# Patient Record
Sex: MALE | Race: WHITE | NOT HISPANIC OR LATINO | Employment: FULL TIME | URBAN - METROPOLITAN AREA
[De-identification: names, ages, dates, MRNs, and addresses within clinical notes are randomized per-mention and may not be internally consistent; named-entity substitution may affect disease eponyms.]

---

## 2017-12-10 ENCOUNTER — OFFICE VISIT (OUTPATIENT)
Dept: URGENT CARE | Facility: MEDICAL CENTER | Age: 47
End: 2017-12-10
Payer: COMMERCIAL

## 2017-12-10 LAB — S PYO AG THROAT QL: NEGATIVE

## 2017-12-10 PROCEDURE — 87430 STREP A AG IA: CPT

## 2017-12-10 PROCEDURE — 99213 OFFICE O/P EST LOW 20 MIN: CPT

## 2017-12-12 NOTE — PROGRESS NOTES
Assessment    1  Pharyngitis (462) (J02 9)   2  Acute upper respiratory infection (465 9) (J06 9)    Plan  Acute upper respiratory infection    · Benzonatate 200 MG Oral Capsule; TAKE 1 CAPSULE 3 TIMES DAILY AS NEEDED  Pharyngitis    · Rapid StrepA- POC; Source:Throat; Status:Resulted - Requires Verification;   Done:[id number] 10:52AM    Discussion/Summary  Discussion Summary:   1  Motrin as needed for throat painPush fluidsFollow-up with PCP if symptoms persist 4  Take Tessalon 1 every 8 hours as needed for cough  Medication Side Effects Reviewed: Possible side effects of new medications were reviewed with the patient/guardian today  Understands and agrees with treatment plan: The treatment plan was reviewed with the patient/guardian  The patient/guardian understands and agrees with the treatment plan      Chief Complaint    1  Sore Throat  Chief Complaint Free Text Note Form: Pt presents with 3 day history of sore throat worse at night , today slight cough  No OTC for sore throat, Delsym for cough with relief      History of Present Illness  HPI: The patient is a 47-year-old male presents with a 3 day history of sore throat, slight cough and nasal congestion  He denies fever since the onset of his symptoms, he has had no chills or body aches  Hospital Based Practices Required Assessment:  Pain Assessment  the patient states they have pain  The pain is located in the throat  (on a scale of 0 to 10, the patient rates the pain at 6 )  Abuse And Domestic Violence Screen   Yes, the patient is safe at home  -- The patient states no one is hurting them  Depression And Suicide Screen  No, the patient has not had thoughts of hurting themself  No, the patient has not felt depressed in the past 7 days  Prefered Language is  english  Primary Language is  english  Readiness To Learn: Receptive  Barriers To Learning: none    Preferred Learning: verbal      Review of Systems  Focused-Male:  Constitutional: no fever or chills, feels well, no tiredness, no recent weight loss or weight gain  ENT: sore throat, but-- no nasal discharge  Respiratory: cough-- and-- PND  Active Problems  1  Depression (311) (F32 9)    Past Medical History  1  Denied: Patient denies significant medical history  Active Problems And Past Medical History Reviewed: The active problems and past medical history were reviewed and updated today  Family History  Family History Reviewed: The family history was reviewed and updated today  Social History   · Former smoker (E68 25) (L54 075)  Social History Reviewed: The social history was reviewed and updated today  Surgical History    1  Denied: History Of Prior Surgery  Surgical History Reviewed: The surgical history was reviewed and updated today  Current Meds   1  Lexapro 10 MG Oral Tablet; Therapy: (Recorded:[id number]) to Recorded  Medication List Reviewed: The medication list was reviewed and updated today  Allergies    1  No Known Drug Allergies    Vitals  Signs   Recorded: [id number] 10:29AM   Temperature: 98 8 F  Heart Rate: 94  Respiration: 18  Systolic: 978  Diastolic: 74  Height: 5 ft 10 in  Weight: 160 lb   BMI Calculated: 22 96  BSA Calculated: 1 9  O2 Saturation: 97  Pain Scale: 6    Physical Exam   Constitutional  General appearance: No acute distress, well appearing and well nourished  Ears, Nose, Mouth, and Throat  External inspection of ears and nose: Normal    Otoscopic examination: Tympanic membrance translucent with normal light reflex  Canals patent without erythema  Nasal mucosa, septum, and turbinates: Abnormal  -- slight clear drainage bilateral   Oropharynx: Abnormal  -- injected, no exudate  Pulmonary  Respiratory effort: No increased work of breathing or signs of respiratory distress  Auscultation of lungs: Clear to auscultation  Cardiovascular  Auscultation of heart: Normal rate and rhythm, normal S1 and S2, without murmurs  Signatures   Electronically signed by : Trina Roman, [facility]; Dec 10 2017 10:53AM EST                       (Author)    Electronically signed by : KAILEE Blackmon ; Dec 11 2017  5:21PM EST                       (Co-author)

## 2018-01-23 VITALS
WEIGHT: 160 LBS | OXYGEN SATURATION: 97 % | DIASTOLIC BLOOD PRESSURE: 74 MMHG | RESPIRATION RATE: 18 BRPM | SYSTOLIC BLOOD PRESSURE: 124 MMHG | BODY MASS INDEX: 22.9 KG/M2 | HEART RATE: 94 BPM | TEMPERATURE: 98.8 F | HEIGHT: 70 IN

## 2018-05-07 ENCOUNTER — HOSPITAL ENCOUNTER (EMERGENCY)
Facility: HOSPITAL | Age: 48
Discharge: HOME | End: 2018-05-07
Attending: EMERGENCY MEDICINE | Admitting: EMERGENCY MEDICINE
Payer: COMMERCIAL

## 2018-05-07 ENCOUNTER — TELEPHONE (OUTPATIENT)
Dept: UROLOGY | Facility: MEDICAL CENTER | Age: 48
End: 2018-05-07

## 2018-05-07 ENCOUNTER — APPOINTMENT (EMERGENCY)
Dept: CT IMAGING | Facility: HOSPITAL | Age: 48
End: 2018-05-07
Payer: COMMERCIAL

## 2018-05-07 VITALS
TEMPERATURE: 97.7 F | WEIGHT: 160.72 LBS | DIASTOLIC BLOOD PRESSURE: 67 MMHG | OXYGEN SATURATION: 100 % | BODY MASS INDEX: 23.06 KG/M2 | RESPIRATION RATE: 18 BRPM | SYSTOLIC BLOOD PRESSURE: 102 MMHG | HEART RATE: 56 BPM

## 2018-05-07 DIAGNOSIS — N20.1 URETEROLITHIASIS: Primary | ICD-10-CM

## 2018-05-07 LAB
ALBUMIN SERPL BCP-MCNC: 3.6 G/DL (ref 3.5–5)
ALP SERPL-CCNC: 55 U/L (ref 46–116)
ALT SERPL W P-5'-P-CCNC: 29 U/L (ref 12–78)
ANION GAP SERPL CALCULATED.3IONS-SCNC: 8 MMOL/L (ref 4–13)
AST SERPL W P-5'-P-CCNC: 15 U/L (ref 5–45)
BACTERIA UR QL AUTO: ABNORMAL /HPF
BASOPHILS # BLD AUTO: 0.01 THOUSANDS/ΜL (ref 0–0.1)
BASOPHILS NFR BLD AUTO: 0 % (ref 0–1)
BILIRUB SERPL-MCNC: 0.7 MG/DL (ref 0.2–1)
BILIRUB UR QL STRIP: ABNORMAL
BILIRUB UR QL STRIP: NEGATIVE
BUN SERPL-MCNC: 13 MG/DL (ref 5–25)
CALCIUM SERPL-MCNC: 8.6 MG/DL (ref 8.3–10.1)
CHLORIDE SERPL-SCNC: 108 MMOL/L (ref 100–108)
CLARITY UR: ABNORMAL
CLARITY UR: CLEAR
CO2 SERPL-SCNC: 28 MMOL/L (ref 21–32)
COLOR UR: YELLOW
COLOR UR: YELLOW
CREAT SERPL-MCNC: 0.95 MG/DL (ref 0.6–1.3)
EOSINOPHIL # BLD AUTO: 0.03 THOUSAND/ΜL (ref 0–0.61)
EOSINOPHIL NFR BLD AUTO: 1 % (ref 0–6)
ERYTHROCYTE [DISTWIDTH] IN BLOOD BY AUTOMATED COUNT: 12.2 % (ref 11.6–15.1)
GFR SERPL CREATININE-BSD FRML MDRD: 96 ML/MIN/1.73SQ M
GLUCOSE SERPL-MCNC: 131 MG/DL (ref 65–140)
GLUCOSE UR STRIP-MCNC: NEGATIVE MG/DL
GLUCOSE UR STRIP-MCNC: NEGATIVE MG/DL
HCT VFR BLD AUTO: 39.9 % (ref 36.5–49.3)
HGB BLD-MCNC: 14.4 G/DL (ref 12–17)
HGB UR QL STRIP.AUTO: ABNORMAL
HGB UR QL STRIP.AUTO: ABNORMAL
KETONES UR STRIP-MCNC: NEGATIVE MG/DL
KETONES UR STRIP-MCNC: NEGATIVE MG/DL
LEUKOCYTE ESTERASE UR QL STRIP: ABNORMAL
LEUKOCYTE ESTERASE UR QL STRIP: ABNORMAL
LIPASE SERPL-CCNC: 146 U/L (ref 73–393)
LYMPHOCYTES # BLD AUTO: 1.69 THOUSANDS/ΜL (ref 0.6–4.47)
LYMPHOCYTES NFR BLD AUTO: 34 % (ref 14–44)
MCH RBC QN AUTO: 31.5 PG (ref 26.8–34.3)
MCHC RBC AUTO-ENTMCNC: 36.1 G/DL (ref 31.4–37.4)
MCV RBC AUTO: 87 FL (ref 82–98)
MONOCYTES # BLD AUTO: 0.33 THOUSAND/ΜL (ref 0.17–1.22)
MONOCYTES NFR BLD AUTO: 7 % (ref 4–12)
NEUTROPHILS # BLD AUTO: 2.85 THOUSANDS/ΜL (ref 1.85–7.62)
NEUTS SEG NFR BLD AUTO: 58 % (ref 43–75)
NITRITE UR QL STRIP: NEGATIVE
NITRITE UR QL STRIP: NEGATIVE
NON-SQ EPI CELLS URNS QL MICRO: ABNORMAL /HPF
PH UR STRIP.AUTO: 6 [PH] (ref 4.5–8)
PH UR STRIP.AUTO: 6 [PH] (ref 4.5–8)
PLATELET # BLD AUTO: 156 THOUSANDS/UL (ref 149–390)
PMV BLD AUTO: 9.7 FL (ref 8.9–12.7)
POTASSIUM SERPL-SCNC: 3.6 MMOL/L (ref 3.5–5.3)
PROT SERPL-MCNC: 6.6 G/DL (ref 6.4–8.2)
PROT UR STRIP-MCNC: ABNORMAL MG/DL
PROT UR STRIP-MCNC: ABNORMAL MG/DL
RBC # BLD AUTO: 4.57 MILLION/UL (ref 3.88–5.62)
RBC #/AREA URNS AUTO: ABNORMAL /HPF
SODIUM SERPL-SCNC: 144 MMOL/L (ref 136–145)
SP GR UR STRIP.AUTO: 1.02 (ref 1–1.03)
SP GR UR STRIP.AUTO: >=1.03 (ref 1–1.03)
UROBILINOGEN UR QL STRIP.AUTO: 2 E.U./DL
UROBILINOGEN UR QL STRIP.AUTO: 2 E.U./DL
WBC # BLD AUTO: 4.91 THOUSAND/UL (ref 4.31–10.16)
WBC #/AREA URNS AUTO: ABNORMAL /HPF

## 2018-05-07 PROCEDURE — 81001 URINALYSIS AUTO W/SCOPE: CPT | Performed by: EMERGENCY MEDICINE

## 2018-05-07 PROCEDURE — 74177 CT ABD & PELVIS W/CONTRAST: CPT

## 2018-05-07 PROCEDURE — 85025 COMPLETE CBC W/AUTO DIFF WBC: CPT | Performed by: EMERGENCY MEDICINE

## 2018-05-07 PROCEDURE — 96374 THER/PROPH/DIAG INJ IV PUSH: CPT

## 2018-05-07 PROCEDURE — 83690 ASSAY OF LIPASE: CPT | Performed by: EMERGENCY MEDICINE

## 2018-05-07 PROCEDURE — 99284 EMERGENCY DEPT VISIT MOD MDM: CPT

## 2018-05-07 PROCEDURE — 36415 COLL VENOUS BLD VENIPUNCTURE: CPT | Performed by: EMERGENCY MEDICINE

## 2018-05-07 PROCEDURE — 96375 TX/PRO/DX INJ NEW DRUG ADDON: CPT

## 2018-05-07 PROCEDURE — 96361 HYDRATE IV INFUSION ADD-ON: CPT

## 2018-05-07 PROCEDURE — 80053 COMPREHEN METABOLIC PANEL: CPT | Performed by: EMERGENCY MEDICINE

## 2018-05-07 RX ORDER — TAMSULOSIN HYDROCHLORIDE 0.4 MG/1
0.4 CAPSULE ORAL ONCE
Status: DISCONTINUED | OUTPATIENT
Start: 2018-05-07 | End: 2018-05-07 | Stop reason: HOSPADM

## 2018-05-07 RX ORDER — ESCITALOPRAM OXALATE 10 MG/1
20 TABLET ORAL DAILY
COMMUNITY
End: 2020-05-18 | Stop reason: SDUPTHER

## 2018-05-07 RX ORDER — ACETAMINOPHEN 325 MG/1
975 TABLET ORAL ONCE
Status: COMPLETED | OUTPATIENT
Start: 2018-05-07 | End: 2018-05-07

## 2018-05-07 RX ORDER — KETOROLAC TROMETHAMINE 30 MG/ML
15 INJECTION, SOLUTION INTRAMUSCULAR; INTRAVENOUS ONCE
Status: COMPLETED | OUTPATIENT
Start: 2018-05-07 | End: 2018-05-07

## 2018-05-07 RX ORDER — MORPHINE SULFATE 4 MG/ML
4 INJECTION, SOLUTION INTRAMUSCULAR; INTRAVENOUS ONCE
Status: COMPLETED | OUTPATIENT
Start: 2018-05-07 | End: 2018-05-07

## 2018-05-07 RX ORDER — OXYCODONE HYDROCHLORIDE AND ACETAMINOPHEN 5; 325 MG/1; MG/1
1 TABLET ORAL EVERY 6 HOURS PRN
Qty: 20 TABLET | Refills: 0 | Status: SHIPPED | OUTPATIENT
Start: 2018-05-07 | End: 2018-05-17

## 2018-05-07 RX ORDER — LORATADINE AND PSEUDOEPHEDRINE 10; 240 MG/1; MG/1
1 TABLET, EXTENDED RELEASE ORAL DAILY
COMMUNITY
End: 2021-01-21 | Stop reason: HOSPADM

## 2018-05-07 RX ORDER — TAMSULOSIN HYDROCHLORIDE 0.4 MG/1
0.4 CAPSULE ORAL
Qty: 10 CAPSULE | Refills: 0 | Status: ON HOLD | OUTPATIENT
Start: 2018-05-07 | End: 2018-05-10

## 2018-05-07 RX ADMIN — SODIUM CHLORIDE 1000 ML: 0.9 INJECTION, SOLUTION INTRAVENOUS at 07:30

## 2018-05-07 RX ADMIN — KETOROLAC TROMETHAMINE 15 MG: 30 INJECTION, SOLUTION INTRAMUSCULAR at 06:56

## 2018-05-07 RX ADMIN — MORPHINE SULFATE 4 MG: 4 INJECTION, SOLUTION INTRAMUSCULAR; INTRAVENOUS at 06:56

## 2018-05-07 RX ADMIN — IOHEXOL 100 ML: 350 INJECTION, SOLUTION INTRAVENOUS at 08:11

## 2018-05-07 RX ADMIN — ACETAMINOPHEN 975 MG: 325 TABLET, FILM COATED ORAL at 06:55

## 2018-05-07 NOTE — ED CARE HANDOFF
Emergency Department Sign Out Note        Sign out and transfer of care from Dr Brad Palomares  See Separate Emergency Department note  The patient, Teodora Lujan, was evaluated by the previous provider for left flank pain  Workup Completed:  Labs and CT scan    ED Course / Workup Pending (followup): Results of CMP, UA and CT scan                          ED Course as of May 07 1521   Mon May 07, 2018   9524 Patient and family updated on CT results  Pain improved  Systolic pressure 88 on most recent check  IV fluids infusing  Will reassess following their completion  [address] reviewed  Patient not identified in database  Procedures  MDM  CritCare Time      Disposition  Final diagnoses:   Ureterolithiasis     Time reflects when diagnosis was documented in both MDM as applicable and the Disposition within this note     Time User Action Codes Description Comment    5/7/2018  9:21 AM Scott LO Add [N20 1] Ureterolithiasis       ED Disposition     ED Disposition Condition Comment    Discharge  Teodora Lujan discharge to home/self care  Condition at discharge:         Follow-up Information     Follow up With Specialties Details Why [address] [place] [address] For Urology [facility] Schedule an appointment as soon as possible for a visit  [facility] [address] [phone]  [phone]        Discharge Medication List as of 5/7/2018  9:23 AM      START taking these medications    Details   oxyCODONE-acetaminophen (PERCOCET) 5-325 mg per tablet Take 1 tablet by mouth every 6 (six) hours as needed for moderate pain or severe pain Max Daily Amount: 4 tablets, Starting Mon 5/7/2018, Print      tamsulosin (FLOMAX) 0 4 mg Take 1 capsule (0 4 mg total) by mouth daily with dinner, Starting Mon 5/7/2018, Print         CONTINUE these medications which have NOT CHANGED    Details   escitalopram (LEXAPRO) 10 mg tablet Take 10 mg by mouth daily, Historical Med loratadine-pseudoephedrine (CLARITIN-D 24-HOUR)  mg per 24 hr tablet Take 1 tablet by mouth daily, Historical Med           No discharge procedures on file         ED Provider  Electronically Signed by     Denis Archibald MD  05/07/18 8482

## 2018-05-07 NOTE — ED PROVIDER NOTES
History  Chief Complaint   Patient presents with    Flank Pain     Pt reports to ED with c/o L flank pain that started about 1 hour ago     Patient is a pleasant 65-year-old male, denies any other significant past medical history who reports to the emergency department with left leg pain that started about 1 hour ago  Pain is severe, achy, no radiation to the testicle  No nausea, vomiting  No sweats  No abdominal pain  On exam he is clearly having pain and he is writhing around on the bed  No abdominal pain  He has no external signs of trauma  No lightheadedness or dizziness  Medical decision makin-year-old male, suspect kidney stone given the flank location and that he is writhing around on the bed, will CT scan the abdomen, treat his symptoms  The patient (and any family present) verbalized understanding of the discharge instructions and warnings that would necessitate return to the Emergency Department  Gave verbal in addition to written discharge instructions  Specifically highlighted areas of special concern regarding the written and verbal discharge instructions and return precautions  All questions were answered prior to discharge  Prior to Admission Medications   Prescriptions Last Dose Informant Patient Reported?  Taking?   escitalopram (LEXAPRO) 10 mg tablet 2018 at Unknown time  Yes Yes   Sig: Take 10 mg by mouth daily   loratadine-pseudoephedrine (CLARITIN-D 24-HOUR)  mg per 24 hr tablet 2018 at Unknown time  Yes Yes   Sig: Take 1 tablet by mouth daily      Facility-Administered Medications: None       Past Medical History:   Diagnosis Date    Renal disorder     kidney stones       Past Surgical History:   Procedure Laterality Date    APPENDECTOMY      [place] CYSTO/URETERO W/LITHOTRIPSY &INDWELL STENT INSRT Left 2018    Procedure: CYSTOSCOPY URETEROSCOPY,LEFT  RETROGRADE PYELOGRAM AND INSERTION STENT URETERAL;  Surgeon: Francis Hollis MD;  [facility];  Service: Urology       No family history on file  I have reviewed and agree with the history as documented  Social History   Substance Use Topics    Smoking status: Never Smoker    Smokeless tobacco: Never Used    Alcohol use Yes      Comment: ocassional        Review of Systems   Constitutional: Negative for chills and fever  Genitourinary: Positive for flank pain  Negative for hematuria  All other systems reviewed and are negative  Physical Exam  ED Triage Vitals [05/07/18 0629]   Temperature Pulse Respirations Blood Pressure SpO2   97 7 °F (36 5 °C) 57 18 104/60 97 %      Temp Source Heart Rate Source Patient Position - Orthostatic VS BP Location FiO2 (%)   Oral Monitor Lying Left arm --      Pain Score       7           Orthostatic Vital Signs  Vitals:    05/07/18 0629 05/07/18 0744 05/07/18 0900   BP: 104/60 96/64 102/67   Pulse: 57 60 56   Patient Position - Orthostatic VS: Lying Lying        Physical Exam   Constitutional: He is oriented to person, place, and time  He appears well-developed and well-nourished  HENT:   Head: Normocephalic and atraumatic  Eyes: EOM are normal  Pupils are equal, round, and reactive to light  Neck: Normal range of motion  Neck supple  Cardiovascular: Normal rate, regular rhythm and normal heart sounds  No murmur heard  Pulmonary/Chest: Effort normal and breath sounds normal  No respiratory distress  He has no wheezes  Abdominal: Soft  Bowel sounds are normal  He exhibits no distension  There is no tenderness  Musculoskeletal: Normal range of motion  He exhibits no edema or tenderness  Neurological: He is alert and oriented to person, place, and time  No cranial nerve deficit  Coordination normal    Skin: Skin is warm and dry  He is not diaphoretic  No erythema  Psychiatric: He has a normal mood and affect  His behavior is normal    Nursing note and vitals reviewed        ED Medications  Medications   ketorolac (TORADOL) injection 15 mg (15 mg Intravenous Given 5/7/18 0656)   acetaminophen (TYLENOL) tablet 975 mg (975 mg Oral Given 5/7/18 0655)   morphine (PF) 4 mg/mL injection 4 mg (4 mg Intravenous Given 5/7/18 0656)   sodium chloride 0 9 % bolus 1,000 mL (0 mL Intravenous Stopped 5/7/18 1015)   iohexol (OMNIPAQUE) 350 MG/ML injection (MULTI-DOSE) 100 mL (100 mL Intravenous Given 5/7/18 0811)       Diagnostic Studies  Results Reviewed     Procedure Component Value Units Date/Time    Urine Microscopic [[id number]]  (Abnormal) Collected:  05/07/18 0811    Lab Status:  Final result Specimen:  Urine from Urine, Clean Catch Updated:  05/07/18 0832     RBC, UA Innumerable (A) /hpf      WBC, UA 1-2 (A) /hpf      Epithelial Cells Occasional /hpf      Bacteria, UA Occasional /hpf     UA w Reflex to Microscopic w Reflex to Culture [[id number]]  (Abnormal) Collected:  05/07/18 0811    Lab Status:  Final result Specimen:  Urine from Urine, Clean Catch Updated:  05/07/18 0821     Color, UA Yellow     Clarity, UA Slightly Cloudy     Specific Gravity, UA 1 025     pH, UA 6 0     Leukocytes, UA Trace (A)     Nitrite, UA Negative     Protein, UA 30 (1+) (A) mg/dl      Glucose, UA Negative mg/dl      Ketones, UA Negative mg/dl      Urobilinogen, UA 2 0 (A) E U /dl      Bilirubin, UA Small (A)     Blood, UA Large (A)    ED Urine Macroscopic [[id number]]  (Abnormal) Collected:  05/07/18 0812    Lab Status:  Final result Specimen:  Urine Updated:  05/07/18 0810     Color, UA Yellow     Clarity, UA Clear     pH, UA 6 0     Leukocytes, UA Trace (A)     Nitrite, UA Negative     Protein,  (2+) (A) mg/dl      Glucose, UA Negative mg/dl      Ketones, UA Negative mg/dl      Urobilinogen, UA 2 0 (A) E U /dl      Bilirubin, UA Negative     Blood, UA Large (A)     Specific Gravity, UA >=1 030    Narrative:       CLINITEK RESULT    Comprehensive metabolic panel [[id number]] Collected:  05/07/18 0652    Lab Status:  Final result Specimen:  Blood from Arm, Right Updated: 05/07/18 0716     Sodium 144 mmol/L      Potassium 3 6 mmol/L      Chloride 108 mmol/L      CO2 28 mmol/L      Anion Gap 8 mmol/L      BUN 13 mg/dL      Creatinine 0 95 mg/dL      Glucose 131 mg/dL      Calcium 8 6 mg/dL      AST 15 U/L      ALT 29 U/L      Alkaline Phosphatase 55 U/L      Total Protein 6 6 g/dL      Albumin 3 6 g/dL      Total Bilirubin 0 70 mg/dL      eGFR 96 ml/min/1 73sq m     Narrative:         National Kidney Disease Education Program recommendations are as follows:  GFR calculation is accurate only with a steady state creatinine  Chronic Kidney disease less than 60 ml/min/1 73 sq  meters  Kidney failure less than 15 ml/min/1 73 sq  meters  Lipase [[id number]]  (Normal) Collected:  05/07/18 0652    Lab Status:  Final result Specimen:  Blood from Arm, Right Updated:  05/07/18 0716     Lipase 146 u/L     CBC and differential [[id number]]  (Normal) Collected:  05/07/18 0652    Lab Status:  Final result Specimen:  Blood from Arm, Right Updated:  05/07/18 0658     WBC 4 91 Thousand/uL      RBC 4 57 Million/uL      Hemoglobin 14 4 g/dL      Hematocrit 39 9 %      MCV 87 fL      MCH 31 5 pg      MCHC 36 1 g/dL      RDW 12 2 %      MPV 9 7 fL      Platelets 345 Thousands/uL      Neutrophils Relative 58 %      Lymphocytes Relative 34 %      Monocytes Relative 7 %      Eosinophils Relative 1 %      Basophils Relative 0 %      Neutrophils Absolute 2 85 Thousands/µL      Lymphocytes Absolute 1 69 Thousands/µL      Monocytes Absolute 0 33 Thousand/µL      Eosinophils Absolute 0 03 Thousand/µL      Basophils Absolute 0 01 Thousands/µL                  CT abdomen pelvis with contrast   Final Result by Lenwood Crigler, MD (05/07 0882)      1  Proximal left ureteral 5 mm calculus resulting in moderate left hydroureteronephrosis and slightly delayed left nephrogram without further urinary calculi identified  2   Cholelithiasis without cholecystitis  3   Fatty liver                 Workstation performed: [id number]                    Procedures  Procedures       Phone Contacts  ED Phone Contact    ED Course                               MDM  CritCare Time    Disposition  Final diagnoses:   Ureterolithiasis     Time reflects when diagnosis was documented in both MDM as applicable and the Disposition within this note     Time User Action Codes Description Comment    5/7/2018  9:21 AM Colin [place] A Add [N20 1] Ureterolithiasis       ED Disposition     ED Disposition Condition Comment    Discharge  Shaunna Morgan discharge to home/self care  Condition at discharge: Follow-up Information     Follow up With Specialties Details Why T[facility]  [address]  For Urology [facility] Schedule an appointment as soon as possible for a visit  [address] [phone]  [phone]        Discharge Medication List as of 5/7/2018  9:23 AM      START taking these medications    Details   oxyCODONE-acetaminophen (PERCOCET) 5-325 mg per tablet Take 1 tablet by mouth every 6 (six) hours as needed for moderate pain or severe pain Max Daily Amount: 4 tablets, Starting Mon 5/7/2018, Print      tamsulosin (FLOMAX) 0 4 mg Take 1 capsule (0 4 mg total) by mouth daily with dinner, Starting Mon 5/7/2018, Print         CONTINUE these medications which have NOT CHANGED    Details   escitalopram (LEXAPRO) 10 mg tablet Take 10 mg by mouth daily, Historical Med      loratadine-pseudoephedrine (CLARITIN-D 24-HOUR)  mg per 24 hr tablet Take 1 tablet by mouth daily, Historical Med           No discharge procedures on file      ED Provider  Electronically Signed by           David Brooks MD  05/10/18 2219       David Brooks MD  05/10/18 2214

## 2018-05-07 NOTE — DISCHARGE INSTRUCTIONS
Ureteral Stones   WHAT YOU NEED TO KNOW:   A ureteral stone is a stone that forms in the kidney and moves down the ureter and gets stuck there  The ureter is the tube that takes urine from the kidney to the bladder  Stones can form in the urinary system when your urine has high levels of minerals and salts  Urinary stones can be made of uric acid, calcium, phosphate, or oxalate crystals  DISCHARGE INSTRUCTIONS:   Return to the emergency department if:   · You have severe pain that does not improve, even after you take medicine  · You have vomiting that is not relieved by medicine  · You develop a fever  Contact your healthcare provider if:   · You develop a fever  · You have any questions or concerns about your condition or care  Follow up with your healthcare provider as directed: You may need to return for more tests  Write down your questions so you remember to ask them during your visits  Medicines: You may need any of the following:  · NSAIDs , such as ibuprofen, help decrease swelling, pain, and fever  This medicine is available with or without a doctor's order  NSAIDs can cause stomach bleeding or kidney problems in certain people  If you take blood thinner medicine, always ask your healthcare provider if NSAIDs are safe for you  Always read the medicine label and follow directions  · Prescription pain medicine  may help decrease pain or help your ureteral stone pass  Do not wait until the pain is severe before you take pain medicine  · Nausea medicine  may help calm your stomach and prevent vomiting  · Take your medicine as directed  Contact your healthcare provider if you think your medicine is not helping or if you have side effects  Tell him or her if you are allergic to any medicine  Keep a list of the medicines, vitamins, and herbs you take  Include the amounts, and when and why you take them  Bring the list or the pill bottles to follow-up visits   Carry your medicine list with you in case of an emergency  Self-care:   · Drink plenty of liquids  Your healthcare provider may tell you to drink at least 8 to 12 (eight-ounce) cups of liquids each day  This helps flush out the ureteral stones when you urinate  Water is the best liquid to drink  · Strain your urine every time you go to the bathroom  Urinate through a strainer or a piece of thin cloth to catch the stones  Take the stones to your healthcare provider so they can be sent to the lab for tests  This will help your healthcare providers plan the best treatment for you  · Ask your healthcare provider about any nutrition changes you need to make  You may need to limit certain foods such as foods high in sodium (salt), certain protein foods, or foods high in oxalate  After you pass your ureteral stone: Once you have passed your ureteral stone, you may need to do a 24-hour urine test  You may need to save all of your urine for 24 hours  Each time you go to the bathroom, you will urinate into a container  Then you will pour your urine into a larger container that is kept cold  You may be told to write down the time and amount of urine you passed  At the end of 24 hours, the urine is sent to a lab for tests  Results from the test will help your healthcare provider plan ways to prevent more stones from forming  © 2017 [employer] Information is for End User's use only and may not be sold, redistributed or otherwise used for commercial purposes  All illustrations and images included in CareNotes® are the copyrighted property of A D A M [employer]  or [employer]  The above information is an  only  It is not intended as medical advice for individual conditions or treatments  Talk to your doctor, nurse or pharmacist before following any medical regimen to see if it is safe and effective for you

## 2018-05-07 NOTE — TELEPHONE ENCOUNTER
Recd message that patient called for ER follow up for stone,  New patient to our practice  Reviewed EPIC  Evaluated in ER 5/7/18 proximal left ureteral 5 mm calculus with hydronephrosis, initiated on Tamsulosin and provided with pain script  Please schedule new patient appointment with MD or PA within one week

## 2018-05-07 NOTE — ED NOTES
Patient aware of needed urine sample, tried but was unable to void at this time        Deneise Lauryn Garcia  05/07/18 0636

## 2018-05-08 ENCOUNTER — HOSPITAL ENCOUNTER (INPATIENT)
Facility: HOSPITAL | Age: 48
LOS: 1 days | Discharge: HOME | DRG: 690 | End: 2018-05-10
Attending: EMERGENCY MEDICINE | Admitting: HOSPITALIST
Payer: COMMERCIAL

## 2018-05-08 ENCOUNTER — APPOINTMENT (EMERGENCY)
Dept: RADIOLOGY | Facility: HOSPITAL | Age: 48
DRG: 690 | End: 2018-05-08
Payer: COMMERCIAL

## 2018-05-08 DIAGNOSIS — N20.0 LEFT NEPHROLITHIASIS: Primary | ICD-10-CM

## 2018-05-08 DIAGNOSIS — N20.1 URETEROLITHIASIS: ICD-10-CM

## 2018-05-08 DIAGNOSIS — R52 INTRACTABLE PAIN: ICD-10-CM

## 2018-05-08 PROBLEM — F41.1 GENERALIZED ANXIETY DISORDER: Chronic | Status: ACTIVE | Noted: 2018-05-08

## 2018-05-08 LAB
ANION GAP SERPL CALCULATED.3IONS-SCNC: 9 MMOL/L (ref 4–13)
BASOPHILS # BLD AUTO: 0 THOUSANDS/ΜL (ref 0–0.1)
BASOPHILS NFR BLD AUTO: 0 % (ref 0–1)
BUN SERPL-MCNC: 10 MG/DL (ref 5–25)
CALCIUM SERPL-MCNC: 8.6 MG/DL (ref 8.3–10.1)
CHLORIDE SERPL-SCNC: 106 MMOL/L (ref 100–108)
CO2 SERPL-SCNC: 26 MMOL/L (ref 21–32)
CREAT SERPL-MCNC: 0.91 MG/DL (ref 0.6–1.3)
EOSINOPHIL # BLD AUTO: 0 THOUSAND/ΜL (ref 0–0.61)
EOSINOPHIL NFR BLD AUTO: 0 % (ref 0–6)
ERYTHROCYTE [DISTWIDTH] IN BLOOD BY AUTOMATED COUNT: 12.6 % (ref 11.6–15.1)
GFR SERPL CREATININE-BSD FRML MDRD: 101 ML/MIN/1.73SQ M
GLUCOSE SERPL-MCNC: 127 MG/DL (ref 65–140)
HCT VFR BLD AUTO: 37 % (ref 36.5–49.3)
HGB BLD-MCNC: 13 G/DL (ref 12–17)
LYMPHOCYTES # BLD AUTO: 1.01 THOUSANDS/ΜL (ref 0.6–4.47)
LYMPHOCYTES NFR BLD AUTO: 13 % (ref 14–44)
MCH RBC QN AUTO: 31.2 PG (ref 26.8–34.3)
MCHC RBC AUTO-ENTMCNC: 35.1 G/DL (ref 31.4–37.4)
MCV RBC AUTO: 89 FL (ref 82–98)
MONOCYTES # BLD AUTO: 0.31 THOUSAND/ΜL (ref 0.17–1.22)
MONOCYTES NFR BLD AUTO: 4 % (ref 4–12)
NEUTROPHILS # BLD AUTO: 6.59 THOUSANDS/ΜL (ref 1.85–7.62)
NEUTS SEG NFR BLD AUTO: 83 % (ref 43–75)
PLATELET # BLD AUTO: 137 THOUSANDS/UL (ref 149–390)
PMV BLD AUTO: 9.6 FL (ref 8.9–12.7)
POTASSIUM SERPL-SCNC: 4.1 MMOL/L (ref 3.5–5.3)
RBC # BLD AUTO: 4.17 MILLION/UL (ref 3.88–5.62)
SODIUM SERPL-SCNC: 141 MMOL/L (ref 136–145)
WBC # BLD AUTO: 7.91 THOUSAND/UL (ref 4.31–10.16)

## 2018-05-08 PROCEDURE — 99244 OFF/OP CNSLTJ NEW/EST MOD 40: CPT | Performed by: NURSE PRACTITIONER

## 2018-05-08 PROCEDURE — 36415 COLL VENOUS BLD VENIPUNCTURE: CPT | Performed by: EMERGENCY MEDICINE

## 2018-05-08 PROCEDURE — 80048 BASIC METABOLIC PNL TOTAL CA: CPT | Performed by: EMERGENCY MEDICINE

## 2018-05-08 PROCEDURE — 74018 RADEX ABDOMEN 1 VIEW: CPT

## 2018-05-08 PROCEDURE — 96374 THER/PROPH/DIAG INJ IV PUSH: CPT

## 2018-05-08 PROCEDURE — 99220 PR INITIAL OBSERVATION CARE/DAY 70 MINUTES: CPT | Performed by: HOSPITALIST

## 2018-05-08 PROCEDURE — 85025 COMPLETE CBC W/AUTO DIFF WBC: CPT | Performed by: EMERGENCY MEDICINE

## 2018-05-08 PROCEDURE — 99285 EMERGENCY DEPT VISIT HI MDM: CPT

## 2018-05-08 PROCEDURE — 96361 HYDRATE IV INFUSION ADD-ON: CPT

## 2018-05-08 RX ORDER — ESCITALOPRAM OXALATE 10 MG/1
10 TABLET ORAL DAILY
Status: DISCONTINUED | OUTPATIENT
Start: 2018-05-08 | End: 2018-05-10 | Stop reason: HOSPADM

## 2018-05-08 RX ORDER — TAMSULOSIN HYDROCHLORIDE 0.4 MG/1
0.4 CAPSULE ORAL
Status: DISCONTINUED | OUTPATIENT
Start: 2018-05-08 | End: 2018-05-10

## 2018-05-08 RX ORDER — SODIUM CHLORIDE 9 MG/ML
75 INJECTION, SOLUTION INTRAVENOUS CONTINUOUS
Status: DISCONTINUED | OUTPATIENT
Start: 2018-05-08 | End: 2018-05-10 | Stop reason: HOSPADM

## 2018-05-08 RX ORDER — OXYCODONE HYDROCHLORIDE AND ACETAMINOPHEN 5; 325 MG/1; MG/1
1 TABLET ORAL EVERY 6 HOURS PRN
Status: DISCONTINUED | OUTPATIENT
Start: 2018-05-08 | End: 2018-05-08

## 2018-05-08 RX ORDER — OXYCODONE HYDROCHLORIDE AND ACETAMINOPHEN 5; 325 MG/1; MG/1
1 TABLET ORAL EVERY 6 HOURS PRN
Status: DISCONTINUED | OUTPATIENT
Start: 2018-05-08 | End: 2018-05-10 | Stop reason: HOSPADM

## 2018-05-08 RX ORDER — OXYCODONE HYDROCHLORIDE 10 MG/1
10 TABLET ORAL EVERY 4 HOURS PRN
Status: DISCONTINUED | OUTPATIENT
Start: 2018-05-08 | End: 2018-05-08

## 2018-05-08 RX ORDER — KETOROLAC TROMETHAMINE 30 MG/ML
15 INJECTION, SOLUTION INTRAMUSCULAR; INTRAVENOUS ONCE
Status: COMPLETED | OUTPATIENT
Start: 2018-05-08 | End: 2018-05-08

## 2018-05-08 RX ORDER — OXYCODONE HYDROCHLORIDE 10 MG/1
10 TABLET ORAL EVERY 4 HOURS PRN
Status: DISCONTINUED | OUTPATIENT
Start: 2018-05-08 | End: 2018-05-10 | Stop reason: HOSPADM

## 2018-05-08 RX ORDER — KETOROLAC TROMETHAMINE 30 MG/ML
15 INJECTION, SOLUTION INTRAMUSCULAR; INTRAVENOUS EVERY 6 HOURS PRN
Status: DISCONTINUED | OUTPATIENT
Start: 2018-05-08 | End: 2018-05-10 | Stop reason: HOSPADM

## 2018-05-08 RX ADMIN — SODIUM CHLORIDE 1000 ML: 0.9 INJECTION, SOLUTION INTRAVENOUS at 09:16

## 2018-05-08 RX ADMIN — TAMSULOSIN HYDROCHLORIDE 0.4 MG: 0.4 CAPSULE ORAL at 16:33

## 2018-05-08 RX ADMIN — SODIUM CHLORIDE 75 ML/HR: 0.9 INJECTION, SOLUTION INTRAVENOUS at 14:53

## 2018-05-08 RX ADMIN — KETOROLAC TROMETHAMINE 15 MG: 30 INJECTION, SOLUTION INTRAMUSCULAR at 09:16

## 2018-05-08 RX ADMIN — ENOXAPARIN SODIUM 40 MG: 40 INJECTION SUBCUTANEOUS at 16:34

## 2018-05-08 NOTE — H&P
H&P- Kathrin Moody 1970, 57 y o  male MRN: [id number]    Unit/Bed#: ED 28 Encounter: [id number]    Primary Care Provider: Xander Burnette MD   Date and time admitted to hospital: 5/8/2018  8:44 AM        * Left nephrolithiasis   Assessment & Plan     Pain, left groin 10/10, controlled on Percocet   KUB 5/ 8:5 mm left mid ureteric calculus at the L3-4 level with minimal if any caudal migration compared to recent CT  CT scan abdomen pelvis 5/ 7:  1   Proximal left ureteral 5 mm calculus resulting in moderate left hydroureteronephrosis and slightly delayed left nephrogram without further urinary calculi identified  2   Cholelithiasis without cholecystitis  3   Fatty liver  plan   IV hydration   analgesia for pain   urology consult   follow-up a m  labs        Generalized anxiety disorder   Assessment & Plan     Continue Lexapro              VTE Prophylaxis: Enoxaparin (Lovenox)  / sequential compression device   Code Status: full code  POLST: POLST form is not discussed and not completed at this time  Discussion with family:  Yes with wife    Anticipated Length of Stay:  Patient will be admitted on an Observation basis with an anticipated length of stay of  <2 midnights  Justification for Hospital Stay:  Medical management of the nephrolithiasis    Total Time for Visit, including Counseling / Coordination of Care: 45 minutes  Greater than 50% of this total time spent on direct patient counseling and coordination of care  Chief Complaint:    Left groin pain    History of Present Illness:    Kathrin Moody is a 57 y o  male who presents with left groin pain which started  Two days ago  Patient visited the ER 05/07/2018 with complaints of left groin pain worsening  Patient was found to have left-sided nephrolithiasis 5 mm left mid ureter calculus at L3-L4  Patient was discharged on Percocet for pain control  Earlier this morning at 1:30 a m   Patient reported severe left groin pain, took Percocet, went to sleep, woke up back with worsening severe left point pain  Reported cold sweats, shaking chills  Denied any nausea, vomiting, fever  Pain is localized left groin, 10/10  Intensity, intermittent,  No aggravating factors  Relieved with Percocet  Past medical history of generalized anxiety disorder, appendicectomy 15 years ago  Review of Systems:    Review of Systems   Constitutional: Positive for activity change and chills  Negative for fever  HENT: Negative  Eyes: Negative  Respiratory: Negative  Cardiovascular: Negative  Gastrointestinal: Negative  Endocrine: Negative  Genitourinary: Negative for decreased urine volume, difficulty urinating, dysuria and frequency  Musculoskeletal: Negative  Allergic/Immunologic: Negative  Neurological: Negative  Hematological: Negative  Psychiatric/Behavioral: Negative  Past Medical and Surgical History:     Past Medical History:   Diagnosis Date    Renal disorder     kidney stones       Past Surgical History:   Procedure Laterality Date    APPENDECTOMY         Meds/Allergies:    Prior to Admission medications    Medication Sig Start Date End Date Taking? Authorizing Provider   escitalopram (LEXAPRO) 10 mg tablet Take 10 mg by mouth daily    Historical Provider, MD   loratadine-pseudoephedrine (CLARITIN-D 24-HOUR)  mg per 24 hr tablet Take 1 tablet by mouth daily    Historical Provider, MD   oxyCODONE-acetaminophen (PERCOCET) 5-325 mg per tablet Take 1 tablet by mouth every 6 (six) hours as needed for moderate pain or severe pain Max Daily Amount: 4 tablets 5/7/18   Berny Pete MD   [facility] 0 4 mg Take 1 capsule (0 4 mg total) by mouth daily with dinner 5/7/18   Berny Pete MD     I have reviewed home medications with patient personally  Allergies:    Allergies   Allergen Reactions    Betadine Antibiotic-Moisturize [Bacitracin-Polymyxin B] Rash       Social History: Marital Status: Unknown   Occupation:   Employed  Patient Pre-hospital Living Situation:  With wife  Patient Pre-hospital Level of Mobility: ambulates without assistance  Patient Pre-hospital Diet Restrictions: none  Substance Use History:   History   Alcohol Use    Yes     Comment: ocassional     History   Smoking Status    Never Smoker   Smokeless Tobacco    Never Used     History   Drug Use No       Family History:   family history positive for grandfather paternal -kidney disease, mother with lung cancer, father with high cholesterol    Physical Exam:     Vitals:   Blood Pressure: 102/55 (05/08/18 1213)  Pulse: 81 (05/08/18 1213)  Temperature: 98 9 °F (37 2 °C) (05/08/18 0851)  Temp Source: Oral (05/08/18 0851)  Respirations: 18 (05/08/18 1213)  Weight - Scale: 74 4 kg (164 lb 0 4 oz) (05/08/18 0851)  SpO2: 97 % (05/08/18 1213)    Physical Exam   Constitutional: He is oriented to person, place, and time  He appears distressed  HENT:   Head: Normocephalic and atraumatic  Mouth/Throat: Oropharynx is clear and moist    Eyes: EOM are normal  Pupils are equal, round, and reactive to light  No scleral icterus  Neck: Normal range of motion  No JVD present  Cardiovascular: Normal rate  Exam reveals no gallop and no friction rub  No murmur heard  Pulmonary/Chest: Effort normal and breath sounds normal  No respiratory distress  He has no wheezes  He has no rales  Abdominal: Soft  Bowel sounds are normal  He exhibits no distension  There is no tenderness  There is no rebound and no guarding  Musculoskeletal: Normal range of motion  He exhibits no edema, tenderness or deformity  Neurological: He is alert and oriented to person, place, and time  No cranial nerve deficit  Coordination normal    Skin: Skin is warm  No rash noted  No erythema  Psychiatric: He has a normal mood and affect  His behavior is normal            Additional Data:     Lab Results: I have personally reviewed pertinent reports  Results from last 7 days  Lab Units 05/08/18  0916   WBC Thousand/uL 7 91   HEMOGLOBIN g/dL 13 0   HEMATOCRIT % 37 0   PLATELETS Thousands/uL 137*   NEUTROS PCT % 83*   LYMPHS PCT % 13*   MONOS PCT % 4   EOS PCT % 0       Results from last 7 days  Lab Units 05/08/18  0916 05/07/18  0652   SODIUM mmol/L 141 144   POTASSIUM mmol/L 4 1 3 6   CHLORIDE mmol/L 106 108   CO2 mmol/L 26 28   BUN mg/dL 10 13   CREATININE mg/dL 0 91 0 95   CALCIUM mg/dL 8 6 8 6   TOTAL PROTEIN g/dL  --  6 6   BILIRUBIN TOTAL mg/dL  --  0 70   ALK PHOS U/L  --  55   ALT U/L  --  29   AST U/L  --  15   GLUCOSE RANDOM mg/dL 127 131                   Imaging: I have personally reviewed pertinent reports  XR abdomen 1 view kub   ED Interpretation by Sarthak Matute DO (05/08 0390)   Unchanged appearance of proximal left-sided ureteral stone      Final Result by Felix Sánchez DO (05/08 7898)      5 mm left mid ureteric calculus at the L3-4 level with minimal if any caudal migration compared to recent CT  Workstation performed: [id number]             EKG, Pathology, and Other Studies Reviewed on Admission:   · EKG: n/a    Allscripts / Epic Records Reviewed: Yes     ** Please Note: This note has been constructed using a voice recognition system   **

## 2018-05-08 NOTE — PLAN OF CARE
Problem: DISCHARGE PLANNING - CARE MANAGEMENT  Goal: Discharge to post-acute care or home with appropriate resources  INTERVENTIONS:  - Conduct assessment to determine patient/family and health care team treatment goals, and need for post-acute services based on payer coverage, community resources, and patient preferences, and barriers to discharge  - Address psychosocial, clinical, and financial barriers to discharge as identified in assessment in conjunction with the patient/family and health care team  - Arrange appropriate level of post-acute services according to patients   needs and preference and payer coverage in collaboration with the physician and health care team  - Communicate with and update the patient/family, physician, and health care team regarding progress on the discharge plan  - Arrange appropriate transportation to post-acute venues  Outcome: Progressing  LOS- 0   PATIENT IS NOT A BUNDLE  PATIENT IS NOT  A READMIT  CM met with patient at bedside along with his wife  Patient lives with his family in [facility] in a 2 story house  Patient uses no DME and can complete ADLS by himself  Patient has no Hx of VNA, rehab, MH , or s&a  Patient uses the [facility] on [address]  Patient has no POA and want no info  Patient works at [employer] and can drive himself  CM dept will continue to follow until d/c

## 2018-05-08 NOTE — ASSESSMENT & PLAN NOTE
Pain, left groin 10/10, controlled on Percocet   KUB 5/ 8:5 mm left mid ureteric calculus at the L3-4 level with minimal if any caudal migration compared to recent CT  CT scan abdomen pelvis 5/ 7:  1   Proximal left ureteral 5 mm calculus resulting in moderate left hydroureteronephrosis and slightly delayed left nephrogram without further urinary calculi identified  2   Cholelithiasis without cholecystitis  3   Fatty liver     plan   IV hydration   analgesia for pain   urology consult   follow-up a m  labs

## 2018-05-08 NOTE — PLAN OF CARE
GENITOURINARY - ADULT     Maintains or returns to baseline urinary function Progressing     Absence of urinary retention Progressing        PAIN - ADULT     Verbalizes/displays adequate comfort level or baseline comfort level Progressing        SAFETY ADULT     Patient will remain free of falls Progressing

## 2018-05-08 NOTE — CONSULTS
CONSULT    Patient Name: Lenin Varma  Patient MRN: [id number]  Date of Service: 5/8/2018   Date / Time Note Created: 5/8/2018 4:12 PM   Referring Provider: Kesha Guidry MD  Provider Creating Note: EDWAR Summers    PCP: Gerardo Espinoza  Attending Provider:  Kesha Guidry MD    Reason for Consult: Flank Pain    History of present illness:  Mr Terra Rowland is a very pleasant 61-year-old male who was seen in the emergency room yesterday for chief complaint of left flank pain; not accompanied by fever, chills, testicular or groin pain or gross hematuria  CT scan of the abdomen and pelvis were demonstrative for a left 5 mm obstructing ureteral calculus with resultant hydronephrosis  Patient denies any prior urologic evaluation, consultation or  surgical manipulation in the past   Current temperature is 98 7° with normal creatinine and normal WBCs  In the emergency room, provider's 1 unable to stop blush adequate pain control  Urologic consultation was requested for further surgical management  Active Problems:   Patient Active Problem List   Diagnosis    Left nephrolithiasis    Generalized anxiety disorder       Impressions  Left Ureteral Calculus  Hydronephrosis  Renal Colic    Recommendations  1  Initiate aggressive IVFs   Flomax  3  Analgesia/Narcotics 5  Anti-emetics 5  ATBs empirically while awaiting culture 6  Strain urine 7  NPO after Midnight for OR if no spontaneous expulsion achieved  Explained risk, benefits and potential complications of ureteroscopic stone extraction  Informed consent by surgeon  Past Medical History:   Diagnosis Date    Renal disorder     kidney stones       Past Surgical History:   Procedure Laterality Date    APPENDECTOMY         History reviewed  No pertinent family history  Social History     Social History    Marital status: Unknown     Spouse name: N/A    Number of children: N/A    Years of education: N/A     Occupational History    Not on file  Social History Main Topics    Smoking status: Never Smoker    Smokeless tobacco: Never Used    Alcohol use Yes      Comment: ocassional    Drug use: No    Sexual activity: Not on file     Other Topics Concern    Not on file     Social History Narrative    No narrative on file       Allergies   Allergen Reactions    Betadine Antibiotic-Moisturize [Bacitracin-Polymyxin B] Rash       Review of Systems  10 point review of systems negative except as noted in HPI  Constitutional:   negative for - chills or fever  Cardiovascular:   no chest pain or dyspnea on exertion  Gastrointestinal:   positive for - abdominal pain  Genito-Urinary:   no dysuria, trouble voiding, or hematuria  Neurological:   no TIA or stroke symptoms     Chart Review   Allergies, current medications, history, problem list    Vital Signs  /63 (BP Location: Right arm)   Pulse 57   Temp 98 7 °F (37 1 °C) (Oral)   Resp 18   Ht 5' 10" (1 778 m)   Wt 74 4 kg (164 lb 0 4 oz)   SpO2 99%   BMI 23 53 kg/m²     Physical Exam  General appearance: alert and oriented, in no acute distress  Head: Normocephalic, without obvious abnormality, atraumatic  Neck: no adenopathy, no carotid bruit, no JVD, supple, symmetrical, trachea midline and thyroid not enlarged, symmetric, no tenderness/mass/nodules  Lungs: clear to auscultation bilaterally  Heart: regular rate and rhythm, S1, S2 normal, no murmur, click, rub or gallop  Abdomen: abnormal findings:  mild tenderness in the lower abdomen  Extremities: extremities normal, warm and well-perfused; no cyanosis, clubbing, or edema  Pulses: 2+ and symmetric  Neurologic: Grossly normal  No urinary drains     Laboratory Studies  Lab Results   Component Value Date     05/08/2018    K 4 1 05/08/2018     05/08/2018    CO2 26 05/08/2018    GLUCOSE 127 05/08/2018    CREATININE 0 91 05/08/2018    BUN 10 05/08/2018     Lab Results   Component Value Date    WBC 7 91 05/08/2018    RBC 4 17 05/08/2018 HGB 13 0 05/08/2018    HCT 37 0 05/08/2018    MCV 89 05/08/2018    MCH 31 2 05/08/2018    RDW 12 6 05/08/2018     (L) 05/08/2018         Imaging and Other Studies  )  Xr Abdomen 1 View Kub    Result Date: 5/8/2018  Narrative: ABDOMEN INDICATION:   Evaluate position of known left ureteral 5 mm calculus  COMPARISON:  CT abdomen pelvis from yesterday VIEWS:  AP supine Images: 2 FINDINGS: 5 mm left ureteric calculus is seen at the L3-4 level  Minimal if any caudal migration noted compared to recent CT  There is a nonobstructive bowel gas pattern  Modest amount of scattered colonic stool  No discernible free air on this supine study  Upright or left lateral decubitus imaging is more sensitive to detect subtle free air in the appropriate setting  Subtle right upper quadrant calcification likely corresponding to known gallstone  Visualized lung bases are clear  Visualized osseous structures are unremarkable for the patient's age  Impression: 5 mm left mid ureteric calculus at the L3-4 level with minimal if any caudal migration compared to recent CT  Workstation performed: [id number]     Ct Abdomen Pelvis With Contrast    Result Date: 5/7/2018  Narrative: CT ABDOMEN AND PELVIS WITH IV CONTRAST INDICATION:   left flank pain, likely kidney stone  COMPARISON: None  TECHNIQUE:  CT examination of the abdomen and pelvis was performed  Axial, sagittal, and coronal 2D reformatted images were created from the source data and submitted for interpretation  Radiation dose length product (DLP) for this visit:  445 mGy-cm   This examination, like all CT scans performed in the [facility], was performed utilizing techniques to minimize radiation dose exposure, including the use of iterative reconstruction and automated exposure control  IV Contrast:  100 mL of iohexol (OMNIPAQUE) Enteric Contrast:  Enteric contrast was not administered   FINDINGS: ABDOMEN LOWER CHEST:  No clinically significant abnormality identified in the visualized lower chest  LIVER/BILIARY TREE:  Mild fatty liver noted  GALLBLADDER:  Cholelithiasis without cholecystitis  SPLEEN:  Unremarkable  PANCREAS:  Unremarkable  ADRENAL GLANDS:  Unremarkable  KIDNEYS/URETERS:  5 mm proximal left ureteral calculus identified at the L3 level resulting in moderate left hydroureteronephrosis  Slightly delayed left nephrogram   The distal ureter is decompressed  No further urinary calculi  STOMACH AND BOWEL:  Mild fecal stasis noted  APPENDIX:  No findings to suggest appendicitis  ABDOMINOPELVIC CAVITY:  No ascites or free intraperitoneal air  No lymphadenopathy  VESSELS:  Unremarkable for patient's age  PELVIS REPRODUCTIVE ORGANS:  Unremarkable for patient's age  URINARY BLADDER:  Unremarkable  ABDOMINAL WALL/INGUINAL REGIONS:  Unremarkable  OSSEOUS STRUCTURES:  No acute fracture or destructive osseous lesion  Impression: 1  Proximal left ureteral 5 mm calculus resulting in moderate left hydroureteronephrosis and slightly delayed left nephrogram without further urinary calculi identified  2   Cholelithiasis without cholecystitis  3   Fatty liver  Workstation performed: [id number]       Medications   Scheduled Meds:  Current Facility-Administered Medications:  enoxaparin 40 mg Subcutaneous Daily Scott Munoz MD    escitalopram 10 mg Oral Daily Scott Munoz MD    oxyCODONE 10 mg Oral Q4H PRN Scott Munoz MD    oxyCODONE-acetaminophen 1 tablet Oral Q6H PRN Scott Munoz MD    sodium chloride 75 mL/hr Intravenous Continuous Scott Munoz MD Last Rate: 75 mL/hr (05/08/18 1453)   tamsulosin 0 4 mg Oral Daily With Alexandria Pedro MD      Continuous Infusions:  sodium chloride 75 mL/hr Last Rate: 75 mL/hr (05/08/18 1453)     PRN Meds: oxyCODONE    oxyCODONE-acetaminophen      Total time spent with patient 25 minutes, >50% spent counseling and/or coordination of care           EDWAR Todd

## 2018-05-08 NOTE — Clinical Note
Case was discussed with Dr Queenie Chavez and the patient's admission status was agreed to be Admission Status: observation status to the service of Dr Queenie Chavez

## 2018-05-08 NOTE — ED PROVIDER NOTES
History  Chief Complaint   Patient presents with    Flank Pain     Pt  reports known 5 mm kidney stone from yesterday  Pt  reports pain is worse  Pain on left side  Took Percocet this am with no relief       History provided by:  Patient  Flank Pain   Pain location:  L flank  Pain quality: sharp    Pain radiates to:  Does not radiate  Pain severity:  Severe  Onset quality:  Gradual  Duration:  1 day  Timing:  Intermittent  Progression:  Worsening  Chronicity:  New  Context comment:  Dx wtih Proximal left ureteral 5 mm calculus, yesterday, discharged home with Percocets has been taking them but pain worsening, unable to sleep, prompting visit  Relieved by:  Nothing  Worsened by:  Nothing  Ineffective treatments: Percocet  Associated symptoms: no chest pain, no chills, no constipation, no cough, no diarrhea, no dysuria, no fever, no nausea, no shortness of breath, no sore throat and no vomiting    Associated symptoms comment:  The patient currently significantly drowsy, eyes mainly closed throughout examination, answering questions appropriately but appears sedated      Prior to Admission Medications   Prescriptions Last Dose Informant Patient Reported? Taking?   escitalopram (LEXAPRO) 10 mg tablet   Yes No   Sig: Take 10 mg by mouth daily   loratadine-pseudoephedrine (CLARITIN-D 24-HOUR)  mg per 24 hr tablet   Yes No   Sig: Take 1 tablet by mouth daily   oxyCODONE-acetaminophen (PERCOCET) 5-325 mg per tablet   No No   Sig: Take 1 tablet by mouth every 6 (six) hours as needed for moderate pain or severe pain Max Daily Amount: 4 tablets   tamsulosin (FLOMAX) 0 4 mg   No No   Sig: Take 1 capsule (0 4 mg total) by mouth daily with dinner      Facility-Administered Medications: None       Past Medical History:   Diagnosis Date    Renal disorder     kidney stones       Past Surgical History:   Procedure Laterality Date    APPENDECTOMY         History reviewed  No pertinent family history    I have reviewed and agree with the history as documented  Social History   Substance Use Topics    Smoking status: Never Smoker    Smokeless tobacco: Never Used    Alcohol use Yes      Comment: ocassional        Review of Systems   Constitutional: Negative for activity change, chills, diaphoresis and fever  HENT: Negative for congestion, sinus pressure and sore throat  Eyes: Negative for pain and visual disturbance  Respiratory: Negative for cough, chest tightness, shortness of breath, wheezing and stridor  Cardiovascular: Negative for chest pain and palpitations  Gastrointestinal: Negative for abdominal distention, abdominal pain, constipation, diarrhea, nausea and vomiting  Genitourinary: Positive for flank pain  Negative for decreased urine volume, difficulty urinating, discharge, dysuria, frequency, penile pain, penile swelling, scrotal swelling, testicular pain and urgency  Musculoskeletal: Negative for neck pain and neck stiffness  Skin: Negative for rash  Neurological: Negative for dizziness, speech difficulty, light-headedness, numbness and headaches  Physical Exam  ED Triage Vitals [05/08/18 0851]   Temperature Pulse Respirations Blood Pressure SpO2   98 9 °F (37 2 °C) (!) 51 18 111/61 100 %      Temp Source Heart Rate Source Patient Position - Orthostatic VS BP Location FiO2 (%)   Oral Monitor Lying Right arm --      Pain Score       Worst Possible Pain           Orthostatic Vital Signs  Vitals:    05/08/18 1100 05/08/18 1213 05/08/18 1424 05/08/18 1432   BP: 96/57 102/55 110/63 115/68   Pulse: (!) 52 81 81 67   Patient Position - Orthostatic VS:  Lying Lying Lying       Physical Exam   Constitutional: He is oriented to person, place, and time  He appears well-developed  No distress  The patient currently significantly drowsy, eyes mainly closed throughout examination, answering questions appropriately but appears sedated   HENT:   Head: Normocephalic and atraumatic     Eyes: Pupils are equal, round, and reactive to light  Neck: Normal range of motion  Neck supple  No tracheal deviation present  Cardiovascular: Normal rate, regular rhythm, normal heart sounds and intact distal pulses  No murmur heard  Pulmonary/Chest: Effort normal and breath sounds normal  No stridor  No respiratory distress  Abdominal: Soft  He exhibits no distension  There is no tenderness  There is no rebound and no guarding  Musculoskeletal: Normal range of motion  Neurological: He is alert and oriented to person, place, and time  Skin: Skin is warm and dry  He is not diaphoretic  No erythema  No pallor  Psychiatric: He has a normal mood and affect  Vitals reviewed        ED Medications  Medications   escitalopram (LEXAPRO) tablet 10 mg (not administered)   oxyCODONE-acetaminophen (PERCOCET) 5-325 mg per tablet 1 tablet (not administered)   tamsulosin (FLOMAX) capsule 0 4 mg (not administered)   enoxaparin (LOVENOX) subcutaneous injection 40 mg (not administered)   sodium chloride 0 9 % infusion (75 mL/hr Intravenous New Bag 5/8/18 1453)   oxyCODONE (ROXICODONE) immediate release tablet 10 mg (not administered)   sodium chloride 0 9 % bolus 1,000 mL (0 mL Intravenous Stopped 5/8/18 1104)   ketorolac (TORADOL) injection 15 mg (15 mg Intravenous Given 5/8/18 0916)       Diagnostic Studies  Results Reviewed     Procedure Component Value Units Date/Time    Basic metabolic panel [[id number]] Collected:  05/08/18 0916    Lab Status:  Final result Specimen:  Blood from Arm, Right Updated:  05/08/18 0941     Sodium 141 mmol/L      Potassium 4 1 mmol/L      Chloride 106 mmol/L      CO2 26 mmol/L      Anion Gap 9 mmol/L      BUN 10 mg/dL      Creatinine 0 91 mg/dL      Glucose 127 mg/dL      Calcium 8 6 mg/dL      eGFR 101 ml/min/1 73sq m     Narrative:         National Kidney Disease Education Program recommendations are as follows:  GFR calculation is accurate only with a steady state creatinine  Chronic Kidney disease less than 60 ml/min/1 73 sq  meters  Kidney failure less than 15 ml/min/1 73 sq  meters  CBC and differential [[id number]]  (Abnormal) Collected:  05/08/18 0916    Lab Status:  Final result Specimen:  Blood from Arm, Right Updated:  05/08/18 0927     WBC 7 91 Thousand/uL      RBC 4 17 Million/uL      Hemoglobin 13 0 g/dL      Hematocrit 37 0 %      MCV 89 fL      MCH 31 2 pg      MCHC 35 1 g/dL      RDW 12 6 %      MPV 9 6 fL      Platelets 865 (L) Thousands/uL      Neutrophils Relative 83 (H) %      Lymphocytes Relative 13 (L) %      Monocytes Relative 4 %      Eosinophils Relative 0 %      Basophils Relative 0 %      Neutrophils Absolute 6 59 Thousands/µL      Lymphocytes Absolute 1 01 Thousands/µL      Monocytes Absolute 0 31 Thousand/µL      Eosinophils Absolute 0 00 Thousand/µL      Basophils Absolute 0 00 Thousands/µL                  XR abdomen 1 view kub   ED Interpretation by Marie Bowser DO (05/08 0946)   Unchanged appearance of proximal left-sided ureteral stone      Final Result by Jorge Cope DO (05/08 1036)      5 mm left mid ureteric calculus at the L3-4 level with minimal if any caudal migration compared to recent CT  Workstation performed: [id number]                    Procedures  Procedures       Phone Contacts  ED Phone Contact    ED Course  ED Course as of May 08 1518   Tue May 08, 2018   1000 Page placed Urology as patient report some improvement but feels very uncomfortable with discharge plan      1026 No call back, Re page Urology                                MDM  Number of Diagnoses or Management Options  Intractable pain: new and requires workup  Left nephrolithiasis: new and requires workup  Diagnosis management comments:       Initial ED assessment:  57-year-old male presents with known left-sided proximal nephrolithiasis, Re presents due to uncontrolled pain despite taking Percocet as an outpatient    Initial DDx includes but is not limited to:   Pain from kidney stone doubt new pathology    Initial ED plan:   KUB to evaluate for stone placement, IV pain control medications, will check creatinine, discussed with patient wife about pain controlling medication choice  I explained at this time I do not feel comfortable giving IV opiates due to patient's current sleepy state  I understand patient was not sleeping most of last night due to discomfort, but currently patient very sleepy eyes mainly close throughout examination, he has taken 5 Percocet over the past 12 hr, I am concerned in this opiate naive person that any more opiates especially IV opiate medications can sent patient into respiratory depression  Although I do want to control patient's pain, I do not want to over sedate him that would require reversal agents or airway interventions  Final ED summary/disposition:   After evaluation and workup in the emergency department, pain controlled in ER, patient very uncomfortable with discharge plan due to severe pain despite oral pain medications at home    Will admit the hospital for Urology consultation and possible stent placement due to severe intractable pain        Amount and/or Complexity of Data Reviewed  Clinical lab tests: ordered and reviewed  Tests in the radiology section of CPT®: ordered and reviewed  Decide to obtain previous medical records or to obtain history from someone other than the patient: yes  Obtain history from someone other than the patient: yes  Review and summarize past medical records: yes  Independent visualization of images, tracings, or specimens: yes      CritCare Time    Disposition  Final diagnoses:   Left nephrolithiasis   Intractable pain - Left flank secondary to nephrolithiasis     Time reflects when diagnosis was documented in both MDM as applicable and the Disposition within this note     Time User Action Codes Description Comment    5/8/2018 11:19 AM Alex CALHOUN Add [N20 0] Left nephrolithiasis     5/8/2018 11:19 AM Hilda العلي Add [R52] Intractable pain     5/8/2018 11:19 AM Yuliaanrosita Preciadogonzalez Modify [R52] Intractable pain Left flank secondary to nephrolithiasis      ED Disposition     ED Disposition Condition Comment    Admit  Case was discussed with Dr Jose Payne and the patient's admission status was agreed to be Admission Status: observation status to the service of [address] [place] [address]      Follow-up Information    None       Current Discharge Medication List      CONTINUE these medications which have NOT CHANGED    Details   escitalopram (LEXAPRO) 10 mg tablet Take 10 mg by mouth daily      loratadine-pseudoephedrine (CLARITIN-D 24-HOUR)  mg per 24 hr tablet Take 1 tablet by mouth daily      oxyCODONE-acetaminophen (PERCOCET) 5-325 mg per tablet Take 1 tablet by mouth every 6 (six) hours as needed for moderate pain or severe pain Max Daily Amount: 4 tablets  Qty: 20 tablet, Refills: 0    Associated Diagnoses: Ureterolithiasis      tamsulosin (FLOMAX) 0 4 mg Take 1 capsule (0 4 mg total) by mouth daily with dinner  Qty: 10 capsule, Refills: 0    Associated Diagnoses: Ureterolithiasis           No discharge procedures on file      ED Provider  Electronically Signed by           Kaitlin Hitchcock DO  05/08/18 8931

## 2018-05-08 NOTE — SOCIAL WORK
LOS- 0   PATIENT IS NOT A BUNDLE  PATIENT IS NOT  A READMIT  CM met with patient at bedside along with his wife  Patient lives with his family in [facility] in a 2 story house  Patient uses no DME and can complete ADLS by himself  Patient has no Hx of VNA, rehab, MH , or s&a  Patient uses the [facility] on [address]  Patient has no POA and want no info  Patient works at [employer] and can drive himself  CM dept will continue to follow until d/c

## 2018-05-09 ENCOUNTER — ANESTHESIA (INPATIENT)
Dept: PERIOP | Facility: HOSPITAL | Age: 48
DRG: 690 | End: 2018-05-09
Payer: COMMERCIAL

## 2018-05-09 ENCOUNTER — ANESTHESIA EVENT (INPATIENT)
Dept: PERIOP | Facility: HOSPITAL | Age: 48
DRG: 690 | End: 2018-05-09
Payer: COMMERCIAL

## 2018-05-09 ENCOUNTER — APPOINTMENT (INPATIENT)
Dept: RADIOLOGY | Facility: HOSPITAL | Age: 48
DRG: 690 | End: 2018-05-09
Payer: COMMERCIAL

## 2018-05-09 LAB
ALBUMIN SERPL BCP-MCNC: 2.9 G/DL (ref 3.5–5)
ALP SERPL-CCNC: 48 U/L (ref 46–116)
ALT SERPL W P-5'-P-CCNC: 20 U/L (ref 12–78)
ANION GAP SERPL CALCULATED.3IONS-SCNC: 7 MMOL/L (ref 4–13)
AST SERPL W P-5'-P-CCNC: 12 U/L (ref 5–45)
BASOPHILS # BLD AUTO: 0.01 THOUSANDS/ΜL (ref 0–0.1)
BASOPHILS NFR BLD AUTO: 0 % (ref 0–1)
BILIRUB SERPL-MCNC: 0.8 MG/DL (ref 0.2–1)
BUN SERPL-MCNC: 10 MG/DL (ref 5–25)
CALCIUM SERPL-MCNC: 7.8 MG/DL (ref 8.3–10.1)
CHLORIDE SERPL-SCNC: 111 MMOL/L (ref 100–108)
CO2 SERPL-SCNC: 26 MMOL/L (ref 21–32)
CREAT SERPL-MCNC: 0.83 MG/DL (ref 0.6–1.3)
EOSINOPHIL # BLD AUTO: 0.02 THOUSAND/ΜL (ref 0–0.61)
EOSINOPHIL NFR BLD AUTO: 0 % (ref 0–6)
ERYTHROCYTE [DISTWIDTH] IN BLOOD BY AUTOMATED COUNT: 12.8 % (ref 11.6–15.1)
GFR SERPL CREATININE-BSD FRML MDRD: 106 ML/MIN/1.73SQ M
GLUCOSE P FAST SERPL-MCNC: 101 MG/DL (ref 65–99)
GLUCOSE SERPL-MCNC: 101 MG/DL (ref 65–140)
HCT VFR BLD AUTO: 33.3 % (ref 36.5–49.3)
HGB BLD-MCNC: 11.6 G/DL (ref 12–17)
LYMPHOCYTES # BLD AUTO: 1.36 THOUSANDS/ΜL (ref 0.6–4.47)
LYMPHOCYTES NFR BLD AUTO: 19 % (ref 14–44)
MAGNESIUM SERPL-MCNC: 1.8 MG/DL (ref 1.6–2.6)
MCH RBC QN AUTO: 31.7 PG (ref 26.8–34.3)
MCHC RBC AUTO-ENTMCNC: 34.8 G/DL (ref 31.4–37.4)
MCV RBC AUTO: 91 FL (ref 82–98)
MONOCYTES # BLD AUTO: 0.62 THOUSAND/ΜL (ref 0.17–1.22)
MONOCYTES NFR BLD AUTO: 9 % (ref 4–12)
NEUTROPHILS # BLD AUTO: 5.14 THOUSANDS/ΜL (ref 1.85–7.62)
NEUTS SEG NFR BLD AUTO: 72 % (ref 43–75)
PHOSPHATE SERPL-MCNC: 3.3 MG/DL (ref 2.7–4.5)
PLATELET # BLD AUTO: 117 THOUSANDS/UL (ref 149–390)
PMV BLD AUTO: 10 FL (ref 8.9–12.7)
POTASSIUM SERPL-SCNC: 3.6 MMOL/L (ref 3.5–5.3)
PROT SERPL-MCNC: 5.5 G/DL (ref 6.4–8.2)
RBC # BLD AUTO: 3.66 MILLION/UL (ref 3.88–5.62)
SODIUM SERPL-SCNC: 144 MMOL/L (ref 136–145)
WBC # BLD AUTO: 7.15 THOUSAND/UL (ref 4.31–10.16)

## 2018-05-09 PROCEDURE — 88112 CYTOPATH CELL ENHANCE TECH: CPT | Performed by: PATHOLOGY

## 2018-05-09 PROCEDURE — 99232 SBSQ HOSP IP/OBS MODERATE 35: CPT | Performed by: HOSPITALIST

## 2018-05-09 PROCEDURE — 83735 ASSAY OF MAGNESIUM: CPT | Performed by: HOSPITALIST

## 2018-05-09 PROCEDURE — BT1FYZZ FLUOROSCOPY OF LEFT KIDNEY, URETER AND BLADDER USING OTHER CONTRAST: ICD-10-PCS | Performed by: UROLOGY

## 2018-05-09 PROCEDURE — 87086 URINE CULTURE/COLONY COUNT: CPT | Performed by: UROLOGY

## 2018-05-09 PROCEDURE — 80053 COMPREHEN METABOLIC PANEL: CPT | Performed by: HOSPITALIST

## 2018-05-09 PROCEDURE — 74420 UROGRAPHY RTRGR +-KUB: CPT

## 2018-05-09 PROCEDURE — 52332 CYSTOSCOPY AND TREATMENT: CPT | Performed by: UROLOGY

## 2018-05-09 PROCEDURE — 0T778DZ DILATION OF LEFT URETER WITH INTRALUMINAL DEVICE, VIA NATURAL OR ARTIFICIAL OPENING ENDOSCOPIC: ICD-10-PCS | Performed by: UROLOGY

## 2018-05-09 PROCEDURE — C2617 STENT, NON-COR, TEM W/O DEL: HCPCS | Performed by: UROLOGY

## 2018-05-09 PROCEDURE — C1769 GUIDE WIRE: HCPCS | Performed by: UROLOGY

## 2018-05-09 PROCEDURE — 99233 SBSQ HOSP IP/OBS HIGH 50: CPT | Performed by: UROLOGY

## 2018-05-09 PROCEDURE — 85025 COMPLETE CBC W/AUTO DIFF WBC: CPT | Performed by: HOSPITALIST

## 2018-05-09 PROCEDURE — 84100 ASSAY OF PHOSPHORUS: CPT | Performed by: HOSPITALIST

## 2018-05-09 DEVICE — STENT URETERAL 6FR 24CML INLAY OPTIMA: Type: IMPLANTABLE DEVICE | Site: URETER | Status: FUNCTIONAL

## 2018-05-09 RX ORDER — MIDAZOLAM HYDROCHLORIDE 1 MG/ML
INJECTION INTRAMUSCULAR; INTRAVENOUS AS NEEDED
Status: DISCONTINUED | OUTPATIENT
Start: 2018-05-09 | End: 2018-05-09 | Stop reason: SURG

## 2018-05-09 RX ORDER — FENTANYL CITRATE/PF 50 MCG/ML
25 SYRINGE (ML) INJECTION
Status: DISCONTINUED | OUTPATIENT
Start: 2018-05-09 | End: 2018-05-09 | Stop reason: HOSPADM

## 2018-05-09 RX ORDER — ONDANSETRON 2 MG/ML
4 INJECTION INTRAMUSCULAR; INTRAVENOUS ONCE AS NEEDED
Status: DISCONTINUED | OUTPATIENT
Start: 2018-05-09 | End: 2018-05-09 | Stop reason: HOSPADM

## 2018-05-09 RX ORDER — LIDOCAINE HYDROCHLORIDE 10 MG/ML
INJECTION, SOLUTION INFILTRATION; PERINEURAL AS NEEDED
Status: DISCONTINUED | OUTPATIENT
Start: 2018-05-09 | End: 2018-05-09 | Stop reason: SURG

## 2018-05-09 RX ORDER — CIPROFLOXACIN 2 MG/ML
400 INJECTION, SOLUTION INTRAVENOUS EVERY 12 HOURS
Status: DISCONTINUED | OUTPATIENT
Start: 2018-05-09 | End: 2018-05-09

## 2018-05-09 RX ORDER — SODIUM CHLORIDE, SODIUM LACTATE, POTASSIUM CHLORIDE, CALCIUM CHLORIDE 600; 310; 30; 20 MG/100ML; MG/100ML; MG/100ML; MG/100ML
INJECTION, SOLUTION INTRAVENOUS CONTINUOUS PRN
Status: DISCONTINUED | OUTPATIENT
Start: 2018-05-09 | End: 2018-05-09 | Stop reason: SURG

## 2018-05-09 RX ORDER — ONDANSETRON 2 MG/ML
INJECTION INTRAMUSCULAR; INTRAVENOUS AS NEEDED
Status: DISCONTINUED | OUTPATIENT
Start: 2018-05-09 | End: 2018-05-09 | Stop reason: SURG

## 2018-05-09 RX ORDER — FENTANYL CITRATE 50 UG/ML
INJECTION, SOLUTION INTRAMUSCULAR; INTRAVENOUS AS NEEDED
Status: DISCONTINUED | OUTPATIENT
Start: 2018-05-09 | End: 2018-05-09 | Stop reason: SURG

## 2018-05-09 RX ORDER — CEFAZOLIN SODIUM 1 G/3ML
INJECTION, POWDER, FOR SOLUTION INTRAMUSCULAR; INTRAVENOUS AS NEEDED
Status: DISCONTINUED | OUTPATIENT
Start: 2018-05-09 | End: 2018-05-09 | Stop reason: SURG

## 2018-05-09 RX ORDER — PROPOFOL 10 MG/ML
INJECTION, EMULSION INTRAVENOUS AS NEEDED
Status: DISCONTINUED | OUTPATIENT
Start: 2018-05-09 | End: 2018-05-09 | Stop reason: SURG

## 2018-05-09 RX ADMIN — OXYCODONE HYDROCHLORIDE 10 MG: 10 TABLET ORAL at 12:45

## 2018-05-09 RX ADMIN — ONDANSETRON 4 MG: 2 INJECTION INTRAMUSCULAR; INTRAVENOUS at 15:30

## 2018-05-09 RX ADMIN — SODIUM CHLORIDE 75 ML/HR: 0.9 INJECTION, SOLUTION INTRAVENOUS at 10:05

## 2018-05-09 RX ADMIN — OXYCODONE HYDROCHLORIDE AND ACETAMINOPHEN 1 TABLET: 5; 325 TABLET ORAL at 09:57

## 2018-05-09 RX ADMIN — SODIUM CHLORIDE, SODIUM LACTATE, POTASSIUM CHLORIDE, AND CALCIUM CHLORIDE: .6; .31; .03; .02 INJECTION, SOLUTION INTRAVENOUS at 15:24

## 2018-05-09 RX ADMIN — OXYCODONE HYDROCHLORIDE 10 MG: 10 TABLET ORAL at 08:38

## 2018-05-09 RX ADMIN — PROPOFOL 200 MG: 10 INJECTION, EMULSION INTRAVENOUS at 15:26

## 2018-05-09 RX ADMIN — CEFAZOLIN SODIUM 1000 MG: 1 INJECTION, POWDER, FOR SOLUTION INTRAMUSCULAR; INTRAVENOUS at 15:24

## 2018-05-09 RX ADMIN — PIPERACILLIN SODIUM AND TAZOBACTAM SODIUM 3.38 G: 36; 4.5 INJECTION, POWDER, FOR SOLUTION INTRAVENOUS at 20:04

## 2018-05-09 RX ADMIN — TAMSULOSIN HYDROCHLORIDE 0.4 MG: 0.4 CAPSULE ORAL at 17:33

## 2018-05-09 RX ADMIN — DEXAMETHASONE SODIUM PHOSPHATE 4 MG: 10 INJECTION INTRAMUSCULAR; INTRAVENOUS at 15:30

## 2018-05-09 RX ADMIN — OXYCODONE HYDROCHLORIDE AND ACETAMINOPHEN 1 TABLET: 5; 325 TABLET ORAL at 02:02

## 2018-05-09 RX ADMIN — SODIUM CHLORIDE 75 ML/HR: 0.9 INJECTION, SOLUTION INTRAVENOUS at 01:50

## 2018-05-09 RX ADMIN — CIPROFLOXACIN 400 MG: 2 INJECTION, SOLUTION INTRAVENOUS at 10:04

## 2018-05-09 RX ADMIN — FENTANYL CITRATE 100 MCG: 50 INJECTION INTRAMUSCULAR; INTRAVENOUS at 15:24

## 2018-05-09 RX ADMIN — LIDOCAINE HYDROCHLORIDE 100 MG: 10 INJECTION, SOLUTION INFILTRATION; PERINEURAL at 15:26

## 2018-05-09 RX ADMIN — OXYCODONE HYDROCHLORIDE AND ACETAMINOPHEN 1 TABLET: 5; 325 TABLET ORAL at 18:18

## 2018-05-09 RX ADMIN — ESCITALOPRAM OXALATE 10 MG: 10 TABLET ORAL at 08:38

## 2018-05-09 RX ADMIN — SODIUM CHLORIDE 75 ML/HR: 0.9 INJECTION, SOLUTION INTRAVENOUS at 17:44

## 2018-05-09 RX ADMIN — MIDAZOLAM HYDROCHLORIDE 3 MG: 1 INJECTION, SOLUTION INTRAMUSCULAR; INTRAVENOUS at 15:24

## 2018-05-09 NOTE — OP NOTE
Operative Note     PATIENT:  Teodora Lujan (MRN [id number])    DATE OF PROCEDURE:   5/9/2018    PRE-OP DIAGNOSIS:   1) left ureteral calculus    Urinary-OP DIAGNOSIS:   1) left ureteral calculus  2) urinary tract infection    PROCEDURES PERFORMED:  1) Cystoscopy  2) left retrograde pyelography with fluoroscopic interpretation  3) left ureteral stent placement     SURGEON:  Janie Green MD    ASSISTANTS:    NOTE:  There were no qualified teaching residents to assist with this case    ANESTHESIA: General     COMPLICATIONS:   None    ANTIBIOTICS:  Cephazolin, broadened to Zosyn based on intraoperative findings    INTRAOPERATIVE THROMBOEMBOLISM PROPHYLAXIS:  Pneumatic compression stockings     FINDINGS:    - the stone was non radiopaque  - retrograde pyelogram revealed moderate hydronephrosis  - upon placement of wire there was brisk drainage of turbid urine consistent with underlying urinary tract infection  - a ureteral stent was successfully placed      INDICATIONS FOR PROCEDURE:  Teodora Lujan is an 57 y o  old male with a left hydronephrosis secondary to an obstructing ureteral stone  After discussing the options, the patient elected to undergo ureteral stent placement  We discussed the procedure in detail, the alternatives, and the risks, and they signed informed consent to proceed  PROCEDURE IN DETAIL:   The patient was identified and brought to the OR  Antibiotic prophylaxis and DVT prophylaxis were administered  They were placed in the comfortable dorsal lithotomy position with care to pad all pressure points  They were prepped and draped in the usual sterile fashion using hibiclens  A surgical time out was performed with all in the room in agreement with the correct patient, procedure, indications, and laterality  A 21-[place] rigid cystoscope was used to enter the bladder  The bladder was inspected in its entirety and there were no lesions noted    The ureteral orifices were identified in their normal orthotopic positions  The left ureteral orifice was identified and a 5 Fr open ended catheter was placed into the ureteral orifice  The stone was not visible on  fluoroscopic guidance  A retrograde pyelogram was performed with injection of 50/50 Isovue which demonstrated moderate hydroureteronephrosis  A Sensor wire was up to the kidney under fluoroscopic guidance  At this point there was brisk drainage of very turbid thick urine consistent with underlying urinary tract infection  For this reason I felt that ureteroscopy was contraindicated elected to place a stent  A  JJ stent was then passed up the wire  under fluoroscopic guidance into the   kidney with a good curl noted in the kidney and in the bladder  The bladder was drained  The patient was placed back supine, awakened from general anesthesia and brought to recovery room in stable condition  SPECIMENS:     Order Name Source Comment Collection Info Order Time   URINE CULTURE Urine, Renal, Left  Collected By: Alex Sebastian MD 5/9/2018  3:41 PM   CYTOLOGY, URINE Urine, Renal, Left  Collected By: Alex Sebastian MD 5/9/2018  3:41 PM        IMPLANTS:     Implant Name Type Inv  Item Serial No   Lot No  LRB No  Used   URETERAL STENT 6 FR X 24 CM OPTIMA INLAY - [id number]   URETERAL STENT 6 FR X 24 CM OPTIMA INLAY   [employer] [id number] Left 1       COMPLICATIONS: none    DISPOSITION: PACU     PLAN:   - patient will return to the [facility]  - his antibiotics were broadened  - please follow up on urine culture submitted from the operating room    Patient will require 2 week course of culture directed antibiotics  - he will require secondary procedure to extract the stone via ureteroscopy once he has recovered from this acute infectious event  - I will schedule  follow-up in my office in 1 week to coordinate

## 2018-05-09 NOTE — ASSESSMENT & PLAN NOTE
Pain, left groin 10/10, controlled on Percocet- still present now in left flank   KUB 5/ 8:5 mm left mid ureteric calculus at the L3-4 level with minimal if any caudal migration compared to recent CT  CT scan abdomen pelvis 5/ 7:  1   Proximal left ureteral 5 mm calculus resulting in moderate left hydroureteronephrosis and slightly delayed left nephrogram without further urinary calculi identified  2   Cholelithiasis without cholecystitis  3   Fatty liver  plan  Stone not pass- use sieve for collection   IV hydration to be continued   analgesia for pain   urology consult recs appreciated   follow-up a m   Labs  cipro 400mg iv bid

## 2018-05-09 NOTE — PROGRESS NOTES
Progress Note - Brennen Higuera 57 y o  male MRN: [id number]    Unit/Bed#: -01 Encounter: [id number]      Assessment:  Mr Mannie Mantilla is a very pleasant 62-year-old male admitted for chief complaint of left flank pain with positive CT scan findings of left obstructing ureteral calculus and resultant hydronephrosis  Patient remains afebrile, however very colicky  with severe urinary frequency  Patient reports adequate pain control and denies nausea and vomiting  Plan:  Maintain NPO  Continue IV fluids and symptom management  Patient is agreeable to cystoscopy and lithotripsy later this afternoon  Subjective:   Positive urinary urgency and frequency  Positive bowel nausea without vomiting  No hematuria  Objective:     Vitals: Blood pressure 111/72, pulse 92, temperature 98 1 °F (36 7 °C), temperature source Oral, resp  rate 16, height 5' 10" (1 778 m), weight 74 4 kg (164 lb 0 4 oz), SpO2 97 %  ,Body mass index is 23 53 kg/m²      No intake or output data in the 24 hours ending 05/09/18 1127    Physical Exam: General appearance: alert and oriented, in no acute distress  Head: Normocephalic, without obvious abnormality, atraumatic  Neck: no adenopathy, no carotid bruit, no JVD, supple, symmetrical, trachea midline and thyroid not enlarged, symmetric, no tenderness/mass/nodules  Lungs: clear to auscultation bilaterally  Heart: regular rate and rhythm, S1, S2 normal, no murmur, click, rub or gallop  Abdomen: abnormal findings:  mild tenderness in the lower abdomen  Extremities: extremities normal, warm and well-perfused; no cyanosis, clubbing, or edema  Pulses: 2+ and symmetric  Neurologic: Grossly normal  No urinary drains     Invasive Devices     Peripheral Intravenous Line            Peripheral IV 05/08/18 Right Antecubital 1 day              Lab Results   Component Value Date    WBC 7 15 05/09/2018    HGB 11 6 (L) 05/09/2018    HCT 33 3 (L) 05/09/2018    MCV 91 05/09/2018     (L) 05/09/2018 Lab Results   Component Value Date    GLUCOSE 101 05/09/2018    CALCIUM 7 8 (L) 05/09/2018     05/09/2018    K 3 6 05/09/2018    CO2 26 05/09/2018     (H) 05/09/2018    BUN 10 05/09/2018    CREATININE 0 83 05/09/2018       Lab, Imaging and other studies: I have personally reviewed pertinent reports

## 2018-05-09 NOTE — PLAN OF CARE
Problem: PAIN - ADULT  Goal: Verbalizes/displays adequate comfort level or baseline comfort level  Interventions:  - Encourage patient to monitor pain and request assistance  - Assess pain using appropriate pain scale  - Administer analgesics based on type and severity of pain and evaluate response  - Implement non-pharmacological measures as appropriate and evaluate response  - Consider cultural and social influences on pain and pain management  - Notify physician/advanced practitioner if interventions unsuccessful or patient reports new pain   Outcome: Progressing      Problem: SAFETY ADULT  Goal: Patient will remain free of falls  INTERVENTIONS:  - Assess patient frequently for physical needs  -  Identify cognitive and physical deficits and behaviors that affect risk of falls    -  [place] fall precautions as indicated by assessment   - Educate patient/family on patient safety including physical limitations  - Instruct patient to call for assistance with activity based on assessment  - Modify environment to reduce risk of injury  - Consider OT/PT consult to assist with strengthening/mobility   Outcome: Progressing      Problem: GENITOURINARY - ADULT  Goal: Maintains or returns to baseline urinary function  INTERVENTIONS:  - Assess urinary function  - Encourage oral fluids to ensure adequate hydration  - Administer IV fluids as ordered to ensure adequate hydration  - Administer ordered medications as needed  - Offer frequent toileting  - Follow urinary retention protocol if ordered   Outcome: Progressing    Goal: Absence of urinary retention  INTERVENTIONS:  - Assess patients ability to void and empty bladder  - Monitor I/O  - Bladder scan as needed  - Discuss with physician/AP medications to alleviate retention as needed  - Discuss catheterization for long term situations as appropriate   Outcome: Progressing

## 2018-05-09 NOTE — ANESTHESIA PREPROCEDURE EVALUATION
Review of Systems/Medical History  Patient summary reviewed  Chart reviewed  No history of anesthetic complications     Cardiovascular  EKG reviewed, Exercise tolerance: good,     Pulmonary  Negative pulmonary ROS        GI/Hepatic  Negative GI/hepatic ROS          Kidney stones,        Endo/Other  Negative endo/other ROS      GYN       Hematology  Negative hematology ROS      Musculoskeletal  Negative musculoskeletal ROS        Neurology  Negative neurology ROS      Psychology   Negative psychology ROS              Physical Exam    Airway    Mallampati score: II  TM Distance: >3 FB  Neck ROM: full     Dental   No notable dental hx     Cardiovascular  Cardiovascular exam normal    Pulmonary  Pulmonary exam normal     Other Findings      Lab Results   Component Value Date    WBC 7 15 05/09/2018    HGB 11 6 (L) 05/09/2018    HCT 33 3 (L) 05/09/2018    MCV 91 05/09/2018     (L) 05/09/2018     Lab Results   Component Value Date     05/09/2018    K 3 6 05/09/2018    CO2 26 05/09/2018     (H) 05/09/2018    BUN 10 05/09/2018    CREATININE 0 83 05/09/2018     No results found for: INR, PROTIME  No results found for: PTT    Lab Results   Component Value Date    GLUCOSE 101 05/09/2018    GLUCOSE 127 05/08/2018    GLUCOSE 131 05/07/2018       No results found for: HGBA1C    Type and Screen:      Anesthesia Plan  ASA Score- 1     Anesthesia Type- general with ASA Monitors  Additional Monitors:   Airway Plan: LMA  Plan Factors-    Induction- intravenous  Postoperative Plan- Plan for postoperative opioid use  Informed Consent- Anesthetic plan and risks discussed with patient and spouse  I personally reviewed this patient with the CRNA  Discussed and agreed on the Anesthesia Plan with the CRNA  Hari Reynolds

## 2018-05-09 NOTE — PROGRESS NOTES
Progress Note - Alla Tolbert 1970, 57 y o  male MRN: [id number]    Unit/Bed#: -01 Encounter: [id number]    Primary Care Provider: Peg John MD   Date and time admitted to hospital: 5/8/2018  8:44 AM        * Left nephrolithiasis   Assessment & Plan     Pain, left groin 10/10, controlled on Percocet- still present now in left flank   KUB 5/ 8:5 mm left mid ureteric calculus at the L3-4 level with minimal if any caudal migration compared to recent CT  Gross hematuria reported  H/H: 11 6/33 3     CT scan abdomen pelvis 5/ 7:  1   Proximal left ureteral 5 mm calculus resulting in moderate left hydroureteronephrosis and slightly delayed left nephrogram without further urinary calculi identified  2   Cholelithiasis without cholecystitis  3   Fatty liver  plan  Stone not pass- use sieve for collection   IV hydration to be continued   analgesia for pain   urology consult recs appreciated   follow-up a m  Labs  cipro 400mg iv bid  Monitor Hb, if <7g/dL, consider BT        Generalized anxiety disorder   Assessment & Plan     Continue Lexapro            VTE Pharmacologic Prophylaxis:   Pharmacologic: gross hematuria, lovenox held  Mechanical VTE Prophylaxis in Place: Yes    Patient Centered Rounds: I have performed bedside rounds with nursing staff today  Discussions with Specialists or Other Care Team Provider: yes    Education and Discussions with Family / Patient: yes    Time Spent for Care: 45 minutes  More than 50% of total time spent on counseling and coordination of care as described above  Current Length of Stay: 0 day(s)    Current Patient Status: Observation   Certification Statement: The patient will continue to require additional inpatient hospital stay due to med mgt of nephrolithiasis    Discharge Plan: home when medically stable    Code Status: Level 1 - Full Code      Subjective:   Severe left flank pain, radiating down to groin  Pain controlled     ROS negative otherwise  Objective:     Vitals:   Temp (24hrs), Av 7 °F (37 1 °C), Min:98 °F (36 7 °C), Max:99 9 °F (37 7 °C)    HR:  [52-92] 92  Resp:  [16-18] 16  BP: ()/(51-72) 111/72  SpO2:  [97 %-99 %] 97 %  Body mass index is 23 53 kg/m²  Input and Output Summary (last 24 hours):     No intake or output data in the 24 hours ending 18 0858    Physical Exam:     Physical Exam   Constitutional: He is oriented to person, place, and time  No distress  HENT:   Head: Normocephalic and atraumatic  Mouth/Throat: Oropharynx is clear and moist    Eyes: EOM are normal  Pupils are equal, round, and reactive to light  Neck: Normal range of motion  No JVD present  Cardiovascular: Normal rate  Exam reveals no gallop and no friction rub  No murmur heard  Pulmonary/Chest: Effort normal and breath sounds normal  No respiratory distress  He has no wheezes  He has no rales  Abdominal: Soft  Bowel sounds are normal  He exhibits no distension  There is tenderness  There is no rebound and no guarding  Left CVA tenderness   Musculoskeletal: Normal range of motion  He exhibits no edema, tenderness or deformity  Neurological: He is alert and oriented to person, place, and time  No cranial nerve deficit  Coordination normal    Skin: Skin is warm  No erythema  Psychiatric: He has a normal mood and affect   His behavior is normal          Additional Data:     Labs:      Results from last 7 days  Lab Units 18  0427   WBC Thousand/uL 7 15   HEMOGLOBIN g/dL 11 6*   HEMATOCRIT % 33 3*   PLATELETS Thousands/uL 117*   NEUTROS PCT % 72   LYMPHS PCT % 19   MONOS PCT % 9   EOS PCT % 0       Results from last 7 days  Lab Units 18  0427   SODIUM mmol/L 144   POTASSIUM mmol/L 3 6   CHLORIDE mmol/L 111*   CO2 mmol/L 26   BUN mg/dL 10   CREATININE mg/dL 0 83   CALCIUM mg/dL 7 8*   TOTAL PROTEIN g/dL 5 5*   BILIRUBIN TOTAL mg/dL 0 80   ALK PHOS U/L 48   ALT U/L 20   AST U/L 12   GLUCOSE RANDOM mg/dL 101 * I Have Reviewed All Lab Data Listed Above  * Additional Pertinent Lab Tests Reviewed: [place] 66 Admission Reviewed    Imaging:    Imaging Reports Reviewed Today   Recent Cultures (last 7 days):           Last 24 Hours Medication List:     Current Facility-Administered Medications:  ciprofloxacin 400 mg Intravenous Q12H Pamela Peng MD    enoxaparin 40 mg Subcutaneous Daily Pamela Peng MD    escitalopram 10 mg Oral Daily Pamela Peng MD    ketorolac 15 mg Intravenous Q6H PRN EDWAR Barba    oxyCODONE 10 mg Oral Q4H PRN Pamela Peng MD    oxyCODONE-acetaminophen 1 tablet Oral Q6H PRN Pamela Peng MD    sodium chloride 75 mL/hr Intravenous Continuous Pamela Peng MD Last Rate: 75 mL/hr (05/09/18 0150)   tamsulosin 0 4 mg Oral Daily With Levi Higginbotham MD         Today, Patient Was Seen By: Pamela Peng MD    ** Please Note: Dictation voice to text software may have been used in the creation of this document   **

## 2018-05-09 NOTE — CASE MANAGEMENT
Initial Clinical Review    Admission: Date/Time/Statement: 5/8/18 @ 1123 OBSERVATION, CHANGED TO INPATIENT 5/9/18 @ 0902 for IV fluids and symptom management  No movement of kidney stone overnight; patient remains NPO for surgery today  Orders Placed This Encounter   Procedures    Inpatient Admission     Standing Status:   Standing     Number of Occurrences:   1     Order Specific Question:   Admitting Physician     Answer:   Angel Hairston     Order Specific Question:   Level of Care     Answer:   Med Surg [16]     Order Specific Question:   Estimated length of stay     Answer:   More than 2 Midnights     Order Specific Question:   Certification     Answer:   I certify that inpatient services are medically necessary for this patient for a duration of greater than two midnights  See H&P and MD Progress Notes for additional information about the patient's course of treatment  ED: Date/Time/Mode of Arrival:   ED Arrival Information     Expected Arrival Acuity Means of Arrival Escorted By Service Admission Type    - 5/8/2018 08:36 Urgent Wheelchair Family Member General Medicine Urgent    Arrival Complaint    flank pain        Chief Complaint:   Chief Complaint   Patient presents with    Flank Pain     Pt  reports known 5 mm kidney stone from yesterday  Pt  reports pain is worse  Pain on left side  Took Percocet this am with no relief       History of Illness:     Yanique Mckeon is a 57 y o  male who presents with left groin pain which started two days ago  Patient visited the ER 05/07/2018 with complaints of left groin pain worsening  Patient was found to have left-sided nephrolithiasis 5 mm left mid ureter calculus at L3-L4  Patient was discharged on Percocet for pain control  Earlier this morning at 1:30 a m  Patient reported severe left groin pain, took Percocet, went to sleep, woke up back with worsening severe left point pain  Reported cold sweats, shaking chills      Pain is localized left groin, 10/10  Intensity, intermittent       ED Vital Signs:   ED Triage Vitals [05/08/18 0851]   Temperature Pulse Respirations Blood Pressure SpO2   98 9 °F (37 2 °C) (!) 51 18 111/61 100 %      Temp Source Heart Rate Source Patient Position - Orthostatic VS BP Location FiO2 (%)   Oral Monitor Lying Right arm --      Pain Score       Worst Possible Pain        Wt Readings from Last 1 Encounters:   05/08/18 74 4 kg (164 lb 0 4 oz)       Vital Signs: WNL    Abnormal Labs/Diagnostic Test Results:     5/7 CT abdomen and pelvis: Proximal left ureteral 5 mm calculus resulting in moderate left hydroureteronephrosis and slightly delayed left nephrogram without further urinary calculi identified  5/8 Abdomen x-ray: 5 mm left mid ureteric calculus at the L3-4 level with minimal if any caudal migration compared to recent CT        05/07/18 6625     Color, UA  Yellow    Clarity, UA  Clear    pH, UA 4 5 - 8 0 6 0    Leukocytes, UA Negative Trace     Nitrite, UA Negative Negative    Protein, UA Negative mg/dl 100 (2+)     Glucose, UA Negative mg/dl Negative    Ketones, UA Negative mg/dl Negative    Urobilinogen, UA 0 2, 1 0 E U /dl E U /dl 2 0     Bilirubin, UA Negative Negative    Blood, UA Negative Large     Specific [place], UA 1 003 - 1 030 >=1 030        ED Treatment:   Medication Administration from 05/08/2018 0836 to 05/08/2018 1429       Date/Time Order Dose Route Action Comments     05/08/2018 0916 sodium chloride 0 9 % bolus 1,000 mL 1,000 mL Intravenous New Bag      05/08/2018 0916 ketorolac (TORADOL) injection 15 mg 15 mg Intravenous Given         Past Medical/Surgical History:    Active Ambulatory Problems     Diagnosis Date Noted    Kidney stones  Appendectomy       Admitting Diagnosis: Flank pain [R10 9]  Intractable pain [R52]  Left nephrolithiasis [N20 0]    Age/Sex: 57 y o  male    Assessment/Plan:         * Left nephrolithiasis   Assessment & Plan      Pain, left groin 10/10   KUB 5/ 8:5 mm left mid ureteric calculus at the L3-4 level with minimal if any caudal migration compared to recent CT  CT scan abdomen pelvis 5/ 7:  1   Proximal left ureteral 5 mm calculus resulting in moderate left hydroureteronephrosis and slightly delayed left nephrogram without further urinary calculi identified  2   Cholelithiasis without cholecystitis  3   Fatty liver  plan   IV hydration   analgesia for pain   urology consult   follow-up a m  labs          Generalized anxiety disorder   Assessment & Plan      Continue Lexapro                VTE Prophylaxis: Enoxaparin (Lovenox)  / sequential compression device        Admission Orders:    Urology consult  NPO  OOB as tolerated  Sequential compression device  OR: CYSTOSCOPY URETEROSCOPY WITH LITHOTRIPSY HOLMIUM LASER, RETROGRADE PYELOGRAM AND INSERTION STENT URETERAL             Scheduled Meds:   Current Facility-Administered Medications:  ciprofloxacin 400 mg Intravenous Q12H   escitalopram 10 mg Oral Daily   ketorolac 15 mg Intravenous Q6H PRN   oxyCODONE 10 mg Oral Q4H PRN   oxyCODONE-acetaminophen 1 tablet Oral Q6H PRN   tamsulosin 0 4 mg Oral Daily With Dinner     Continuous Infusions:   sodium chloride 75 mL/hr     ====================================================================     5/8/18 Urology Consult:    Impressions  Left Ureteral Calculus  Hydronephrosis  Renal Colic     Recommendations  1  Initiate aggressive IVFs   Flomax  3  Analgesia/Narcotics 5  Anti-emetics 5  ATBs empirically while awaiting culture 6  Strain urine 7  NPO after Midnight for OR if no spontaneous expulsion achieved  5/9/18 Urology Progress Note:    Assessment:  Mr Xander Mahoney is a  74-year-old male admitted for chief complaint of left flank pain with positive CT scan findings of left obstructing ureteral calculus and resultant hydronephrosis  Patient remains very colicky with severe urinary frequency        Plan:  Maintain NPO  Continue IV fluids and symptom management    Patient is agreeable to cystoscopy and lithotripsy later this afternoon            Thank you,  [address]  [facility] in the [employer] [facility] [employer] for 2017  Network Utilization Review Department  Phone: [phone]; Fax [phone]  ATTENTION: The Network Utilization Review Department is now centralized for our 7 Facilities  Make a note that we have a new phone and fax numbers for our Department  Please call with any questions or concerns to [phone] and carefully follow the prompts so that you are directed to the right person  All voicemails are confidential  Fax any determinations, approvals, denials, and requests for initial or continue stay review clinical to [phone]  Due to HIGH CALL volume, it would be easier if you could please send faxed requests to expedite your requests and in part, help us provide discharge notifications faster

## 2018-05-09 NOTE — ANESTHESIA POSTPROCEDURE EVALUATION
Post-Op Assessment Note      CV Status:  Stable    Mental Status:  Alert and awake    Hydration Status:  Stable and euvolemic    PONV Controlled:  Controlled    Airway Patency:  Patent and adequate    Post Op Vitals Reviewed: Yes          Staff: CRNA           /61 (05/09/18 1558)    Temp 98 3 °F (36 8 °C) (05/09/18 1557)    Pulse 93 (05/09/18 1558)   Resp 20 (05/09/18 1558)    SpO2 95 % (05/09/18 1558)

## 2018-05-10 ENCOUNTER — TELEPHONE (OUTPATIENT)
Dept: UROLOGY | Facility: CLINIC | Age: 48
End: 2018-05-10

## 2018-05-10 VITALS
TEMPERATURE: 98.2 F | BODY MASS INDEX: 23.48 KG/M2 | HEIGHT: 70 IN | OXYGEN SATURATION: 98 % | HEART RATE: 57 BPM | WEIGHT: 164 LBS | DIASTOLIC BLOOD PRESSURE: 55 MMHG | SYSTOLIC BLOOD PRESSURE: 103 MMHG | RESPIRATION RATE: 16 BRPM

## 2018-05-10 LAB
ALBUMIN SERPL BCP-MCNC: 3.1 G/DL (ref 3.5–5)
ALP SERPL-CCNC: 51 U/L (ref 46–116)
ALT SERPL W P-5'-P-CCNC: 23 U/L (ref 12–78)
ANION GAP SERPL CALCULATED.3IONS-SCNC: 8 MMOL/L (ref 4–13)
AST SERPL W P-5'-P-CCNC: 14 U/L (ref 5–45)
BACTERIA UR CULT: NORMAL
BILIRUB SERPL-MCNC: 0.6 MG/DL (ref 0.2–1)
BUN SERPL-MCNC: 10 MG/DL (ref 5–25)
CALCIUM SERPL-MCNC: 8.4 MG/DL (ref 8.3–10.1)
CHLORIDE SERPL-SCNC: 107 MMOL/L (ref 100–108)
CO2 SERPL-SCNC: 27 MMOL/L (ref 21–32)
CREAT SERPL-MCNC: 0.89 MG/DL (ref 0.6–1.3)
ERYTHROCYTE [DISTWIDTH] IN BLOOD BY AUTOMATED COUNT: 12.4 % (ref 11.6–15.1)
GFR SERPL CREATININE-BSD FRML MDRD: 103 ML/MIN/1.73SQ M
GLUCOSE SERPL-MCNC: 124 MG/DL (ref 65–140)
HCT VFR BLD AUTO: 36.7 % (ref 36.5–49.3)
HGB BLD-MCNC: 12.9 G/DL (ref 12–17)
MCH RBC QN AUTO: 31.4 PG (ref 26.8–34.3)
MCHC RBC AUTO-ENTMCNC: 35.1 G/DL (ref 31.4–37.4)
MCV RBC AUTO: 89 FL (ref 82–98)
PLATELET # BLD AUTO: 130 THOUSANDS/UL (ref 149–390)
PMV BLD AUTO: 10.1 FL (ref 8.9–12.7)
POTASSIUM SERPL-SCNC: 4.2 MMOL/L (ref 3.5–5.3)
PROT SERPL-MCNC: 6.1 G/DL (ref 6.4–8.2)
RBC # BLD AUTO: 4.11 MILLION/UL (ref 3.88–5.62)
SODIUM SERPL-SCNC: 142 MMOL/L (ref 136–145)
WBC # BLD AUTO: 8.88 THOUSAND/UL (ref 4.31–10.16)

## 2018-05-10 PROCEDURE — 99233 SBSQ HOSP IP/OBS HIGH 50: CPT | Performed by: NURSE PRACTITIONER

## 2018-05-10 PROCEDURE — 80053 COMPREHEN METABOLIC PANEL: CPT | Performed by: HOSPITALIST

## 2018-05-10 PROCEDURE — 85027 COMPLETE CBC AUTOMATED: CPT | Performed by: HOSPITALIST

## 2018-05-10 PROCEDURE — 99238 HOSP IP/OBS DSCHRG MGMT 30/<: CPT | Performed by: HOSPITALIST

## 2018-05-10 PROCEDURE — 99232 SBSQ HOSP IP/OBS MODERATE 35: CPT | Performed by: NURSE PRACTITIONER

## 2018-05-10 RX ORDER — CIPROFLOXACIN 500 MG/1
500 TABLET, FILM COATED ORAL EVERY 12 HOURS SCHEDULED
Qty: 28 TABLET | Refills: 0 | Status: SHIPPED | OUTPATIENT
Start: 2018-05-10 | End: 2018-05-24

## 2018-05-10 RX ORDER — CIPROFLOXACIN 500 MG/1
500 TABLET, FILM COATED ORAL EVERY 12 HOURS SCHEDULED
Qty: 28 TABLET | Refills: 0 | Status: SHIPPED | OUTPATIENT
Start: 2018-05-10 | End: 2018-05-10

## 2018-05-10 RX ORDER — TAMSULOSIN HYDROCHLORIDE 0.4 MG/1
0.4 CAPSULE ORAL
Qty: 30 CAPSULE | Refills: 0 | Status: SHIPPED | OUTPATIENT
Start: 2018-05-10 | End: 2018-08-24 | Stop reason: ALTCHOICE

## 2018-05-10 RX ORDER — CIPROFLOXACIN 500 MG/1
500 TABLET, FILM COATED ORAL EVERY 12 HOURS SCHEDULED
Qty: 24 TABLET | Refills: 0 | Status: SHIPPED | OUTPATIENT
Start: 2018-05-10 | End: 2018-05-10

## 2018-05-10 RX ORDER — OXYBUTYNIN CHLORIDE 5 MG/1
5 TABLET ORAL 3 TIMES DAILY
Qty: 30 TABLET | Refills: 0 | Status: SHIPPED | OUTPATIENT
Start: 2018-05-10 | End: 2018-05-10

## 2018-05-10 RX ORDER — OXYBUTYNIN CHLORIDE 5 MG/1
5 TABLET ORAL 3 TIMES DAILY
Status: DISCONTINUED | OUTPATIENT
Start: 2018-05-10 | End: 2018-05-10 | Stop reason: HOSPADM

## 2018-05-10 RX ORDER — OXYBUTYNIN CHLORIDE 5 MG/1
5 TABLET ORAL 3 TIMES DAILY
Qty: 30 TABLET | Refills: 0 | Status: SHIPPED | OUTPATIENT
Start: 2018-05-10 | End: 2018-08-24 | Stop reason: ALTCHOICE

## 2018-05-10 RX ADMIN — SODIUM CHLORIDE 75 ML/HR: 0.9 INJECTION, SOLUTION INTRAVENOUS at 06:34

## 2018-05-10 RX ADMIN — PIPERACILLIN SODIUM AND TAZOBACTAM SODIUM 3.38 G: 36; 4.5 INJECTION, POWDER, FOR SOLUTION INTRAVENOUS at 09:20

## 2018-05-10 RX ADMIN — ESCITALOPRAM OXALATE 10 MG: 10 TABLET ORAL at 09:18

## 2018-05-10 RX ADMIN — OXYBUTYNIN CHLORIDE 5 MG: 5 TABLET ORAL at 09:18

## 2018-05-10 RX ADMIN — PIPERACILLIN SODIUM AND TAZOBACTAM SODIUM 3.38 G: 36; 4.5 INJECTION, POWDER, FOR SOLUTION INTRAVENOUS at 01:04

## 2018-05-10 NOTE — TELEPHONE ENCOUNTER
Patient needs an appointment for H&P scheduled at [facility]   Please schedule with NP/PA within 1-2 weeks

## 2018-05-10 NOTE — DISCHARGE SUMMARY
Discharge- Musa Jeffrey 1970, 57 y o  male MRN: [id number]    Unit/Bed#: -01 Encounter: [id number]    Primary Care Provider: Hailey Moya MD   Date and time admitted to hospital: 5/8/2018  8:44 AM        * Left nephrolithiasis   Assessment & Plan     Pain, left groin 10/10, controlled on Percocet- still present now in left flank   KUB 5/ 8:5 mm left mid ureteric calculus at the L3-4 level with minimal if any caudal migration compared to recent CT  CT scan abdomen pelvis 5/ 7:  1   Proximal left ureteral 5 mm calculus resulting in moderate left hydroureteronephrosis and slightly delayed left nephrogram without further urinary calculi identified  2   Cholelithiasis without cholecystitis  3   Fatty liver  plan  Stent in place  Oral hydration is encourage  analgesia for pain  follow-up a m  Labs  cipro 400mg bid 14 days        Generalized anxiety disorder   Assessment & Plan     Continue Lexapro            Discharging Physician / Practitioner: Cristina Rocha MD  PCP: Hailey Moya MD  Admission Date:   Admission Orders     Ordered        05/09/18 0902  Inpatient Admission  Once         05/08/18 1122  Place in Observation (expected length of stay for this patient is less than two midnights)  Once             Discharge Date: 05/10/18    Resolved Problems  Date Reviewed: 5/10/2018    None          Consultations During Hospital Stay:  Urology  Procedures Performed:     · Ureteral stent placement    Significant Findings / Test Results:     Have mm left ureteral calculus at L3-L4  Incidental Findings:   · None    Test Results Pending at Discharge (will require follow up):    Urine culture   Outpatient Tests Requested:  · None    Complications:  None    Reason for Admission:  Left groin pain    Hospital Course:     Musa Jeffrey is a 57 y o  male patient who originally presented to the hospital on 5/8/2018 due to left groin pain, which started 2 days prior to admission, visited ER 05/07/2018 with worsening left groin pain  Patient was noted to have a left-sided kidney stone 5 mm in the left mid ureter calculus at L3-L4  Patient was given Percocet for pain control, IV hydration, antimicrobial therapy  Urology was consulted with  Please see above list of diagnoses and related plan for additional information  Ureteral stent placement, to continue antibiotics outpatient, follow-up with Urology for ureteroscopy and shockwave lithotripsy  Urology team aware, risk and benefits were discussed by patient, all procedures will be done outpatient  Condition at Discharge: good     Discharge Day Visit / Exam:     Subjective:  Urinary frequency  ROS negative otherwise  Vitals: Blood Pressure: 103/55 (05/10/18 0733)  Pulse: 57 (05/10/18 0733)  Temperature: 98 2 °F (36 8 °C) (05/10/18 0733)  Temp Source: Oral (05/10/18 0733)  Respirations: 16 (05/10/18 0733)  Height: 5' 10" (177 8 cm) (05/09/18 1401)  Weight - Scale: 74 4 kg (164 lb) (05/09/18 1401)  SpO2: 98 % (05/10/18 0733)  Exam:   Physical Exam   Constitutional: He is oriented to person, place, and time  No distress  HENT:   Head: Normocephalic and atraumatic  Mouth/Throat: Oropharynx is clear and moist    Eyes: EOM are normal  Pupils are equal, round, and reactive to light  Neck: Normal range of motion  No JVD present  Cardiovascular: Normal rate  Exam reveals no gallop and no friction rub  No murmur heard  Pulmonary/Chest: Effort normal and breath sounds normal  No respiratory distress  He has no wheezes  Abdominal: Soft  Bowel sounds are normal  He exhibits no distension  There is no tenderness  Musculoskeletal: Normal range of motion  He exhibits no edema  Neurological: He is alert and oriented to person, place, and time  No cranial nerve deficit  Skin: Skin is warm  No erythema  Psychiatric: He has a normal mood and affect   His behavior is normal      Discussion with Family:yes with wife  Discharge instructions/Information to patient and family:   See after visit summary for information provided to patient and family  Provisions for Follow-Up Care:  See after visit summary for information related to follow-up care and any pertinent home health orders  Disposition:     Home    For Discharges to [facility]:   · Not Applicable to this Patient - Not Applicable to this Patient    Planned Readmission: none     Discharge Statement:  I spent 35 minutes discharging the patient  This time was spent on the day of discharge  I had direct contact with the patient on the day of discharge  Greater than 50% of the total time was spent examining patient, answering all patient questions, arranging and discussing plan of care with patient as well as directly providing post-discharge instructions  Additional time then spent on discharge activities  Discharge Medications:  See after visit summary for reconciled discharge medications provided to patient and family        ** Please Note: This note has been constructed using a voice recognition system **

## 2018-05-10 NOTE — DISCHARGE INSTRUCTIONS
Kidney Stones   WHAT YOU NEED TO KNOW:   Kidney stones form in the urinary system when the water and waste in your urine are out of balance  When this happens, certain types of waste crystals separate from the urine  The crystals build up and form kidney stones  You may have 1 or more kidney stones  DISCHARGE INSTRUCTIONS:   Seek care immediately:   · You have vomiting that is not relieved by medicine  Contact your healthcare provider if:   · You have a fever  · You have trouble passing urine  · You see blood in your urine  · You have severe pain  · You have any questions or concerns about your condition or care  Medicines:   · NSAIDs , such as ibuprofen, help decrease swelling, pain, and fever  This medicine is available with or without a doctor's order  NSAIDs can cause stomach bleeding or kidney problems in certain people  If you take blood thinner medicine, always ask your healthcare provider if NSAIDs are safe for you  Always read the medicine label and follow directions  · Prescription medicine  may be given  Ask how to take this medicine safely  · Medicines  to balance your electrolytes may be needed  · Take your medicine as directed  Contact your healthcare provider if you think your medicine is not helping or if you have side effects  Tell him or her if you are allergic to any medicine  Keep a list of the medicines, vitamins, and herbs you take  Include the amounts, and when and why you take them  Bring the list or the pill bottles to follow-up visits  Carry your medicine list with you in case of an emergency  Follow up with your healthcare provider as directed: You may need to return for more tests  Write down your questions so you remember to ask them during your visits  Self-care:   · Drink plenty of liquids  Your healthcare provider may tell you to drink at least 8 to 12 (eight-ounce) cups of liquids each day   This helps flush out the kidney stones when you urinate  Water is the best liquid to drink  · Strain your urine every time you go to the bathroom  Urinate through a strainer or a piece of thin cloth to catch the stones  Take the stones to your healthcare provider so they can be sent to the lab for tests  This will help your healthcare providers plan the best treatment for you  · Eat a variety of healthy foods  Healthy foods include fruits, vegetables, whole-grain breads, low-fat dairy products, beans, and fish  You may need to limit how much sodium (salt) or protein you eat  Ask for information about the best foods for you  · Stay active  Your stones may pass more easily by if you stay active  Ask about the best activities for you  After you pass your kidney stones:  Once you have passed your kidney stones, your healthcare provider may  order a 24-hour urine test  Results from a 24-hour urine test will help your healthcare provider plan ways to prevent more stones from forming  If you are told to do a 24-hour test, your healthcare provider will give you more instructions  © 2017 [facility] Information is for End User's use only and may not be sold, redistributed or otherwise used for commercial purposes  All illustrations and images included in CareNotes® are the copyrighted property of A D A M [employer] Inc  or [employer]  The above information is an  only  It is not intended as medical advice for individual conditions or treatments  Talk to your doctor, nurse or pharmacist before following any medical regimen to see if it is safe and effective for you  Kidney Stones   WHAT YOU NEED TO KNOW:   What is a kidney stone? Kidney stones form in the urinary system when the water and waste in your urine are out of balance  When this happens, certain types of waste crystals separate from the urine  The crystals build up and form kidney stones   Kidney stones can be made of uric acid, calcium, phosphate, or oxalate crystals  You may have 1 or more kidney stones  What increases my risk for kidney stones? · You do not drink enough liquids (especially water) each day  · You have urinary tract infections often  · You follow a certain type of diet  For example, people who eat a diet high in meat or salt may be at higher risk for kidney stones  People who eat foods high in oxalate may also be at higher risk  Foods that are high in oxalate include nuts, chocolate, coffee, and green leafy vegetables  · You take certain medicines such as diuretics, steroids, and antacids  · A family member has had kidney stones  · You were born with a kidney or bowel disorder, or you have other medical problems such as gout  What are the signs and symptoms of kidney stones? · Pain in the middle of your back that moves across to your side or that may spread to your groin    · Nausea and vomiting    · Urge to urinate often, burning feeling when you urinate, or pink or red urine    · Tenderness in your lower back, side, or stomach  How are kidney stones diagnosed? Your healthcare provider will ask about your health, diet, and lifestyle  He may refer you to a urologist  Brenda Thompson may need tests to find out what type of kidney stones you have  Tests can show the size of your kidney stones and where they are in your urinary system  You may have one or more of the following:  · Urine tests  may show if you have blood in your urine  They may also show high amounts of the substances that form kidney stones, such as uric acid  · Blood tests  show how well your kidneys are working  They may also be used to check the levels of calcium or uric acid in your blood  · A noncontrast helical CT scan  is a type of x-ray that uses a computer to take pictures of your kidneys  Healthcare providers use the pictures to check for kidney stones or other problems  · X-rays  of your kidneys, bladder, and ureters may be done   You may be given a dye before the pictures are taken to help healthcare providers see the pictures better  You may need to have more than one x-ray  Tell the healthcare provider if you have ever had an allergic reaction to contrast dye  · An abdominal ultrasound  uses sound waves to show pictures of your abdomen on a monitor  How are kidney stones treated? · NSAIDs , such as ibuprofen, help decrease swelling, pain, and fever  This medicine is available with or without a doctor's order  NSAIDs can cause stomach bleeding or kidney problems in certain people  If you take blood thinner medicine, always ask your healthcare provider if NSAIDs are safe for you  Always read the medicine label and follow directions  · Prescription medicine  may be given  Ask how to take this medicine safely  · Medicines  to balance your electrolytes may be needed  · A procedure or surgery  to remove the kidney stones may be needed if they do not pass on their own  Your treatment will depend on the size and location of your kidney stones  How can I manage my symptoms? · Drink plenty of liquids  Your healthcare provider may tell you to drink at least 8 to 12 (eight-ounce) cups of liquids each day  This helps flush out the kidney stones when you urinate  Water is the best liquid to drink  · Strain your urine every time you go to the bathroom  Urinate through a strainer or a piece of thin cloth to catch the stones  Take the stones to your healthcare provider so they can be sent to the lab for tests  This will help your healthcare providers plan the best treatment for you  · Eat a variety of healthy foods  Healthy foods include fruits, vegetables, whole-grain breads, low-fat dairy products, beans, and fish  You may need to limit how much sodium (salt) or protein you eat  Ask for information about the best foods for you  · Exercise regularly  Your stones may pass more easily if you stay active   Ask about the best activities for you  When should I seek immediate care? · You have vomiting that is not relieved by medicine  When should I contact my healthcare provider? · You have a fever  · You have trouble passing urine  · You see blood in your urine  · You have severe pain  · You have any questions or concerns about your condition or care  CARE AGREEMENT:   You have the right to help plan your care  Learn about your health condition and how it may be treated  Discuss treatment options with your caregivers to decide what care you want to receive  You always have the right to refuse treatment  The above information is an  only  It is not intended as medical advice for individual conditions or treatments  Talk to your doctor, nurse or pharmacist before following any medical regimen to see if it is safe and effective for you  © 2017 [address] Information is for End User's use only and may not be sold, redistributed or otherwise used for commercial purposes  All illustrations and images included in CareNotes® are the copyrighted property of A D A M , Inc  or [employer]

## 2018-05-10 NOTE — TELEPHONE ENCOUNTER
Spoke with Pt and scheduled for next Wednesday 5/16 with Isiah at 11:30 at [place]  Confirmed PT has [facility]

## 2018-05-10 NOTE — ASSESSMENT & PLAN NOTE
Pain, left groin 10/10, controlled on Percocet- still present now in left flank   KUB 5/ 8:5 mm left mid ureteric calculus at the L3-4 level with minimal if any caudal migration compared to recent CT  CT scan abdomen pelvis 5/ 7:  1   Proximal left ureteral 5 mm calculus resulting in moderate left hydroureteronephrosis and slightly delayed left nephrogram without further urinary calculi identified  2   Cholelithiasis without cholecystitis  3   Fatty liver  plan  Stent in place  Oral hydration is encourage  analgesia for pain  follow-up a paige   Labs  cipro 400mg bid 14 days

## 2018-05-10 NOTE — PROGRESS NOTES
Progress Note - Justa Chau 57 y o  male MRN: [id number]    Unit/Bed#: -01 Encounter: [id number]      Assessment:  Mr Laurie Sen is a very pleasant 63-year-old male who was admitted with a chief complaint of left flank pain not accompanied by fever, chills, testicular, groin pain or gross hematuria  CT of the abdomen and pelvis was positive for 5 mm left obstructing ureteral calculus and resultant hydronephrosis  Patient was admitted by the internal medicine team for medical expulsive therapy  Patient did not pass stone overnight and was for planned ureteroscopy and laser lithotripsy  Now postoperative day 1 ureteral stent placement secondary to  presence of pus upon ureteral cannulation  Patient remains afebrile with normal labs  Patient has offered some complaints of mild to moderate stent colic  Otherwise, patient has done well postoperatively  Plan:  Continue fluids, narcotics, anti spasmodic, stool softener to prevent constipation which often at exacerbate stent colic and antibiotics for sterilization  Patient understands he will require staged ureteroscopic stone treatment to be performed in the ambulatory surgery setting once convalesced  Our office is in the process of making arrangements and will contact patient once discharged  Otherwise, patient is eating, ambulating and voiding with only minor complaints of dysuria to be expected secondary to stent  No further  intervention is indicated during this hospital stay  We will sign off  Please do not hesitate to contact our office with any questions or new  concerns  Subjective:   "I feel better, just a little stinging on my side "    Objective:     Vitals: Blood pressure 103/55, pulse 57, temperature 98 2 °F (36 8 °C), temperature source Oral, resp  rate 16, height 5' 10" (1 778 m), weight 74 4 kg (164 lb), SpO2 98 %  ,Body mass index is 23 53 kg/m²        Intake/Output Summary (Last 24 hours) at 05/10/18 0816  Last data filed at 05/09/18 2237   Gross per 24 hour   Intake              560 ml   Output                0 ml   Net              560 ml       Physical Exam: General appearance: alert and oriented, in no acute distress and cooperative  Head: Normocephalic, without obvious abnormality, atraumatic  Neck: no adenopathy, no carotid bruit, no JVD, supple, symmetrical, trachea midline and thyroid not enlarged, symmetric, no tenderness/mass/nodules  Lungs: clear to auscultation bilaterally  Heart: regular rate and rhythm, S1, S2 normal, no murmur, click, rub or gallop  Abdomen: soft, non-tender; bowel sounds normal; no masses,  no organomegaly  Extremities: extremities normal, warm and well-perfused; no cyanosis, clubbing, or edema  Pulses: 2+ and symmetric  Neurologic: Grossly normal     Lab Results   Component Value Date    WBC 8 88 05/10/2018    HGB 12 9 05/10/2018    HCT 36 7 05/10/2018    MCV 89 05/10/2018     (L) 05/10/2018       Lab Results   Component Value Date    GLUCOSE 124 05/10/2018    CALCIUM 8 4 05/10/2018     05/10/2018    K 4 2 05/10/2018    CO2 27 05/10/2018     05/10/2018    BUN 10 05/10/2018    CREATININE 0 89 05/10/2018         Lab, Imaging and other studies: I have personally reviewed pertinent reports

## 2018-05-11 NOTE — CASE MANAGEMENT
Notification of Discharge  This is a Notification of Discharge from our facility [address]  Please be advised that this patient has been discharge from our facility  Below you will find the admission and discharge date and time including the patients disposition  PRESENTATION DATE: 5/8/2018  8:44 AM  IP ADMISSION DATE: 5/9/18 0902  DISCHARGE DATE: 5/10/2018 12:52 PM  DISPOSITION: Home/Self Care    [address] [facility] in the [facility] by [employer] for 2017  Network Utilization Review Department  Phone: [phone]; Fax [phone]  ATTENTION: The Network Utilization Review Department is now centralized for our 7 Facilities  Make a note that we have a new phone and fax numbers for our Department  Please call with any questions or concerns to [phone] and carefully follow the prompts so that you are directed to the right person  All voicemails are confidential  Fax any determinations, approvals, denials, and requests for initial or continue stay review clinical to [phone]  Due to HIGH CALL volume, it would be easier if you could please send faxed requests to expedite your requests and in part, help us provide discharge notifications faster        Reference #[id number]

## 2018-05-15 NOTE — PROGRESS NOTES
5/16/2018    Alla Tolbert  1970  [id number]      Assessment  -Ureteral calculus    Discussion/Plan  Nuria Perez is a 57 y o  male being managed by Dr Zofia Porras        -Patient unable to provide repeat urine specimen at office visit today  Will provide script for UA and culture, to be performed prior to upcoming surgery  Patient will complete current course of Ciprofloxacin 500mg  -Will arrange for secondary procedure of ureteroscopy and extraction of stone  Discussed procedure in detail, including all risks and benefits  Patient will sign consent with performing surgeon day of procedure  -All questions answered, patient agrees with plan      History of Present Illness  57 y o  male who presents as new consult after recent finding of ureteral calculus  Patient initially presented to emergency department on 5/7/18 for acute onset of left flank pain  CT scan identified 5mm left ureteral calculus as well as moderate left hydroureteronephrosis and slight delayed left nephrogram   Left ureteral stent placed on 5/9/18 by Dr Zofia Porras  However, turbid, thick urine identified, and ureteroscopy was contraindicated  Patient discharged with 14 day course of Cipro, he has 7 days left of antibiotics  Results of urine culture from 5/9/18 showed no bacterial growth  Patient denies any dysuria  He notes having urinary frequency, urgency, and fever of 100 7 one week ago, but has resolved  Denies any gross hematuria or flank pain  Review of Systems  Review of Systems   Constitutional: Negative  HENT: Negative  Respiratory: Negative  Cardiovascular: Negative  Gastrointestinal: Negative  Genitourinary: Negative for decreased urine volume, difficulty urinating, dysuria, flank pain, frequency, hematuria and urgency  Musculoskeletal: Negative  Skin: Negative  Neurological: Negative  Psychiatric/Behavioral: Negative          Past Medical History  Past Medical History:   Diagnosis Date    Renal disorder     kidney stones       Past Social History  Past Surgical History:   Procedure Laterality Date    APPENDECTOMY      MD CYSTO/URETERO W/LITHOTRIPSY &INDWELL STENT INSRT Left 5/9/2018    Procedure: CYSTOSCOPY URETEROSCOPY,LEFT  RETROGRADE PYELOGRAM AND INSERTION STENT URETERAL;  Surgeon: Tim Rocha MD;  Location: [facility];  Service: Urology       Past Family History  No family history on file  Past Social history  Social History     Social History    Marital status: Unknown     Spouse name: N/A    Number of children: N/A    Years of education: N/A     Occupational History    Not on file  Social History Main Topics    Smoking status: Never Smoker    Smokeless tobacco: Never Used    Alcohol use Yes      Comment: ocassional    Drug use: No    Sexual activity: Not on file     Other Topics Concern    Not on file     Social History Narrative    No narrative on file       Current Medications  Current Outpatient Prescriptions   Medication Sig Dispense Refill    ciprofloxacin (CIPRO) 500 mg tablet Take 1 tablet (500 mg total) by mouth every 12 (twelve) hours for 14 days 28 tablet 0    escitalopram (LEXAPRO) 10 mg tablet Take 10 mg by mouth daily      loratadine-pseudoephedrine (CLARITIN-D 24-HOUR)  mg per 24 hr tablet Take 1 tablet by mouth daily      oxybutynin (DITROPAN) 5 mg tablet Take 1 tablet (5 mg total) by mouth 3 (three) times a day for 10 days 30 tablet 0    oxyCODONE-acetaminophen (PERCOCET) 5-325 mg per tablet Take 1 tablet by mouth every 6 (six) hours as needed for moderate pain or severe pain Max Daily Amount: 4 tablets 20 tablet 0    tamsulosin (FLOMAX) 0 4 mg Take 1 capsule (0 4 mg total) by mouth daily with dinner 30 capsule 0     No current facility-administered medications for this visit          Allergies  Allergies   Allergen Reactions    Betadine Antibiotic-Moisturize [Bacitracin-Polymyxin B] Rash       Past Medical History, Social History, Family History, medications and allergies were reviewed  Vitals  There were no vitals filed for this visit  Physical Exam  Physical Exam   Constitutional: He is oriented to person, place, and time  He appears well-developed and well-nourished  HENT:   Head: Normocephalic  Eyes: Pupils are equal, round, and reactive to light  Neck: Normal range of motion  Cardiovascular: Normal rate, regular rhythm and intact distal pulses  Pulmonary/Chest: Effort normal and breath sounds normal    Abdominal: Soft  Normal appearance and bowel sounds are normal  There is no CVA tenderness  Musculoskeletal: Normal range of motion  Neurological: He is alert and oriented to person, place, and time  He has normal reflexes  Skin: Skin is warm and dry  Psychiatric: He has a normal mood and affect  His behavior is normal  Judgment and thought content normal        Results    I have personally reviewed all pertinent lab results and reviewed with patient  No results found for: PSA  Lab Results   Component Value Date    GLUCOSE 124 05/10/2018    CALCIUM 8 4 05/10/2018     05/10/2018    K 4 2 05/10/2018    CO2 27 05/10/2018     05/10/2018    BUN 10 05/10/2018    CREATININE 0 89 05/10/2018     Lab Results   Component Value Date    WBC 8 88 05/10/2018    HGB 12 9 05/10/2018    HCT 36 7 05/10/2018    MCV 89 05/10/2018     (L) 05/10/2018     No results found for this or any previous visit (from the past 1 hour(s))

## 2018-05-16 ENCOUNTER — OFFICE VISIT (OUTPATIENT)
Dept: UROLOGY | Facility: CLINIC | Age: 48
End: 2018-05-16
Payer: COMMERCIAL

## 2018-05-16 ENCOUNTER — TELEPHONE (OUTPATIENT)
Dept: UROLOGY | Facility: AMBULATORY SURGERY CENTER | Age: 48
End: 2018-05-16

## 2018-05-16 VITALS
BODY MASS INDEX: 23.42 KG/M2 | HEART RATE: 62 BPM | DIASTOLIC BLOOD PRESSURE: 78 MMHG | HEIGHT: 70 IN | SYSTOLIC BLOOD PRESSURE: 118 MMHG | WEIGHT: 163.6 LBS

## 2018-05-16 DIAGNOSIS — N20.1 URETERAL CALCULUS, LEFT: ICD-10-CM

## 2018-05-16 DIAGNOSIS — N39.0 URINARY TRACT INFECTION WITHOUT HEMATURIA, SITE UNSPECIFIED: Primary | ICD-10-CM

## 2018-05-16 PROCEDURE — 99213 OFFICE O/P EST LOW 20 MIN: CPT | Performed by: NURSE PRACTITIONER

## 2018-05-16 RX ORDER — ESCITALOPRAM OXALATE 10 MG/1
10 TABLET ORAL DAILY
COMMUNITY
End: 2018-05-17

## 2018-05-16 NOTE — PATIENT INSTRUCTIONS
Ureteroscopy   WHAT YOU NEED TO KNOW:   A ureteroscopy is a procedure to examine in the inside of your urinary tract, which includes your urethra, bladder, ureters, and kidneys  A ureteroscope is a small, thin tube with a light and camera on the end  Ureteroscopy can help your healthcare provider diagnose and treat problems in your urinary tract, such as kidney stones  DISCHARGE INSTRUCTIONS:   Medicine:   · Antibiotics  may be given to treat or prevent an infection  · Take your medicine as directed  Contact your healthcare provider if you think your medicine is not helping or if you have side effects  Tell him or her if you are allergic to any medicine  Keep a list of the medicines, vitamins, and herbs you take  Include the amounts, and when and why you take them  Bring the list or the pill bottles to follow-up visits  Carry your medicine list with you in case of an emergency  Follow up with your healthcare provider as directed:  Write down your questions so you remember to ask them during your visits  Drink liquids as directed  Liquids can help prevent kidney stones and urinary tract infections  Drink water and limit the amount of caffeine you drink  Caffeine may be found in coffee, tea, soda, sports drinks, and foods  Ask your healthcare provider how much liquid to drink each day  Contact your healthcare provider if:   · You have a fever  · You cannot urinate  · You have blood in your urine  · You are vomiting  · You have pain in your abdomen or side  · You have questions or concerns about your condition or care  © 2017 [address] Information is for End User's use only and may not be sold, redistributed or otherwise used for commercial purposes  All illustrations and images included in CareNotes® are the copyrighted property of A D [employer] M [employer] Inc  or [employer]  The above information is an  only   It is not intended as medical advice for individual conditions or treatments  Talk to your doctor, nurse or pharmacist before following any medical regimen to see if it is safe and effective for you

## 2018-05-16 NOTE — TELEPHONE ENCOUNTER
Confirmed surgery date of 6/8/18 for cysto/left URS/Holmium laser/left RGP/left stent  Packet emailed to patient

## 2018-05-17 ENCOUNTER — TRANSCRIBE ORDERS (OUTPATIENT)
Dept: LAB | Facility: CLINIC | Age: 48
End: 2018-05-17

## 2018-05-17 ENCOUNTER — APPOINTMENT (OUTPATIENT)
Dept: LAB | Facility: CLINIC | Age: 48
End: 2018-05-17
Payer: COMMERCIAL

## 2018-05-17 DIAGNOSIS — N39.0 URINARY TRACT INFECTION WITHOUT HEMATURIA, SITE UNSPECIFIED: ICD-10-CM

## 2018-05-17 LAB
BACTERIA UR QL AUTO: ABNORMAL /HPF
BILIRUB UR QL STRIP: NEGATIVE
CLARITY UR: CLEAR
COLOR UR: ABNORMAL
GLUCOSE UR STRIP-MCNC: NEGATIVE MG/DL
HGB UR QL STRIP.AUTO: ABNORMAL
KETONES UR STRIP-MCNC: NEGATIVE MG/DL
LEUKOCYTE ESTERASE UR QL STRIP: ABNORMAL
NITRITE UR QL STRIP: NEGATIVE
NON-SQ EPI CELLS URNS QL MICRO: ABNORMAL /HPF
PH UR STRIP.AUTO: 6 [PH] (ref 4.5–8)
PROT UR STRIP-MCNC: NEGATIVE MG/DL
RBC #/AREA URNS AUTO: ABNORMAL /HPF
SP GR UR STRIP.AUTO: <=1.005 (ref 1–1.03)
UROBILINOGEN UR QL STRIP.AUTO: 0.2 E.U./DL
WBC #/AREA URNS AUTO: ABNORMAL /HPF

## 2018-05-17 PROCEDURE — 81001 URINALYSIS AUTO W/SCOPE: CPT | Performed by: NURSE PRACTITIONER

## 2018-05-17 PROCEDURE — 87086 URINE CULTURE/COLONY COUNT: CPT

## 2018-05-17 NOTE — PRE-PROCEDURE INSTRUCTIONS
Pre-Surgery Instructions:   Medication Instructions    ciprofloxacin (CIPRO) 500 mg tablet Instructed patient per Anesthesia Guidelines   escitalopram (LEXAPRO) 10 mg tablet Instructed patient per Anesthesia Guidelines   loratadine-pseudoephedrine (CLARITIN-D 24-HOUR)  mg per 24 hr tablet Instructed patient per Anesthesia Guidelines   oxybutynin (DITROPAN) 5 mg tablet Instructed patient per Anesthesia Guidelines   tamsulosin (FLOMAX) 0 4 mg Instructed patient per Anesthesia Guidelines  Pre op and bathing instructions reviewed

## 2018-05-18 ENCOUNTER — TELEPHONE (OUTPATIENT)
Dept: UROLOGY | Facility: AMBULATORY SURGERY CENTER | Age: 48
End: 2018-05-18

## 2018-05-18 LAB — BACTERIA UR CULT: NORMAL

## 2018-05-18 NOTE — TELEPHONE ENCOUNTER
Please advise patient that results of recent urine culture were negative for any infection, he is pending upcoming ureteroscopy on 6/8/18

## 2018-05-22 ENCOUNTER — TELEPHONE (OUTPATIENT)
Dept: UROLOGY | Facility: CLINIC | Age: 48
End: 2018-05-22

## 2018-05-22 NOTE — TELEPHONE ENCOUNTER
Crow Pacheco patient, managed by DR Deep Perez scheduled for surgery on 6/8/18 for cysto/URS/left stent,  He has a indwelling stent original surgery 5/9/18  Seen on 5/18/18 with Sita Mota for [facility] P and consents  Returned call from patient,  He states he is feeling well, he completed his prescription for anti inflammatory two days ago and has no pain,  Advised he can use over the counter Ibuprofen as needed for any stent discomfort,  He has one day left of Cipro  He would like to discontinue the Flomax due to increased urinary frequency  Advised increased frequency could also be related to his indwelling stent  I advised patient I will send message to advanced practitioner to confirm ok to stop Flomax and advised clinical will follow up with him

## 2018-05-23 NOTE — TELEPHONE ENCOUNTER
After discussing with Dr Ignacia De Leon, no further testing required at this time  Please reassure patient that he can have occasional flank pain and/or gross hematuria due to ureteral stent  Plans for upcoming L URS on 6/8/18  If patient continues to have persistent discomfort before surgery, can consider KUB

## 2018-05-23 NOTE — TELEPHONE ENCOUNTER
Called and informed patient, no further imaging needed at this time  Reviewed with patient occasional flank pain and blood in urine can occur with ureteral stent in place  Advised to increase fluids,  Use Ibuprofen as needed for pain,  Call if develops worsening pain, fever, nausea or vomiting  Plan to proceed with surgery as scheduled on 6/8/18

## 2018-05-29 ENCOUNTER — TELEPHONE (OUTPATIENT)
Dept: UROLOGY | Facility: CLINIC | Age: 48
End: 2018-05-29

## 2018-05-29 ENCOUNTER — ANESTHESIA EVENT (OUTPATIENT)
Dept: PERIOP | Facility: AMBULARY SURGERY CENTER | Age: 48
End: 2018-05-29
Payer: COMMERCIAL

## 2018-05-29 NOTE — TELEPHONE ENCOUNTER
[facility] patient, managed by DR Adkins, indwelling ureteral stent s/p stent insertion on 5/9/18  Patient scheduled for cysto/URS/left stent on 6/8/18 with Dr Naveen Jennings,  Patient left message that he had resumed exercise running and he has experienced increased hematuria and discomfort with exercise  Patient calling to get recommendation  LM for patient to call office

## 2018-05-29 NOTE — TELEPHONE ENCOUNTER
Patient returned call,  Reviewed with patient that if he is experiencing increased stent discomfort and hematuria with exercise that he should refrain from running, treadmill  And avoid heavy lifting at this time  Follow up with surgery as scheduled

## 2018-06-08 ENCOUNTER — APPOINTMENT (OUTPATIENT)
Dept: RADIOLOGY | Facility: AMBULARY SURGERY CENTER | Age: 48
End: 2018-06-08
Payer: COMMERCIAL

## 2018-06-08 ENCOUNTER — TELEPHONE (OUTPATIENT)
Dept: UROLOGY | Facility: CLINIC | Age: 48
End: 2018-06-08

## 2018-06-08 ENCOUNTER — HOSPITAL ENCOUNTER (OUTPATIENT)
Facility: AMBULARY SURGERY CENTER | Age: 48
Setting detail: OUTPATIENT SURGERY
Discharge: HOME | End: 2018-06-08
Attending: UROLOGY | Admitting: UROLOGY
Payer: COMMERCIAL

## 2018-06-08 ENCOUNTER — ANESTHESIA (OUTPATIENT)
Dept: PERIOP | Facility: AMBULARY SURGERY CENTER | Age: 48
End: 2018-06-08
Payer: COMMERCIAL

## 2018-06-08 VITALS
HEIGHT: 70 IN | OXYGEN SATURATION: 100 % | HEART RATE: 85 BPM | DIASTOLIC BLOOD PRESSURE: 82 MMHG | RESPIRATION RATE: 16 BRPM | BODY MASS INDEX: 21.62 KG/M2 | SYSTOLIC BLOOD PRESSURE: 122 MMHG | WEIGHT: 151 LBS | TEMPERATURE: 97 F

## 2018-06-08 DIAGNOSIS — N20.0 LEFT NEPHROLITHIASIS: Primary | ICD-10-CM

## 2018-06-08 PROCEDURE — C2617 STENT, NON-COR, TEM W/O DEL: HCPCS | Performed by: UROLOGY

## 2018-06-08 PROCEDURE — C1758 CATHETER, URETERAL: HCPCS | Performed by: UROLOGY

## 2018-06-08 PROCEDURE — C1769 GUIDE WIRE: HCPCS | Performed by: UROLOGY

## 2018-06-08 PROCEDURE — 74420 UROGRAPHY RTRGR +-KUB: CPT

## 2018-06-08 PROCEDURE — 52356 CYSTO/URETERO W/LITHOTRIPSY: CPT | Performed by: UROLOGY

## 2018-06-08 DEVICE — STENT URETERAL 6FR 24CML INLAY OPTIMA: Type: IMPLANTABLE DEVICE | Status: FUNCTIONAL

## 2018-06-08 RX ORDER — ONDANSETRON 2 MG/ML
INJECTION INTRAMUSCULAR; INTRAVENOUS AS NEEDED
Status: DISCONTINUED | OUTPATIENT
Start: 2018-06-08 | End: 2018-06-08 | Stop reason: SURG

## 2018-06-08 RX ORDER — PROPOFOL 10 MG/ML
INJECTION, EMULSION INTRAVENOUS AS NEEDED
Status: DISCONTINUED | OUTPATIENT
Start: 2018-06-08 | End: 2018-06-08 | Stop reason: SURG

## 2018-06-08 RX ORDER — SODIUM CHLORIDE, SODIUM LACTATE, POTASSIUM CHLORIDE, CALCIUM CHLORIDE 600; 310; 30; 20 MG/100ML; MG/100ML; MG/100ML; MG/100ML
75 INJECTION, SOLUTION INTRAVENOUS CONTINUOUS
Status: DISCONTINUED | OUTPATIENT
Start: 2018-06-08 | End: 2018-06-08 | Stop reason: HOSPADM

## 2018-06-08 RX ORDER — SODIUM CHLORIDE 9 MG/ML
INJECTION, SOLUTION INTRAVENOUS AS NEEDED
Status: DISCONTINUED | OUTPATIENT
Start: 2018-06-08 | End: 2018-06-08 | Stop reason: HOSPADM

## 2018-06-08 RX ORDER — ONDANSETRON 2 MG/ML
4 INJECTION INTRAMUSCULAR; INTRAVENOUS ONCE AS NEEDED
Status: DISCONTINUED | OUTPATIENT
Start: 2018-06-08 | End: 2018-06-08 | Stop reason: HOSPADM

## 2018-06-08 RX ORDER — SODIUM CHLORIDE 9 MG/ML
INJECTION, SOLUTION INTRAVENOUS CONTINUOUS PRN
Status: DISCONTINUED | OUTPATIENT
Start: 2018-06-08 | End: 2018-06-08 | Stop reason: SURG

## 2018-06-08 RX ORDER — FENTANYL CITRATE 50 UG/ML
INJECTION, SOLUTION INTRAMUSCULAR; INTRAVENOUS AS NEEDED
Status: DISCONTINUED | OUTPATIENT
Start: 2018-06-08 | End: 2018-06-08 | Stop reason: SURG

## 2018-06-08 RX ORDER — HYDROCODONE BITARTRATE AND ACETAMINOPHEN 5; 325 MG/1; MG/1
1 TABLET ORAL EVERY 6 HOURS PRN
Qty: 5 TABLET | Refills: 0 | Status: SHIPPED | OUTPATIENT
Start: 2018-06-08 | End: 2018-06-18

## 2018-06-08 RX ORDER — MAGNESIUM HYDROXIDE 1200 MG/15ML
LIQUID ORAL AS NEEDED
Status: DISCONTINUED | OUTPATIENT
Start: 2018-06-08 | End: 2018-06-08 | Stop reason: HOSPADM

## 2018-06-08 RX ORDER — DOCUSATE SODIUM 100 MG/1
100 CAPSULE, LIQUID FILLED ORAL 2 TIMES DAILY
Qty: 30 CAPSULE | Refills: 0 | Status: SHIPPED | OUTPATIENT
Start: 2018-06-08 | End: 2020-04-17

## 2018-06-08 RX ORDER — TAMSULOSIN HYDROCHLORIDE 0.4 MG/1
0.4 CAPSULE ORAL
Qty: 15 CAPSULE | Refills: 0 | Status: SHIPPED | OUTPATIENT
Start: 2018-06-08 | End: 2018-08-24 | Stop reason: ALTCHOICE

## 2018-06-08 RX ORDER — METOCLOPRAMIDE HYDROCHLORIDE 5 MG/ML
10 INJECTION INTRAMUSCULAR; INTRAVENOUS ONCE AS NEEDED
Status: DISCONTINUED | OUTPATIENT
Start: 2018-06-08 | End: 2018-06-08 | Stop reason: HOSPADM

## 2018-06-08 RX ORDER — LIDOCAINE HYDROCHLORIDE 10 MG/ML
INJECTION, SOLUTION INFILTRATION; PERINEURAL AS NEEDED
Status: DISCONTINUED | OUTPATIENT
Start: 2018-06-08 | End: 2018-06-08 | Stop reason: SURG

## 2018-06-08 RX ORDER — IBUPROFEN 200 MG
600 TABLET ORAL EVERY 6 HOURS PRN
Refills: 0
Start: 2018-06-08 | End: 2018-08-24 | Stop reason: ALTCHOICE

## 2018-06-08 RX ORDER — PHENAZOPYRIDINE HYDROCHLORIDE 200 MG/1
200 TABLET, FILM COATED ORAL 3 TIMES DAILY PRN
Qty: 10 TABLET | Refills: 0 | Status: SHIPPED | OUTPATIENT
Start: 2018-06-08 | End: 2018-06-11

## 2018-06-08 RX ORDER — FENTANYL CITRATE/PF 50 MCG/ML
50 SYRINGE (ML) INJECTION
Status: DISCONTINUED | OUTPATIENT
Start: 2018-06-08 | End: 2018-06-08 | Stop reason: HOSPADM

## 2018-06-08 RX ORDER — MIDAZOLAM HYDROCHLORIDE 1 MG/ML
INJECTION INTRAMUSCULAR; INTRAVENOUS AS NEEDED
Status: DISCONTINUED | OUTPATIENT
Start: 2018-06-08 | End: 2018-06-08 | Stop reason: SURG

## 2018-06-08 RX ORDER — DIPHENHYDRAMINE HYDROCHLORIDE 50 MG/ML
12.5 INJECTION INTRAMUSCULAR; INTRAVENOUS ONCE AS NEEDED
Status: DISCONTINUED | OUTPATIENT
Start: 2018-06-08 | End: 2018-06-08 | Stop reason: HOSPADM

## 2018-06-08 RX ADMIN — FENTANYL CITRATE 50 MCG: 50 INJECTION, SOLUTION INTRAMUSCULAR; INTRAVENOUS at 12:13

## 2018-06-08 RX ADMIN — ONDANSETRON 4 MG: 2 INJECTION INTRAMUSCULAR; INTRAVENOUS at 12:30

## 2018-06-08 RX ADMIN — LIDOCAINE HYDROCHLORIDE 50 MG: 10 INJECTION, SOLUTION INFILTRATION; PERINEURAL at 12:00

## 2018-06-08 RX ADMIN — FENTANYL CITRATE 25 MCG: 50 INJECTION, SOLUTION INTRAMUSCULAR; INTRAVENOUS at 12:29

## 2018-06-08 RX ADMIN — MIDAZOLAM HYDROCHLORIDE 2 MG: 1 INJECTION, SOLUTION INTRAMUSCULAR; INTRAVENOUS at 11:57

## 2018-06-08 RX ADMIN — FENTANYL CITRATE 25 MCG: 50 INJECTION, SOLUTION INTRAMUSCULAR; INTRAVENOUS at 12:35

## 2018-06-08 RX ADMIN — PROPOFOL 200 MG: 10 INJECTION, EMULSION INTRAVENOUS at 12:00

## 2018-06-08 RX ADMIN — SODIUM CHLORIDE: 0.9 INJECTION, SOLUTION INTRAVENOUS at 11:55

## 2018-06-08 RX ADMIN — DEXAMETHASONE SODIUM PHOSPHATE 10 MG: 10 INJECTION INTRAMUSCULAR; INTRAVENOUS at 12:18

## 2018-06-08 RX ADMIN — FENTANYL CITRATE 50 MCG: 50 INJECTION INTRAMUSCULAR; INTRAVENOUS at 13:06

## 2018-06-08 NOTE — DISCHARGE INSTRUCTIONS
WHAT IS A STENT? At the end of the procedure, your doctor may place a stent into your ureter  A stent is a thin, flexible piece of plastic that will hold open your ureter while the remaining small pieces of stone pass  This allows your kidney to drain easily and prevents you from having to pass these small stone pieces on your own, which could be painful  The stent is about 12 inches long and looks and feels like a thin piece of spaghetti  AFTER THE PROCEDURE  After the procedure you may experience the following symptoms  All of these are normal and should resolve within 1 or 2 days after your stent is removed  1) Urinary frequency (urinating more often than usual)  2) Urinary urgency (the sensation that you need to urinate right away)  3) Painful urination (this can be pain in your bladder or in your back when  you urinate)  4) Blood in your urine ( a stent can irritate the lining of your bladder causing it to bleed)  5) Back/Flank pain, especially with urination  You will receive a prescription for narcotic pain medication after the procedure  You will also receive a prescription for tamsulosin which you will take once a day for 2 weeks to help relax your ureter and decrease stent discomfort  You will also need to purchase a stool softener (i e  Colace) or mild laxative (i e  Miralax) as the narcotic pain medication can make you constipated  This is important as constipation can exacerbate stent related symptoms

## 2018-06-08 NOTE — OP NOTE
Operative Note     PATIENT:  Efren Bower (MRN [id number])    DATE OF PROCEDURE:   6/8/2018    PRE-OP DIAGNOSIS:   1) Left ureteral calculus  2) indwelling left ureteral stent  3) history of urinary tract infection    POST-OP DIAGNOSIS:   1) Left ureteral calculus  2) indwelling left ureteral stent  3) history of urinary tract infection    PROCEDURES PERFORMED:  1) Cystoscopy  2) Left retrograde pyelography with fluoroscopic interpretation  3) Left ureteroscopy with laser lithotripsy of stone  4) Left ureteral stent placement     SURGEON:  Carie Levy MD    NOTE:  There were no qualified teaching residents to assist with this case    ANESTHESIA: General     COMPLICATIONS:   None    ANTIBIOTICS:  Cefazolin    INTRAOPERATIVE THROMBOEMBOLISM PROPHYLAXIS:  Pneumatic compression stockings     FINDINGS:  There was a solitary left stone that had retropulsed into a lower pole calyx  It had the endoscopic appearance of a calcium oxalate dihydrate stone  It was dusted in situ and a postoperative ureteral stent was of the place on a string    INDICATIONS FOR PROCEDURE:  Efren Bower is an 57 y o  old male with Left ureteral calculus  He was recently treated with a ureteral stent  After discussing the options, the patient elected to undergo ureteroscopy and ureteral stent placement  We discussed the procedure in detail, the alternatives, and the risks, and they signed informed consent to proceed  PROCEDURE IN DETAIL:   The patient was identified and brought to the OR  Antibiotic prophylaxis and DVT prophylaxis were administered  They were placed in the comfortable dorsal lithotomy position with care to pad all pressure points  They were prepped and draped in the usual sterile fashion using hibiclens  A surgical time out was performed with all in the room in agreement with the correct patient, procedure, indications, and laterality  A 21-Congolese rigid cystoscope was used to enter the bladder    The bladder was inspected in its entirety and there were no lesions noted  The ureteral orifices were identified in their normal orthotopic positions  The Left ureteral orifice was identified and a 5 Fr open ended catheter was placed into the ureteral orifice  A retrograde pyelogram was performed with the findings as described above  A Sensor wire was advanced up to the kidney under fluoroscopic guidance  The pre-placed stent was removed  Leaving this safety wire in place, the bladder was drained  A 7 5 Cambodian semi-rigid ureteral scope was advanced alongside the pre-placed guidewire  A number of clots were present within the ureter  These were evacuated using a 2 4 Cambodian Nitinol tip stone basket to provide better visualization  The ureter itself was intact  I did evaluate the status of the ureter, based on the patient's previously noted impacted stone prior to stent placement  This was evaluated with retrograde pyelogram through the ureteral scope that was negative for extravasation  Next a 2nd wire was placed and over this a 12/14 [employer] ureteral access sheath was advanced under fluoroscopic guidance  Through this an 8 5 Matchbinra digital flexible ureteral scope was placed and pan pyeloscopy was performed  The stone was encountered in the lower pole calyx  A 200 micron holmium laser fiber was passed when the stone was non deflected  The stone was then Re in gauge and the stone was completely dusted using the settings of 0 7 joules and 7 hertz  The ureteroscope was backed down the ureter under vision and there were no residual fragments and the ureter was noted to be intact with no injury and moderate edema where the stone was located  A JJ stent was then passed up the wire  under fluoroscopic guidance into the kidney with a good curl noted in the kidney and in the bladder  An externalized tethering string was left in place and secured to the skin  The bladder was drained        The patient was placed back supine, awakened from general anesthesia and brought to recovery room in stable condition  ESTIMATED BLOOD LOSS:  Minimal      SPECIMENS:   * No orders in the log *     IMPLANTS:     Implant Name Type Inv  Item Serial No   Lot No  LRB No  Used   URETERAL STENT 6 FR X 24 CM OPTIMA INLAY - [id number]   URETERAL STENT 6 FR X 24 CM OPTIMA INLAY   [place] [employer] [facility] [id number] Left 1        COMPLICATIONS: None    DISPOSITION: PACU    PLAN:  Patient will be scheduled for ureteral stent removal in the office early next week    He will then follow up with a KUB and renal ultrasound in 2 months time

## 2018-06-08 NOTE — INTERVAL H&P NOTE
H&P reviewed  After examining the patient I find no changes in the patients condition since the H&P had been written  Discussed options for management of the patient's ureteral stone  We discussed surgical options including ureteroscopy and shock wave lithotripsy  In addition we discussed conservative management with medical expulsive therapy  The patient has elected to undergo ureteroscopy  I discussed with the patients risks and benefits and alternatives to ureteroscopy with laser lithotripsy  The risks include bleeding, infection, injury to the urethra, bladder, ureter or kidney, risk of a staged procedure, risk of stricture, risk of residual fragments, risk of loss of kidney, risks of anesthesia including DVT, PE, MI and death  The patient understands that a ureteral stent will likely be left in place at the time of the procedure  We reviewed the expected postoperative care  The patient understands these risks and has provided informed consent for // LEFT ureteroscopy

## 2018-06-08 NOTE — H&P (VIEW-ONLY)
5/16/2018    Jody Villanueva  1970  [id number]      Assessment  -Ureteral calculus    Discussion/Plan  Scott Sams is a 57 y o  male being managed by Dr Gerardo Hickey        -Patient unable to provide repeat urine specimen at office visit today  Will provide script for UA and culture, to be performed prior to upcoming surgery  Patient will complete current course of Ciprofloxacin 500mg  -Will arrange for secondary procedure of ureteroscopy and extraction of stone  Discussed procedure in detail, including all risks and benefits  Patient will sign consent with performing surgeon day of procedure  -All questions answered, patient agrees with plan      History of Present Illness  57 y o  male who presents as new consult after recent finding of ureteral calculus  Patient initially presented to emergency department on 5/7/18 for acute onset of left flank pain  CT scan identified 5mm left ureteral calculus as well as moderate left hydroureteronephrosis and slight delayed left nephrogram   Left ureteral stent placed on 5/9/18 by Dr Gerardo Hickey  However, turbid, thick urine identified, and ureteroscopy was contraindicated  Patient discharged with 14 day course of Cipro, he has 7 days left of antibiotics  Results of urine culture from 5/9/18 showed no bacterial growth  Patient denies any dysuria  He notes having urinary frequency, urgency, and fever of 100 7 one week ago, but has resolved  Denies any gross hematuria or flank pain  Review of Systems  Review of Systems   Constitutional: Negative  HENT: Negative  Respiratory: Negative  Cardiovascular: Negative  Gastrointestinal: Negative  Genitourinary: Negative for decreased urine volume, difficulty urinating, dysuria, flank pain, frequency, hematuria and urgency  Musculoskeletal: Negative  Skin: Negative  Neurological: Negative  Psychiatric/Behavioral: Negative          Past Medical History  Past Medical History:   Diagnosis Date    Renal disorder     kidney stones       Past Social History  Past Surgical History:   Procedure Laterality Date    APPENDECTOMY      MS CYSTO/URETERO W/LITHOTRIPSY &INDWELL STENT INSRT Left 5/9/2018    Procedure: CYSTOSCOPY URETEROSCOPY,LEFT  RETROGRADE PYELOGRAM AND INSERTION STENT URETERAL;  Surgeon: Jack Chen MD;  [facility];  Service: Urology       Past Family History  No family history on file  Past Social history  Social History     Social History    Marital status: Unknown     Spouse name: N/A    Number of children: N/A    Years of education: N/A     Occupational History    Not on file  Social History Main Topics    Smoking status: Never Smoker    Smokeless tobacco: Never Used    Alcohol use Yes      Comment: ocassional    Drug use: No    Sexual activity: Not on file     Other Topics Concern    Not on file     Social History Narrative    No narrative on file       Current Medications  Current Outpatient Prescriptions   Medication Sig Dispense Refill    ciprofloxacin (CIPRO) 500 mg tablet Take 1 tablet (500 mg total) by mouth every 12 (twelve) hours for 14 days 28 tablet 0    escitalopram (LEXAPRO) 10 mg tablet Take 10 mg by mouth daily      loratadine-pseudoephedrine (CLARITIN-D 24-HOUR)  mg per 24 hr tablet Take 1 tablet by mouth daily      oxybutynin (DITROPAN) 5 mg tablet Take 1 tablet (5 mg total) by mouth 3 (three) times a day for 10 days 30 tablet 0    oxyCODONE-acetaminophen (PERCOCET) 5-325 mg per tablet Take 1 tablet by mouth every 6 (six) hours as needed for moderate pain or severe pain Max Daily Amount: 4 tablets 20 tablet 0    tamsulosin (FLOMAX) 0 4 mg Take 1 capsule (0 4 mg total) by mouth daily with dinner 30 capsule 0     No current facility-administered medications for this visit          Allergies  Allergies   Allergen Reactions    Betadine Antibiotic-Moisturize [Bacitracin-Polymyxin B] Rash       Past Medical History, Social History, Family History, medications and allergies were reviewed  Vitals  There were no vitals filed for this visit  Physical Exam  Physical Exam   Constitutional: He is oriented to person, place, and time  He appears well-developed and well-nourished  HENT:   Head: Normocephalic  Eyes: Pupils are equal, round, and reactive to light  Neck: Normal range of motion  Cardiovascular: Normal rate, regular rhythm and intact distal pulses  Pulmonary/Chest: Effort normal and breath sounds normal    Abdominal: Soft  Normal appearance and bowel sounds are normal  There is no CVA tenderness  Musculoskeletal: Normal range of motion  Neurological: He is alert and oriented to person, place, and time  He has normal reflexes  Skin: Skin is warm and dry  Psychiatric: He has a normal mood and affect  His behavior is normal  Judgment and thought content normal        Results    I have personally reviewed all pertinent lab results and reviewed with patient  No results found for: PSA  Lab Results   Component Value Date    GLUCOSE 124 05/10/2018    CALCIUM 8 4 05/10/2018     05/10/2018    K 4 2 05/10/2018    CO2 27 05/10/2018     05/10/2018    BUN 10 05/10/2018    CREATININE 0 89 05/10/2018     Lab Results   Component Value Date    WBC 8 88 05/10/2018    HGB 12 9 05/10/2018    HCT 36 7 05/10/2018    MCV 89 05/10/2018     (L) 05/10/2018     No results found for this or any previous visit (from the past 1 hour(s))

## 2018-06-08 NOTE — ANESTHESIA PREPROCEDURE EVALUATION
Review of Systems/Medical History  Patient summary reviewed  Chart reviewed  No history of anesthetic complications     Cardiovascular  Hyperlipidemia,    Pulmonary  Negative pulmonary ROS        GI/Hepatic  Negative GI/hepatic ROS          Kidney stones,        Endo/Other  Negative endo/other ROS      GYN       Hematology  Negative hematology ROS      Musculoskeletal  Negative musculoskeletal ROS        Neurology  Negative neurology ROS      Psychology   Anxiety,              Physical Exam    Airway    Mallampati score: II  TM Distance: >3 FB  Neck ROM: full     Dental   No notable dental hx     Cardiovascular  Rhythm: regular, Rate: normal, Cardiovascular exam normal    Pulmonary  Pulmonary exam normal Breath sounds clear to auscultation,     Other Findings        Anesthesia Plan  ASA Score- 2     Anesthesia Type- general with ASA Monitors  Additional Monitors:   Airway Plan: LMA  Plan Factors-    Induction- intravenous  Postoperative Plan- Plan for postoperative opioid use  Informed Consent- Anesthetic plan and risks discussed with patient  I personally reviewed this patient with the CRNA  Discussed and agreed on the Anesthesia Plan with the CRNA  Lisa Cook Recent labs personally reviewed:  Lab Results   Component Value Date    WBC 8 88 05/10/2018    HGB 12 9 05/10/2018     (L) 05/10/2018     Lab Results   Component Value Date     05/10/2018    K 4 2 05/10/2018    BUN 10 05/10/2018    CREATININE 0 89 05/10/2018    GLUCOSE 124 05/10/2018     I, Jenn Palafox MD, have personally seen and evaluated the patient prior to anesthetic care  I have reviewed the pre-anesthetic record, and other medical records if appropriate to the anesthetic care  If a CRNA is involved in the case, I have reviewed the CRNA assessment, if present, and agree   Risks/benefits and alternatives discussed with patient including possible PONV, sore throat, and possibility of rare anesthetic and surgical emergencies

## 2018-06-08 NOTE — ANESTHESIA POSTPROCEDURE EVALUATION
Post-Op Assessment Note      CV Status:  Stable    Mental Status:  Alert and awake    Hydration Status:  Euvolemic    PONV Controlled:  Controlled    Airway Patency:  Patent    Post Op Vitals Reviewed: Yes          Staff: CRNA           BP      Temp (!) 97 1 °F (36 2 °C) (06/08/18 1250)    Pulse 95 (06/08/18 1250)   Resp 16 (06/08/18 1250)    SpO2

## 2018-06-11 NOTE — TELEPHONE ENCOUNTER
Called and spoke to patient, reviewed post op stent instructions,  Scheduled for nurse visit tomorrow 6/12/18 Marta Barnett with Stu Trinh RN for stent with string removal   Instructed to take Ibuprofen 600 mg one hour before  Patient is to be scheduled for a 2 month follow up with PA, with US/KUB prior to visit  Orders entered  Thank you 
Patient is s/p Left URS/laser lithotripsy of stone with left ureteral stent with string placement with Dr Genevieve Santiago on 6/8/18,  Per DR Genevieve Santiago patient to be scheduled for stent with string removal nurse visit Tuesday 6/12/18 , then follow up in 2 months with KUB/US prior to visit 
none

## 2018-06-12 ENCOUNTER — CLINICAL SUPPORT (OUTPATIENT)
Dept: UROLOGY | Facility: CLINIC | Age: 48
End: 2018-06-12
Payer: COMMERCIAL

## 2018-06-12 VITALS
HEIGHT: 70 IN | HEART RATE: 88 BPM | SYSTOLIC BLOOD PRESSURE: 112 MMHG | DIASTOLIC BLOOD PRESSURE: 74 MMHG | WEIGHT: 155.2 LBS | BODY MASS INDEX: 22.22 KG/M2

## 2018-06-12 DIAGNOSIS — N20.0 KIDNEY STONE: Primary | ICD-10-CM

## 2018-06-12 PROCEDURE — 99211 OFF/OP EST MAY X REQ PHY/QHP: CPT | Performed by: UROLOGY

## 2018-06-12 NOTE — PROGRESS NOTES
Sunitha Malcolm  [id number]  1970      6/12/2018      Diagnosis  Chief Complaint     Nephrolithiasis          Patient is s/p left ureteroscopy with stone extraction on 6/8/18 with [facility]  Follow up in 2 months with AP with KUB and US prior to visit      Procedure Stent with String Removal    Vitals:    06/12/18 0822   BP: 112/74   BP Location: Left arm   Patient Position: Sitting   Cuff Size: Standard   Pulse: 88   Weight: 70 4 kg (155 lb 3 2 oz)   Height: 5' 10" (1 778 m)       Stent with string removed without difficulty  Reviewed post stent removal symptoms including flank pain, nausea, dysuria, and hematuria  Instructed to increase PO fluids  Take NSAIDS and other prescribed pain medication PRN  Gave patietn a copy of the Dietary Kidney stone prevention handout  Encouraged to call with for uncontrolled pain and fever        Debi Magana RN

## 2018-08-06 ENCOUNTER — HOSPITAL ENCOUNTER (OUTPATIENT)
Dept: ULTRASOUND IMAGING | Facility: HOSPITAL | Age: 48
Discharge: HOME | End: 2018-08-06
Attending: UROLOGY
Payer: COMMERCIAL

## 2018-08-06 DIAGNOSIS — N20.0 KIDNEY STONE: ICD-10-CM

## 2018-08-06 PROCEDURE — 76770 US EXAM ABDO BACK WALL COMP: CPT

## 2018-08-22 ENCOUNTER — HOSPITAL ENCOUNTER (OUTPATIENT)
Dept: RADIOLOGY | Facility: HOSPITAL | Age: 48
Discharge: HOME | End: 2018-08-22
Attending: UROLOGY
Payer: COMMERCIAL

## 2018-08-22 DIAGNOSIS — N20.0 KIDNEY STONE: ICD-10-CM

## 2018-08-22 PROCEDURE — 74018 RADEX ABDOMEN 1 VIEW: CPT

## 2018-08-24 ENCOUNTER — OFFICE VISIT (OUTPATIENT)
Dept: UROLOGY | Facility: CLINIC | Age: 48
End: 2018-08-24
Payer: COMMERCIAL

## 2018-08-24 VITALS
WEIGHT: 162 LBS | HEIGHT: 70 IN | DIASTOLIC BLOOD PRESSURE: 70 MMHG | SYSTOLIC BLOOD PRESSURE: 112 MMHG | HEART RATE: 84 BPM | BODY MASS INDEX: 23.19 KG/M2

## 2018-08-24 DIAGNOSIS — N20.0 NEPHROLITHIASIS: Primary | ICD-10-CM

## 2018-08-24 PROCEDURE — 99213 OFFICE O/P EST LOW 20 MIN: CPT | Performed by: PHYSICIAN ASSISTANT

## 2018-08-24 NOTE — PROGRESS NOTES
1  Nephrolithiasis  XR abdomen 1 view kub       Assessment and plan:       1  Nephrolithiasis status post left ureteroscopy 06/08/2018 - managed by Dr Adama Hammer  - I was happy to review with the patient his most recent KUB and ultrasound is negative for any obstructive uropathy or intrarenal calculi  We discussed the importance of proper hydration and dietary modifications order to minimize future stone formation  Patient will follow up with with a KUB in 1 year for further surveillance  Should this be negative for any stone burden at that time continued surveillance can be discontinued  -patient is aware to contact us sooner with any signs of a passing stone   -All questions answered  Mavis Hicks PA-C      Chief Complaint     F/u nephrolithiasis    History of Present Illness     Jaya Varela is a 57 y o  male patient of Dr Adama Hammer with a history of nephrolithiasis status post ureteroscopy 06/08/2018 presenting for follow-up  Patient had a 5 mm left ureteral calculus  He underwent a left ureteral stent on 05/09/2018  He underwent definitive stone therapy with cystoscopy, left ureteroscopy, holmium laser, basket extraction, retrograde pyelogram, ureteral stent insertion 06/08/2018  Stent removed in the office 06/12/2018  Patient had a recent ultrasound of the kidney and bladder (08/06/2018) which was negative for any hydronephrosis, intrarenal calculi, or evidence of obstructive uropathy  KUB still pendingt this time  Patient denies any dysuria, gross hematuria, flank pain, nausea, vomiting, fevers, or chills  Denies any interval passage of stone  Laboratory     Lab Results   Component Value Date    CREATININE 0 89 05/10/2018         Review of Systems     Review of Systems   Constitutional: Negative for activity change, appetite change, chills, diaphoresis, fatigue, fever and unexpected weight change     Respiratory: Negative for chest tightness and shortness of breath  Cardiovascular: Negative for chest pain, palpitations and leg swelling  Gastrointestinal: Negative for abdominal distention, abdominal pain, constipation, diarrhea, nausea and vomiting  Genitourinary: Negative for decreased urine volume, difficulty urinating, dysuria, enuresis, flank pain, frequency, genital sores, hematuria and urgency  Musculoskeletal: Negative for back pain, gait problem and myalgias  Skin: Negative for color change, pallor, rash and wound  Psychiatric/Behavioral: Negative for behavioral problems  The patient is not nervous/anxious  Allergies     Allergies   Allergen Reactions    Betadine Antibiotic-Moisturize [Bacitracin-Polymyxin B] Rash       Physical Exam     Physical Exam   Constitutional: He is oriented to person, place, and time  He appears well-developed and well-nourished  No distress  HENT:   Head: Normocephalic and atraumatic  Eyes: Conjunctivae are normal    Neck: Normal range of motion  No tracheal deviation present  Pulmonary/Chest: Effort normal    Musculoskeletal: Normal range of motion  He exhibits no edema or deformity  Neurological: He is alert and oriented to person, place, and time  Skin: Skin is warm and dry  No rash noted  He is not diaphoretic  No erythema  Psychiatric: He has a normal mood and affect   His behavior is normal          Vital Signs     Vitals:    08/24/18 0853   BP: 112/70   BP Location: Left arm   Patient Position: Sitting   Cuff Size: Adult   Pulse: 84   Weight: 73 5 kg (162 lb)   Height: 5' 10" (1 778 m)         Current Medications       Current Outpatient Prescriptions:     escitalopram (LEXAPRO) 10 mg tablet, Take 10 mg by mouth daily, Disp: , Rfl:     docusate sodium (COLACE) 100 mg capsule, Take 1 capsule (100 mg total) by mouth 2 (two) times a day for 15 days, Disp: 30 capsule, Rfl: 0    loratadine-pseudoephedrine (CLARITIN-D 24-HOUR)  mg per 24 hr tablet, Take 1 tablet by mouth daily, Disp: , Rfl:    Polyethylene Glycol 3350 (MIRALAX PO), Take by mouth, Disp: , Rfl:       Active Problems     Patient Active Problem List   Diagnosis    Left nephrolithiasis    Generalized anxiety disorder    Urinary tract infection without hematuria         Past Medical History     Past Medical History:   Diagnosis Date    Anxiety     Hyperlipidemia     controlled with diet and exercise    Kidney stone     Renal disorder     kidney stones         Surgical History     Past Surgical History:   Procedure Laterality Date    APPENDECTOMY      DE CYSTO/URETERO W/LITHOTRIPSY &INDWELL STENT INSRT Left 5/9/2018    Procedure: CYSTOSCOPY URETEROSCOPY,LEFT  RETROGRADE PYELOGRAM AND INSERTION STENT URETERAL;  Surgeon: William Pollack MD;  [facility];  Service: Urology    DE CYSTO/URETERO W/LITHOTRIPSY &INDWELL STENT INSRT Left 6/8/2018    Procedure: CYSTOSCOPY URETEROSCOPY WITH LITHOTRIPSY HOLMIUM LASER, RETROGRADE PYELOGRAM AND INSERTION STENT URETERAL;  Surgeon: William Pollack MD;  [facility];  Service: Urology    TONSILLECTOMY      WISDOM TOOTH EXTRACTION           Family History     History reviewed  No pertinent family history  Social History     Social History       Radiology     RENAL ULTRASOUND     INDICATION:   N20 0: Calculus of kidney      COMPARISON: None     TECHNIQUE:   Ultrasound of the retroperitoneum was performed with a curvilinear transducer utilizing volumetric sweeps and still imaging techniques       FINDINGS:     KIDNEYS:     Right kidney:  10 6 cm  Left kidney:  12 cm      Right kidney  Normal echogenicity and contour  No suspicious masses detected  No hydronephrosis  No shadowing calculi  No perinephric fluid collections      Left kidney  Normal echogenicity and contour  No suspicious masses detected  No hydronephrosis  No shadowing calculi  No perinephric fluid collections      URETERS:  Nonvisualized      BLADDER:   Normally distended    No focal thickening or mass lesions    Bilateral ureteral jets detected         IMPRESSION:     Normal

## 2018-11-24 ENCOUNTER — OFFICE VISIT (OUTPATIENT)
Dept: URGENT CARE | Facility: MEDICAL CENTER | Age: 48
End: 2018-11-24
Payer: COMMERCIAL

## 2018-11-24 VITALS
HEIGHT: 70 IN | RESPIRATION RATE: 18 BRPM | WEIGHT: 171.2 LBS | HEART RATE: 82 BPM | DIASTOLIC BLOOD PRESSURE: 78 MMHG | TEMPERATURE: 98.4 F | BODY MASS INDEX: 24.51 KG/M2 | OXYGEN SATURATION: 98 % | SYSTOLIC BLOOD PRESSURE: 118 MMHG

## 2018-11-24 DIAGNOSIS — J02.9 SORE THROAT: Primary | ICD-10-CM

## 2018-11-24 LAB — S PYO AG THROAT QL: NEGATIVE

## 2018-11-24 PROCEDURE — S9083 URGENT CARE CENTER GLOBAL: HCPCS | Performed by: PHYSICIAN ASSISTANT

## 2018-11-24 PROCEDURE — 87430 STREP A AG IA: CPT | Performed by: PHYSICIAN ASSISTANT

## 2018-11-24 PROCEDURE — G0382 LEV 3 HOSP TYPE B ED VISIT: HCPCS | Performed by: PHYSICIAN ASSISTANT

## 2018-11-24 NOTE — PROGRESS NOTES
[facility]        NAME: Boo Valle is a 57 y o  male  : 1970    MRN: [id number]  DATE: 2018  TIME: 1:13 PM    Assessment and Plan   Sore throat [J02 9]  1  Sore throat  POCT rapid strepA         Patient Instructions     pharyngitis  Follow up with PCP in 3-5 days  Proceed to  ER if symptoms worsen  Chief Complaint     Chief Complaint   Patient presents with    Sore Throat         History of Present Illness       58 y/o male c/o sore throat x 6 days associated with fever, states he was seen in the past, was swabbed and  Results were negative  Returns to have another strep test " wants to make sure"  States he feels a little better now        Review of Systems   Review of Systems   Constitutional: Negative  HENT: Positive for sore throat  Negative for congestion, dental problem, drooling, ear discharge, ear pain, facial swelling, nosebleeds, postnasal drip, rhinorrhea, sinus pain, sinus pressure, trouble swallowing and voice change  Eyes: Negative  Respiratory: Negative  Negative for apnea, cough, choking, chest tightness, shortness of breath, wheezing and stridor  Cardiovascular: Negative  Negative for chest pain           Current Medications       Current Outpatient Prescriptions:     escitalopram (LEXAPRO) 10 mg tablet, Take 10 mg by mouth daily, Disp: , Rfl:     loratadine-pseudoephedrine (CLARITIN-D 24-HOUR)  mg per 24 hr tablet, Take 1 tablet by mouth daily, Disp: , Rfl:     docusate sodium (COLACE) 100 mg capsule, Take 1 capsule (100 mg total) by mouth 2 (two) times a day for 15 days, Disp: 30 capsule, Rfl: 0    Polyethylene Glycol 3350 (MIRALAX PO), Take by mouth, Disp: , Rfl:     Current Allergies     Allergies as of 2018 - Reviewed 2018   Allergen Reaction Noted    Betadine antibiotic-moisturize [bacitracin-polymyxin b] Rash 2018            The following portions of the patient's history were reviewed and updated as appropriate: allergies, current medications, past family history, past medical history, past social history, past surgical history and problem list      Past Medical History:   Diagnosis Date    Anxiety     Hyperlipidemia     controlled with diet and exercise    Kidney stone     Renal disorder     kidney stones       Past Surgical History:   Procedure Laterality Date    APPENDECTOMY      AK CYSTO/URETERO W/LITHOTRIPSY &INDWELL STENT INSRT Left 5/9/2018    Procedure: CYSTOSCOPY URETEROSCOPY,LEFT  RETROGRADE PYELOGRAM AND INSERTION STENT URETERAL;  Surgeon: Norah Oropeza MD;  Location[facility];  Service: Urology    AK CYSTO/URETERO W/LITHOTRIPSY &INDWELL STENT INSRT Left 6/8/2018    Procedure: CYSTOSCOPY URETEROSCOPY WITH LITHOTRIPSY HOLMIUM LASER, RETROGRADE PYELOGRAM AND INSERTION STENT URETERAL;  Surgeon: Norah Oropeza MD;  [facility];  Service: Urology    TONSILLECTOMY      WISDOM TOOTH EXTRACTION         No family history on file  Medications have been verified  Objective   /78   Pulse 82   Temp 98 4 °F (36 9 °C) (Temporal)   Resp 18   Ht 5' 10" (1 778 m)   Wt 77 7 kg (171 lb 3 2 oz)   SpO2 98%   BMI 24 56 kg/m²        Physical Exam     Physical Exam   Constitutional: He appears well-developed and well-nourished  No distress  HENT:   Head: Normocephalic and atraumatic  Right Ear: Hearing, tympanic membrane, external ear and ear canal normal    Left Ear: Hearing, tympanic membrane, external ear and ear canal normal    Mouth/Throat: Uvula is midline and mucous membranes are normal  Posterior oropharyngeal erythema present  No oropharyngeal exudate, posterior oropharyngeal edema or tonsillar abscesses  Neck: Normal range of motion  Neck supple  Cardiovascular: Normal rate, regular rhythm, normal heart sounds and intact distal pulses  Pulmonary/Chest: Effort normal and breath sounds normal    Lymphadenopathy:     He has cervical adenopathy  Skin: He is not diaphoretic

## 2018-11-24 NOTE — PROGRESS NOTES
Pt  C/O sore throat for the past 5 days  Denies fevers at home  Was tested for Strep Thursday, which came back negative

## 2018-11-24 NOTE — PATIENT INSTRUCTIONS
pharyngitis  Follow up with PCP in 3-5 days  Proceed to  ER if symptoms worsen  Pharyngitis   WHAT YOU NEED TO KNOW:   Pharyngitis, or sore throat, is inflammation of the tissues and structures in your pharynx (throat)  Pharyngitis is most often caused by bacteria  It may also be caused by a cold or flu virus  Other causes include smoking, allergies, or acid reflux  DISCHARGE INSTRUCTIONS:   Call 911 for any of the following:   · You have trouble breathing or swallowing because your throat is swollen or sore  Return to the emergency department if:   · You are drooling because it hurts too much to swallow  · Your fever is higher than 102? F (39?C) or lasts longer than 3 days  · You are confused  · You taste blood in your throat  Contact your healthcare provider if:   · Your throat pain gets worse  · You have a painful lump in your throat that does not go away after 5 days  · Your symptoms do not improve after 5 days  · You have questions or concerns about your condition or care  Medicines:  Viral pharyngitis will go away on its own without treatment  Your sore throat should start to feel better in 3 to 5 days for both viral and bacterial infections  You may need any of the following:  · Antibiotics  treat a bacterial infection  · NSAIDs , such as ibuprofen, help decrease swelling, pain, and fever  NSAIDs can cause stomach bleeding or kidney problems in certain people  If you take blood thinner medicine, always ask your healthcare provider if NSAIDs are safe for you  Always read the medicine label and follow directions  · Acetaminophen  decreases pain and fever  It is available without a doctor's order  Ask how much to take and how often to take it  Follow directions  Acetaminophen can cause liver damage if not taken correctly  · Take your medicine as directed  Contact your healthcare provider if you think your medicine is not helping or if you have side effects   Tell him or her if you are allergic to any medicine  Keep a list of the medicines, vitamins, and herbs you take  Include the amounts, and when and why you take them  Bring the list or the pill bottles to follow-up visits  Carry your medicine list with you in case of an emergency  Manage your symptoms:   · Gargle salt water  Mix ¼ teaspoon salt in an 8 ounce glass of warm water and gargle  This may help decrease swelling in your throat  · Drink liquids as directed  You may need to drink more liquids than usual  Liquids may help soothe your throat and prevent dehydration  Ask how much liquid to drink each day and which liquids are best for you  · Use a cool-steam humidifier  to help moisten the air in your room and calm your cough  · Soothe your throat  with cough drops, ice, soft foods, or popsicles  Prevent the spread of pharyngitis:  Cover your mouth and nose when you cough or sneeze  Do not share food or drinks  Wash your hands often  Use soap and water  If soap and water are unavailable, use an alcohol based hand   Follow up with your healthcare provider as directed:  Write down your questions so you remember to ask them during your visits  © 2017 [employer] Information is for End User's use only and may not be sold, redistributed or otherwise used for commercial purposes  All illustrations and images included in CareNotes® are the copyrighted property of [employer]  or [employer]  The above information is an  only  It is not intended as medical advice for individual conditions or treatments  Talk to your doctor, nurse or pharmacist before following any medical regimen to see if it is safe and effective for you

## 2019-08-07 ENCOUNTER — HOSPITAL ENCOUNTER (OUTPATIENT)
Dept: RADIOLOGY | Facility: HOSPITAL | Age: 49
Discharge: HOME | End: 2019-08-07
Payer: COMMERCIAL

## 2019-08-07 DIAGNOSIS — N20.0 NEPHROLITHIASIS: ICD-10-CM

## 2019-08-07 PROCEDURE — 74018 RADEX ABDOMEN 1 VIEW: CPT

## 2019-08-30 ENCOUNTER — OFFICE VISIT (OUTPATIENT)
Dept: UROLOGY | Facility: CLINIC | Age: 49
End: 2019-08-30
Payer: COMMERCIAL

## 2019-08-30 VITALS
HEART RATE: 66 BPM | BODY MASS INDEX: 24.05 KG/M2 | SYSTOLIC BLOOD PRESSURE: 116 MMHG | DIASTOLIC BLOOD PRESSURE: 88 MMHG | HEIGHT: 70 IN | WEIGHT: 168 LBS

## 2019-08-30 DIAGNOSIS — N20.0 LEFT NEPHROLITHIASIS: Primary | ICD-10-CM

## 2019-08-30 PROCEDURE — 99213 OFFICE O/P EST LOW 20 MIN: CPT | Performed by: PHYSICIAN ASSISTANT

## 2019-08-30 NOTE — PROGRESS NOTES
1  Left nephrolithiasis           Assessment and plan:       1  Nephrolithiasis status post left ureteroscopy 06/08/2018 - managed by Dr Christel Parker  - I was happy to review with the patient that his KUB is negative for any radiopaque urinary tract calculi  - we reviewed proper hydration dietary modifications to minimize future stone formation  - patient will follow up with Urology on an as-needed basis  He is encouraged to contact us in the future with any signs symptoms passing stone  All questions answered  Thelma Li PA-C      Chief Complaint     Follow-up kidney stone    History of Present Illness     Maximo Lyon is a 54 y o  male patient of Dr Christel Parker with a history of nephrolithiasis status post left ureteral stent placement 05/09/2018 and ureteroscopy (06/08/2018) presenting for follow-up  Patient underwent staged intervention for a ureteral stone in May and June 2018  Patient had a KUB (08/07/2019) which was negative for any radiopaque urinary tract calculi  Patient has been doing well over the past year  Denies any interval passage of stones  Denies any dysuria, gross hematuria, flank pain, or urinary infections  Laboratory     Lab Results   Component Value Date    CREATININE 0 89 05/10/2018       Review of Systems     Review of Systems   Constitutional: Negative for activity change, appetite change, chills, diaphoresis, fatigue, fever and unexpected weight change  Respiratory: Negative for chest tightness and shortness of breath  Cardiovascular: Negative for chest pain, palpitations and leg swelling  Gastrointestinal: Negative for abdominal distention, abdominal pain, constipation, diarrhea, nausea and vomiting  Genitourinary: Negative for decreased urine volume, difficulty urinating, dysuria, enuresis, flank pain, frequency, genital sores, hematuria and urgency  Musculoskeletal: Negative for back pain, gait problem and myalgias     Skin: Negative for color change, pallor, rash and wound  Psychiatric/Behavioral: Negative for behavioral problems  The patient is not nervous/anxious  Allergies     Allergies   Allergen Reactions    Betadine Antibiotic-Moisturize [Bacitracin-Polymyxin B] Rash       Physical Exam     Physical Exam   Constitutional: He is oriented to person, place, and time  He appears well-developed and well-nourished  No distress  HENT:   Head: Normocephalic and atraumatic  Eyes: Conjunctivae are normal    Neck: Normal range of motion  No tracheal deviation present  Pulmonary/Chest: Effort normal    Musculoskeletal: Normal range of motion  He exhibits no edema or deformity  Neurological: He is alert and oriented to person, place, and time  Skin: Skin is warm and dry  No rash noted  He is not diaphoretic  No erythema  Psychiatric: He has a normal mood and affect   His behavior is normal          Vital Signs     Vitals:    08/30/19 0824   BP: 116/88   BP Location: Left arm   Patient Position: Sitting   Cuff Size: Adult   Pulse: 66   Weight: 76 2 kg (168 lb)   Height: 5' 10" (1 778 m)         Current Medications       Current Outpatient Medications:     escitalopram (LEXAPRO) 10 mg tablet, Take 10 mg by mouth daily, Disp: , Rfl:     loratadine-pseudoephedrine (CLARITIN-D 24-HOUR)  mg per 24 hr tablet, Take 1 tablet by mouth daily, Disp: , Rfl:     docusate sodium (COLACE) 100 mg capsule, Take 1 capsule (100 mg total) by mouth 2 (two) times a day for 15 days, Disp: 30 capsule, Rfl: 0    Polyethylene Glycol 3350 (MIRALAX PO), Take by mouth, Disp: , Rfl:       Active Problems     Patient Active Problem List   Diagnosis    Generalized anxiety disorder    Urinary tract infection without hematuria         Past Medical History     Past Medical History:   Diagnosis Date    Anxiety     Hyperlipidemia     controlled with diet and exercise    Kidney stone     Renal disorder     kidney stones         Surgical History Past Surgical History:   Procedure Laterality Date    APPENDECTOMY      NM CYSTO/URETERO W/LITHOTRIPSY &INDWELL STENT INSRT Left 5/9/2018    Procedure: CYSTOSCOPY URETEROSCOPY,LEFT  RETROGRADE PYELOGRAM AND INSERTION STENT URETERAL;  Surgeon: Mary Minor MD;  [facility];  Service: Urology    NM CYSTO/URETERO W/LITHOTRIPSY &INDWELL STENT INSRT Left 6/8/2018    Procedure: CYSTOSCOPY URETEROSCOPY WITH LITHOTRIPSY HOLMIUM LASER, RETROGRADE PYELOGRAM AND INSERTION STENT URETERAL;  Surgeon: Mary Minor MD;  [facility];  Service: Urology    TONSILLECTOMY      WISDOM TOOTH EXTRACTION           Family History     History reviewed  No pertinent family history  Social History     Social History       Radiology     ABDOMEN     INDICATION:   N20 0: Calculus of kidney      COMPARISON:  8/22/2018     VIEWS:  [facility]        FINDINGS:     No radiopaque renal calculi seen      No radiopaque ureteral calculi identified      Stable pelvic calculi in keeping with phleboliths      Nonobstructive bowel gas pattern      No acute osseous abnormality is seen      IMPRESSION:     No radiopaque urinary tract calculi

## 2020-03-31 ENCOUNTER — TELEPHONE (OUTPATIENT)
Dept: PSYCHIATRY | Facility: CLINIC | Age: 50
End: 2020-03-31

## 2020-04-03 ENCOUNTER — TELEPHONE (OUTPATIENT)
Dept: PSYCHIATRY | Facility: CLINIC | Age: 50
End: 2020-04-03

## 2020-04-03 ENCOUNTER — TELEPHONE (OUTPATIENT)
Dept: FAMILY MEDICINE CLINIC | Facility: CLINIC | Age: 50
End: 2020-04-03

## 2020-04-03 DIAGNOSIS — F41.9 ANXIETY: Primary | ICD-10-CM

## 2020-04-03 RX ORDER — ALPRAZOLAM 0.5 MG/1
0.5 TABLET ORAL 3 TIMES DAILY
COMMUNITY
Start: 2020-03-27 | End: 2020-04-03 | Stop reason: SDUPTHER

## 2020-04-03 RX ORDER — ALPRAZOLAM 0.5 MG/1
0.5 TABLET ORAL 3 TIMES DAILY
Qty: 30 TABLET | Refills: 0 | Status: SHIPPED | OUTPATIENT
Start: 2020-04-03 | End: 2020-04-14 | Stop reason: SDUPTHER

## 2020-04-03 RX ORDER — ALPRAZOLAM 0.5 MG/1
0.5 TABLET ORAL 3 TIMES DAILY
Qty: 30 TABLET | Refills: 0 | Status: CANCELLED | OUTPATIENT
Start: 2020-04-03

## 2020-04-14 ENCOUNTER — TELEMEDICINE (OUTPATIENT)
Dept: FAMILY MEDICINE CLINIC | Facility: CLINIC | Age: 50
End: 2020-04-14
Payer: COMMERCIAL

## 2020-04-14 VITALS — HEART RATE: 66 BPM | HEIGHT: 70 IN | WEIGHT: 155 LBS | BODY MASS INDEX: 22.19 KG/M2

## 2020-04-14 DIAGNOSIS — F41.1 GENERALIZED ANXIETY DISORDER: Primary | Chronic | ICD-10-CM

## 2020-04-14 DIAGNOSIS — F41.9 ANXIETY: ICD-10-CM

## 2020-04-14 PROBLEM — N39.0 URINARY TRACT INFECTION WITHOUT HEMATURIA: Status: RESOLVED | Noted: 2018-05-16 | Resolved: 2020-04-14

## 2020-04-14 PROCEDURE — 3008F BODY MASS INDEX DOCD: CPT | Performed by: NURSE PRACTITIONER

## 2020-04-14 PROCEDURE — 99213 OFFICE O/P EST LOW 20 MIN: CPT | Performed by: FAMILY MEDICINE

## 2020-04-14 RX ORDER — ALPRAZOLAM 0.5 MG/1
0.5 TABLET ORAL 3 TIMES DAILY PRN
Qty: 60 TABLET | Refills: 0 | Status: SHIPPED | OUTPATIENT
Start: 2020-04-14 | End: 2020-04-17

## 2020-04-17 ENCOUNTER — TELEMEDICINE (OUTPATIENT)
Dept: PSYCHIATRY | Facility: CLINIC | Age: 50
End: 2020-04-17
Payer: COMMERCIAL

## 2020-04-17 DIAGNOSIS — F41.1 GENERALIZED ANXIETY DISORDER: Chronic | ICD-10-CM

## 2020-04-17 DIAGNOSIS — F41.0 PANIC DISORDER: Primary | ICD-10-CM

## 2020-04-17 DIAGNOSIS — F43.23 ADJUSTMENT DISORDER WITH MIXED ANXIETY AND DEPRESSED MOOD: ICD-10-CM

## 2020-04-17 PROBLEM — F43.20 ADJUSTMENT DISORDER: Status: ACTIVE | Noted: 2020-04-17

## 2020-04-17 PROCEDURE — 99205 OFFICE O/P NEW HI 60 MIN: CPT | Performed by: NURSE PRACTITIONER

## 2020-04-17 RX ORDER — CLONAZEPAM 0.5 MG/1
0.5 TABLET ORAL 2 TIMES DAILY PRN
Qty: 14 TABLET | Refills: 0 | Status: SHIPPED | OUTPATIENT
Start: 2020-04-17 | End: 2020-06-04

## 2020-04-23 ENCOUNTER — TELEMEDICINE (OUTPATIENT)
Dept: PSYCHIATRY | Facility: CLINIC | Age: 50
End: 2020-04-23
Payer: COMMERCIAL

## 2020-04-23 DIAGNOSIS — F41.0 PANIC DISORDER: ICD-10-CM

## 2020-04-23 DIAGNOSIS — F43.23 ADJUSTMENT DISORDER WITH MIXED ANXIETY AND DEPRESSED MOOD: Primary | ICD-10-CM

## 2020-04-23 DIAGNOSIS — F41.1 GENERALIZED ANXIETY DISORDER: Chronic | ICD-10-CM

## 2020-04-23 PROCEDURE — NC001 PR NO CHARGE: Performed by: NURSE PRACTITIONER

## 2020-04-23 PROCEDURE — 99214 OFFICE O/P EST MOD 30 MIN: CPT | Performed by: NURSE PRACTITIONER

## 2020-04-23 PROCEDURE — 90833 PSYTX W PT W E/M 30 MIN: CPT | Performed by: NURSE PRACTITIONER

## 2020-04-30 ENCOUNTER — TELEPHONE (OUTPATIENT)
Dept: OTHER | Facility: OTHER | Age: 50
End: 2020-04-30

## 2020-05-01 ENCOUNTER — TELEPHONE (OUTPATIENT)
Dept: PSYCHIATRY | Facility: CLINIC | Age: 50
End: 2020-05-01

## 2020-05-08 ENCOUNTER — TELEPHONE (OUTPATIENT)
Dept: PSYCHIATRY | Facility: CLINIC | Age: 50
End: 2020-05-08

## 2020-05-08 DIAGNOSIS — F41.1 GENERALIZED ANXIETY DISORDER: Chronic | ICD-10-CM

## 2020-05-08 DIAGNOSIS — F41.0 PANIC DISORDER: Primary | ICD-10-CM

## 2020-05-10 ENCOUNTER — TELEPHONE (OUTPATIENT)
Dept: OTHER | Facility: OTHER | Age: 50
End: 2020-05-10

## 2020-05-10 DIAGNOSIS — F41.0 PANIC DISORDER: Primary | ICD-10-CM

## 2020-05-10 RX ORDER — HYDROXYZINE 50 MG/1
50 TABLET, FILM COATED ORAL 3 TIMES DAILY PRN
Qty: 15 TABLET | Refills: 0 | Status: SHIPPED | OUTPATIENT
Start: 2020-05-10 | End: 2020-07-20

## 2020-05-12 RX ORDER — GABAPENTIN 100 MG/1
100 CAPSULE ORAL 3 TIMES DAILY
Qty: 90 CAPSULE | Refills: 0 | Status: SHIPPED | OUTPATIENT
Start: 2020-05-12 | End: 2020-05-12

## 2020-05-13 ENCOUNTER — TELEPHONE (OUTPATIENT)
Dept: PSYCHIATRY | Facility: CLINIC | Age: 50
End: 2020-05-13

## 2020-05-14 ENCOUNTER — TELEMEDICINE (OUTPATIENT)
Dept: PSYCHIATRY | Facility: CLINIC | Age: 50
End: 2020-05-14
Payer: COMMERCIAL

## 2020-05-14 DIAGNOSIS — F41.1 GENERALIZED ANXIETY DISORDER: Primary | Chronic | ICD-10-CM

## 2020-05-14 PROCEDURE — 99213 OFFICE O/P EST LOW 20 MIN: CPT | Performed by: NURSE PRACTITIONER

## 2020-05-18 DIAGNOSIS — F41.1 GAD (GENERALIZED ANXIETY DISORDER): Primary | ICD-10-CM

## 2020-05-18 RX ORDER — ESCITALOPRAM OXALATE 20 MG/1
20 TABLET ORAL DAILY
Qty: 30 TABLET | Refills: 2 | Status: SHIPPED | OUTPATIENT
Start: 2020-05-18 | End: 2020-06-04

## 2020-05-26 ENCOUNTER — TELEMEDICINE (OUTPATIENT)
Dept: PSYCHIATRY | Facility: CLINIC | Age: 50
End: 2020-05-26
Payer: COMMERCIAL

## 2020-05-26 DIAGNOSIS — F43.22 ADJUSTMENT DISORDER WITH ANXIOUS MOOD: ICD-10-CM

## 2020-05-26 DIAGNOSIS — F41.1 GENERALIZED ANXIETY DISORDER: Primary | Chronic | ICD-10-CM

## 2020-05-26 PROCEDURE — NC001 PR NO CHARGE: Performed by: NURSE PRACTITIONER

## 2020-05-26 PROCEDURE — 99213 OFFICE O/P EST LOW 20 MIN: CPT | Performed by: NURSE PRACTITIONER

## 2020-05-26 PROCEDURE — 90833 PSYTX W PT W E/M 30 MIN: CPT | Performed by: NURSE PRACTITIONER

## 2020-05-29 ENCOUNTER — TELEPHONE (OUTPATIENT)
Dept: PSYCHIATRY | Facility: CLINIC | Age: 50
End: 2020-05-29

## 2020-05-29 DIAGNOSIS — F41.1 GAD (GENERALIZED ANXIETY DISORDER): Primary | ICD-10-CM

## 2020-05-29 RX ORDER — PROPRANOLOL HYDROCHLORIDE 20 MG/1
20 TABLET ORAL 2 TIMES DAILY
Qty: 60 TABLET | Refills: 0 | Status: SHIPPED | OUTPATIENT
Start: 2020-05-29 | End: 2020-06-12

## 2020-05-29 RX ORDER — ESCITALOPRAM OXALATE 5 MG/1
5 TABLET ORAL DAILY
Qty: 30 TABLET | Refills: 0 | Status: SHIPPED | OUTPATIENT
Start: 2020-05-29 | End: 2020-06-22

## 2020-05-29 RX ORDER — ESCITALOPRAM OXALATE 10 MG/1
10 TABLET ORAL DAILY
Qty: 30 TABLET | Refills: 0 | Status: SHIPPED | OUTPATIENT
Start: 2020-05-29 | End: 2020-06-12

## 2020-06-04 ENCOUNTER — TELEPHONE (OUTPATIENT)
Dept: PSYCHIATRY | Facility: CLINIC | Age: 50
End: 2020-06-04

## 2020-06-04 DIAGNOSIS — G44.89 OTHER HEADACHE SYNDROME: Primary | ICD-10-CM

## 2020-06-09 ENCOUNTER — TELEPHONE (OUTPATIENT)
Dept: PSYCHIATRY | Facility: CLINIC | Age: 50
End: 2020-06-09

## 2020-06-12 ENCOUNTER — TELEMEDICINE (OUTPATIENT)
Dept: PSYCHIATRY | Facility: CLINIC | Age: 50
End: 2020-06-12
Payer: COMMERCIAL

## 2020-06-12 DIAGNOSIS — F41.1 GAD (GENERALIZED ANXIETY DISORDER): ICD-10-CM

## 2020-06-12 DIAGNOSIS — F43.22 ADJUSTMENT DISORDER WITH ANXIOUS MOOD: Primary | ICD-10-CM

## 2020-06-12 PROCEDURE — 90833 PSYTX W PT W E/M 30 MIN: CPT | Performed by: NURSE PRACTITIONER

## 2020-06-12 PROCEDURE — 99214 OFFICE O/P EST MOD 30 MIN: CPT | Performed by: NURSE PRACTITIONER

## 2020-06-12 RX ORDER — ESCITALOPRAM OXALATE 10 MG/1
15 TABLET ORAL DAILY
Qty: 45 TABLET | Refills: 2 | Status: SHIPPED | OUTPATIENT
Start: 2020-06-12 | End: 2020-07-20

## 2020-06-12 RX ORDER — PROPRANOLOL HYDROCHLORIDE 20 MG/1
20 TABLET ORAL 2 TIMES DAILY
Qty: 60 TABLET | Refills: 2 | Status: SHIPPED | OUTPATIENT
Start: 2020-06-12 | End: 2020-07-20

## 2020-06-20 DIAGNOSIS — F41.1 GAD (GENERALIZED ANXIETY DISORDER): ICD-10-CM

## 2020-06-22 RX ORDER — ESCITALOPRAM OXALATE 5 MG/1
TABLET ORAL
Qty: 30 TABLET | Refills: 0 | Status: SHIPPED | OUTPATIENT
Start: 2020-06-22 | End: 2020-07-20

## 2020-06-30 ENCOUNTER — TELEPHONE (OUTPATIENT)
Dept: PSYCHIATRY | Facility: CLINIC | Age: 50
End: 2020-06-30

## 2020-07-20 ENCOUNTER — TELEMEDICINE (OUTPATIENT)
Dept: PSYCHIATRY | Facility: CLINIC | Age: 50
End: 2020-07-20
Payer: COMMERCIAL

## 2020-07-20 DIAGNOSIS — F43.20 ADJUSTMENT DISORDER IN REMISSION: Primary | ICD-10-CM

## 2020-07-20 DIAGNOSIS — F41.1 GAD (GENERALIZED ANXIETY DISORDER): ICD-10-CM

## 2020-07-20 PROBLEM — F41.0 PANIC DISORDER: Status: RESOLVED | Noted: 2020-04-17 | Resolved: 2020-07-20

## 2020-07-20 PROCEDURE — 90833 PSYTX W PT W E/M 30 MIN: CPT | Performed by: NURSE PRACTITIONER

## 2020-07-20 PROCEDURE — 99214 OFFICE O/P EST MOD 30 MIN: CPT | Performed by: NURSE PRACTITIONER

## 2020-07-20 RX ORDER — PROPRANOLOL HYDROCHLORIDE 20 MG/1
10 TABLET ORAL 2 TIMES DAILY
Qty: 60 TABLET | Refills: 2
Start: 2020-07-20 | End: 2020-10-13

## 2020-07-20 RX ORDER — ESCITALOPRAM OXALATE 10 MG/1
15 TABLET ORAL DAILY
Qty: 135 TABLET | Refills: 2 | Status: SHIPPED | OUTPATIENT
Start: 2020-07-20 | End: 2020-10-22

## 2020-07-20 NOTE — PSYCH
Virtual Regular Visit    Problem List Items Addressed This Visit        Other    Adjustment disorder in remission - Primary    Relevant Medications    escitalopram (LEXAPRO) 10 mg tablet      Other Visit Diagnoses     GORDON (generalized anxiety disorder)        Relevant Medications    propranolol (INDERAL) 20 mg tablet    escitalopram (LEXAPRO) 10 mg tablet             Encounter provider EDWAR Wilson    Provider located at   [address]  [facility] [address] [phone]    Recent Visits  No visits were found meeting these conditions  Showing recent visits within past 7 days and meeting all other requirements     Today's Visits  Date Type Provider Dept   07/20/20 Telemedicine EDWAR [facility]   Showing today's visits and meeting all other requirements     Future Appointments  No visits were found meeting these conditions  Showing future appointments within next 150 days and meeting all other requirements      The patient was identified by name and date of birth  Jose Antonio Mckeon was informed that this is a telemedicine visit and that the visit is being conducted through [employer]  My office door was closed  No one else was in the room  He acknowledged consent and understanding of privacy and security of the video platform  The patient has agreed to participate and understands they can discontinue the visit at any time  Patient is aware this is a billable service       HPI     Current Outpatient Medications   Medication Sig Dispense Refill    escitalopram (LEXAPRO) 10 mg tablet Take 1 5 tablets (15 mg total) by mouth daily 135 tablet 2    loratadine-pseudoephedrine (CLARITIN-D 24-HOUR)  mg per 24 hr tablet Take 1 tablet by mouth daily      Polyethylene Glycol 3350 (MIRALAX PO) Take by mouth      propranolol (INDERAL) 20 mg tablet Take 0 5 tablets (10 mg total) by mouth 2 (two) times a day 60 tablet 2     No current facility-administered medications for this visit  Review of Systems  Video Exam    There were no vitals filed for this visit  Physical Exam   As a result of this visit, I have referred the patient for further respiratory evaluation  No    I spent 17 minutes directly with the patient during this visit  VIRTUAL VISIT DISCLAIMER    Jose Mays acknowledges that he has consented to an online visit or consultation  He understands that the online visit is based solely on information provided by him, and that, in the absence of a face-to-face physical evaluation by the physician, the diagnosis he receives is both limited and provisional in terms of accuracy and completeness  This is not intended to replace a full medical face-to-face evaluation by the physician  Jose Mays understands and accepts these terms  MEDICATION MANAGEMENT NOTE        [facility]    Name and Date of Birth:  Jose Mays 52 y o  1970 MRN: [id number]    Date of Visit: July 20, 2020    Allergies   Allergen Reactions    Betadine Antibiotic-Moisturize [Bacitracin-Polymyxin B] Rash     SUBJECTIVE:    Teja Ponce is seen today for a follow up for Adjustment Disorder and anxiety  He reports that he has done very well since the last visit  Patient reports that anxiety has been well controlled since returning to work  Patient has been able to resume normal job duties and responsibilities and manage anxiety  Patient states he maintains mind fullness practice which has been helpful  He denies any side effects from medications  PLAN:  Reduce propanolol by half for 1 week  If well tolerated at end week patient may discontinue propanolol    Continue Lexapro 15 mg p o  daily   Check-in every 3-4 months and Re-evaluate medication at 8 months to 1 year if anxiety remains stable  Aware of 24 hour and weekend coverage for urgent situations accessed by calling [facility] main practice number    Diagnoses and all orders for this visit:    Adjustment disorder in remission    GORDON (generalized anxiety disorder)  -     propranolol (INDERAL) 20 mg tablet; Take 0 5 tablets (10 mg total) by mouth 2 (two) times a day  -     escitalopram (LEXAPRO) 10 mg tablet; Take 1 5 tablets (15 mg total) by mouth daily        Psychotherapy Provided:     Individual psychotherapy provided: Yes  Counseling was provided during the session today for 16 minutes  Supportive counseling provided  Discussed discussed month from this practice and processed progress made since initiating treatment  HPI ROS Appetite Changes and Sleep:     He reports early morning awakening, normal appetite, normal energy level   Patient denies suicidal or homicidal ideation    Review Of Systems:      General sleep disturbances   Personality no change in personality   Constitutional negative   ENT negative   Cardiovascular negative   Respiratory negative   Gastrointestinal negative   Genitourinary negative   Musculoskeletal negative   Integumentary negative   Neurological negative   Endocrine negative   Other Symptoms none, all other systems are negative     Mental Status Evaluation:    Appearance Adequate hygiene and grooming   Behavior calm and cooperative   Mood euthymic  Depression Scale -  of 10 (0 = No depression)  Anxiety Scale -  of 10 (0 = No anxiety)   Speech Normal rate and volume   Affect appropriate and mood-congruent   Thought Processes Goal directed and coherent   Thought Content Does not verbalize delusional material   Associations Tightly connected   Perceptual Disturbances Denies hallucinations and does not appear to be responding to internal stimuli   Risk Potential Suicidal/Homicidal Ideation - No evidence of suicidal or homicidal ideation and Patient does not verbalize suicidal or homicidal ideation  Risk of Violence - No evidence of risk for violence found on assessment  Risk of Self Mutilation - No evidence of risk for self mutilation found on assessment   Orientation oriented to person, place, time/date and situation   Memory recent and remote memory grossly intact   Consciousness alert and awake   Attention/Concentration attention span and concentration are age appropriate   Insight intact   Judgement intact   Muscle Strength and Gait normal muscle strength and normal muscle tone, normal gait/station and normal balance   Motor Activity no abnormal movements   Language no difficulty naming common objects, no difficulty repeating a phrase, no difficulty writing a sentence   Fund of Knowledge adequate knowledge of current events  adequate fund of knowledge regarding past history  adequate fund of knowledge regarding vocabulary      Past Psychiatric History Update:     Inpatient Psychiatric Admission Since Last Encounter:   no  Changes to Outpatient Psychiatric Treatment Team:    no  Suicide Attempt Or Self Mutilation Since Last Encounter:   no  Incidence of Violent Behavior Since Last Encounter:   no    Traumatic History Update:     New Onset of Abuse Since Last Encounter:   no  Traumatic Events Since Last Encounter:   no    Past Medical History:    Past Medical History:   Diagnosis Date    Anxiety     Hyperlipidemia     controlled with diet and exercise    Kidney stone     Renal disorder     kidney stones     Past Medical History Pertinent Negatives:   Diagnosis Date Noted    History of transfusion 05/17/2018     Past Surgical History:   Procedure Laterality Date    APPENDECTOMY      PA CYSTO/URETERO W/LITHOTRIPSY &INDWELL STENT INSRT Left 5/9/2018    Procedure: CYSTOSCOPY URETEROSCOPY,LEFT  RETROGRADE PYELOGRAM AND INSERTION STENT URETERAL;  Surgeon: Juan Gee MD;  Location: [facility];  Service: Urology    PA CYSTO/URETERO W/LITHOTRIPSY &INDWELL STENT INSRT Left 6/8/2018    Procedure: CYSTOSCOPY URETEROSCOPY WITH LITHOTRIPSY HOLMIUM LASER, RETROGRADE PYELOGRAM AND INSERTION STENT URETERAL;  Surgeon: Tony Garcia MD;  Location: [facility];  Service: Urology    TONSILLECTOMY      WISDOM TOOTH EXTRACTION      WISDOM TOOTH EXTRACTION       Allergies   Allergen Reactions    Betadine Antibiotic-Moisturize [Bacitracin-Polymyxin B] Rash     Substance Abuse History:    Social History     Substance and Sexual Activity   Alcohol Use Yes    Alcohol/week: 1 0 standard drinks    Types: 1 Cans of beer per week    Frequency: Monthly or less    Drinks per session: 1 or 2    Binge frequency: Never    Comment: ocassional     Social History     Substance and Sexual Activity   Drug Use No     Social History:    Social History     Socioeconomic History    Marital status: /Civil Union     Spouse name: Not on file    Number of children: Not on file    Years of education: Not on file    Highest education level: Not on file   Occupational History    Not on file   Social Needs    Financial resource strain: Not on file    Food insecurity:     Worry: Not on file     Inability: Not on file    Transportation needs:     Medical: Not on file     Non-medical: Not on file   Tobacco Use    Smoking status: Former Smoker     Packs/day: 0 50     Types: Cigarettes     Last attempt to quit: 1998     Years since quittin 1    Smokeless tobacco: Never Used   Substance and Sexual Activity    Alcohol use:  Yes     Alcohol/week: 1 0 standard drinks     Types: 1 Cans of beer per week     Frequency: Monthly or less     Drinks per session: 1 or 2     Binge frequency: Never     Comment: ocassional    Drug use: No    Sexual activity: Not on file   Lifestyle    Physical activity:     Days per week: Not on file     Minutes per session: Not on file    Stress: Not on file   Relationships    Social connections:     Talks on phone: Not on file     Gets together: Not on file     Attends Taoism service: Not on file     Active member of club or organization: Not on file     Attends meetings of clubs or organizations: Not on file     Relationship status: Not on file    Intimate partner violence:     Fear of current or ex partner: Not on file     Emotionally abused: Not on file     Physically abused: Not on file     Forced sexual activity: Not on file   Other Topics Concern    Not on file   Social History Narrative    Not on file     Family Psychiatric History:     Family History   Problem Relation Age of Onset    Heart disease Mother     Lung cancer Mother     Hypertension Mother     Stroke Father     Hyperlipidemia Father      History Review: The following portions of the patient's history were reviewed and updated as appropriate: allergies, current medications, past family history, past medical history, past social history, past surgical history and problem list     OBJECTIVE:     Vital signs in last 24 hours: There were no vitals filed for this visit  Laboratory Results: I have personally reviewed all pertinent laboratory/tests results  Suicide/Homicide Risk Assessment:    Risk of Harm to Self:   The following ratings are based on assessment at the time of the interview   Recent Specific Risk Factors include: none    Risk of Harm to Others:   The following ratings are based on assessment at the time of the interview   Recent Specific Risk Factors include: none  The following interventions are recommended: no intervention changes needed    Medications Risks/Benefits:      Risks, Benefits And Possible Side Effects Of Medications:    Discussed risks and benefits of treatment with patient including risk of suicidality and serotonin syndrome related to treatment with antidepressants;  Risk of induction of manic symptoms in certain patient populations     Controlled Medication Discussion:     Not applicable    Treatment Plan:    Due for update/Updated:   no    EDWAR Wiggins 07/20/20

## 2020-07-27 ENCOUNTER — TELEPHONE (OUTPATIENT)
Dept: PSYCHIATRY | Facility: CLINIC | Age: 50
End: 2020-07-27

## 2020-07-27 NOTE — TELEPHONE ENCOUNTER
Called Jason Lockett back and informed him that Laith Dennis is fine with him staying on the 10 MG of Propranolol BID for another week

## 2020-07-27 NOTE — TELEPHONE ENCOUNTER
That sounds good, I have no issue with that  If he needs more medication prior to discontinuing let me know

## 2020-07-27 NOTE — TELEPHONE ENCOUNTER
Received call from Avelino regarding Propranolol  Avelino states that Evertrj Fernandez is weaning him off medication  However, he feels like he may need a little more time on the 10 MG dose  He would like to give it another week till he comes off of it completely as things have been a little hectic for him  He has been taking the 10 MG BID for about 4 days now  Will refer to Jennifer Weston for review

## 2020-09-21 ENCOUNTER — TELEPHONE (OUTPATIENT)
Dept: PSYCHIATRY | Facility: CLINIC | Age: 50
End: 2020-09-21

## 2020-09-21 NOTE — TELEPHONE ENCOUNTER
Fay Nguyễn called and left a message  He says his anxiety and Insomnia are still pretty bad  His wife wanted him to talk to you about medical marijuana or possibly other options   Please give him a call

## 2020-09-21 NOTE — TELEPHONE ENCOUNTER
Returned Arnold's call  He states that he was doing good for a while but his anxiety kicked in again  States that he feels it may partly be triggered by insomnia  He states that he is not sleeping well then worries about his lack of sleep during the day  He questioned if he can take the Propanolol only once in a while  Also mentioned medical marijuana but I informed him that we don't prescribe that here and he may need to talk to his family doctor about it  He tried an Advil pm last night and states it worked "a little bit"  Will refer to Fernanda Munoz for review

## 2020-09-21 NOTE — TELEPHONE ENCOUNTER
Called Wade Hoffman back and informed him that he can take a half to a whole tablet of his Propranolol as needed for anxiety  Reminded him to watch for drowsiness and dizziness if taking it while at work during the day

## 2020-10-05 ENCOUNTER — TELEPHONE (OUTPATIENT)
Dept: PSYCHIATRY | Facility: CLINIC | Age: 50
End: 2020-10-05

## 2020-10-06 ENCOUNTER — TELEMEDICINE (OUTPATIENT)
Dept: PSYCHIATRY | Facility: CLINIC | Age: 50
End: 2020-10-06
Payer: COMMERCIAL

## 2020-10-06 DIAGNOSIS — F41.1 GAD (GENERALIZED ANXIETY DISORDER): Primary | ICD-10-CM

## 2020-10-06 PROBLEM — F43.22 ADJUSTMENT DISORDER WITH ANXIOUS MOOD: Status: ACTIVE | Noted: 2020-04-17

## 2020-10-06 PROCEDURE — 99213 OFFICE O/P EST LOW 20 MIN: CPT | Performed by: NURSE PRACTITIONER

## 2020-10-07 ENCOUNTER — TELEPHONE (OUTPATIENT)
Dept: PSYCHIATRY | Facility: CLINIC | Age: 50
End: 2020-10-07

## 2020-10-07 ENCOUNTER — TELEPHONE (OUTPATIENT)
Dept: FAMILY MEDICINE CLINIC | Facility: CLINIC | Age: 50
End: 2020-10-07

## 2020-10-12 ENCOUNTER — TELEMEDICINE (OUTPATIENT)
Dept: FAMILY MEDICINE CLINIC | Facility: CLINIC | Age: 50
End: 2020-10-12
Payer: COMMERCIAL

## 2020-10-12 VITALS — WEIGHT: 150 LBS | HEIGHT: 70 IN | BODY MASS INDEX: 21.47 KG/M2

## 2020-10-12 DIAGNOSIS — F41.1 GAD (GENERALIZED ANXIETY DISORDER): Primary | ICD-10-CM

## 2020-10-12 DIAGNOSIS — Z13.6 SCREENING FOR CARDIOVASCULAR CONDITION: ICD-10-CM

## 2020-10-12 DIAGNOSIS — R53.83 FATIGUE, UNSPECIFIED TYPE: ICD-10-CM

## 2020-10-12 PROCEDURE — 99213 OFFICE O/P EST LOW 20 MIN: CPT | Performed by: FAMILY MEDICINE

## 2020-10-13 ENCOUNTER — TELEPHONE (OUTPATIENT)
Dept: PSYCHIATRY | Facility: CLINIC | Age: 50
End: 2020-10-13

## 2020-10-13 ENCOUNTER — TRANSCRIBE ORDERS (OUTPATIENT)
Dept: LAB | Facility: CLINIC | Age: 50
End: 2020-10-13

## 2020-10-13 ENCOUNTER — LAB (OUTPATIENT)
Dept: LAB | Facility: CLINIC | Age: 50
End: 2020-10-13
Payer: COMMERCIAL

## 2020-10-13 DIAGNOSIS — R53.83 FATIGUE, UNSPECIFIED TYPE: ICD-10-CM

## 2020-10-13 DIAGNOSIS — Z13.6 SCREENING FOR CARDIOVASCULAR CONDITION: ICD-10-CM

## 2020-10-13 PROBLEM — E78.00 HYPERCHOLESTEROLEMIA: Status: ACTIVE | Noted: 2020-10-13

## 2020-10-13 LAB
ALBUMIN SERPL BCP-MCNC: 4.3 G/DL (ref 3.5–5)
ALP SERPL-CCNC: 61 U/L (ref 46–116)
ALT SERPL W P-5'-P-CCNC: 22 U/L (ref 12–78)
ANION GAP SERPL CALCULATED.3IONS-SCNC: 4 MMOL/L (ref 4–13)
AST SERPL W P-5'-P-CCNC: 9 U/L (ref 5–45)
BASOPHILS # BLD AUTO: 0.02 THOUSANDS/ΜL (ref 0–0.1)
BASOPHILS NFR BLD AUTO: 0 % (ref 0–1)
BILIRUB SERPL-MCNC: 0.95 MG/DL (ref 0.2–1)
BUN SERPL-MCNC: 9 MG/DL (ref 5–25)
CALCIUM SERPL-MCNC: 8.7 MG/DL (ref 8.3–10.1)
CHLORIDE SERPL-SCNC: 105 MMOL/L (ref 100–108)
CHOLEST SERPL-MCNC: 228 MG/DL (ref 50–200)
CO2 SERPL-SCNC: 28 MMOL/L (ref 21–32)
CREAT SERPL-MCNC: 0.89 MG/DL (ref 0.6–1.3)
EOSINOPHIL # BLD AUTO: 0.01 THOUSAND/ΜL (ref 0–0.61)
EOSINOPHIL NFR BLD AUTO: 0 % (ref 0–6)
ERYTHROCYTE [DISTWIDTH] IN BLOOD BY AUTOMATED COUNT: 11.6 % (ref 11.6–15.1)
GFR SERPL CREATININE-BSD FRML MDRD: 101 ML/MIN/1.73SQ M
GLUCOSE P FAST SERPL-MCNC: 104 MG/DL (ref 65–99)
HCT VFR BLD AUTO: 44.5 % (ref 36.5–49.3)
HDLC SERPL-MCNC: 45 MG/DL
HGB BLD-MCNC: 15.6 G/DL (ref 12–17)
IMM GRANULOCYTES # BLD AUTO: 0.01 THOUSAND/UL (ref 0–0.2)
IMM GRANULOCYTES NFR BLD AUTO: 0 % (ref 0–2)
LDLC SERPL CALC-MCNC: 161 MG/DL (ref 0–100)
LYMPHOCYTES # BLD AUTO: 1.12 THOUSANDS/ΜL (ref 0.6–4.47)
LYMPHOCYTES NFR BLD AUTO: 18 % (ref 14–44)
MCH RBC QN AUTO: 32.3 PG (ref 26.8–34.3)
MCHC RBC AUTO-ENTMCNC: 35.1 G/DL (ref 31.4–37.4)
MCV RBC AUTO: 92 FL (ref 82–98)
MONOCYTES # BLD AUTO: 0.38 THOUSAND/ΜL (ref 0.17–1.22)
MONOCYTES NFR BLD AUTO: 6 % (ref 4–12)
NEUTROPHILS # BLD AUTO: 4.85 THOUSANDS/ΜL (ref 1.85–7.62)
NEUTS SEG NFR BLD AUTO: 76 % (ref 43–75)
NONHDLC SERPL-MCNC: 183 MG/DL
NRBC BLD AUTO-RTO: 0 /100 WBCS
PLATELET # BLD AUTO: 175 THOUSANDS/UL (ref 149–390)
PMV BLD AUTO: 9.4 FL (ref 8.9–12.7)
POTASSIUM SERPL-SCNC: 4.2 MMOL/L (ref 3.5–5.3)
PROT SERPL-MCNC: 7.4 G/DL (ref 6.4–8.2)
RBC # BLD AUTO: 4.83 MILLION/UL (ref 3.88–5.62)
SODIUM SERPL-SCNC: 137 MMOL/L (ref 136–145)
TRIGL SERPL-MCNC: 110 MG/DL
TSH SERPL DL<=0.05 MIU/L-ACNC: 1.3 UIU/ML (ref 0.36–3.74)
WBC # BLD AUTO: 6.39 THOUSAND/UL (ref 4.31–10.16)

## 2020-10-13 PROCEDURE — 85025 COMPLETE CBC W/AUTO DIFF WBC: CPT

## 2020-10-13 PROCEDURE — 80061 LIPID PANEL: CPT

## 2020-10-13 PROCEDURE — 36415 COLL VENOUS BLD VENIPUNCTURE: CPT

## 2020-10-13 PROCEDURE — 80053 COMPREHEN METABOLIC PANEL: CPT

## 2020-10-13 PROCEDURE — 84443 ASSAY THYROID STIM HORMONE: CPT

## 2020-10-15 ENCOUNTER — TELEPHONE (OUTPATIENT)
Dept: PSYCHIATRY | Facility: CLINIC | Age: 50
End: 2020-10-15

## 2020-10-15 DIAGNOSIS — R43.8 METALLIC TASTE: Primary | ICD-10-CM

## 2020-10-15 PROBLEM — R20.2 PARESTHESIA: Status: ACTIVE | Noted: 2020-10-15

## 2020-10-19 ENCOUNTER — TELEPHONE (OUTPATIENT)
Dept: PSYCHIATRY | Facility: CLINIC | Age: 50
End: 2020-10-19

## 2020-10-20 ENCOUNTER — TELEPHONE (OUTPATIENT)
Dept: NEUROLOGY | Facility: CLINIC | Age: 50
End: 2020-10-20

## 2020-10-22 ENCOUNTER — TELEPHONE (OUTPATIENT)
Dept: PSYCHIATRY | Facility: CLINIC | Age: 50
End: 2020-10-22

## 2020-10-22 DIAGNOSIS — F41.1 GAD (GENERALIZED ANXIETY DISORDER): ICD-10-CM

## 2020-10-22 RX ORDER — ESCITALOPRAM OXALATE 10 MG/1
10 TABLET ORAL DAILY
Qty: 135 TABLET | Refills: 2
Start: 2020-10-22 | End: 2021-01-14

## 2020-10-22 RX ORDER — PROPRANOLOL HYDROCHLORIDE 10 MG/1
10 TABLET ORAL
COMMUNITY
End: 2020-11-12

## 2020-11-12 ENCOUNTER — TELEMEDICINE (OUTPATIENT)
Dept: PSYCHIATRY | Facility: CLINIC | Age: 50
End: 2020-11-12
Payer: COMMERCIAL

## 2020-11-12 DIAGNOSIS — F43.22 ADJUSTMENT DISORDER WITH ANXIOUS MOOD: ICD-10-CM

## 2020-11-12 DIAGNOSIS — F41.1 GAD (GENERALIZED ANXIETY DISORDER): Primary | ICD-10-CM

## 2020-11-12 PROCEDURE — 99213 OFFICE O/P EST LOW 20 MIN: CPT | Performed by: NURSE PRACTITIONER

## 2020-12-03 ENCOUNTER — TELEPHONE (OUTPATIENT)
Dept: FAMILY MEDICINE CLINIC | Facility: CLINIC | Age: 50
End: 2020-12-03

## 2020-12-07 ENCOUNTER — TELEPHONE (OUTPATIENT)
Dept: PSYCHIATRY | Facility: CLINIC | Age: 50
End: 2020-12-07

## 2020-12-23 ENCOUNTER — CONSULT (OUTPATIENT)
Dept: NEUROLOGY | Facility: CLINIC | Age: 50
End: 2020-12-23
Payer: COMMERCIAL

## 2020-12-23 ENCOUNTER — TELEPHONE (OUTPATIENT)
Dept: PSYCHIATRY | Facility: CLINIC | Age: 50
End: 2020-12-23

## 2020-12-23 VITALS
TEMPERATURE: 98.3 F | BODY MASS INDEX: 22.67 KG/M2 | HEART RATE: 111 BPM | WEIGHT: 158 LBS | DIASTOLIC BLOOD PRESSURE: 80 MMHG | SYSTOLIC BLOOD PRESSURE: 100 MMHG

## 2020-12-23 DIAGNOSIS — F41.9 ANXIETY DISORDER, UNSPECIFIED TYPE: ICD-10-CM

## 2020-12-23 DIAGNOSIS — R20.2 NUMBNESS AND TINGLING: ICD-10-CM

## 2020-12-23 DIAGNOSIS — R20.0 NUMBNESS AND TINGLING: ICD-10-CM

## 2020-12-23 DIAGNOSIS — R53.83 FATIGUE: Primary | ICD-10-CM

## 2020-12-23 PROCEDURE — 1036F TOBACCO NON-USER: CPT | Performed by: PSYCHIATRY & NEUROLOGY

## 2020-12-23 PROCEDURE — 99245 OFF/OP CONSLTJ NEW/EST HI 55: CPT | Performed by: PSYCHIATRY & NEUROLOGY

## 2020-12-29 ENCOUNTER — TELEPHONE (OUTPATIENT)
Dept: NEUROLOGY | Facility: CLINIC | Age: 50
End: 2020-12-29

## 2020-12-29 ENCOUNTER — TRANSCRIBE ORDERS (OUTPATIENT)
Dept: SLEEP CENTER | Facility: CLINIC | Age: 50
End: 2020-12-29

## 2020-12-29 DIAGNOSIS — R06.83 SNORING: Primary | ICD-10-CM

## 2021-01-06 ENCOUNTER — TELEPHONE (OUTPATIENT)
Dept: SLEEP CENTER | Facility: CLINIC | Age: 51
End: 2021-01-06

## 2021-01-06 NOTE — TELEPHONE ENCOUNTER
----- Message from Lalo Argueta MD sent at 1/5/2021  2:39 PM EST -----  Approved  ----- Message -----  From: Nirav Lara  Sent: 12/28/2020   7:25 AM EST  To: Sleep Medicine [facility] Provider    This sleep study needs approval      If approved please sign and return to clerical pool  If denied please include reasons why  Also provide alternative testing if warranted  Please sign and return to clerical pool

## 2021-01-07 ENCOUNTER — HOSPITAL ENCOUNTER (OUTPATIENT)
Dept: SLEEP CENTER | Facility: CLINIC | Age: 51
Discharge: HOME | End: 2021-01-07
Payer: COMMERCIAL

## 2021-01-07 DIAGNOSIS — R06.83 SNORING: Primary | ICD-10-CM

## 2021-01-07 DIAGNOSIS — R06.83 SNORING: ICD-10-CM

## 2021-01-07 PROCEDURE — G0399 HOME SLEEP TEST/TYPE 3 PORTA: HCPCS

## 2021-01-11 ENCOUNTER — TELEPHONE (OUTPATIENT)
Dept: SLEEP CENTER | Facility: CLINIC | Age: 51
End: 2021-01-11

## 2021-01-11 NOTE — TELEPHONE ENCOUNTER
Patient calling for results of sleep study  Advised sleep study reveals mild CHRISTOPHER, recommend consult  Scheduled 2/1/2021 with Dr Marielle Staples in [facility]

## 2021-01-12 ENCOUNTER — HOSPITAL ENCOUNTER (OUTPATIENT)
Dept: MRI IMAGING | Facility: HOSPITAL | Age: 51
Discharge: HOME | End: 2021-01-12
Attending: PSYCHIATRY & NEUROLOGY
Payer: COMMERCIAL

## 2021-01-12 ENCOUNTER — TELEPHONE (OUTPATIENT)
Dept: PSYCHIATRY | Facility: CLINIC | Age: 51
End: 2021-01-12

## 2021-01-12 DIAGNOSIS — R20.0 NUMBNESS AND TINGLING: ICD-10-CM

## 2021-01-12 DIAGNOSIS — R20.2 NUMBNESS AND TINGLING: ICD-10-CM

## 2021-01-12 PROCEDURE — 70551 MRI BRAIN STEM W/O DYE: CPT

## 2021-01-12 PROCEDURE — G1004 CDSM NDSC: HCPCS

## 2021-01-12 NOTE — TELEPHONE ENCOUNTER
James Mayorga called and is looking to move his appointment up  Unfortunately there is no availability, unless we are to open a slot for him  James Mayorga has been unable to sleep again  Last night, he had an intense burring sensation under his skin  It was painful and kept him up all night       Pts next appointment is on 1/14/21

## 2021-01-12 NOTE — TELEPHONE ENCOUNTER
Returned Arnold's call  Joo Duenas addressed how he was doing good for a while and now he is having problems again  States that nobody understands what he is talking about but feels like his body is going in waves  Questioned medication and he informed me that he stopped taking his Lexapro  I advised him that it is never a good idea to just start or stop a medication without discussing with provider  He has already been off of Lexapro for a few weeks  Therefore I instructed him to wait to discuss with Toribio on Thursday  Confirmed appointment  Vaughn Grossman to call his PCP or go to ER if there is something he needs to discuss urgently  Also provided him with the phone number for [facility]  Will refer to Raya Alcantar for review

## 2021-01-14 ENCOUNTER — TELEMEDICINE (OUTPATIENT)
Dept: PSYCHIATRY | Facility: CLINIC | Age: 51
End: 2021-01-14
Payer: COMMERCIAL

## 2021-01-14 DIAGNOSIS — F41.1 GAD (GENERALIZED ANXIETY DISORDER): Primary | ICD-10-CM

## 2021-01-14 PROCEDURE — 90833 PSYTX W PT W E/M 30 MIN: CPT | Performed by: NURSE PRACTITIONER

## 2021-01-14 PROCEDURE — 99214 OFFICE O/P EST MOD 30 MIN: CPT | Performed by: NURSE PRACTITIONER

## 2021-01-14 RX ORDER — ESCITALOPRAM OXALATE 10 MG/1
10 TABLET ORAL DAILY
Qty: 90 TABLET | Refills: 1 | Status: SHIPPED | OUTPATIENT
Start: 2021-01-14 | End: 2021-01-21 | Stop reason: HOSPADM

## 2021-01-14 RX ORDER — TRAZODONE HYDROCHLORIDE 50 MG/1
50 TABLET ORAL
Qty: 30 TABLET | Refills: 1 | Status: SHIPPED | OUTPATIENT
Start: 2021-01-14 | End: 2021-01-21 | Stop reason: HOSPADM

## 2021-01-14 NOTE — BH TREATMENT PLAN
TREATMENT PLAN (Medication Management Only)        [facility]    Name and Date of Birth:  Lakia Montemayor 52 y o  1970  Date of Treatment Plan: January 14, 2021  Diagnosis/Diagnoses:    1  GORDON (generalized anxiety disorder)      Strengths/Personal Resources for Self-Care: ability to reason, work skills, good physical health, average or above intelligence  Area/Areas of need (in own words): anxiety symptoms, depressive symptoms  1  Long Term Goal: improve anxiety and improve depression  Target Date: 6 months - 7/14/2021  Person/Persons responsible for completion of goal: Darcie Wu  2  Short Term Objective (s) - How will we reach this goal?:   A  Provider new recommended medication/dosage changes and/or continue medication(s): continue current medications as prescribed  B   Attend medication management appointments regularly  C   Attend psychotherapy regularly  Target Date: 6 months - 7/14/2021  Person/Persons Responsible for Completion of Goal: Darcie Wu  Progress Towards Goals: continuing treatment  Treatment Modality: Patient will transfer to a new provider; Dr Geovanna MCKNIGHT Watkins  March 8th  Review due 90 to 120 days from date of this plan: 2 months - 3/14/2021  Expected length of service: Two months  My Physician/PA/NP and I have developed this plan together and I agree to work on the goals and objectives  I understand the treatment goals that were developed for my treatment    Treatment Plan Verbal Consent - Due to COVID     Virtual Visit

## 2021-01-14 NOTE — PSYCH
Virtual Regular Visit    Problem List Items Addressed This Visit        Other    GORDON (generalized anxiety disorder) - Primary    Relevant Medications    escitalopram (LEXAPRO) 10 mg tablet    traZODone (DESYREL) 50 mg tablet             Encounter provider EDWAR Crowe    Provider located at   [address] [facility]  [address]  [place] [facility] [id number] [phone]    Recent Visits  Date Type Provider Dept   01/12/21 Telephone Eugene Wheatley, [address] [facility],[address] recent visits within past 7 days and meeting all other requirements     Today's Visits  Date Type Provider Dept   01/14/21 Telemedicine EDWAR [facility]   Showing today's visits and meeting all other requirements     Future Appointments  No visits were found meeting these conditions  Showing future appointments within next 150 days and meeting all other requirements      The patient was identified by name and date of birth  Dante Grady was informed that this is a telemedicine visit and that the visit is being conducted through [employer]  My office door was closed  No one else was in the room  He acknowledged consent and understanding of privacy and security of the video platform  The patient has agreed to participate and understands they can discontinue the visit at any time  Patient is aware this is a billable service       HPI     Current Outpatient Medications   Medication Sig Dispense Refill    escitalopram (LEXAPRO) 10 mg tablet Take 1 tablet (10 mg total) by mouth daily 90 tablet 1    loratadine-pseudoephedrine (CLARITIN-D 24-HOUR)  mg per 24 hr tablet Take 1 tablet by mouth daily      Polyethylene Glycol 3350 (MIRALAX PO) Take by mouth      traZODone (DESYREL) 50 mg tablet Take 1 tablet (50 mg total) by mouth daily at bedtime as needed for sleep ;may take a half tablet if a whole tablet is overly sedating 30 tablet 1     No current facility-administered medications for this visit  Review of Systems  Video Exam    There were no vitals filed for this visit  Physical Exam   As a result of this visit, I have referred the patient for further respiratory evaluation  No    I spent 15 minutes directly with the patient during this visit  [address] Rahul Brown acknowledges that he has consented to an online visit or consultation  He understands that the online visit is based solely on information provided by him, and that, in the absence of a face-to-face physical evaluation by the physician, the diagnosis he receives is both limited and provisional in terms of accuracy and completeness  This is not intended to replace a full medical face-to-face evaluation by the physician  Arabella Agudelo understands and accepts these terms  MEDICATION MANAGEMENT NOTE        [facility]    Name and Date of Birth:  Arabella Agudelo 52 y o  1970 MRN: [id number]    Date of Visit: January 14, 2021    Allergies   Allergen Reactions    Betadine Antibiotic-Moisturize [Bacitracin-Polymyxin B] Rash     SUBJECTIVE:    Whit Ro is seen today for a follow up for Generalized Anxiety Disorder  He reports that he has decompensated since the last visit  Patient states that he stopped Lexapro in late December  Patient had a period of euthymia but has since relapsed into daily anxiety with somatic symptoms and hyper fixation on internal state  Somatizations include sensation of burning under the skin and a sense that he can feel changes in neurotransmitters  Currently patient is having difficulty with sleep sleeping only 2-3 hours at night and feeling tired but and unable to sleep during the day  Patient denies periods decreased sleep in conjunction with increased energy, talkativeness, impulsivity, flight of ideas, or distractibility  Patient has never exhibited delusional or grandiose ideations  Patient resumed Lexapro 10 mg 2 days ago  He denies any side effects from medications  PLAN:  -Continue Lexapro 10 mg p o  Daily  -Initiate trazodone 50 mg p o  HS  Patient may take half tablet at bedtime if he full tablet is too sedating  -Patient strongly encouraged to engage with mind fullness meditation breathing exercises to reduce fixation on internal state and associated symptoms  -patient strongly encouraged to remain consistent with daily dose of Lexapro through scheduled follow-up appointment with Dr Promise Lawler of 24 hour and weekend coverage for urgent situations accessed by calling [facility] main practice number    Diagnoses and all orders for this visit:    GORDON (generalized anxiety disorder)  -     escitalopram (LEXAPRO) 10 mg tablet; Take 1 tablet (10 mg total) by mouth daily  -     traZODone (DESYREL) 50 mg tablet; Take 1 tablet (50 mg total) by mouth daily at bedtime as needed for sleep ;may take a half tablet if a whole tablet is overly sedating        Current Outpatient Medications on File Prior to Visit   Medication Sig Dispense Refill    loratadine-pseudoephedrine (CLARITIN-D 24-HOUR)  mg per 24 hr tablet Take 1 tablet by mouth daily      Polyethylene Glycol 3350 (MIRALAX PO) Take by mouth      [DISCONTINUED] escitalopram (LEXAPRO) 10 mg tablet Take 1 tablet (10 mg total) by mouth daily (Patient not taking: Reported on 12/23/2020) 135 tablet 2     No current facility-administered medications on file prior to visit  Psychotherapy Provided:     Individual psychotherapy provided: Yes  Counseling was provided during the session today for 16 minutes  Supportive counseling provided  Reviewed practice of mind fullness meditation breathing exercises to reduce stress and anxiety      HPI ROS Appetite Changes and Sleep:     He reports decrease in number of sleep hours (2-3 hours), difficulty falling asleep, frequent awakenings, adequate appetite, low energy   Patient denies suicidal or homicidal ideation    Review Of Systems:      General emotional problems, sleep disturbances and decreased functioning   Personality no change in personality   Constitutional negative   ENT negative   Cardiovascular negative   Respiratory negative   Gastrointestinal negative   Genitourinary negative   Musculoskeletal negative   Integumentary negative   Neurological negative   Endocrine negative   Other Symptoms none, all other systems are negative     Mental Status Evaluation:    Appearance Adequate hygiene and grooming   Behavior cooperative and friendly   Mood anxious  Depression Scale -  of 10 (0 = No depression)  Anxiety Scale -  of 10 (0 = No anxiety)   Speech Normal rate and volume   Affect appropriate and mood-congruent   Thought Processes Goal directed and coherent   Thought Content Does not verbalize delusional material   Associations Tightly connected   Perceptual Disturbances Denies hallucinations and does not appear to be responding to internal stimuli   Risk Potential Suicidal/Homicidal Ideation - No evidence of suicidal or homicidal ideation and Patient does not verbalize suicidal or homicidal ideation  Risk of Violence - No evidence of risk for violence found on assessment  Risk of Self Mutilation - No evidence of risk for self mutilation found on assessment   Orientation oriented to person, place, time/date and situation   Memory recent and remote memory grossly intact   Consciousness alert and awake   Attention/Concentration attention span and concentration are age appropriate   Insight improving   Judgement fair   Muscle Strength and Gait normal muscle strength and normal muscle tone, normal gait/station and normal balance   Motor Activity no abnormal movements   Language no difficulty naming common objects, no difficulty repeating a phrase, no difficulty writing a sentence   Fund of Knowledge adequate knowledge of current events  adequate fund of knowledge regarding past history  adequate fund of knowledge regarding vocabulary      Past Psychiatric History Update:     Inpatient Psychiatric Admission Since Last Encounter:   no  Changes to Outpatient Psychiatric Treatment Team:    no  Suicide Attempt Or Self Mutilation Since Last Encounter:   no  Incidence of Violent Behavior Since Last Encounter:   no    Traumatic History Update:     New Onset of Abuse Since Last Encounter:   no  Traumatic Events Since Last Encounter:   no    Past Medical History:    Past Medical History:   Diagnosis Date    Anxiety     Hyperlipidemia     controlled with diet and exercise    Kidney stone     Renal disorder     kidney stones     Past Medical History Pertinent Negatives:   Diagnosis Date Noted    History of transfusion 05/17/2018     Past Surgical History:   Procedure Laterality Date    APPENDECTOMY      NC CYSTO/URETERO W/LITHOTRIPSY &INDWELL STENT INSRT Left 5/9/2018    Procedure: CYSTOSCOPY URETEROSCOPY,LEFT  RETROGRADE PYELOGRAM AND INSERTION STENT URETERAL;  Surgeon: Latasha Woodard MD;  Location: [facility];  Service: Urology    NC CYSTO/URETERO W/LITHOTRIPSY &INDWELL STENT INSRT Left 6/8/2018    Procedure: CYSTOSCOPY URETEROSCOPY WITH LITHOTRIPSY HOLMIUM LASER, RETROGRADE PYELOGRAM AND INSERTION STENT URETERAL;  Surgeon: Latasha Woodard MD;  Location: [facility];  Service: Urology    TONSILLECTOMY      WISDOM TOOTH EXTRACTION      WISDOM TOOTH EXTRACTION       Allergies   Allergen Reactions    Betadine Antibiotic-Moisturize [Bacitracin-Polymyxin B] Rash     Substance Abuse History:    Social History     Substance and Sexual Activity   Alcohol Use Yes    Alcohol/week: 1 0 standard drinks    Types: 1 Cans of beer per week    Frequency: Monthly or less    Drinks per session: 1 or 2    Binge frequency: Never    Comment: ocassional     Social History     Substance and Sexual Activity   Drug Use No     Social History:    Social History     Socioeconomic History    Marital status: /Civil Union     Spouse name: Not on file    Number of children: Not on file    Years of education: Not on file    Highest education level: Not on file   Occupational History    Not on file   Social Needs    Financial resource strain: Not on file    Food insecurity     Worry: Not on file     Inability: Not on file   [employer] needs     Medical: Not on file     Non-medical: Not on file   Tobacco Use    Smoking status: Former Smoker     Packs/day: 0 50     Types: Cigarettes     Quit date: 1998     Years since quittin 6    Smokeless tobacco: Never Used   Substance and Sexual Activity    Alcohol use: Yes     Alcohol/week: 1 0 standard drinks     Types: 1 Cans of beer per week     Frequency: Monthly or less     Drinks per session: 1 or 2     Binge frequency: Never     Comment: ocassional    Drug use: No    Sexual activity: Not on file   Lifestyle    Physical activity     Days per week: Not on file     Minutes per session: Not on file    Stress: Not on file   Relationships    Social connections     Talks on phone: Not on file     Gets together: Not on file     Attends Congregation service: Not on file     Active member of club or organization: Not on file     Attends meetings of clubs or organizations: Not on file     Relationship status: Not on file    Intimate partner violence     Fear of current or ex partner: Not on file     Emotionally abused: Not on file     Physically abused: Not on file     Forced sexual activity: Not on file   Other Topics Concern    Not on file   Social History Narrative    Not on file     Family Psychiatric History:     Family History   Problem Relation Age of Onset    Heart disease Mother     Lung cancer Mother     Hypertension Mother     Stroke Father     Hyperlipidemia Father      History Review: The following portions of the patient's history were reviewed and updated as appropriate: allergies, current medications, past family history, past medical history, past social history, past surgical history and problem list     OBJECTIVE:     Vital signs in last 24 hours: There were no vitals filed for this visit  Laboratory Results: I have personally reviewed all pertinent laboratory/tests results  Suicide/Homicide Risk Assessment:    Risk of Harm to Self:   The following ratings are based on assessment at the time of the interview   Recent Specific Risk Factors include: current anxiety symptoms    Risk of Harm to Others:   The following ratings are based on assessment at the time of the interview   Recent Specific Risk Factors include: none  The following interventions are recommended: no intervention changes needed    Medications Risks/Benefits:      Risks, Benefits And Possible Side Effects Of Medications:    Discussed risks and benefits of treatment with patient including risk of suicidality, serotonin syndrome and SIADH related to treatment with antidepressants; Risk of induction of manic symptoms in certain patient populations     Controlled Medication Discussion:     Not applicable    Treatment Plan:    Due for update/Updated:   EDWAR Argueta 01/14/21    This note was shared with patient

## 2021-01-15 ENCOUNTER — HOSPITAL ENCOUNTER (EMERGENCY)
Facility: HOSPITAL | Age: 51
End: 2021-01-16
Attending: EMERGENCY MEDICINE
Payer: COMMERCIAL

## 2021-01-15 DIAGNOSIS — T50.902A INTENTIONAL DRUG OVERDOSE, INITIAL ENCOUNTER (HCC): ICD-10-CM

## 2021-01-15 DIAGNOSIS — F32.A DEPRESSION, UNSPECIFIED DEPRESSION TYPE: ICD-10-CM

## 2021-01-15 DIAGNOSIS — T43.211A OVERDOSE OF TRAZODONE: Primary | ICD-10-CM

## 2021-01-15 DIAGNOSIS — T50.902A SUICIDE ATTEMPT BY DRUG OVERDOSE (HCC): ICD-10-CM

## 2021-01-15 LAB
ALBUMIN SERPL BCP-MCNC: 4.1 G/DL (ref 3.5–5)
ALP SERPL-CCNC: 60 U/L (ref 46–116)
ALT SERPL W P-5'-P-CCNC: 14 U/L (ref 12–78)
AMPHETAMINES SERPL QL SCN: NEGATIVE
ANION GAP SERPL CALCULATED.3IONS-SCNC: 6 MMOL/L (ref 4–13)
APAP SERPL-MCNC: <2 UG/ML (ref 10–20)
AST SERPL W P-5'-P-CCNC: 9 U/L (ref 5–45)
BARBITURATES UR QL: NEGATIVE
BASOPHILS # BLD AUTO: 0.01 THOUSANDS/ΜL (ref 0–0.1)
BASOPHILS NFR BLD AUTO: 0 % (ref 0–1)
BENZODIAZ UR QL: NEGATIVE
BILIRUB SERPL-MCNC: 1.21 MG/DL (ref 0.2–1)
BUN SERPL-MCNC: 11 MG/DL (ref 5–25)
CALCIUM SERPL-MCNC: 8.8 MG/DL (ref 8.3–10.1)
CHLORIDE SERPL-SCNC: 103 MMOL/L (ref 100–108)
CO2 SERPL-SCNC: 28 MMOL/L (ref 21–32)
COCAINE UR QL: NEGATIVE
CREAT SERPL-MCNC: 0.82 MG/DL (ref 0.6–1.3)
EOSINOPHIL # BLD AUTO: 0.01 THOUSAND/ΜL (ref 0–0.61)
EOSINOPHIL NFR BLD AUTO: 0 % (ref 0–6)
ERYTHROCYTE [DISTWIDTH] IN BLOOD BY AUTOMATED COUNT: 11.8 % (ref 11.6–15.1)
ETHANOL SERPL-MCNC: <3 MG/DL (ref 0–3)
FLUAV RNA RESP QL NAA+PROBE: NEGATIVE
FLUBV RNA RESP QL NAA+PROBE: NEGATIVE
GFR SERPL CREATININE-BSD FRML MDRD: 105 ML/MIN/1.73SQ M
GLUCOSE SERPL-MCNC: 115 MG/DL (ref 65–140)
HCT VFR BLD AUTO: 43 % (ref 36.5–49.3)
HGB BLD-MCNC: 15.1 G/DL (ref 12–17)
IMM GRANULOCYTES # BLD AUTO: 0.01 THOUSAND/UL (ref 0–0.2)
IMM GRANULOCYTES NFR BLD AUTO: 0 % (ref 0–2)
LYMPHOCYTES # BLD AUTO: 0.87 THOUSANDS/ΜL (ref 0.6–4.47)
LYMPHOCYTES NFR BLD AUTO: 20 % (ref 14–44)
MCH RBC QN AUTO: 31.9 PG (ref 26.8–34.3)
MCHC RBC AUTO-ENTMCNC: 35.1 G/DL (ref 31.4–37.4)
MCV RBC AUTO: 91 FL (ref 82–98)
METHADONE UR QL: NEGATIVE
MONOCYTES # BLD AUTO: 0.33 THOUSAND/ΜL (ref 0.17–1.22)
MONOCYTES NFR BLD AUTO: 8 % (ref 4–12)
NEUTROPHILS # BLD AUTO: 3.07 THOUSANDS/ΜL (ref 1.85–7.62)
NEUTS SEG NFR BLD AUTO: 72 % (ref 43–75)
NRBC BLD AUTO-RTO: 0 /100 WBCS
OPIATES UR QL SCN: NEGATIVE
OXYCODONE+OXYMORPHONE UR QL SCN: NEGATIVE
PCP UR QL: NEGATIVE
PLATELET # BLD AUTO: 148 THOUSANDS/UL (ref 149–390)
PMV BLD AUTO: 9.3 FL (ref 8.9–12.7)
POTASSIUM SERPL-SCNC: 3.7 MMOL/L (ref 3.5–5.3)
PROT SERPL-MCNC: 7.1 G/DL (ref 6.4–8.2)
RBC # BLD AUTO: 4.73 MILLION/UL (ref 3.88–5.62)
RSV RNA RESP QL NAA+PROBE: NEGATIVE
SALICYLATES SERPL-MCNC: <3 MG/DL (ref 3–20)
SARS-COV-2 RNA RESP QL NAA+PROBE: NEGATIVE
SODIUM SERPL-SCNC: 137 MMOL/L (ref 136–145)
THC UR QL: NEGATIVE
WBC # BLD AUTO: 4.3 THOUSAND/UL (ref 4.31–10.16)

## 2021-01-15 PROCEDURE — 82077 ASSAY SPEC XCP UR&BREATH IA: CPT | Performed by: EMERGENCY MEDICINE

## 2021-01-15 PROCEDURE — 96361 HYDRATE IV INFUSION ADD-ON: CPT

## 2021-01-15 PROCEDURE — 99449 NTRPROF PH1/NTRNET/EHR 31/>: CPT | Performed by: EMERGENCY MEDICINE

## 2021-01-15 PROCEDURE — 36415 COLL VENOUS BLD VENIPUNCTURE: CPT | Performed by: EMERGENCY MEDICINE

## 2021-01-15 PROCEDURE — 80143 DRUG ASSAY ACETAMINOPHEN: CPT | Performed by: EMERGENCY MEDICINE

## 2021-01-15 PROCEDURE — 0241U HB NFCT DS VIR RESP RNA 4 TRGT: CPT | Performed by: EMERGENCY MEDICINE

## 2021-01-15 PROCEDURE — 80307 DRUG TEST PRSMV CHEM ANLYZR: CPT | Performed by: EMERGENCY MEDICINE

## 2021-01-15 PROCEDURE — 99285 EMERGENCY DEPT VISIT HI MDM: CPT

## 2021-01-15 PROCEDURE — 93005 ELECTROCARDIOGRAM TRACING: CPT

## 2021-01-15 PROCEDURE — 80053 COMPREHEN METABOLIC PANEL: CPT | Performed by: EMERGENCY MEDICINE

## 2021-01-15 PROCEDURE — 85025 COMPLETE CBC W/AUTO DIFF WBC: CPT | Performed by: EMERGENCY MEDICINE

## 2021-01-15 PROCEDURE — 80179 DRUG ASSAY SALICYLATE: CPT | Performed by: EMERGENCY MEDICINE

## 2021-01-15 PROCEDURE — 96360 HYDRATION IV INFUSION INIT: CPT

## 2021-01-15 PROCEDURE — 99285 EMERGENCY DEPT VISIT HI MDM: CPT | Performed by: EMERGENCY MEDICINE

## 2021-01-15 RX ORDER — TRAMADOL HYDROCHLORIDE 50 MG/1
50 TABLET ORAL EVERY 6 HOURS PRN
Status: CANCELLED | OUTPATIENT
Start: 2021-01-15

## 2021-01-15 RX ORDER — IBUPROFEN 600 MG/1
600 TABLET ORAL EVERY 8 HOURS PRN
Status: CANCELLED | OUTPATIENT
Start: 2021-01-15

## 2021-01-15 RX ORDER — ACETAMINOPHEN 325 MG/1
650 TABLET ORAL EVERY 6 HOURS PRN
Status: CANCELLED | OUTPATIENT
Start: 2021-01-15

## 2021-01-15 RX ORDER — MAGNESIUM HYDROXIDE/ALUMINUM HYDROXICE/SIMETHICONE 120; 1200; 1200 MG/30ML; MG/30ML; MG/30ML
30 SUSPENSION ORAL EVERY 4 HOURS PRN
Status: CANCELLED | OUTPATIENT
Start: 2021-01-15

## 2021-01-15 RX ORDER — HALOPERIDOL 5 MG
5 TABLET ORAL EVERY 6 HOURS PRN
Status: CANCELLED | OUTPATIENT
Start: 2021-01-15

## 2021-01-15 RX ORDER — TRAZODONE HYDROCHLORIDE 50 MG/1
50 TABLET ORAL
Status: CANCELLED | OUTPATIENT
Start: 2021-01-15

## 2021-01-15 RX ORDER — BENZTROPINE MESYLATE 0.5 MG/1
1 TABLET ORAL EVERY 6 HOURS PRN
Status: CANCELLED | OUTPATIENT
Start: 2021-01-15

## 2021-01-15 RX ORDER — LORAZEPAM 1 MG/1
1 TABLET ORAL EVERY 6 HOURS PRN
Status: CANCELLED | OUTPATIENT
Start: 2021-01-15

## 2021-01-15 RX ORDER — BENZTROPINE MESYLATE 1 MG/ML
1 INJECTION INTRAMUSCULAR; INTRAVENOUS EVERY 6 HOURS PRN
Status: CANCELLED | OUTPATIENT
Start: 2021-01-15

## 2021-01-15 RX ORDER — HALOPERIDOL 5 MG/ML
5 INJECTION INTRAMUSCULAR EVERY 6 HOURS PRN
Status: CANCELLED | OUTPATIENT
Start: 2021-01-15

## 2021-01-15 RX ORDER — LORAZEPAM 2 MG/ML
2 INJECTION INTRAMUSCULAR EVERY 6 HOURS PRN
Status: CANCELLED | OUTPATIENT
Start: 2021-01-15

## 2021-01-15 RX ADMIN — SODIUM CHLORIDE 1000 ML: 0.9 INJECTION, SOLUTION INTRAVENOUS at 10:21

## 2021-01-15 RX ADMIN — SODIUM CHLORIDE 1000 ML: 0.9 INJECTION, SOLUTION INTRAVENOUS at 11:35

## 2021-01-15 RX ADMIN — ACTIVATED CHARCOAL 50 G: 208 SUSPENSION ORAL at 10:29

## 2021-01-15 NOTE — ED NOTES
Per ED physician, patient is medically cleared for assessment  He agreed to sign a 201 earlier, but if that changes, ED physician will pursue a 302  Crisis to follow-up  EVS (Eligibility Verification System) called - [phone]  Automated system indicates: not on file  Patient's insurance of record is [employer]  Back of card not on file, but typically the number to call for precertification is: [phone]

## 2021-01-15 NOTE — ED NOTES
Pt has [employer] as his primary insurance  CW EVS'd pt and per EVS pt is Not on File        [address] [facility]

## 2021-01-15 NOTE — ED PROVIDER NOTES
History  Chief Complaint   Patient presents with    Overdose - Intentional     Pt presents to the ED with intentional overdose  reports taking 13 tablets of trazodone estimated about 15 minutes ago  History provided by:  Patient  Overdose - Intentional  Ingested substance:  Prescription medication  Time since incident:  15 minutes  Ingestion amount:  13x pills of 50mg Trazodone,  (650mg total), single ingestion 15 minutes ago  Witnesses present: no    Called poison control: no    Incident location:  Home  Context: suicide attempt (Says he was very depressed and wanted to end his life, after taking 13 pills he changed his mind realized what he did was wrong came here for help )    Associated symptoms: no abdominal pain, no chest pain, no cough, no diaphoresis, no diarrhea, no headaches, no nausea, no shortness of breath and no vomiting    Associated symptoms comment:  Depression      Prior to Admission Medications   Prescriptions Last Dose Informant Patient Reported? Taking?    Polyethylene Glycol 3350 (MIRALAX PO) Not Taking at Unknown time Self Yes No   Sig: Take by mouth   escitalopram (LEXAPRO) 10 mg tablet   No Yes   Sig: Take 1 tablet (10 mg total) by mouth daily   loratadine-pseudoephedrine (CLARITIN-D 24-HOUR)  mg per 24 hr tablet  Self Yes Yes   Sig: Take 1 tablet by mouth daily   traZODone (DESYREL) 50 mg tablet   No Yes   Sig: Take 1 tablet (50 mg total) by mouth daily at bedtime as needed for sleep ;may take a half tablet if a whole tablet is overly sedating      Facility-Administered Medications: None       Past Medical History:   Diagnosis Date    Anxiety     Hyperlipidemia     controlled with diet and exercise    Kidney stone     Renal disorder     kidney stones       Past Surgical History:   Procedure Laterality Date    APPENDECTOMY      CT CYSTO/URETERO W/LITHOTRIPSY &INDWELL STENT INSRT Left 5/9/2018    Procedure: CYSTOSCOPY URETEROSCOPY,LEFT  RETROGRADE PYELOGRAM AND INSERTION STENT URETERAL;  Surgeon: Trina Lopez MD;  [facility];  Service: Urology    MS CYSTO/URETERO W/LITHOTRIPSY &INDWELL STENT INSRT Left 2018    Procedure: CYSTOSCOPY URETEROSCOPY WITH LITHOTRIPSY HOLMIUM LASER, RETROGRADE PYELOGRAM AND INSERTION STENT URETERAL;  Surgeon: Trina Lopez MD;  [facility];  Service: Urology    TONSILLECTOMY      WISDOM TOOTH EXTRACTION      WISDOM TOOTH EXTRACTION         Family History   Problem Relation Age of Onset    Heart disease Mother     Lung cancer Mother     Hypertension Mother     Stroke Father     Hyperlipidemia Father      I have reviewed and agree with the history as documented  E-Cigarette/Vaping     E-Cigarette/Vaping Substances     Social History     Tobacco Use    Smoking status: Former Smoker     Packs/day: 0 50     Types: Cigarettes     Quit date: 1998     Years since quittin 6    Smokeless tobacco: Never Used   Substance Use Topics    Alcohol use: Yes     Alcohol/week: 1 0 standard drinks     Types: 1 Cans of beer per week     Frequency: Monthly or less     Drinks per session: 1 or 2     Binge frequency: Never     Comment: ocassional    Drug use: No       Review of Systems   Constitutional: Negative for activity change, chills, diaphoresis and fever  HENT: Negative for congestion, sinus pressure and sore throat  Eyes: Negative for pain and visual disturbance  Respiratory: Negative for cough, chest tightness, shortness of breath, wheezing and stridor  Cardiovascular: Negative for chest pain and palpitations  Gastrointestinal: Negative for abdominal distention, abdominal pain, constipation, diarrhea, nausea and vomiting  Genitourinary: Negative for dysuria and frequency  Musculoskeletal: Negative for neck pain and neck stiffness  Skin: Negative for rash  Neurological: Negative for dizziness, speech difficulty, light-headedness, numbness and headaches         Physical Exam  Physical Exam  Vitals signs reviewed  Constitutional:       General: He is not in acute distress  Appearance: He is well-developed  He is not diaphoretic  HENT:      Head: Normocephalic and atraumatic  Right Ear: External ear normal       Left Ear: External ear normal       Nose: Nose normal    Eyes:      General:         Right eye: No discharge  Left eye: No discharge  Pupils: Pupils are equal, round, and reactive to light  Neck:      Musculoskeletal: Normal range of motion and neck supple  Trachea: No tracheal deviation  Cardiovascular:      Rate and Rhythm: Regular rhythm  Tachycardia present  Heart sounds: Normal heart sounds  No murmur  Pulmonary:      Effort: Pulmonary effort is normal  No respiratory distress  Breath sounds: Normal breath sounds  No stridor  Abdominal:      General: There is no distension  Palpations: Abdomen is soft  Tenderness: There is no abdominal tenderness  There is no guarding or rebound  Musculoskeletal: Normal range of motion  Skin:     General: Skin is warm and dry  Coloration: Skin is not pale  Findings: No erythema  Neurological:      General: No focal deficit present  Mental Status: He is alert and oriented to person, place, and time           Vital Signs  ED Triage Vitals [01/15/21 1002]   Temperature Pulse Respirations Blood Pressure SpO2   98 °F (36 7 °C) (!) 128 18 149/81 97 %      Temp Source Heart Rate Source Patient Position - Orthostatic VS BP Location FiO2 (%)   Oral Monitor Sitting Right arm --      Pain Score       No Pain           Vitals:    01/15/21 1400 01/15/21 1430 01/15/21 1500 01/15/21 1600   BP: 98/65 99/64 101/65 105/70   Pulse: 76 78 74 77   Patient Position - Orthostatic VS:   Lying Lying         Visual Acuity  Visual Acuity      Most Recent Value   L Pupil Size (mm)  4   R Pupil Size (mm)  4          ED Medications  Medications   charcoal activated (NICOLE INSTA-EDYTA) oral liquid 50 g (50 g Oral Given 1/15/21 1029)   sodium chloride 0 9 % bolus 1,000 mL (0 mL Intravenous Stopped 1/15/21 1134)   sodium chloride 0 9 % bolus 1,000 mL (0 mL Intravenous Stopped 1/15/21 1302)       Diagnostic Studies  Results Reviewed     Procedure Component Value Units Date/Time    Rapid drug screen, urine [[id number]]  (Normal) Collected: 01/15/21 1208    Lab Status: Final result Specimen: Urine, Other Updated: 01/15/21 1234     Amph/Meth UR Negative     Barbiturate Ur Negative     Benzodiazepine Urine Negative     Cocaine Urine Negative     Methadone Urine Negative     Opiate Urine Negative     PCP Ur Negative     THC Urine Negative     Oxycodone Urine Negative    Narrative:      FOR MEDICAL PURPOSES ONLY  IF CONFIRMATION NEEDED PLEASE CONTACT THE LAB WITHIN 5 DAYS  Drug Screen Cutoff Levels:  AMPHETAMINE/METHAMPHETAMINES  1000 ng/mL  BARBITURATES     200 ng/mL  BENZODIAZEPINES     200 ng/mL  COCAINE      300 ng/mL  METHADONE      300 ng/mL  OPIATES      300 ng/mL  PHENCYCLIDINE     25 ng/mL  THC       50 ng/mL  OXYCODONE      100 ng/mL    COVID19, Influ[facility] A/B, [facility] [[id number]]  (Normal) Collected: 01/15/21 1135    Lab Status: Final result Specimen: Nares from Nasopharyngeal Swab Updated: 01/15/21 1233     SARS-CoV-2 Negative     INFLUENZA A PCR Negative     INFLUENZA B PCR Negative     RSV PCR Negative    Narrative: This test has been authorized by FDA under an EUA (Emergency Use Assay) for use by authorized laboratories  Clinical caution and judgement should be used with the interpretation of these results with consideration of the clinical impression and other laboratory testing  Testing reported as "Positive" or "Negative" has been proven to be accurate according to standard laboratory validation requirements  All testing is performed with control materials showing appropriate reactivity at standard intervals      Comprehensive metabolic panel [[id number]]  (Abnormal) Collected: 01/15/21 1017 Lab Status: Final result Specimen: Blood from Arm, Left Updated: 01/15/21 1045     Sodium 137 mmol/L      Potassium 3 7 mmol/L      Chloride 103 mmol/L      CO2 28 mmol/L      ANION GAP 6 mmol/L      BUN 11 mg/dL      Creatinine 0 82 mg/dL      Glucose 115 mg/dL      Calcium 8 8 mg/dL      AST 9 U/L      ALT 14 U/L      Alkaline Phosphatase 60 U/L      Total Protein 7 1 g/dL      Albumin 4 1 g/dL      Total Bilirubin 1 21 mg/dL      eGFR 105 ml/min/1 73sq m     Narrative:      [place] guidelines for Chronic Kidney Disease (CKD):     Stage 1 with normal or high GFR (GFR > 90 mL/min/1 73 square meters)    Stage 2 Mild CKD (GFR = 60-89 mL/min/1 73 square meters)    Stage 3A Moderate CKD (GFR = 45-59 mL/min/1 73 square meters)    Stage 3B Moderate CKD (GFR = 30-44 mL/min/1 73 square meters)    Stage 4 Severe CKD (GFR = 15-29 mL/min/1 73 square meters)    Stage 5 End Stage CKD (GFR <15 mL/min/1 73 square meters)  Note: GFR calculation is accurate only with a steady state creatinine    Acetaminophen level-If concentration is detectable, please discuss with medical  on call   [[id number]]  (Abnormal) Collected: 01/15/21 1017    Lab Status: Final result Specimen: Blood from Arm, Left Updated: 01/15/21 1043     Acetaminophen Level <2 ug/mL     Salicylate level [[id number]]  (Abnormal) Collected: 01/15/21 1017    Lab Status: Final result Specimen: Blood from Arm, Left Updated: 27/76/42 4600     Salicylate Lvl <3 mg/dL     Ethanol [[id number]]  (Normal) Collected: 01/15/21 1017    Lab Status: Final result Specimen: Blood from Arm, Left Updated: 01/15/21 1041     Ethanol Lvl <3 mg/dL     CBC and differential [[id number]]  (Abnormal) Collected: 01/15/21 1017    Lab Status: Final result Specimen: Blood from Arm, Left Updated: 01/15/21 1038     WBC 4 30 Thousand/uL      RBC 4 73 Million/uL      Hemoglobin 15 1 g/dL      Hematocrit 43 0 %      MCV 91 fL      MCH 31 9 pg      MCHC 35 1 g/dL RDW 11 8 %      MPV 9 3 fL      Platelets 111 Thousands/uL      nRBC 0 /100 WBCs      Neutrophils Relative 72 %      Immat GRANS % 0 %      Lymphocytes Relative 20 %      Monocytes Relative 8 %      Eosinophils Relative 0 %      Basophils Relative 0 %      Neutrophils Absolute 3 07 Thousands/µL      Immature Grans Absolute 0 01 Thousand/uL      Lymphocytes Absolute 0 87 Thousands/µL      Monocytes Absolute 0 33 Thousand/µL      Eosinophils Absolute 0 01 Thousand/µL      Basophils Absolute 0 01 Thousands/µL                  No orders to display              Procedures  ECG 12 Lead Documentation Only    Date/Time: 1/15/2021 4:32 PM  Performed by: Alyx Quezada DO  Authorized by: Alyx Quezada DO     ECG reviewed by me, the ED Provider: yes    Patient location:  ED  Previous ECG:     Previous ECG:  Unavailable  Interpretation:     Interpretation: non-specific    Rate:     ECG rate:  95    ECG rate assessment: normal    Rhythm:     Rhythm: sinus rhythm    Ectopy:     Ectopy: none    QRS:     QRS axis:  Normal    QRS intervals:  Normal  Conduction:     Conduction: normal    ST segments:     ST segments:  Non-specific  T waves:     T waves: non-specific               ED Course  ED Course as of Gurpreet 15 1632   Fri Gurpreet 15, 2021   1015 Monitor for hypotension, give IV fluids for now, agrees chart call, if unremarkable after 4- 5 hours, can clear for inpatient psych      1524 Patient medically cleared for inpatient psychiatric placement                                              MDM  Number of Diagnoses or Management Options  Depression, unspecified depression type: new and requires workup  Intentional drug overdose, initial encounter [facility]): new and requires workup  Overdose of trazodone: new and requires workup  Suicide attempt by drug overdose [facility]): new and requires workup  Diagnosis management comments: Patient with an intentional trazodone overdose as a suicide attempt    Occurred just prior to arrival was given chart call, discussed case with toxicology was observed in emergency department for 5 hours, no significant hypotension, patient medically cleared for inpatient psychiatric placement, patient agreeable to sign a 201  Case signed out to evening physician pending bed placement  Amount and/or Complexity of Data Reviewed  Clinical lab tests: ordered and reviewed  Review and summarize past medical records: yes  Discuss the patient with other providers: yes  Independent visualization of images, tracings, or specimens: yes        Disposition  Final diagnoses:   Overdose of trazodone   Intentional drug overdose, initial encounter [facility]   Suicide attempt by drug overdose ([facility] )   Depression, unspecified depression type     Time reflects when diagnosis was documented in both MDM as applicable and the Disposition within this note     Time User Action Codes Description Comment    1/15/2021  3:46 PM Odilon Brasher [[id number]] Overdose of trazodone     1/15/2021  3:46 PM Beverly, Yaneth [facility] Intentional drug overdose, initial encounter ([facility] )     1/15/2021  3:46 PM Beverly, [address] [facility] Suicide attempt by drug overdose ([facility] )     1/15/2021  3:46 PM Odilon Brasher [F32 9] Depression, unspecified depression type       ED Disposition     None      Follow-up Information    None         Patient's Medications   Discharge Prescriptions    No medications on file     No discharge procedures on file      PDMP Review       Value Time User    PDMP Reviewed  Yes 4/14/2020 11:08 AM Juan Luis Carlson MD          ED Provider  Electronically Signed by           Alyx Quezada DO  01/15/21 1841

## 2021-01-15 NOTE — ED NOTES
Pt is a 52 y o  male who was brought to the ED with   Chief Complaint   Patient presents with    Overdose - Intentional     Pt presents to the ED with intentional overdose  reports taking 13 tablets of trazodone estimated about 15 minutes ago  Pt brught to the ED wuith complaints of incresed anxiety, S/I with plan to OD- Post OD, Pt reports that he tooke 13tab (50mg) of trazadone, Pt reports that his anxiety has been getting worse to the point that he wanted to end his life, Pt reports increased obsesive thoughts about work, money and not sleeping, Pt reports that his anxiety gets so bad that he has body aches, and feels like his body is on fire  Pt reports feeling helpless and hopeless  Pt admits to S/I, Pt denies H/I,A/H,V/H  Intake Assessment completed, Safety risk Assessment completed  CW met with patient and discussed the process of a U admissions, patient has agree and signed a 201, CW discussed this case and pt plan with ED Physician who is in agreement with this plan  CW will start the bed search and complete the Pre-Cert          [address] [facility]

## 2021-01-15 NOTE — CONSULTS
PHONE [facility] Toxicology  Aubrie Clemens 52 y o  male MRN: [id number]  Unit/Bed#: ED 06 Encounter: [id number]      Reason for Consult / Principal Problem: Trazodone overdose    Inpatient consult to Toxicology  Consult performed by: Jenny Jauregui MD  Consult ordered by: Coco Vilchis DO        01/15/21      ASSESSMENT:  1) Trazodone overdose, intentional    DISCUSSION/ RECOMMENDATIONS:  The patient presented after reported overdose on 650 mg of trazodone  On initial presentation he was awake and alert with no exam abnormalities, did have some tachycardia  Activated charcoal was given  Screening EKG and co-ingestion labs done and reviewed  Recommended observation in the emergency department until at least 4 hours after time of ingestion and administration of IV fluid bolus  At this time patient remains well with stable vital signs including resolution of tachycardia  I do not expect any further significant toxicity to develop  Patient can be medically cleared from toxicology perspective at this time  For further questions, please call [facility]  Service or Patient Access Center to reach the medical  on call  Hx and PE limited by the dynamics of a phone consultation  I have not personally interviewed or evaluated the patient, but only advised based on the information provided to me  Primary provider is responsible for all clinical decisions  Pertinent history, physical exam and clinical findings and course discussed: Aubrie Clemens is a 52y o  year old male who presents with overdose  The patient stated that he ingested 13 tablets of 50 mg trazodone shortly before 10:00 a m  This morning in a self-harm attempt  Denied any other ingestions  Overall well on presentation and has continued to have normal mental status with no exam abnormalities noted  Review of systems and physical exam not performed by me      Historical Information   Past Medical History:   Diagnosis Date    Anxiety     Hyperlipidemia     controlled with diet and exercise    Kidney stone     Renal disorder     kidney stones     Past Surgical History:   Procedure Laterality Date    APPENDECTOMY      MD CYSTO/URETERO W/LITHOTRIPSY &INDWELL STENT INSRT Left 2018    Procedure: CYSTOSCOPY URETEROSCOPY,LEFT  RETROGRADE PYELOGRAM AND INSERTION STENT URETERAL;  Surgeon: Glo Brunner MD;  [facility];  Service: Urology    MD CYSTO/URETERO W/LITHOTRIPSY &INDWELL STENT INSRT Left 2018    Procedure: CYSTOSCOPY URETEROSCOPY WITH LITHOTRIPSY HOLMIUM LASER, RETROGRADE PYELOGRAM AND INSERTION STENT URETERAL;  Surgeon: Glo Brunner MD;  [facility];  Service: Urology    TONSILLECTOMY      WISDOM TOOTH EXTRACTION      WISDOM TOOTH EXTRACTION       Social History   Social History     Substance and Sexual Activity   Alcohol Use Yes    Alcohol/week: 1 0 standard drinks    Types: 1 Cans of beer per week    Frequency: Monthly or less    Drinks per session: 1 or 2    Binge frequency: Never    Comment: ocassional     Social History     Substance and Sexual Activity   Drug Use No     Social History     Tobacco Use   Smoking Status Former Smoker    Packs/day: 0 50    Types: Cigarettes    Quit date: 1998    Years since quittin 6   Smokeless Tobacco Never Used     Family History   Problem Relation Age of Onset    Heart disease Mother     Lung cancer Mother     Hypertension Mother     Stroke Father     Hyperlipidemia Father         Prior to Admission medications    Medication Sig Start Date End Date Taking?  Authorizing Provider   escitalopram (LEXAPRO) 10 mg tablet Take 1 tablet (10 mg total) by mouth daily 21   EDWAR Burgess   loratadine-pseudoephedrine (CLARITIN-D 24-HOUR)  mg per 24 hr tablet Take 1 tablet by mouth daily    Historical Provider, MD   Polyethylene Glycol 3350 (MIRALAX PO) Take by mouth    Historical Provider, MD   traZODone (DESYREL) 50 mg tablet Take 1 tablet (50 mg total) by mouth daily at bedtime as needed for sleep ;may take a half tablet if a whole tablet is overly sedating 1/14/21   EDWAR Burgess       No current facility-administered medications for this encounter  Allergies   Allergen Reactions    Betadine Antibiotic-Moisturize [Bacitracin-Polymyxin B] Rash       Objective       Intake/Output Summary (Last 24 hours) at 1/15/2021 1527  Last data filed at 1/15/2021 1302  Gross per 24 hour   Intake 2000 ml   Output    Net 2000 ml       Invasive Devices:   Ureteral Drain/Stent Left ureter 6 Fr  (Active)       Vitals   Vitals:    01/15/21 1330 01/15/21 1400 01/15/21 1430 01/15/21 1500   BP: 101/60 98/65 99/64 101/65   TempSrc:       Pulse: 78 76 78 74   Resp:  16  15   Patient Position - Orthostatic VS:    Lying   Temp:             EKG, Pathology, and/or Other Studies: I have personally reviewed pertinent reports  Lab Results: I have personally reviewed pertinent reports  Labs:  Results from last 7 days   Lab Units 01/15/21  1017   WBC Thousand/uL 4 30*   HEMOGLOBIN g/dL 15 1   HEMATOCRIT % 43 0   PLATELETS Thousands/uL 148*   NEUTROS PCT % 72   LYMPHS PCT % 20   MONOS PCT % 8      Results from last 7 days   Lab Units 01/15/21  1017   POTASSIUM mmol/L 3 7   CHLORIDE mmol/L 103   CO2 mmol/L 28   BUN mg/dL 11   CREATININE mg/dL 0 82   CALCIUM mg/dL 8 8   ALK PHOS U/L 60   ALT U/L 14   AST U/L 9              No results found for: TROPONINI      Results from last 7 days   Lab Units 01/15/21  1017   ACETAMINOPHEN LVL ug/mL <2*   ETHANOL LVL mg/dL <3   SALICYLATE LVL mg/dL <3*     Invalid input(s): EXTPREGUR        Counseling / Coordination of Care  Total time spent today 31 minutes   This was a phone consultation

## 2021-01-15 NOTE — ED NOTES
Per patient, he is okay with disclosure to wife - Maria Del Carmen Silva [address] [place] [address] V, RN  01/15/21 9725

## 2021-01-15 NOTE — ED NOTES
CW started bed search, CW spoke with [facility] specialist who informed that pt clinical can be reviewed at [facility] for possible admission, 201 has been faxed         [facility]

## 2021-01-16 ENCOUNTER — HOSPITAL ENCOUNTER (INPATIENT)
Facility: HOSPITAL | Age: 51
LOS: 5 days | Discharge: HOME | DRG: 880 | End: 2021-01-21
Attending: STUDENT IN AN ORGANIZED HEALTH CARE EDUCATION/TRAINING PROGRAM | Admitting: PSYCHIATRY & NEUROLOGY
Payer: COMMERCIAL

## 2021-01-16 VITALS
BODY MASS INDEX: 21.67 KG/M2 | WEIGHT: 151.01 LBS | RESPIRATION RATE: 16 BRPM | OXYGEN SATURATION: 100 % | SYSTOLIC BLOOD PRESSURE: 122 MMHG | DIASTOLIC BLOOD PRESSURE: 75 MMHG | HEART RATE: 90 BPM | TEMPERATURE: 98.8 F

## 2021-01-16 DIAGNOSIS — G47.00 INSOMNIA: Primary | ICD-10-CM

## 2021-01-16 DIAGNOSIS — F41.1 GENERALIZED ANXIETY DISORDER: ICD-10-CM

## 2021-01-16 DIAGNOSIS — F32.A DEPRESSION, UNSPECIFIED DEPRESSION TYPE: ICD-10-CM

## 2021-01-16 DIAGNOSIS — T43.211A OVERDOSE OF TRAZODONE: ICD-10-CM

## 2021-01-16 PROBLEM — F41.9 ANXIETY: Status: ACTIVE | Noted: 2021-01-16

## 2021-01-16 LAB
ATRIAL RATE: 100 BPM
P AXIS: 77 DEGREES
PR INTERVAL: 138 MS
QRS AXIS: 93 DEGREES
QRSD INTERVAL: 84 MS
QT INTERVAL: 318 MS
QTC INTERVAL: 400 MS
T WAVE AXIS: 68 DEGREES
VENTRICULAR RATE: 95 BPM

## 2021-01-16 PROCEDURE — 93010 ELECTROCARDIOGRAM REPORT: CPT | Performed by: INTERNAL MEDICINE

## 2021-01-16 PROCEDURE — 93005 ELECTROCARDIOGRAM TRACING: CPT

## 2021-01-16 PROCEDURE — 99222 1ST HOSP IP/OBS MODERATE 55: CPT | Performed by: STUDENT IN AN ORGANIZED HEALTH CARE EDUCATION/TRAINING PROGRAM

## 2021-01-16 RX ORDER — TRAMADOL HYDROCHLORIDE 50 MG/1
50 TABLET ORAL EVERY 6 HOURS PRN
Status: DISCONTINUED | OUTPATIENT
Start: 2021-01-16 | End: 2021-01-21 | Stop reason: HOSPADM

## 2021-01-16 RX ORDER — HYDROXYZINE HYDROCHLORIDE 25 MG/1
25 TABLET, FILM COATED ORAL ONCE
Status: COMPLETED | OUTPATIENT
Start: 2021-01-16 | End: 2021-01-16

## 2021-01-16 RX ORDER — TRAZODONE HYDROCHLORIDE 50 MG/1
50 TABLET ORAL
Status: DISCONTINUED | OUTPATIENT
Start: 2021-01-16 | End: 2021-01-17

## 2021-01-16 RX ORDER — LORAZEPAM 2 MG/ML
2 INJECTION INTRAMUSCULAR EVERY 6 HOURS PRN
Status: DISCONTINUED | OUTPATIENT
Start: 2021-01-16 | End: 2021-01-21 | Stop reason: HOSPADM

## 2021-01-16 RX ORDER — HYDROXYZINE HYDROCHLORIDE 25 MG/1
25 TABLET, FILM COATED ORAL
Status: DISCONTINUED | OUTPATIENT
Start: 2021-01-16 | End: 2021-01-17

## 2021-01-16 RX ORDER — HALOPERIDOL 5 MG/ML
5 INJECTION INTRAMUSCULAR EVERY 6 HOURS PRN
Status: DISCONTINUED | OUTPATIENT
Start: 2021-01-16 | End: 2021-01-21 | Stop reason: HOSPADM

## 2021-01-16 RX ORDER — LORAZEPAM 1 MG/1
1 TABLET ORAL EVERY 6 HOURS PRN
Status: DISCONTINUED | OUTPATIENT
Start: 2021-01-16 | End: 2021-01-21 | Stop reason: HOSPADM

## 2021-01-16 RX ORDER — ACETAMINOPHEN 325 MG/1
650 TABLET ORAL EVERY 6 HOURS PRN
Status: DISCONTINUED | OUTPATIENT
Start: 2021-01-16 | End: 2021-01-18 | Stop reason: SDUPTHER

## 2021-01-16 RX ORDER — MAGNESIUM HYDROXIDE/ALUMINUM HYDROXICE/SIMETHICONE 120; 1200; 1200 MG/30ML; MG/30ML; MG/30ML
30 SUSPENSION ORAL EVERY 4 HOURS PRN
Status: DISCONTINUED | OUTPATIENT
Start: 2021-01-16 | End: 2021-01-21 | Stop reason: HOSPADM

## 2021-01-16 RX ORDER — SERTRALINE HYDROCHLORIDE 25 MG/1
25 TABLET, FILM COATED ORAL ONCE
Status: COMPLETED | OUTPATIENT
Start: 2021-01-16 | End: 2021-01-16

## 2021-01-16 RX ORDER — HALOPERIDOL 5 MG
5 TABLET ORAL EVERY 6 HOURS PRN
Status: DISCONTINUED | OUTPATIENT
Start: 2021-01-16 | End: 2021-01-21 | Stop reason: HOSPADM

## 2021-01-16 RX ORDER — BENZTROPINE MESYLATE 1 MG/ML
1 INJECTION INTRAMUSCULAR; INTRAVENOUS EVERY 6 HOURS PRN
Status: DISCONTINUED | OUTPATIENT
Start: 2021-01-16 | End: 2021-01-21 | Stop reason: HOSPADM

## 2021-01-16 RX ORDER — BENZTROPINE MESYLATE 1 MG/1
1 TABLET ORAL EVERY 6 HOURS PRN
Status: DISCONTINUED | OUTPATIENT
Start: 2021-01-16 | End: 2021-01-21 | Stop reason: HOSPADM

## 2021-01-16 RX ORDER — HYDROXYZINE HYDROCHLORIDE 25 MG/1
25 TABLET, FILM COATED ORAL EVERY 4 HOURS PRN
Status: DISCONTINUED | OUTPATIENT
Start: 2021-01-16 | End: 2021-01-21 | Stop reason: HOSPADM

## 2021-01-16 RX ORDER — IBUPROFEN 600 MG/1
600 TABLET ORAL EVERY 8 HOURS PRN
Status: DISCONTINUED | OUTPATIENT
Start: 2021-01-16 | End: 2021-01-21 | Stop reason: HOSPADM

## 2021-01-16 RX ORDER — ACETAMINOPHEN 325 MG/1
650 TABLET ORAL EVERY 6 HOURS PRN
Status: DISCONTINUED | OUTPATIENT
Start: 2021-01-16 | End: 2021-01-21 | Stop reason: HOSPADM

## 2021-01-16 RX ADMIN — HYDROXYZINE HYDROCHLORIDE 25 MG: 25 TABLET ORAL at 22:17

## 2021-01-16 RX ADMIN — HYDROXYZINE HYDROCHLORIDE 25 MG: 25 TABLET ORAL at 14:02

## 2021-01-16 RX ADMIN — SERTRALINE HYDROCHLORIDE 25 MG: 25 TABLET ORAL at 14:01

## 2021-01-16 NOTE — PROGRESS NOTES
Patient belongings upon arrival;  -Sweatpants  -[address] [facility] [place] ([id number])  -[employer]  (1819)  -[employer] (6712)  -[address] (5875)  -[employer] ([phone])  -(2) [employer] ([phone]) ([phone])  -$20 Sent to Safe #([id number])  -Glasses    Pt belongings at bedside;  -[address]

## 2021-01-16 NOTE — PROGRESS NOTES
201 from SLR-ED s/p SA via OD on (13)Trazodone-50mg, persistent SI to OD, increased anxiety leading to panic attacks and hopelessness/helplessness  PMH: unremarkable  Allergy to Bacitracin  Former smoker (quit date 5/17/1998)  Occasional ETOH use (last reported use >12months)  Previous experimentation with street drugs >20years ago)  Denies all Hx of abuse  Resides in a private residence with spouse and daughter  Denies access to firearms  Cooperative with skin/contraband check  Appears anxious and reports feeling overwhelmed  Denies current SI and verbally contracts for safety  Denies HI/AVH  Oriented to the unit and the rules/routines were reviewed  Denies questions at this time

## 2021-01-16 NOTE — PLAN OF CARE
Problem: SELF HARM/SUICIDALITY  Goal: Will have no self-injury during hospital stay  Description: INTERVENTIONS:  - Q 15 MINUTES: Routine safety checks  - Q WAKING SHIFT & PRN: Assess risk to determine if routine checks are adequate to maintain patient safety  - Encourage patient to participate actively in care by formulating a plan to combat response to suicidal ideation, identify supports and resources  Outcome: Progressing     Problem: DEPRESSION  Goal: Will be euthymic at discharge  Description: INTERVENTIONS:  - Administer medication as ordered  - Provide emotional support via 1:1 interaction with staff  - Encourage involvement in milieu/groups/activities  - Monitor for social isolation  Outcome: Progressing     Problem: PSYCHOSIS  Goal: Will report no hallucinations or delusions  Description: Interventions:  - Administer medication as  ordered  - Every waking shifts and PRN assess for the presence of hallucinations and or delusions  - Assist with reality testing to support increasing orientation  - Assess if patient's hallucinations or delusions are encouraging self-harm or harm to others and intervene as appropriate  Outcome: Progressing     Problem: ANXIETY  Goal: Will report anxiety at manageable levels  Description: INTERVENTIONS:  - Administer medication as ordered  - Teach and encourage coping skills  - Provide emotional support  - Assess patient/family for anxiety and ability to cope  Outcome: Progressing  Goal: By discharge: Patient will verbalize 2 strategies to deal with anxiety  Description: Interventions:  - Identify any obvious source/trigger to anxiety  - Staff will assist patient in applying identified coping technique/skills  - Encourage attendance of scheduled groups and activities  Outcome: Progressing

## 2021-01-16 NOTE — ED NOTES
Pt is in room asleep, one to one in progress, will continue to monitor     [address]  01/16/21 4890

## 2021-01-16 NOTE — EMTALA/ACUTE CARE TRANSFER
Chela Ruiza [address] [place]   Dept: [phone]      EMTALA TRANSFER CONSENT    NAME Iam Kay                                         1970                              MRN [id number]    I have been informed of my rights regarding examination, treatment, and transfer   by Dr Genoveva Munoz DO    Benefits: Specialized equipment and/or services available at the receiving facility (Include comment)________________________    Risks: Potential for delay in receiving treatment, Increased discomfort during transfer      Consent for Transfer:  I acknowledge that my medical condition has been evaluated and explained to me by the emergency department physician or other qualified medical person and/or my attending physician, who has recommended that I be transferred to the service of  Accepting Physician: Dr J Carlos Isaac at [address] [place] [address] Name, [address] : Tommy Bill  The above potential benefits of such transfer, the potential risks associated with such transfer, and the probable risks of not being transferred have been explained to me, and I fully understand them  The doctor has explained that, in my case, the benefits of transfer outweigh the risks  I agree to be transferred  I authorize the performance of emergency medical procedures and treatments upon me in both transit and upon arrival at the receiving facility  Additionally, I authorize the release of any and all medical records to the receiving facility and request they be transported with me, if possible  I understand that the safest mode of transportation during a medical emergency is an ambulance and that the Hospital advocates the use of this mode of transport  Risks of traveling to the receiving facility by car, including absence of medical control, life sustaining equipment, such as oxygen, and medical personnel has been explained to me and I fully understand them      ([address] CORRECT BOX BELOW)  [  ]  I consent to the stated transfer and to be transported by ambulance/helicopter  [  ]  I consent to the stated transfer, but refuse transportation by ambulance and accept full responsibility for my transportation by car  I understand the risks of non-ambulance transfers and I exonerate the Hospital and its staff from any deterioration in my condition that results from this refusal     X___________________________________________    DATE  21  TIME________  Signature of patient or legally responsible individual signing on patient behalf           RELATIONSHIP TO PATIENT_________________________          Provider Certification    NAME Nanda Monk                                         1970                              MRN [id number]    A medical screening exam was performed on the above named patient  Based on the examination:    Condition Necessitating Transfer The primary encounter diagnosis was Overdose of trazodone  Diagnoses of Intentional drug overdose, initial encounter [facility]), Suicide attempt by drug overdose ([facility] [address] ), and Depression, unspecified depression type were also pertinent to this visit      Patient Condition: The patient has been stabilized such that within reasonable medical probability, no material deterioration of the patient condition or the condition of the unborn child(janessa) is likely to result from the transfer    Reason for Transfer: Other (Include comment)____________________(Inpatient psychiatric care)    Transfer Requirements: [facility] [address]   · Space available and qualified personnel available for treatment as acknowledged by    · Agreed to accept transfer and to provide appropriate medical treatment as acknowledged by       Dr Arce Expose  · Appropriate medical records of the examination and treatment of the patient are provided at the time of transfer   [address] _______  · Transfer will be performed by qualified personnel from and appropriate transfer equipment as required, including the use of necessary and appropriate life support measures  Provider Certification: I have examined the patient and explained the following risks and benefits of being transferred/refusing transfer to the patient/family:  The patient is stable for psychiatric transfer because they are medically stable, and is protected from harming him/herself or others during transport      Based on these reasonable risks and benefits to the patient and/or the unborn child(janessa), and based upon the information available at the time of the patients examination, I certify that the medical benefits reasonably to be expected from the provision of appropriate medical treatments at another medical facility outweigh the increasing risks, if any, to the individuals medical condition, and in the case of labor to the unborn child, from effecting the transfer      X____________________________________________ DATE 01/16/21        TIME_______      ORIGINAL - SEND TO MEDICAL RECORDS   COPY - SEND WITH PATIENT DURING TRANSFER

## 2021-01-16 NOTE — PROGRESS NOTES
Pt reports feeling depressed after taking a medication for his anxiety, reports his anxiety was improved at the start of the shift, denies SI  Pt was scant but pleasant in conversation  Pt later reported increased anxiety, a tingly feeling in his neck, a weird taste in his mouth, and has fluttering eyelids  Pt will consider trying PRN Ativan this evening if his anxiety increases, later decided to wait it out and just take scheduled bedtime medication  Pt reports he does not like taking medications and feels like his body might be fighting the medication he took  Out in milieu at times, cooperative

## 2021-01-16 NOTE — ED NOTES
Insurance Authorization for admission:   Phone call placed to [id number]  Phone number: [phone]   Spoke to Aly Tadeo  (customer Serv Rep)     3 days approved  (via Henrine Nageotte)   Level of care: IP  Review on TBA (Call Upon Arrival to get reviewer information)  Authorization #  [id number]     EVS (Eligibility Verification System) called - [phone]    Automated system indicates: Not On [address]

## 2021-01-16 NOTE — TREATMENT PLAN
TREATMENT PLAN REVIEW - Arpita Schmitz [address] 52 y o  1970 male MRN: [id number]    [address] [place] [address] Room / Bed: [facility] 253/[facility] Encounter: [id number]          Admit Date/Time:  1/16/2021 10:50 AM    Treatment Team: Attending Provider: Diony Dukes MD; Registered Nurse: Darius Rogers RN; Registered Nurse: Aurelio King RN    Diagnosis: Principal Problem:    Depression  Active Problems:    Generalized anxiety disorder      Patient Strengths/Assets: average or above intelligence, capable of independent living, cooperative, communication skills, financial means, general fund of knowledge, good insight   Patient Barriers/Limitations: poor past treatment response, resistance to treatment    Short Term Goals: decrease in depressive symptoms, decrease in anxiety symptoms    Long Term Goals: improvement in anxiety, resolution of depressive symptoms, free of suicidal thoughts    Progress Towards Goals: starting psychiatric medications as prescribed    Recommended Treatment: medication management, patient medication education, group therapy, milieu therapy, continued Behavioral Health psychiatric evaluation/assessment process    Treatment Frequency: daily medication monitoring, group and milieu therapy daily, monitoring through interdisciplinary rounds, monitoring through weekly patient care conferences    Expected Discharge Date:  1/21/21    Discharge Plan: referral for outpatient medication management with a psychiatrist, referral for outpatient psychotherapy    Treatment Plan Created/Updated By: Sofía Lewis MD

## 2021-01-16 NOTE — H&P
Psychiatric Evaluation - [address] 52 y o  male MRN: [id number]  Unit/Bed#: [facility] 253-01 Encounter: [id number]      Chief Complaint: "I attempted suicide "    History of Present Illness     Per ED Note by Rohan Lee on 1/15/21:  "Pt brught to the ED with complaints of incresed anxiety, S/I with plan to OD- Post OD, Pt reports that he tooke 13tab (50mg) of trazadone, Pt reports that his anxiety has been getting worse to the point that he wanted to end his life, Pt reports increased obsesive thoughts about work, money and not sleeping, Pt reports that his anxiety gets so bad that he has body aches, and feels like his body is on fire  Pt reports feeling helpless and hopeless  Pt admits to S/I, Pt denies H/I,A/H,V/H  Intake Assessment completed, Safety risk Assessment completed  CW met with patient and discussed the process of a U admissions, patient has agree and signed a 201, CW discussed this case and pt plan with ED Physician who is in agreement with this plan  CW will start the bed search and complete the Pre-Cert "    Patient was admitted to psychiatric unit on a voluntarily 201 commitment basis  Per Admission Interview with Dr Sohan Martinez on 1/16/21:  52 y o   White male,  for 15 years, has 1 daughter (15 y/o), domiciled with wife, daughter in [facility], currently employed as a  for 20 years, [address] [facility] significant for h/o generalized anxiety disorder, currently in outpatient treatment with Raya Alcantar and individual therapy, no past psychiatric hospitalizations, no past suicide attempts, no h/o self-injurious behaviors, no h/o physical aggression, no significant PMH, no active substance use, presents to [facility] inpatient psychiatry unit transferred from [address] [place] [address] ED for inpatient psychiatric hospitalization s/p overdose of Trazodone, with Vega Umanzor reporting "I attempted suicide "    Per patient, patient reports that the last time things were pretty stable was before COVID in March 2020  He reports that he started having increasing panic attacks around that time, reports that he had anxiety for years since last 19's  He reports that he has been on Lexapro for years and doing well with his anxiety, stopped it around 1/2020 since he had been doing so, reports that it was a gradual taper  He reports that the anxiety symptoms got worse with the start of pandemic  He reports that he spoke to his PCP around 3/2020 regarding the worsening anxiety and he prescribed Xanax prn and re-started the Lexapro  He reports that he spoke to PCP office staff and was informed to take Xanax 2 tablets of the Xanax 0 5 mg tablet at that time  He repots that the Xanax didn't help his panic attacks and was anxious about being on too much medication  HE reports that he wasn't sleeping right, feeling very restless at night  He reports that he stopped working around mid-March 2020 due to his level of anxiety, was put on short-term disability  He saw psychiatric provider on 4/17/20 and was transitioned from Xanax to Klonopin and then tapered off the Klonopin  He reports having significant withdrawal from the Klonopin with hallucinations, poor appetite, lasting for about 3 weeks after the medication was stopped  He was doing better around May 2020 just on the Lexapro 15 mg daily and was started on Propranolol 20 mg bid  He was on that regimen for a few months and doing well, the Propranolol was tapered and stopped around 7/2020  He was maintained on Lexapro 20 mg daily until it was lowered to Lexapro 10 mg daily in mid-October 2020  Patient started having increasing somatic symptoms in Oct/November with dizziness, tingling, having trouble relaxing  He reports that he cut back on his work hours at that point  He saw a neurologist on 12/23/20 due to concerns about dizziness, fatigue, tingling  He had a MRI which was normal, symptoms were attributed to anxiety     He reports that in December 2020, his motivation level went down, was cutting back on his work, was trying to stay more relaxed  He reports that he decided to stop the Lexapro at the end of December, was concerned that the medication may have been contributing to how he was feeling  He report that about a week ago, he started having a burning sensation up and down his body, had been sleeping poorly for about a week, was only sleeping about 2-3 hours per night for several nights  He reports that he wouldn't feel tired  Patient saw outpatient provider on 1/14/21 with increased anxiety, sleep concerns  He was re-started on Lexapro and started on Trazodone 50 mg qhs  He slept well better the first night, getting about 4 hours of sleep  He reports that he started feeling hopeless yesterday due to the sensations he was getting  He reports feeling that his brain connections are "out of whack "  He reports feeling that sensations were not going to end  He reports that in his 19's, he had a bad ecstasy experience, worries that caused his anxiety  He reports feeling paralyzed by his thoughts  He reports that he tries to utilize relaxation techniques  He reports that he started having suicidal ideation about 8 months ago which would come intermittently when he wasn't doing well  He reports when feeling hopeless, he had a plan to overdose on Trazodone  He reports that he drove himself to the [facility] ED and while in the parking lot, he took 13 tabs of Trazodone and then he quickly brought himself in to ED realizing he didn't want to die  He reports that he is happy to be alive now but still isn't feeling well  In terms of mood symptoms, patient describes his mood as "anxious, have dread," (rating anxiety 10/10 intensity)  Patient reports that he has been sleeping poorly this week, trouble staying asleep, waking up constantly  He reports that his eating has been okay    He reports that he has been a bit distractible  He had active SI with overdose of Trazodone, currently denies any passive or active suicidal ideation, intent, or plan  In terms of anxiety symptoms, patient has generalized worries, reports getting anxious in social situations  He endorses panic attacks at times on almost daily basis  Patient denies OCD symptoms  He reports that he gets fixated on things  Patient denies PTSD symptoms  On psychiatric ROS, he denies any auditory or visual hallucinations, denies feelings of paranoia  Patient denies any h/o or current manic symptoms  Patient denies any h/o physical or sexual abuse  Historical Information     Past Psychiatric History:   H/o generalized anxiety disorder, currently in outpatient treatment with Tammie Campos and individual therapy, no past psychiatric hospitalizations, no past suicide attempts, no h/o self-injurious behaviors, no h/o physical aggression      Currently in treatment with ASHA Campos, Dr Ifeoma Holley- psychologist     Past Psychiatric medication trial: Xanax (unhelpful), Klonopin (helpful), Propranolol (helpful)  Current Psychiatric Medication: Trazodone 25 mg qhs, Lexapro 10 mg daily    Substance Abuse History:  Social History     Tobacco History     Smoking Status  Former Smoker Quit date  5/17/1998 Smoking Frequency  0 5 packs/day Smoking Tobacco Type  Cigarettes    Smokeless Tobacco Use  Never Used          Alcohol History     Alcohol Use Status  Yes Drinks/Week  1 Cans of beer per week Amount  1 0 standard drinks of alcohol/wk Comment  ocassional          Drug Use     Drug Use Status  No          Sexual Activity     Sexually Active  Not Asked          Activities of Daily Living    Not Asked             No cigarette use  Alcohol- very rarely drinks beer, last drink was 3 months ago  Ecstasy/LSD- in his 20's, one time use  Cannabis- started at 28 y/o, last time years ago    I have assessed this patient for substance use within the past 12 months    Family Psychiatric History: Mother- Generalized Anxiety Disorder, chronic pain  Mat  Aunt- Committed suicide    Social History:  Currently , living with wife and daughter  Works as a  for [employer]  No access to guns, no legal problems  Abuse History: No h/o physical or sexual abuse  Past Medical History:   Diagnosis Date    Anxiety     Depression     Hyperlipidemia     controlled with diet and exercise    Kidney stone     Panic attack     Renal disorder     kidney stones    Suicide attempt [facility])        Medical Review Of Systems:  Comprehensive ROS was negative except as noted in HPI and burning sensation, dizziness, cold sweats  Meds/Allergies   all current active meds have been reviewed  Allergies   Allergen Reactions    Betadine Antibiotic-Moisturize [Bacitracin-Polymyxin B] Rash       Objective   Vital signs in last 24 hours:  Temp:  [98 6 °F (37 °C)-98 8 °F (37 1 °C)] 98 6 °F (37 °C)  HR:  [64-90] 85  Resp:  [15-18] 18  BP: ()/(57-85) 143/85    Mental status:  Appearance sitting comfortably in chair, dressed in casual clothing, adequate hygiene and grooming, cooperative with interview, fairly well related   Mood  "anxious, have dread," (rating anxiety 10/10 intensity)  Affect Appears constricted in depressed range, stable, mood-congruent   Speech Normal rate, rhythm, and volume   Thought Processes Linear and goal directed, a bit perseverative at times   Associations intact associations   Hallucinations Denies any auditory or visual hallucinations   Thought Content Ruminating thoughts, No passive or active suicidal or homicidal ideation, intent, or plan     Orientation Oriented to person, place, time, and situation   Recent and Remote Memory Grossly intact   Attention Span Concentration impaired   Intellect Appears to be of Average Intelligence   Insight Insight intact   Judgement judgment was limited   Muscle Strength Muscle strength and tone were normal   Language Within normal limits   Fund of Knowledge Age appropriate   Pain None       Patient Strengths/Assets: average or above intelligence, capable of independent living, cooperative, communication skills, financial means, general fund of knowledge, good insight   Patient Barriers/Limitations: poor past treatment response, resistance to treatment  Lab Results:   I have personally reviewed all pertinent laboratory/tests results  Labs in last 72 hours:   Recent Labs     01/15/21  1017   WBC 4 30*   RBC 4 73   HGB 15 1   HCT 43 0   *   RDW 11 8   NEUTROABS 3 07   SODIUM 137   K 3 7      CO2 28   BUN 11   CREATININE 0 82   GLUC 115   CALCIUM 8 8   AST 9   ALT 14   ALKPHOS 60   TP 7 1   ALB 4 1   TBILI 1 21*       Imaging Studies: MRI brain (1/12/21)- Unremarkable    Assessment/Plan   Principal Problem:    Depression  Active Problems:    Generalized anxiety disorder    52 y o   White male,  for 15 years, has 1 daughter (15 y/o), domiciled with wife, daughter in [facility], currently employed as a  for 20 years, [address] [facility] significant for h/o generalized anxiety disorder, currently in outpatient treatment with Ciarra Sr and individual therapy, no past psychiatric hospitalizations, no past suicide attempts, no h/o self-injurious behaviors, no h/o physical aggression, no significant PMH, no active substance use, presents to [facility] inpatient psychiatry unit transferred from [address] [place] [address] ED for inpatient psychiatric hospitalization s/p overdose of Trazodone, with Navarro Daphne reporting "I attempted suicide "    On assessment today, patient with crippling anxiety over the past 10 months with frequent panic attacks, trouble working secondary to anxiety, significant somatic complaints of burning sensation and dizziness resulting in intermittent active suicidal ideation over the past several months due to feelings of hopelessness and despair resulting in overdose of trazodone leading up to hospitalization, in psychosocial context of working as a , stable family unit, good intellectual functioning, has some biases against medication  Given current severity of symptoms and recent overdose of trazodone, patient is imminent risk of harm to self would benefit from inpatient psychiatric hospitalization at this time  Plan:   Risks, benefits and possible side effects of Medications:   Risks, benefits, and possible side effects of medications explained to patient and patient verbalizes understanding  Plan:  1  Admit to [facility] [place] [id number] on 201 status for safety and treatment of depression, GORDON  2  No 1:1 CO needed at this time as patient feels safe on the unit  3  Psych- Will start Zoloft 25 mg today, then titrate to Zoloft 50 mg daily starting tomorrow  Will start Hydroxyzine 25 mg x1 dose now, then continue Hydroxyzine 25 mg qhs for anxiety  May use Hydroxyzine 25 mg prn anxiety symptoms  4  Medical- Will order EKG to monitor QTc interval   Reviewed admisssion labwork and recent MRI of head- unremarkable  5  CW to get collateral from wife, work on dispo planning  Certification: Estimated length of stay: More than 2 midnights  I certify that inpatient services are medically necessary for this patient for a duration of greater that 2 midnights  See H&P and MD Progress Notes for additional information about the patient's course of treatment

## 2021-01-16 NOTE — ED NOTES
Ordered patient breakfast tray  Patient also requesting a phone to call wife  Nurse aware  Will provide a portable phone when one becomes available  1:1 sitter is present and will continue to monitor       Mercedes Chiang  01/16/21 8145

## 2021-01-16 NOTE — ED NOTES
Pt is in bed asleep, one to one in progress, will continue to monitor     270 Sandy Munoz  01/16/21 0101

## 2021-01-16 NOTE — ED NOTES
Pt is asleep in room, one to one in progress, will continue to monitor     270 Sandy Munoz  01/15/21 6226

## 2021-01-16 NOTE — ED NOTES
Patient left [address] [place] [address] with [facility] transport with all belongings and proper paperwork       Raúl Power  01/16/21 1000

## 2021-01-16 NOTE — ED NOTES
Patient is accepted at [facility]  Patient is accepted by Dr Sung Cano per [facility] is arranged with [facility]    Transportation is scheduled for [facility]  Patient may go to the floor at [facility]  Nurse report is to be called to [phone] prior to patient transfer        [address] [place] [address]

## 2021-01-17 PROBLEM — R45.851 SUICIDAL IDEATION: Status: ACTIVE | Noted: 2021-01-17

## 2021-01-17 PROBLEM — Z00.8 MEDICAL CLEARANCE FOR PSYCHIATRIC ADMISSION: Status: ACTIVE | Noted: 2021-01-17

## 2021-01-17 PROBLEM — R42 DIZZINESS: Status: ACTIVE | Noted: 2021-01-17

## 2021-01-17 LAB
BASOPHILS # BLD AUTO: 0.02 THOUSANDS/ΜL (ref 0–0.1)
BASOPHILS NFR BLD AUTO: 0 % (ref 0–1)
EOSINOPHIL # BLD AUTO: 0.01 THOUSAND/ΜL (ref 0–0.61)
EOSINOPHIL NFR BLD AUTO: 0 % (ref 0–6)
ERYTHROCYTE [DISTWIDTH] IN BLOOD BY AUTOMATED COUNT: 11.8 % (ref 11.6–15.1)
HCT VFR BLD AUTO: 40.8 % (ref 36.5–49.3)
HGB BLD-MCNC: 14.3 G/DL (ref 12–17)
IMM GRANULOCYTES # BLD AUTO: 0.01 THOUSAND/UL (ref 0–0.2)
IMM GRANULOCYTES NFR BLD AUTO: 0 % (ref 0–2)
LYMPHOCYTES # BLD AUTO: 1.09 THOUSANDS/ΜL (ref 0.6–4.47)
LYMPHOCYTES NFR BLD AUTO: 19 % (ref 14–44)
MCH RBC QN AUTO: 32.4 PG (ref 26.8–34.3)
MCHC RBC AUTO-ENTMCNC: 35 G/DL (ref 31.4–37.4)
MCV RBC AUTO: 93 FL (ref 82–98)
MONOCYTES # BLD AUTO: 0.44 THOUSAND/ΜL (ref 0.17–1.22)
MONOCYTES NFR BLD AUTO: 8 % (ref 4–12)
NEUTROPHILS # BLD AUTO: 4.1 THOUSANDS/ΜL (ref 1.85–7.62)
NEUTS SEG NFR BLD AUTO: 73 % (ref 43–75)
NRBC BLD AUTO-RTO: 0 /100 WBCS
PLATELET # BLD AUTO: 155 THOUSANDS/UL (ref 149–390)
PMV BLD AUTO: 9.6 FL (ref 8.9–12.7)
RBC # BLD AUTO: 4.41 MILLION/UL (ref 3.88–5.62)
WBC # BLD AUTO: 5.67 THOUSAND/UL (ref 4.31–10.16)

## 2021-01-17 PROCEDURE — 85025 COMPLETE CBC W/AUTO DIFF WBC: CPT | Performed by: STUDENT IN AN ORGANIZED HEALTH CARE EDUCATION/TRAINING PROGRAM

## 2021-01-17 PROCEDURE — 99232 SBSQ HOSP IP/OBS MODERATE 35: CPT | Performed by: STUDENT IN AN ORGANIZED HEALTH CARE EDUCATION/TRAINING PROGRAM

## 2021-01-17 PROCEDURE — 99253 IP/OBS CNSLTJ NEW/EST LOW 45: CPT | Performed by: NURSE PRACTITIONER

## 2021-01-17 RX ORDER — LANOLIN ALCOHOL/MO/W.PET/CERES
6 CREAM (GRAM) TOPICAL
Status: DISCONTINUED | OUTPATIENT
Start: 2021-01-17 | End: 2021-01-21 | Stop reason: HOSPADM

## 2021-01-17 RX ORDER — TRAZODONE HYDROCHLORIDE 100 MG/1
100 TABLET ORAL
Status: DISCONTINUED | OUTPATIENT
Start: 2021-01-17 | End: 2021-01-21 | Stop reason: HOSPADM

## 2021-01-17 RX ADMIN — MELATONIN TAB 3 MG 6 MG: 3 TAB at 21:56

## 2021-01-17 RX ADMIN — SERTRALINE HYDROCHLORIDE 50 MG: 50 TABLET ORAL at 09:47

## 2021-01-17 RX ADMIN — TRAZODONE HYDROCHLORIDE 50 MG: 50 TABLET ORAL at 02:48

## 2021-01-17 NOTE — CONSULTS
Consult- Dante Grady 1970, 52 y o  male MRN: [id number]    Unit/Bed#: [facility] 253-01 Encounter: [id number]    Primary Care Provider: Shraddha Rodríguez MD   Date and time admitted to hospital: 1/16/2021 10:50 AM    Inpatient consult for Medical Clearance for [address] patient  Consult performed by: EDWAR Begum  Consult ordered by: Daisha Fields MD        Medical clearance for psychiatric admission  Assessment & Plan  Background: Presented to the ED at [facility] for intentional overdose with 13 tablets of trazodone in an attempt to kill himself;   Subsequently transferred to [facility] inpatient psych  · SLIM  consult for medical clearance for psychiatric admission  · Vitals reviewed within normal limits  · COVID-19, influenza A/B, RSV negative  · Labs reviewed  · EKG completed revealing a QTC of 435 normal sinus rhythm  · Medically cleared for psychiatric admission  · Rest of management per primary team    Suicidal ideation  Assessment & Plan  ·  With 13 tablets of trazodone  ·  Management per primary team    * Depression  Assessment & Plan  · Management per primary team     Generalized anxiety disorder  Assessment & Plan  · Managed as an outpatient on Lexapro  · Will defer reinitiation to primary team   · Supportive care     Dizziness  Assessment & Plan  · With associated insomnia over the past 2-3 weeks  · Reports only 1-3 hours every night   He states that when he gets tired he also feels dizziness however can never fall asleep   · He does take trazadone as an OP however given insomnia would possibly consider a paradoxical reaction; defer to primary team   · Noted BP lability; orthostatic vitals to be completed X1  · Would consider OP ENT visit for possible ear crystals/vertigo          VTE Prophylaxis: Reason for no pharmacologic prophylaxis Low risk and ambulatory  / reason for no mechanical VTE prophylaxis Low risk and ambulatory     Recommendations for Discharge:  ·  per primary team    Counseling / Coordination of Care Time: 45 minutes  Greater than 50% of total time spent on patient counseling and coordination of care  Collaboration of Care: Were Recommendations Directly Discussed with Primary Treatment Team? - No     History of Present Illness:    Sharonda Vásquez is a 52 y o  male  With past medical history of generalized anxiety disorder, depression, and panic attacks who is originally presented to the [facility] Emergency Department due to  Suicide attempt with intentional overdose subsequently requiring transfer to [facility]  We are consulted for  Medical clearance for  Inpatient psychiatric admission  Review of Systems:    Review of Systems   Constitutional: Negative  Cardiovascular: Negative  Gastrointestinal: Negative  Musculoskeletal: Negative  Skin: Negative  Neurological: Positive for dizziness  Negative for facial asymmetry, weakness, light-headedness, numbness and headaches  Psychiatric/Behavioral: Positive for suicidal ideas         Past Medical and Surgical History:     Past Medical History:   Diagnosis Date    Anxiety     Depression     Hyperlipidemia     controlled with diet and exercise    Kidney stone     Panic attack     Renal disorder     kidney stones    Suicide attempt [facility])        Past Surgical History:   Procedure Laterality Date    APPENDECTOMY      TN CYSTO/URETERO W/LITHOTRIPSY &INDWELL STENT INSRT Left 5/9/2018    Procedure: CYSTOSCOPY URETEROSCOPY,LEFT  RETROGRADE PYELOGRAM AND INSERTION STENT URETERAL;  Surgeon: Lizandro Richter MD;  Location: [facility];  Service: Urology    TN CYSTO/URETERO W/LITHOTRIPSY &INDWELL STENT INSRT Left 6/8/2018    Procedure: CYSTOSCOPY URETEROSCOPY WITH LITHOTRIPSY HOLMIUM LASER, RETROGRADE PYELOGRAM AND INSERTION STENT URETERAL;  Surgeon: Lizandro Richter MD;  Location: [facility];  Service: Urology    TONSILLECTOMY      WISDOM TOOTH EXTRACTION      WISDOM TOOTH EXTRACTION         Meds/Allergies:    PTA meds:   Prior to Admission Medications   Prescriptions Last Dose Informant Patient Reported? Taking? Polyethylene Glycol 3350 (MIRALAX PO)  Self Yes No   Sig: Take by mouth   escitalopram (LEXAPRO) 10 mg tablet   No No   Sig: Take 1 tablet (10 mg total) by mouth daily   loratadine-pseudoephedrine (CLARITIN-D 24-HOUR)  mg per 24 hr tablet  Self Yes No   Sig: Take 1 tablet by mouth daily   traZODone (DESYREL) 50 mg tablet   No No   Sig: Take 1 tablet (50 mg total) by mouth daily at bedtime as needed for sleep ;may take a half tablet if a whole tablet is overly sedating      Facility-Administered Medications: None       Allergies:    Allergies   Allergen Reactions    Betadine Antibiotic-Moisturize [Bacitracin-Polymyxin B] Rash       Social History:     Marital Status: /Civil Union    Substance Use History:   Social History     Substance and Sexual Activity   Alcohol Use Yes    Alcohol/week: 1 0 standard drinks    Types: 1 Cans of beer per week    Frequency: Monthly or less    Drinks per session: 1 or 2    Binge frequency: Never    Comment: ocassional     Social History     Tobacco Use   Smoking Status Former Smoker    Packs/day: 0 50    Types: Cigarettes    Quit date: 1998    Years since quittin 6   Smokeless Tobacco Never Used     Social History     Substance and Sexual Activity   Drug Use No    Comment: Reports last use of any substance >20years ago       Family History:    Family History   Problem Relation Age of Onset    Heart disease Mother     Lung cancer Mother     Hypertension Mother     Stroke Father     Hyperlipidemia Father        Physical Exam:     Vitals:   Blood Pressure: 123/78 (21)  Pulse: 96 (21)  Temperature: 98 5 °F (36 9 °C) (21)  Temp Source: Tympanic (21)  Respirations: 16 (21)  Height: 5' 9" (175 3 cm) (21 1055)  Weight - Scale: 69 5 kg (153 lb 3 2 oz) (01/16/21 1055)  SpO2: 99 % (01/16/21 1055)    Physical Exam  Vitals signs and nursing note reviewed  Constitutional:       Appearance: He is well-developed  HENT:      Head: Normocephalic and atraumatic  Eyes:      Conjunctiva/sclera: Conjunctivae normal    Neck:      Musculoskeletal: Neck supple  Cardiovascular:      Rate and Rhythm: Normal rate and regular rhythm  Heart sounds: No murmur  Pulmonary:      Effort: Pulmonary effort is normal  No respiratory distress  Breath sounds: Normal breath sounds  Abdominal:      Palpations: Abdomen is soft  Tenderness: There is no abdominal tenderness  Skin:     General: Skin is warm and dry  Neurological:      Mental Status: He is alert  Psychiatric:         Mood and Affect: Mood is anxious and depressed  Speech: Speech normal          Behavior: Behavior normal          Thought Content: Thought content is paranoid  Thought content includes suicidal ideation  Thought content includes suicidal plan  Additional Data:     Lab Results: I have personally reviewed pertinent reports  Results from last 7 days   Lab Units 01/15/21  1017   WBC Thousand/uL 4 30*   HEMOGLOBIN g/dL 15 1   HEMATOCRIT % 43 0   PLATELETS Thousands/uL 148*   NEUTROS PCT % 72   LYMPHS PCT % 20   MONOS PCT % 8   EOS PCT % 0     Results from last 7 days   Lab Units 01/15/21  1017   SODIUM mmol/L 137   POTASSIUM mmol/L 3 7   CHLORIDE mmol/L 103   CO2 mmol/L 28   BUN mg/dL 11   CREATININE mg/dL 0 82   ANION GAP mmol/L 6   CALCIUM mg/dL 8 8   ALBUMIN g/dL 4 1   TOTAL BILIRUBIN mg/dL 1 21*   ALK PHOS U/L 60   ALT U/L 14   AST U/L 9   GLUCOSE RANDOM mg/dL 115             No results found for: HGBA1C            Imaging: I have personally reviewed pertinent reports  No orders to display       EKG, Pathology, and Other Studies Reviewed on Admission:   · EKG:  Reviewed as above  ** Please Note: This note has been constructed using a voice recognition system  **

## 2021-01-17 NOTE — ASSESSMENT & PLAN NOTE
· With associated insomnia over the past 2-3 weeks  · Reports only 1-3 hours every night   He states that when he gets tired he also feels dizziness however can never fall asleep   · He does take trazadone as an OP however given insomnia would possibly consider a paradoxical reaction; defer to primary team   · Noted BP lability; orthostatic vitals to be completed X1  · Would consider OP ENT visit for possible ear crystals/vertigo

## 2021-01-17 NOTE — TREATMENT TEAM
AM rounds- new admit, pt 201 after overdosing on 13 50mg trazodone  Reports elevated anxiety and depression  After receiving prn medications, began to have physical symptoms of "electric shocks" and fluttering eyelids  Restless     Readmit score-16

## 2021-01-17 NOTE — PROGRESS NOTES
Pt is visible in the milieu  Pt reports to this writer that his only concern is that the medication is not working for sleep  Pt reports that he spoke with doctor regarding his sleep meds and hoping to have a medication that will help with his insomnia  Pt denies anxiety and depression as well as SI/HI/AVH

## 2021-01-17 NOTE — PROGRESS NOTES
Pt remains pleasant in conversation  Appears anxious throughout the day however does report some improvement with atarax  Pt denies SI/HI, moderate depression  Reviewed scheduled medications, pt denies any questions at this time  States starting new medications typically makes him more anxious however is happy to try something to help anxiety and depression long term  Social with select peers on the unit

## 2021-01-17 NOTE — ASSESSMENT & PLAN NOTE
· Managed as an outpatient on Lexapro  · Will defer reinitiation to primary team   · Supportive care

## 2021-01-17 NOTE — PROGRESS NOTES
Asleep, but restless in bed at the onset of the shift  At [phone], C/O feeling like "electricity" was going through his body to tech  By the time writer got to his room, he stated that he felt a "little better " Reports that he hasn't slept for "a week & a half " so given Trazodone 50 mg  Po prn/sleep at 0248/sleep  Good effect obtained, as observed asleep in bed within the hr  & remains asleep presently

## 2021-01-17 NOTE — PLAN OF CARE
Problem: SELF HARM/SUICIDALITY  Goal: Will have no self-injury during hospital stay  Description: INTERVENTIONS:  - Q 15 MINUTES: Routine safety checks  - Q WAKING SHIFT & PRN: Assess risk to determine if routine checks are adequate to maintain patient safety  - Encourage patient to participate actively in care by formulating a plan to combat response to suicidal ideation, identify supports and resources  Outcome: Progressing     Problem: DEPRESSION  Goal: Will be euthymic at discharge  Description: INTERVENTIONS:  - Administer medication as ordered  - Provide emotional support via 1:1 interaction with staff  - Encourage involvement in milieu/groups/activities  - Monitor for social isolation  Outcome: Progressing     Problem: PSYCHOSIS  Goal: Will report no hallucinations or delusions  Description: Interventions:  - Administer medication as  ordered  - Every waking shifts and PRN assess for the presence of hallucinations and or delusions  - Assist with reality testing to support increasing orientation  - Assess if patient's hallucinations or delusions are encouraging self-harm or harm to others and intervene as appropriate  Outcome: Progressing     Problem: ANXIETY  Goal: By discharge: Patient will verbalize 2 strategies to deal with anxiety  Description: Interventions:  - Identify any obvious source/trigger to anxiety  - Staff will assist patient in applying identified coping technique/skills  - Encourage attendance of scheduled groups and activities  Outcome: Progressing     Problem: Ineffective Coping  Goal: Participates in unit activities  Description: Interventions:  - Provide therapeutic environment   - Provide required programming   - Redirect inappropriate behaviors   Outcome: Progressing     Problem: ANXIETY  Goal: Will report anxiety at manageable levels  Description: INTERVENTIONS:  - Administer medication as ordered  - Teach and encourage coping skills  - Provide emotional support  - Assess patient/family for anxiety and ability to cope  Outcome: Not Progressing

## 2021-01-17 NOTE — PROGRESS NOTES
Patient had belongings dropped off  -(3) Pair Socks  -(3) Underwear  -(8) Shirts  -(3) Pants  -(3) Books  -Black Duffle Bag    Pt belongings at bedside;  -(3) Pants  -(3) Shirts  -(3) Underwear  -(3) Pair Socks  -(3) Books

## 2021-01-17 NOTE — PROGRESS NOTES
Progress Note - Behavioral Health   Sandi Dukes 52 y o  male MRN: [id number]  Unit/Bed#: [facility] Encounter: [id number]    Subjective:    Per nursing, patient reporting feeling depressed after taking Hydroxyzine, pleasant, continues to have somatic complaints of tingling in his neck, weird taste in mouth  He was restless overnight, complaining of "electricity" feeling, took Trazodone around 2:30 PM with good effect  Per patient, patient reports that yesterday was a stressful day  He reports that he regretted his decisions before the hospitalization  He reports that he slept a few hours last night  He took Trazodone and Hydroxyzine, reports that he was feeling very tense  He slept for a few hours, gave him Trazodone and was up the rest of night  He reports that he got about 2-3 hours per night  He reports that he continues to worry about the Ecstasy episode he had 20 years ago  He reports that his body fights the medications  He reports feeling a bit hopeless yesterday  He reports his mood has been "a bit better," leveling off  Patient reports that his appetite has been okay today  He reports that he spoke to wife on the phone  He denies any passive or active suicidal ideation, intent, or plan        Behavior over the last 24 hours:  improved  Medication side effects: Yes- burning sensation with Atarax  ROS: blurry vision    Objective:    Temp:  [97 3 °F (36 3 °C)-98 5 °F (36 9 °C)] 98 4 °F (36 9 °C)  HR:  [87-96] 93  Resp:  [16-17] 17  BP: ()/(56-83) 127/82    Mental Status Evaluation:  Appearance:  restless and fidgety, dressed in casual clothing, adequate hygiene and grooming, cooperative with interview, fairly well related, anxious appearing   Behavior:  No tics, tremors, or behaviors observed   Speech:  Normal rate, rhythm, and volume   Mood:  "a bit better, still anxious and tense"   Affect:  Appears mildly constricted in depressed range, stable, mood-congruent   Thought Process: Linear and goal directed   Associations intact associations   Thought Content:  No passive or active suicidal or homicidal ideation, intent, or plan  Perceptual Disturbances: Denies any auditory or visual hallucinations   Sensorium:  Oriented to person, place, time, and situation   Memory:  recent and remote memory grossly intact   Consciousness:  alert and awake   Attention: attention span and concentration were age appropriate   Insight:  fair   Judgment: fair   Gait/Station: normal gait/station   Motor Activity: no abnormal movements       Labs: I have personally reviewed all pertinent laboratory/tests results  Labs in last 72 hours:   Recent Labs     01/15/21  1017 01/17/21  0630   WBC 4 30* 5 67   RBC 4 73 4 41   HGB 15 1 14 3   HCT 43 0 40 8   * 155   RDW 11 8 11 8   NEUTROABS 3 07 4 10   SODIUM 137  --    K 3 7  --      --    CO2 28  --    BUN 11  --    CREATININE 0 82  --    GLUC 115  --    CALCIUM 8 8  --    AST 9  --    ALT 14  --    ALKPHOS 60  --    TP 7 1  --    ALB 4 1  --    TBILI 1 21*  --        Progress Toward Goals: Progressing    Recommended Treatment: Continue with group therapy, milieu therapy and occupational therapy  Risks, benefits and possible side effects of Medications:   Risks, benefits, and possible side effects of medications explained to patient and patient verbalizes understanding  Medications: all current active meds have been reviewed    Current Facility-Administered Medications   Medication Dose Route Frequency Provider Last Rate    acetaminophen  650 mg Oral Q6H PRN Raúl You MD      acetaminophen  650 mg Oral Q6H PRN Raúl You MD      aluminum-magnesium hydroxide-simethicone  30 mL Oral Q4H PRN Raúl You MD      benztropine  1 mg Intramuscular Q6H PRN Raúl You MD      benztropine  1 mg Oral Q6H PRN Raúl You MD      haloperidol  5 mg Oral Q6H PRN Raúl You MD      haloperidol lactate  5 mg Intramuscular Q6H PRN Brain Martínez MD      hydrOXYzine HCL  25 mg Oral HS Brain Martínez MD      hydrOXYzine HCL  25 mg Oral Q4H PRN Brain Martínez MD      ibuprofen  600 mg Oral Q8H PRN Brain Martínez MD      LORazepam  2 mg Intramuscular Q6H PRN Brain Martínez MD      LORazepam  1 mg Oral Q6H PRN Brain Martínez MD      magnesium hydroxide  30 mL Oral Daily PRN Brain Martínez MD      sertraline  50 mg Oral Daily Brain Martínez MD      traMADol  50 mg Oral Q6H PRN Brain Martínez MD      traZODone  50 mg Oral HS PRN Brain Martínez MD             Assessment/Plan   Principal Problem:    Depression  Active Problems:    Generalized anxiety disorder    Suicidal ideation    Medical clearance for psychiatric admission    Dizziness    51 y/o Male with GORDON, depression- continues to have significant anxiety, somatic complaints, some mild improvement this afternoon    Plan:  -Given poor tolerance of Hydroxyzine, will stop at this time  May use Ativan prn anxiety symptoms   -Started Melatonin 6 mg qhs   -Continue rest of med regimen

## 2021-01-17 NOTE — ASSESSMENT & PLAN NOTE
Background: Presented to the ED at [facility] for intentional overdose with 13 tablets of trazodone in an attempt to kill himself;   Subsequently transferred to [facility] psych    · SLIM  consult for medical clearance for psychiatric admission  · Vitals reviewed within normal limits  · COVID-19, influenza A/B, RSV negative  · Labs reviewed  · EKG completed revealing a QTC of 435 normal sinus rhythm  · Medically cleared for psychiatric admission  · Rest of management per primary team

## 2021-01-18 LAB
ATRIAL RATE: 79 BPM
P AXIS: 50 DEGREES
PR INTERVAL: 142 MS
QRS AXIS: 66 DEGREES
QRSD INTERVAL: 86 MS
QT INTERVAL: 380 MS
QTC INTERVAL: 435 MS
T WAVE AXIS: 53 DEGREES
VENTRICULAR RATE: 79 BPM

## 2021-01-18 PROCEDURE — 93010 ELECTROCARDIOGRAM REPORT: CPT | Performed by: INTERNAL MEDICINE

## 2021-01-18 PROCEDURE — 99232 SBSQ HOSP IP/OBS MODERATE 35: CPT | Performed by: STUDENT IN AN ORGANIZED HEALTH CARE EDUCATION/TRAINING PROGRAM

## 2021-01-18 RX ORDER — QUETIAPINE FUMARATE 25 MG/1
25 TABLET, FILM COATED ORAL
Status: DISCONTINUED | OUTPATIENT
Start: 2021-01-18 | End: 2021-01-19

## 2021-01-18 RX ADMIN — MELATONIN TAB 3 MG 6 MG: 3 TAB at 21:03

## 2021-01-18 RX ADMIN — QUETIAPINE FUMARATE 25 MG: 25 TABLET ORAL at 21:03

## 2021-01-18 RX ADMIN — SERTRALINE HYDROCHLORIDE 50 MG: 50 TABLET ORAL at 10:44

## 2021-01-18 NOTE — PROGRESS NOTES
Pt pleasant and polite during interaction  Appears restless this morning and does admit to feeling anxious  Pt denies AVH however may be having tactile hallucinations based on assessment  Pt describes feelings of tingling in the neck and head when trying to initiate sleep  Also reports sensory distortions such as feeling as though his fingers are longer than they actually are  Pt reports poor sleep is main issue which started in his late 25s after bad experience with "ecstasy"  Pt feels his brain is "rewiring itself" and he can feel this happening  Pt also states "I know it sounds crazy, I don't know how to explain it"  Expresses feeling of hopelessness as "no one seems to know how to help me"  Pt states "I don't want to give up" and has a wife and daughter to live for  RN provided encouragement and support  Pt is receptive and expresses appreciation

## 2021-01-18 NOTE — PROGRESS NOTES
Progress Note - Behavioral Health   Aubrie Clemens 52 y o  male MRN: [id number]  Unit/Bed#: [facility] 253-01 Encounter: [id number]    Assessment/Plan   Principal Problem:    Depression  Active Problems:    Generalized anxiety disorder    Suicidal ideation    Medical clearance for psychiatric admission    Dizziness      Subjective: Patient was seen, chart reviewed and case discussed with team  Patient with hx of anxiety/depression admitted to inpatient for OD on trazodone  Patient appears anxious, calm and cooperative  Reports that he slept little better last night but feels "somethng is going up through by back and pulling my neck" Reports that when he doesn't sleep well, next day feels dizzy, tingling sensation, difficult to focus and poor concentration  He also reports poor sleep for the past 3 weeks, decreased need for sleep, not getting tired, intrusive thoughts and not able to slow down   Denies SI/HI/AH/VH  Psychiatric Review of Systems:  Behavior over the last 24 hours:  improved  Sleep: insomnia  Appetite: normal  Medication side effects: No  ROS: no complaints, all others negative    Current Medications:  Current Facility-Administered Medications   Medication Dose Route Frequency    acetaminophen (TYLENOL) tablet 650 mg  650 mg Oral Q6H PRN    acetaminophen (TYLENOL) tablet 650 mg  650 mg Oral Q6H PRN    aluminum-magnesium hydroxide-simethicone (MYLANTA) oral suspension 30 mL  30 mL Oral Q4H PRN    benztropine (COGENTIN) injection 1 mg  1 mg Intramuscular Q6H PRN    benztropine (COGENTIN) tablet 1 mg  1 mg Oral Q6H PRN    haloperidol (HALDOL) tablet 5 mg  5 mg Oral Q6H PRN    haloperidol lactate (HALDOL) injection 5 mg  5 mg Intramuscular Q6H PRN    hydrOXYzine HCL (ATARAX) tablet 25 mg  25 mg Oral Q4H PRN    ibuprofen (MOTRIN) tablet 600 mg  600 mg Oral Q8H PRN    LORazepam (ATIVAN) injection 2 mg  2 mg Intramuscular Q6H PRN    LORazepam (ATIVAN) tablet 1 mg  1 mg Oral Q6H PRN    magnesium hydroxide (MILK OF MAGNESIA) oral suspension 30 mL  30 mL Oral Daily PRN    melatonin tablet 6 mg  6 mg Oral HS    QUEtiapine (SEROquel) tablet 25 mg  25 mg Oral HS    sertraline (ZOLOFT) tablet 50 mg  50 mg Oral Daily    traMADol (ULTRAM) tablet 50 mg  50 mg Oral Q6H PRN    traZODone (DESYREL) tablet 100 mg  100 mg Oral HS PRN       Behavioral Health Medications:   all current active meds have been reviewed, continue current psychiatric medications and current meds:   Current Facility-Administered Medications   Medication Dose Route Frequency    acetaminophen (TYLENOL) tablet 650 mg  650 mg Oral Q6H PRN    acetaminophen (TYLENOL) tablet 650 mg  650 mg Oral Q6H PRN    aluminum-magnesium hydroxide-simethicone (MYLANTA) oral suspension 30 mL  30 mL Oral Q4H PRN    benztropine (COGENTIN) injection 1 mg  1 mg Intramuscular Q6H PRN    benztropine (COGENTIN) tablet 1 mg  1 mg Oral Q6H PRN    haloperidol (HALDOL) tablet 5 mg  5 mg Oral Q6H PRN    haloperidol lactate (HALDOL) injection 5 mg  5 mg Intramuscular Q6H PRN    hydrOXYzine HCL (ATARAX) tablet 25 mg  25 mg Oral Q4H PRN    ibuprofen (MOTRIN) tablet 600 mg  600 mg Oral Q8H PRN    LORazepam (ATIVAN) injection 2 mg  2 mg Intramuscular Q6H PRN    LORazepam (ATIVAN) tablet 1 mg  1 mg Oral Q6H PRN    magnesium hydroxide (MILK OF MAGNESIA) oral suspension 30 mL  30 mL Oral Daily PRN    melatonin tablet 6 mg  6 mg Oral HS    QUEtiapine (SEROquel) tablet 25 mg  25 mg Oral HS    sertraline (ZOLOFT) tablet 50 mg  50 mg Oral Daily    traMADol (ULTRAM) tablet 50 mg  50 mg Oral Q6H PRN    traZODone (DESYREL) tablet 100 mg  100 mg Oral HS PRN     Vitals:  Vitals:    01/18/21 0732   BP: 125/83   Pulse: 98   Resp: 18   Temp: 98 4 °F (36 9 °C)   SpO2:        Laboratory results:  I have personally reviewed all pertinent laboratory/tests results      Mental Status Evaluation:  Appearance:  age appropriate and casually dressed   Behavior:  restless and fidgety Speech:  fast   Mood:  anxious and depressed   Affect:  labile and mood-congruent   Language Appropriate   Thought Process:  goal directed and logical   Thought Content:  normal   Perceptual Disturbances: None   Risk Potential: Suicidal Ideations none, Homicidal Ideations none and Potential for Aggression No   Sensorium:  person, place, time/date, situation, day of week, month of year and year   Cognition:  recent and remote memory grossly intact   Consciousness:  alert    Attention: attention span appeared shorter than expected for age   Insight:  good   Judgment: fair   Gait/Station: normal gait/station   Motor Activity: no abnormal movements     Progress Toward Goals: Progressing    Recommended Treatment: Continue with pharmacotherapy, group therapy, milieu therapy and occupational therapy      1  Start Seroquel 25 mg Po HS

## 2021-01-18 NOTE — PROGRESS NOTES
With the exception of awakening x 1 for a drink of water, has appeared to be sleeping in bed, without periods of restlessness observed  Quickly returned to bed & has appeared to resume sleep  Will continue to monitor

## 2021-01-19 PROCEDURE — 99232 SBSQ HOSP IP/OBS MODERATE 35: CPT | Performed by: STUDENT IN AN ORGANIZED HEALTH CARE EDUCATION/TRAINING PROGRAM

## 2021-01-19 RX ORDER — GABAPENTIN 100 MG/1
100 CAPSULE ORAL 3 TIMES DAILY
Status: DISCONTINUED | OUTPATIENT
Start: 2021-01-19 | End: 2021-01-20

## 2021-01-19 RX ORDER — QUETIAPINE FUMARATE 25 MG/1
50 TABLET, FILM COATED ORAL
Status: DISCONTINUED | OUTPATIENT
Start: 2021-01-19 | End: 2021-01-21 | Stop reason: HOSPADM

## 2021-01-19 RX ADMIN — GABAPENTIN 100 MG: 100 CAPSULE ORAL at 16:09

## 2021-01-19 RX ADMIN — MELATONIN TAB 3 MG 6 MG: 3 TAB at 21:22

## 2021-01-19 RX ADMIN — SERTRALINE HYDROCHLORIDE 50 MG: 50 TABLET ORAL at 09:31

## 2021-01-19 RX ADMIN — QUETIAPINE FUMARATE 50 MG: 25 TABLET ORAL at 21:21

## 2021-01-19 RX ADMIN — GABAPENTIN 100 MG: 100 CAPSULE ORAL at 21:21

## 2021-01-19 NOTE — PROGRESS NOTES
Pt calm and cooperative throughout interaction  Denies SI/HI and AVH  Pt is hopeful for discharge Thursday  Pt states utilizing coping skills helped with anxiety today  Denies any questions or concerns  Pt stated "I feel a lot calmer after talking with you" to this writer

## 2021-01-19 NOTE — PROGRESS NOTES
Patient visible in milieu, denies SI, HI, AVH  Reports slight improvement in symptoms  Social with peers throughout most the afternoon in dayroom  Compliant with meds, hopeful to sleep well after additional medication was added

## 2021-01-19 NOTE — CASE MANAGEMENT
Pt met with CM to review & sign ROIs for Art Ping), [facility](outpatient), Dr Brigida John(therapist), & Dr Severo Snipes)  Pt is 53 y/o male who reported that his mental health issues began about 10 months ago when COVID happened  Pt said that he was missing work in the beginning because of it, but had been back to work more recently  Pt said that he was anxious & not sleeping & not dealing with his issues  Pt said that he drove himself to the hospital & in the parking lot took 13 Trazodone pills  Pt said that waited a few minutes, but then walked himself in to the ER, & regretted what he had done; Pt reported he had written a note & it was in his coat pocket, incase he was found   Pt is  & lives at home with his wife & their 15 y/o daughter; Pt plans to return home at discharge  Pt said that his mother is  & his father is not really supportive  Pt has 2 sisters & he is the oldest child  Pt reported a family history of his mom having depression/anxiety  Pt denied any previous inpatient behavioral health hospitalizations, but had seen advanced practitionerJuliano, through [address] & has an intake with Dr Rod Souza in March  Pt said that he has a psychologist, Shaniqua White, who he has been working with for some time  Pt said that he thinks that their progress has stalled & he may need to find a new psychologist   Pt asked for recommendations & CM agreed to provide some alternative provider's information in his discharge instructions  Pt confirmed that his PCP is Dr Rajesh Amos & denied any medical concerns or any history of seizures  Pt reported he had been prescribed Lexapro, but stopped taking it because he didn't think it was working  Pt denied any drug, alcohol, or tobacco use  Pt reported that Confucianist/spirituality are important to him  Pt reported he has a Masters Degree in [place]  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    Pt said that his employer is aware he is in the hospital   He reported he was on FMLA for 7 weeks & wasn't sure if he could take that again  CM encouraged him to outreach to his HR department regarding their policies  Pt said that he had used up his STD, but my have LTD; CM again encouraged Pt to talk directly with his employer regarding his options  Pt denied any legal issues  Pt denied having access to firearms  Pt drives independently to appointments  Pt reported that his goal for this hospitalization is to get help to sleep better  CM & Pt talked about PHP & Pt expressed interested  CM provided him with a printout of information regarding [facility]

## 2021-01-19 NOTE — PROGRESS NOTES
Status: Pt is a 201 from [facility] ER, s/p OD on 13 Trazodone tablets  Pt reported increased anxiety & Panic attacks  Pt denying any SI since arrival on the unit  Pt reporting medications prescribed for sleep, not working  Pt lives with his spouse & daughter & plans to return there at discharge    Medication: Melatonin 6mg started for sleep, Zoloft increased to 50mg daily / no PRNs  D/C: Weds/Thurs / new admission     01/18/21 0750   Team Meeting   Meeting Type Daily Rounds   Team Members Present   Team Members Present Physician;Nurse;Occupational Therapist;   Physician Team Member Dr Manasa Anderson / Akua Tai / Eden Chance Team Member Brittny Mariano / Yohan Ni Management Team Member Dudley Milton / Kya Mead   OT Team Member Henrique Coelho / Willma Meckel   Patient/Family Present   Patient Present No   Patient's Family Present No

## 2021-01-19 NOTE — PROGRESS NOTES
Patient: Shamir Concepcion    Procedure(s):  LEFT EYE PHACOEMULSIFICATION CLEAR CORNEA WITH STANDARD INTRAOCULAR LENS IMPLANT  - Wound Class: I-Clean    Diagnosis:LEFT EYE NUCLEAR CATARACT  Diagnosis Additional Information: No value filed.    Anesthesia Type:  MAC    Note:  Anesthesia Post Evaluation    Patient location during evaluation: Phase 2  Patient participation: Able to fully participate in evaluation  Level of consciousness: awake and alert  Pain management: adequate  Airway patency: patent  Cardiovascular status: acceptable  Respiratory status: acceptable  Hydration status: acceptable  PONV: none     Anesthetic complications: None          Last vitals:  Vitals:    04/04/17 0646 04/04/17 0822 04/04/17 0837   BP: 153/74 143/76 160/76   Resp: 16 16 16   Temp: 36.1  C (96.9  F)     SpO2: 95% 97% 96%         Electronically Signed By: Octavia Romero MD  April 4, 2017  9:23 AM   Pt is pleasant and cooperative on interaction  Continues to appear anxious  Reports sleeping ok "the first half of the night"  Continues to focus on inability to sleep  Refers to "nerves" trying to make connections and admits he is unsure how to describe what he is feeling  Reports feeling safe on unit

## 2021-01-19 NOTE — PROGRESS NOTES
01/19/21 1000 01/19/21 1100 01/19/21 1315   Activity/Group Checklist   Group Community meeting  (Goal setting/cards) Nursing Education Life Skills  (letting go balloon activity)   Attendance Attended Attended Attended   Attendance Duration (min) 16-30 16-30 46-60   Interactions Disorganized interaction Interacted appropriately Interacted appropriately   Affect/Mood VANESA  (restless, abruptly left group 2-3x) Calm Appropriate   Goals Achieved  --   --  Able to listen to others; Able to engage in interactions; Able to reflect/comment on own behavior;Discussed coping strategies; Identified feelings      01/19/21 1415   Activity/Group Checklist   Group Other (Comment)  (mindful breathing meditation)   Attendance Attended   Attendance Duration (min) 16-30   Interactions Interacted appropriately   Affect/Mood Calm   Goals Achieved Able to experience relief/decrease in symptoms; Able to engage in interactions; Able to listen to others  (verbalized feeling less anxious following exercise)   Pt attended 4/4 therapeutic groups today  BALAJ[employer] student attempted to engage pt in completing relapse prevention plan  Initially pt was agreeable to completing plan, however when ambulating to the dining room, pt spontaneously reported "not feeling well" and needed to return to his room  Nursing was notified for further assessment

## 2021-01-19 NOTE — PROGRESS NOTES
Status: Pt denies any SI/HI  Pt slept overnight, no issues to report    Medication: started Seroquel 25mg HS / no PRNs  D/C: Pedro Barros / Thurs, Pt is interested in [facility]     01/19/21 0750   Team Meeting   Meeting Type Daily Rounds   Team Members Present   Team Members Present Physician;Nurse;Occupational Therapist;   Physician Team Member Dr Beto Jiménez / Raj Farmer / Nicho Zhou Team Member Gustavo Day / Evelyn Peraza Management Team Member Christine Hoyt / Yonatan Colon   OT Team Member No Beckford / Jennifer Acuña   Patient/Family Present   Patient Present No   Patient's Family Present No

## 2021-01-19 NOTE — PROGRESS NOTES
Treatment Plan Meeting:  Diagnosis of depression & generalized anxiety disorder reviewed  Discussed short term goals for decrease in depression & decrease in anxiety  Pt reported that lack of sleep has impacted his anxiety & said that sleep last night was somewhat improved  Attending physician spoke about addition of low dose of Seroquel(25) to support sleep & depression  CM met with Pt earlier & he expressed some interest in PHP at discharge; CM will follow-up with [facility] regarding possible start date  At this time discharge is tentative for mid week (Weds/Thurs)  All parties in agreement & treatment plan was signed       01/18/21 1530   Team Meeting   Meeting Type Tx Team Meeting   Initial Conference Date 01/18/21   Next Conference Date 02/13/21   Team Members Present   Team Members Present Physician;Nurse;   Physician Team Member Dr Edie Weber Team Member Aries Management Team Member Raghu   Patient/Family Present   Patient Present Yes   Patient's Family Present No

## 2021-01-19 NOTE — PROGRESS NOTES
Progress Note - Behavioral Health   Isreal Parish 52 y o  male MRN: [id number]  Unit/Bed#: [facility] 253-01 Encounter: [id number]    Assessment/Plan   Principal Problem:    Depression  Active Problems:    Generalized anxiety disorder    Suicidal ideation    Medical clearance for psychiatric admission    Dizziness      Subjective: Patient was seen, chart reviewed and case discussed with team  Patient appears anxious and restless in his room  Reports 4-5 hours sleep, poor sleep in the second half was conscious and knows what's happening around, did not have deep sleep  He continues to have some feeling in the nerves and he is not able to describe and thinks that he might need some neurological studies  Patient appears anxious and somatic  Denies SI/HI/AH/VH  He agreed to try gabapentin for anxiety       Psychiatric Review of Systems:  Behavior over the last 24 hours:  unchanged  Sleep: insomnia  Appetite: normal  Medication side effects: No  ROS: no complaints, all others negative    Current Medications:  Current Facility-Administered Medications   Medication Dose Route Frequency    acetaminophen (TYLENOL) tablet 650 mg  650 mg Oral Q6H PRN    aluminum-magnesium hydroxide-simethicone (MYLANTA) oral suspension 30 mL  30 mL Oral Q4H PRN    benztropine (COGENTIN) injection 1 mg  1 mg Intramuscular Q6H PRN    benztropine (COGENTIN) tablet 1 mg  1 mg Oral Q6H PRN    gabapentin (NEURONTIN) capsule 100 mg  100 mg Oral TID    haloperidol (HALDOL) tablet 5 mg  5 mg Oral Q6H PRN    haloperidol lactate (HALDOL) injection 5 mg  5 mg Intramuscular Q6H PRN    hydrOXYzine HCL (ATARAX) tablet 25 mg  25 mg Oral Q4H PRN    ibuprofen (MOTRIN) tablet 600 mg  600 mg Oral Q8H PRN    LORazepam (ATIVAN) injection 2 mg  2 mg Intramuscular Q6H PRN    LORazepam (ATIVAN) tablet 1 mg  1 mg Oral Q6H PRN    magnesium hydroxide (MILK OF MAGNESIA) oral suspension 30 mL  30 mL Oral Daily PRN    melatonin tablet 6 mg  6 mg Oral HS    QUEtiapine (SEROquel) tablet 50 mg  50 mg Oral HS    sertraline (ZOLOFT) tablet 50 mg  50 mg Oral Daily    traMADol (ULTRAM) tablet 50 mg  50 mg Oral Q6H PRN    traZODone (DESYREL) tablet 100 mg  100 mg Oral HS PRN       Behavioral Health Medications: all current active meds have been reviewed  Vitals:  Vitals:    01/19/21 1055   BP: 128/74   Pulse: 91   Resp: 16   Temp: 98 1 °F (36 7 °C)   SpO2:        Laboratory results:  I have personally reviewed all pertinent laboratory/tests results  Mental Status Evaluation:  Appearance:  age appropriate   Behavior:  restless and fidgety   Speech:  soft   Mood:  anxious   Affect:  labile and mood-congruent   Language Appropriate   Thought Process:  goal directed and illogical   Thought Content:  somatic   Perceptual Disturbances: None   Risk Potential: Suicidal Ideations none, Homicidal Ideations none and Potential for Aggression No   Sensorium:  person, place, time/date, situation, day of week, month of year and year   Cognition:  recent and remote memory grossly intact   Consciousness:  alert    Attention: attention span appeared shorter than expected for age   Insight:  fair   Judgment: fair   Gait/Station: normal gait/station   Motor Activity: no abnormal movements     Progress Toward Goals: Progressing    Recommended Treatment: Continue with pharmacotherapy, group therapy, milieu therapy and occupational therapy  1  Start Gabapentin 100 mg TID  2  Increase seroquel to 50 mg PO Hs    Risks, benefits and possible side effects of Medications:   Risks, benefits, and possible side effects of medications explained to patient and patient verbalizes understanding

## 2021-01-20 PROCEDURE — 99232 SBSQ HOSP IP/OBS MODERATE 35: CPT | Performed by: STUDENT IN AN ORGANIZED HEALTH CARE EDUCATION/TRAINING PROGRAM

## 2021-01-20 RX ORDER — GABAPENTIN 300 MG/1
300 CAPSULE ORAL 3 TIMES DAILY
Status: DISCONTINUED | OUTPATIENT
Start: 2021-01-20 | End: 2021-01-21 | Stop reason: HOSPADM

## 2021-01-20 RX ADMIN — GABAPENTIN 300 MG: 300 CAPSULE ORAL at 21:12

## 2021-01-20 RX ADMIN — GABAPENTIN 300 MG: 300 CAPSULE ORAL at 17:27

## 2021-01-20 RX ADMIN — MELATONIN TAB 3 MG 6 MG: 3 TAB at 21:12

## 2021-01-20 RX ADMIN — SERTRALINE HYDROCHLORIDE 50 MG: 50 TABLET ORAL at 08:50

## 2021-01-20 RX ADMIN — QUETIAPINE FUMARATE 50 MG: 25 TABLET ORAL at 21:12

## 2021-01-20 RX ADMIN — GABAPENTIN 300 MG: 300 CAPSULE ORAL at 09:36

## 2021-01-20 NOTE — PROGRESS NOTES
Pt remains pleasant and calm  Denies SI/HI-verbally contracts for safety  Expresses he feels ready for discharge and hopeful to discharge tomorrow  Pt denies any concerns or questions and reports he feels a decrease in anxiety and depression  Reports sleeping well at night

## 2021-01-20 NOTE — PROGRESS NOTES
Pt is cooperative and pleasant during interaction  Reports sleeping much better overnight and feels less anxious today  Denies SI/HI/AVH  Pt presents later in the morning concerned about a "sudden drowsiness" when he layed down in bed however it has resolved  Pt also states he has already made the doctor aware  Pt expresses understanding that body will need time to adjust to new meds

## 2021-01-20 NOTE — PROGRESS NOTES
Status: Pt anxious at times, but slept, only waking at 3 AM   Pt came out & checked the time at the nurses station, & then returned to bed; no issues to report     Medication: Neurontin 100mg TID started & Seroquel increased to 50mg HS / no PRNs  D/C: Weds vs Thurs / Pt interested in [facility]     01/20/21 0750   Team Meeting   Meeting Type Daily Rounds   Team Members Present   Team Members Present Physician;Nurse;Occupational Therapist;   Physician Team Member Dr Rupali Aguilar / Jeannine Mota / Michelle Gibbs Team Member Julio Cesar Winkler / Sue Barros Management Team Member Margy Hadley / Elvis Luke / Aura Das   OT Team Member Zaid Dubon / Deborah Conway   Patient/Family Present   Patient Present No   Patient's Family Present No

## 2021-01-20 NOTE — DISCHARGE INSTR - APPOINTMENTS
Cassie Patino RN, our [employer], will be calling you after your discharge, on the phone number that you provided  She will be available as an additional support, if needed  If you wish to speak with her, you may contact Gideon Essex at [phone]       You expressed interest in possibly finding a new therapist  Please contact the [facility] at [phone] to schedule an appointment with Ned Matias PhD

## 2021-01-20 NOTE — DISCHARGE INSTR - OTHER ORDERS
Below are the instructions for [facility] ison furniture:           Shailesh Gill said, "You may not control all the events that happen to you, but you can decide not to be reduced by them "   We are currently living in quite unpredictable times, where we may be feeling completely out of control  At [facility], we understand the distress which results in feeling out of control, which is why we remained open during the pandemic  [facility] continues to provide partial hospitalization services in a telehealth setting amidst COVID-19 to ensure the health and emotional safety of our clients  We have adapted our ability to provide care to a variety of clients by offering our partial program services via the [facility] care is being provided at [address] [place] [address] Ave  Time  Group    9:00 to 9:30  Morning Assessment    9:30 to 10:15  Psychotherapy Group    10:15 to 10:25  Break    10:25 to 11:10  Education Group    11:10 to 11:30  Lunch    11:30 to 12:15  Allied Therapy    12:15 to 12:30  Wrap Up and Goal Setting    12:30 to ?? Case Management Sessions with         Eligible clients need access to internet and hardware to participate in the program  They need the independent and technological ability to access the groups and participate in a virtual setting  They need to be able to set aside the interrupted time to participate in the program from 9:00am - 12:30pm      Clients are offered three groups each day and they are offered up to three individual Case Management sessions via phone or Teams to work on skill building, aftercare planning, and connection to services  Clients are encouraged to inform their outpatient team that they will be attending ison furniture and their  can coordinate a return to treatment with them upon discharge  Downloading and Instruction Guide    1  Go into your device's [employer]  Download [employer] teams  2  DO NOT create a user account    Once the christopher is downloaded, close it out and wait for your scheduled appointment  3  For an [facility] group visit:  a  Look for an email from Lamonte Huerta, our clinical , with a similar title that reads [facility] Virtual Partial Program'  b  Open the email  c  Click the link [facility]   d  Your christopher will automatically open  When you are prompted, choose JOIN AS A GUEST and type your name  e  Marizol Zoe NOW (again, I know it is repetitive)  f  Make sure you turn on your camera and microphone  Hang Up to leave meeting  Camera  Microphone      4  For a medication check:  a  Follow the instructions above to enter the [facility] meeting  b  HANG UP (Red Phone Icon)  from the meeting to leave for your medication check  c  Look for an email from Lamonte Huerta with a similar title that reads Medication Check with Dr Mehul Mesa  d  Open the email  e  Click the link [facility]  f  Your christopher will automatically open  When you are prompted, JOIN AS A GUEST and type your name  g  Click JOIN NOW  h  Turn on your camera and microphone   i  Upon completion of your medication check, return to the email you received regarding today's dated PHP program    j  Again, click the link [facility]  k  Your christopher will automatically open  When you are prompted, JOIN AS A GUEST and type your name  l  Click JOIN NOW  m   Turn on your camera and microphone

## 2021-01-20 NOTE — PROGRESS NOTES
Progress Note - Behavioral Health   Nanda Monk 52 y o  male MRN: [id number]  Unit/Bed#: [facility] 253-01 Encounter: [id number]    Assessment/Plan   Principal Problem:    Depression  Active Problems:    Generalized anxiety disorder    Suicidal ideation    Medical clearance for psychiatric admission    Dizziness      Subjective: Patient was seen, chart reviewed and case discussed with team  Patient reports that he slept much better and less anxious today  He has more energy and able to participate in groups and activities  However he suddenly felt drowsiness and laying in bed  Denies SI/HI/AH/VH     Psychiatric Review of Systems:  Behavior over the last 24 hours:  improved  Sleep: normal  Appetite: normal  Medication side effects: No  ROS: no complaints, all others negative    Current Medications:  Current Facility-Administered Medications   Medication Dose Route Frequency    acetaminophen (TYLENOL) tablet 650 mg  650 mg Oral Q6H PRN    aluminum-magnesium hydroxide-simethicone (MYLANTA) oral suspension 30 mL  30 mL Oral Q4H PRN    benztropine (COGENTIN) injection 1 mg  1 mg Intramuscular Q6H PRN    benztropine (COGENTIN) tablet 1 mg  1 mg Oral Q6H PRN    gabapentin (NEURONTIN) capsule 300 mg  300 mg Oral TID    haloperidol (HALDOL) tablet 5 mg  5 mg Oral Q6H PRN    haloperidol lactate (HALDOL) injection 5 mg  5 mg Intramuscular Q6H PRN    hydrOXYzine HCL (ATARAX) tablet 25 mg  25 mg Oral Q4H PRN    ibuprofen (MOTRIN) tablet 600 mg  600 mg Oral Q8H PRN    LORazepam (ATIVAN) injection 2 mg  2 mg Intramuscular Q6H PRN    LORazepam (ATIVAN) tablet 1 mg  1 mg Oral Q6H PRN    magnesium hydroxide (MILK OF MAGNESIA) oral suspension 30 mL  30 mL Oral Daily PRN    melatonin tablet 6 mg  6 mg Oral HS    QUEtiapine (SEROquel) tablet 50 mg  50 mg Oral HS    sertraline (ZOLOFT) tablet 50 mg  50 mg Oral Daily    traMADol (ULTRAM) tablet 50 mg  50 mg Oral Q6H PRN    traZODone (DESYREL) tablet 100 mg  100 mg Oral HS PRN       Behavioral Health Medications:   all current active meds have been reviewed, continue current psychiatric medications and current meds:   Current Facility-Administered Medications   Medication Dose Route Frequency    acetaminophen (TYLENOL) tablet 650 mg  650 mg Oral Q6H PRN    aluminum-magnesium hydroxide-simethicone (MYLANTA) oral suspension 30 mL  30 mL Oral Q4H PRN    benztropine (COGENTIN) injection 1 mg  1 mg Intramuscular Q6H PRN    benztropine (COGENTIN) tablet 1 mg  1 mg Oral Q6H PRN    gabapentin (NEURONTIN) capsule 300 mg  300 mg Oral TID    haloperidol (HALDOL) tablet 5 mg  5 mg Oral Q6H PRN    haloperidol lactate (HALDOL) injection 5 mg  5 mg Intramuscular Q6H PRN    hydrOXYzine HCL (ATARAX) tablet 25 mg  25 mg Oral Q4H PRN    ibuprofen (MOTRIN) tablet 600 mg  600 mg Oral Q8H PRN    LORazepam (ATIVAN) injection 2 mg  2 mg Intramuscular Q6H PRN    LORazepam (ATIVAN) tablet 1 mg  1 mg Oral Q6H PRN    magnesium hydroxide (MILK OF MAGNESIA) oral suspension 30 mL  30 mL Oral Daily PRN    melatonin tablet 6 mg  6 mg Oral HS    QUEtiapine (SEROquel) tablet 50 mg  50 mg Oral HS    sertraline (ZOLOFT) tablet 50 mg  50 mg Oral Daily    traMADol (ULTRAM) tablet 50 mg  50 mg Oral Q6H PRN    traZODone (DESYREL) tablet 100 mg  100 mg Oral HS PRN     Vitals:  Vitals:    01/20/21 1100   BP: 149/76   Pulse: 86   Resp: 16   Temp: 98 5 °F (36 9 °C)   SpO2:        Laboratory results:  I have personally reviewed all pertinent laboratory/tests results      Mental Status Evaluation:  Appearance:  age appropriate and casually dressed   Behavior:  restless and fidgety   Speech:  normal pitch and normal volume   Mood:  anxious   Affect:  mood-congruent   Language Appropriate   Thought Process:  goal directed and logical   Thought Content:  normal   Perceptual Disturbances: None   Risk Potential: Suicidal Ideations none, Homicidal Ideations none and Potential for Aggression No   Sensorium:  person, place, time/date, situation, day of week, month of year and year   Cognition:  recent and remote memory grossly intact   Consciousness:  alert    Attention: attention span appeared shorter than expected for age   Insight:  fair   Judgment: fair   Gait/Station: normal gait/station   Motor Activity: no abnormal movements     Progress Toward Goals: Progressing    Recommended Treatment: Continue with pharmacotherapy, group therapy, milieu therapy and occupational therapy      1 Increase gabapentin to 300 mg TID

## 2021-01-21 VITALS
BODY MASS INDEX: 22.69 KG/M2 | WEIGHT: 153.2 LBS | TEMPERATURE: 99.1 F | RESPIRATION RATE: 18 BRPM | DIASTOLIC BLOOD PRESSURE: 60 MMHG | OXYGEN SATURATION: 99 % | HEART RATE: 99 BPM | SYSTOLIC BLOOD PRESSURE: 126 MMHG | HEIGHT: 69 IN

## 2021-01-21 PROCEDURE — 99239 HOSP IP/OBS DSCHRG MGMT >30: CPT | Performed by: STUDENT IN AN ORGANIZED HEALTH CARE EDUCATION/TRAINING PROGRAM

## 2021-01-21 RX ORDER — LANOLIN ALCOHOL/MO/W.PET/CERES
6 CREAM (GRAM) TOPICAL
Qty: 60 TABLET | Refills: 1 | Status: SHIPPED | OUTPATIENT
Start: 2021-01-21 | End: 2021-04-01 | Stop reason: SDDI

## 2021-01-21 RX ORDER — GABAPENTIN 300 MG/1
300 CAPSULE ORAL 3 TIMES DAILY
Qty: 90 CAPSULE | Refills: 1 | Status: SHIPPED | OUTPATIENT
Start: 2021-01-21 | End: 2021-03-08 | Stop reason: SDUPTHER

## 2021-01-21 RX ORDER — QUETIAPINE FUMARATE 50 MG/1
50 TABLET, FILM COATED ORAL
Qty: 30 TABLET | Refills: 1 | Status: SHIPPED | OUTPATIENT
Start: 2021-01-21 | End: 2021-01-29

## 2021-01-21 RX ADMIN — GABAPENTIN 300 MG: 300 CAPSULE ORAL at 08:40

## 2021-01-21 RX ADMIN — SERTRALINE HYDROCHLORIDE 50 MG: 50 TABLET ORAL at 08:40

## 2021-01-21 NOTE — PROGRESS NOTES
Status:Pt walking the halls at times with peers, other times seclusive & in his room  Pt slept overnight, no issues to report  Medication: Neurontin increased to 300mg TID / no PRNs  D/C: Today / Pt's wife will provide transportation home  Pt is scheduled to start [facility] (PHP) tomorrow        01/21/21 0750   Team Meeting   Meeting Type Daily Rounds   Team Members Present   Team Members Present Physician;Nurse;   Physician Team Member Dr Cam Kapoor / Miguel Hodges Team Member [facility] Management Team Member Alen Acharya / Debbie Rodriguez   Patient/Family Present   Patient Present No   Patient's Family Present No

## 2021-01-21 NOTE — CASE MANAGEMENT
CM outreached to [facility] to inquire about possible start dates for Fri vs Mon; both dates available  CM met with Pt to review this & that the treatment team had discussed discharge tentative for Thurs  Pt said that he thought he would go home on Weds, however, said that he is still not sleeping great  CM encouraged Pt to work with the treatment team on his medications, & he agreed he may not be ready for discharge tomorrow  CM contacted Pt's wife, Shayy Powell, @ [phone] & she reported that Pt's anxiety & mental health issues did start around the time of the pandemic  She said that she had not noticed any unusual behavior or changes prior to the incident that led to Pt's admission  CM reviewed plans for Pt to [facility] to [facility] as a transition after he has completed discharge inpatient & she was in support of this plan  CM reviewed that discharge is tentative for Thurs, however, Pt was asking about d/c tomorrow, but the treatment team wanted to see how his sleep was tonight  Shayy Powell said that she would be able to provide transportation, whenever Pt is ready for d/c

## 2021-01-21 NOTE — PLAN OF CARE
Problem: SELF HARM/SUICIDALITY  Goal: Will have no self-injury during hospital stay  Description: INTERVENTIONS:  - Q 15 MINUTES: Routine safety checks  - Q WAKING SHIFT & PRN: Assess risk to determine if routine checks are adequate to maintain patient safety  - Encourage patient to participate actively in care by formulating a plan to combat response to suicidal ideation, identify supports and resources  Outcome: Adequate for Discharge     Problem: DEPRESSION  Goal: Will be euthymic at discharge  Description: INTERVENTIONS:  - Administer medication as ordered  - Provide emotional support via 1:1 interaction with staff  - Encourage involvement in milieu/groups/activities  - Monitor for social isolation  Outcome: Adequate for Discharge     Problem: PSYCHOSIS  Goal: Will report no hallucinations or delusions  Description: Interventions:  - Administer medication as  ordered  - Every waking shifts and PRN assess for the presence of hallucinations and or delusions  - Assist with reality testing to support increasing orientation  - Assess if patient's hallucinations or delusions are encouraging self-harm or harm to others and intervene as appropriate  Outcome: Adequate for Discharge     Problem: ANXIETY  Goal: Will report anxiety at manageable levels  Description: INTERVENTIONS:  - Administer medication as ordered  - Teach and encourage coping skills  - Provide emotional support  - Assess patient/family for anxiety and ability to cope  Outcome: Adequate for Discharge  Goal: By discharge: Patient will verbalize 2 strategies to deal with anxiety  Description: Interventions:  - Identify any obvious source/trigger to anxiety  - Staff will assist patient in applying identified coping technique/skills  - Encourage attendance of scheduled groups and activities  Outcome: Adequate for Discharge     Problem: Ineffective Coping  Goal: Participates in unit activities  Description: Interventions:  - Provide therapeutic environment - Provide required programming   - Redirect inappropriate behaviors   Outcome: Adequate for Discharge     Problem: DISCHARGE PLANNING  Goal: Discharge to home or other facility with appropriate resources  Description: INTERVENTIONS:  - Identify barriers to discharge w/patient and caregiver  - Arrange for needed discharge resources and transportation as appropriate  - Identify discharge learning needs (meds, wound care, etc )  - Arrange for interpretive services to assist at discharge as needed  - Refer to Case Management Department for coordinating discharge planning if the patient needs post-hospital services based on physician/advanced practitioner order or complex needs related to functional status, cognitive ability, or social support system  Outcome: Adequate for Discharge

## 2021-01-21 NOTE — DISCHARGE INSTRUCTIONS
Anxiety   WHAT YOU SHOULD KNOW:   Anxiety is a condition that causes you to feel excessive worry, uneasiness, or fear  Family or work stress, smoking, caffeine, and alcohol can increase your risk for anxiety  Certain medicines or health conditions can also increase your risk  Anxiety may begin gradually, and can become a long-term condition if it is not managed or treated  AFTER YOU LEAVE:   Medicines:   · Medicines  can help you feel more calm and relaxed, and decrease your symptoms  · Take your medicine as directed  Contact your healthcare provider if you think your medicine is not helping or if you have side effects  Tell him if you are allergic to any medicine  Keep a list of the medicines, vitamins, and herbs you take  Include the amounts, and when and why you take them  Bring the list or the pill bottles to follow-up visits  Carry your medicine list with you in case of an emergency  Follow up with your healthcare provider within 2 weeks or as directed:  Write down your questions so you remember to ask them during your visits  Manage anxiety:   · Go to counseling as directed  Cognitive behavioral therapy can help you understand and change how you react to events that trigger your symptoms  · Find ways to manage your symptoms  Activities such as exercise, meditation, or listening to music can help you relax  · Practice deep breathing  Breathing can change how your body reacts to stress  Focus on taking slow, deep breaths several times a day, or during an anxiety attack  Breathe in through your nose, and out through your mouth  · Avoid caffeine  Caffeine can make your symptoms worse  Avoid foods or drinks that are meant to increase your energy level  · Limit or avoid alcohol  Ask your healthcare provider if alcohol is safe for you  You may not be able to drink alcohol if you take certain anxiety or depression medicines  Limit alcohol to 1 drink per day if you are a woman   Limit alcohol to 2 drinks per day if you are a man  A drink of alcohol is 12 ounces of beer, 5 ounces of wine, or 1½ ounces of liquor  Contact your healthcare provider if:   · Your symptoms get worse or do not get better with treatment  · You think your medicine may be causing side effects  · Your anxiety keeps you from doing your regular daily activities  · You have new symptoms since your last visit  · You have questions or concerns about your condition or care  Seek care immediately or call 911 if:   · You have chest pain, tightness, or heaviness that may spread to your shoulders, arms, jaw, neck, or back  · You feel like hurting yourself or someone else  · You feel dizzy, lightheaded, or faint  © 2014 [address] is for End User's use only and may not be sold, redistributed or otherwise used for commercial purposes  All illustrations and images included in CareNotes® are the copyrighted property of A D A M , Inc  or [employer]  The above information is an  only  It is not intended as medical advice for individual conditions or treatments  Talk to your doctor, nurse or pharmacist before following any medical regimen to see if it is safe and effective for you  Depression   WHAT YOU NEED TO KNOW:   Depression is a medical condition that causes feelings of sadness or hopelessness that do not go away  Depression may cause you to lose interest in things you used to enjoy  These feelings may interfere with your daily life  DISCHARGE INSTRUCTIONS:   Call your local emergency number (911 in the [address] [place] [address],[address]) if:   · You think about harming yourself or someone else  · You have done something on purpose to hurt yourself  Call your therapist or doctor if:   · Your symptoms do not improve  · You cannot make it to your next appointment  · You have new symptoms  · You have questions or concerns about your condition or care      The following resources are available at any time to help you, if needed:   · [address]: 9-466.166.3859 (3-783-250-JIID)     · Suicide Hotline: 0-885.909.5431 (1-651-BGCKQLX)     · For a list of international numbers: https://save org/find-help/international-resources/    Medicines:   · Antidepressants  may be given to improve or balance your mood  You may need to take this medicine for several weeks before you begin to feel better  · Take your medicine as directed  Contact your healthcare provider if you think your medicine is not helping or if you have side effects  Tell him of her if you are allergic to any medicine  Keep a list of the medicines, vitamins, and herbs you take  Include the amounts, and when and why you take them  Bring the list or the pill bottles to follow-up visits  Carry your medicine list with you in case of an emergency  Therapy  is often used together with medicine to relieve depression  Therapy is a way for you to talk about your feelings and anything that may be causing depression  Therapy can be done alone or in a group  It may also be done with family members or a significant other  Self-care:   · Get regular physical activity  Try to be active for 30 minutes, 3 to 5 days a week  Physical activity can help relieve depression  Work with your healthcare provider to develop a plan that you enjoy  It may help to ask someone to be active with you  · Create a regular sleep schedule  A routine can help you relax before bed  Listen to music, read, or do yoga  Try to go to bed and wake up at the same time every day  Sleep is important for emotional health  · Eat a variety of healthy foods  Healthy foods include fruits, vegetables, whole-grain breads, low-fat dairy products, lean meats, fish, and cooked beans  A healthy meal plan is low in fat, salt, and added sugar  · Do not drink alcohol or use drugs  Alcohol and drugs can make depression worse   Talk to your therapist or doctor if you need help quitting  Follow up with your healthcare provider as directed: Your healthcare provider will monitor your progress at follow-up visits  He or she will also monitor your medicine if you take antidepressants  Your healthcare provider will ask if the medicine is helping  Tell him or her about any side effects or problems you may have with your medicine  The type or amount of medicine may need to be changed  Write down your questions so you remember to ask them during your visits  © Copyright [address] Information is for End User's use only and may not be sold, redistributed or otherwise used for commercial purposes  All illustrations and images included in CareNotes® are the copyrighted property of A D A M , Inc  or [address] [place], [place] [address]air[address]   The above information is an  only  It is not intended as medical advice for individual conditions or treatments  Talk to your doctor, nurse or pharmacist before following any medical regimen to see if it is safe and effective for you

## 2021-01-21 NOTE — DISCHARGE SUMMARY
Discharge Summary - [address] 52 y o  male MRN: [id number]  Unit/Bed#: [facility] 253-01 Encounter: [id number]     Admission Date:   Admission Orders (From admission, onward)     Ordered        01/16/21 1059  ED TO DIFFERENT CAMPUS  [facility] UNIT or INPATIENT MEDICAL UNIT to [facility] UNIT (using Discharge Readmit Navigator) - Admit Patient to [address] [place] [address]  Once                         Discharge Date: 1/21/2021    Attending Psychiatrist: Ezra Hester MD    Reason for Admission/HPI:   History of Present Illness     Patient is a 77-year-old male who presented to [facility] ED after intentional overdose of 13 50mg Trazodone tablets  Medical toxicology was consulted in the ED and he was medically cleared  During crisis evaluation he reported that recently he had increased anxiety due to occupational and financial reasons  He reported over the last few weeks increased depression with symptoms of poor sleep, inability to concentrate, hopelessness, and suicidal thoughts  He did report increased anxiety with daily panic attacks  He denied psychosis and delusional material   He did not show signs of jay  He does not have history of jay    Patient has no previous inpatient psychiatric hospitalizations, denied previous suicide attempts, and does have current outpatient psychiatrist with therapist     Psychosocial Stressors: occupational, financial    Hospital Course:   Behavioral Health Medications:   current meds:   Current Facility-Administered Medications   Medication Dose Route Frequency    acetaminophen (TYLENOL) tablet 650 mg  650 mg Oral Q6H PRN    aluminum-magnesium hydroxide-simethicone (MYLANTA) oral suspension 30 mL  30 mL Oral Q4H PRN    benztropine (COGENTIN) injection 1 mg  1 mg Intramuscular Q6H PRN    benztropine (COGENTIN) tablet 1 mg  1 mg Oral Q6H PRN    gabapentin (NEURONTIN) capsule 300 mg  300 mg Oral TID    haloperidol (HALDOL) tablet 5 mg  5 mg Oral Q6H PRN    haloperidol lactate (HALDOL) injection 5 mg  5 mg Intramuscular Q6H PRN    hydrOXYzine HCL (ATARAX) tablet 25 mg  25 mg Oral Q4H PRN    ibuprofen (MOTRIN) tablet 600 mg  600 mg Oral Q8H PRN    LORazepam (ATIVAN) injection 2 mg  2 mg Intramuscular Q6H PRN    LORazepam (ATIVAN) tablet 1 mg  1 mg Oral Q6H PRN    magnesium hydroxide (MILK OF MAGNESIA) oral suspension 30 mL  30 mL Oral Daily PRN    melatonin tablet 6 mg  6 mg Oral HS    QUEtiapine (SEROquel) tablet 50 mg  50 mg Oral HS    sertraline (ZOLOFT) tablet 50 mg  50 mg Oral Daily    traMADol (ULTRAM) tablet 50 mg  50 mg Oral Q6H PRN    traZODone (DESYREL) tablet 100 mg  100 mg Oral HS PRN       Patient was admitted to [address] [place] [address] inpatient psychiatric unit on voluntary 201 commitment for safety and stabilization  On admission patient was placed on Zoloft 25mg QD for depression and anxiety  Zoloft was quickly increased to 50mg  He did struggle with sleep where he required Seroquel 50mg HS + Melatonin 6mg HS  Neurontin was started and increased to 300mg TID for anxiety  He tolerated medications with no acute side effects  His mood brightened over the course of his treatment, and he was seen in [facility] interacting appropriately with peers  He was seen attending groups  He did not demonstrate dangerous behavior to self or others during his inpatient stay  On day of discharge patient had reduced depression, controllable anxiety, denied psychosis, did not show signs of jay, and denied suicidal/homicidal ideations  Mental Status at time of Discharge:     Appearance:  casually dressed   Behavior:  cooperative   Speech:  normal pitch and normal volume   Mood:  euthymic   Affect:  mood-congruent   Thought Process:  goal directed and linear   Thought Content:  normal   Perceptual Disturbances: None   Risk Potential: Denied SI/HI    Potential for aggression: No   Sensorium:  person, place and time/date Cognition:  recent and remote memory grossly intact   Consciousness:  alert and awake    Attention: attention span appeared shorter than expected for age   Insight:  fair   Judgment: fair   Gait/Station: normal gait/station and normal balance   Motor Activity: no abnormal movements       Discharge Diagnosis:   Depression  Generalized anxiety disorder      Discharge Medications:  See after visit summary for reconciled discharge medications provided to patient and family  Discharge instructions/Information to patient and family:   See after visit summary for information provided to patient and family  Provisions for Follow-Up Care:  See after visit summary for information related to follow-up care and any pertinent home health orders  Discharge Statement   I spent 33 minutes discharging the patient  This time was spent on the day of discharge  I had direct contact with the patient on the day of discharge  On day of discharge patient had mental status exam performed, discharge instructions/medications reviewed, and outpatient planning discussed  She was given 1 month plus 1 refill of scripts         Zaki Martin PA-C

## 2021-01-21 NOTE — PROGRESS NOTES
Pt remains calm and cooperative  Denies SI/HI, expresses a decrease in anxiety and feels readiness for discharge  States he slept well overnight  Denies any questions or concerns regarding scheduled medications

## 2021-01-21 NOTE — BH TRANSITION RECORD
Contact Information: If you have any questions, concerns, pended studies, tests and/or procedures, or emergencies regarding your inpatient behavioral health visit  Please contact [place] [facility] [phone] and ask to speak to a , nurse or physician  A contact is available 24 hours/ 7 days a week at this number  Summary of Procedures Performed During your Stay:  Below is a list of major procedures performed during your hospital stay and a summary of results:  - Cardiac Procedures/Studies: EKG  Pending Studies (From admission, onward)    None        If studies are pending at discharge, follow up with your PCP and/or referring provider

## 2021-01-21 NOTE — CASE MANAGEMENT
SARITA confirmed with [facility], Pt can start program on Fri (1/22) with his intake at 8:30 AM in person at the [facility] location; Pt will begin virtual groups on Monday  CM contacted Pt's therapist, Dr Amrit John @ [phone] & left a message to notify him of Pt's admission & planned stepdown to [address]  CM asked for a return call/fax number to send discharge information  SARITA met with Pt who reported that he was feeling "whoozy" from the new medications, but he said that he spoke with the doctor & nursing staff & they both assured him that would subside, as his body adjusted to the medication  CM reviewed that Pt is scheduled to begin PHP on Fri with the intake being in person & then virtual groups starting Monday & he was in agreement with this planning  CM contacted Pt's wife, Ann Alanis, @ [phone] & left a message regarding discharge plans

## 2021-01-22 ENCOUNTER — OFFICE VISIT (OUTPATIENT)
Dept: PSYCHOLOGY | Facility: CLINIC | Age: 51
End: 2021-01-22
Payer: COMMERCIAL

## 2021-01-22 ENCOUNTER — OFFICE VISIT (OUTPATIENT)
Dept: PSYCHIATRY | Facility: CLINIC | Age: 51
End: 2021-01-22
Payer: COMMERCIAL

## 2021-01-22 VITALS
BODY MASS INDEX: 21.76 KG/M2 | HEART RATE: 98 BPM | RESPIRATION RATE: 18 BRPM | WEIGHT: 152 LBS | SYSTOLIC BLOOD PRESSURE: 123 MMHG | HEIGHT: 70 IN | DIASTOLIC BLOOD PRESSURE: 78 MMHG | TEMPERATURE: 98.1 F

## 2021-01-22 DIAGNOSIS — F32.A DEPRESSION, UNSPECIFIED DEPRESSION TYPE: ICD-10-CM

## 2021-01-22 DIAGNOSIS — F41.1 GENERALIZED ANXIETY DISORDER: Primary | ICD-10-CM

## 2021-01-22 DIAGNOSIS — F41.1 GAD (GENERALIZED ANXIETY DISORDER): Primary | ICD-10-CM

## 2021-01-22 PROCEDURE — S0201 PARTIAL HOSPITALIZATION SERV: HCPCS

## 2021-01-22 PROCEDURE — 99215 OFFICE O/P EST HI 40 MIN: CPT | Performed by: PSYCHIATRY & NEUROLOGY

## 2021-01-22 PROCEDURE — 3008F BODY MASS INDEX DOCD: CPT | Performed by: NURSE PRACTITIONER

## 2021-01-22 PROCEDURE — 90791 PSYCH DIAGNOSTIC EVALUATION: CPT
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
## 2021-01-22 NOTE — PSYCH
Assessment/Plan:      Diagnoses and all orders for this visit:    GORDON (generalized anxiety disorder)    Depression, unspecified depression type          Subjective:     Patient ID: Zoë Mary is a 52 y o  male  [facility] Treatment Plan   AREAS OF NEED: Anxiety as evidenced by physical symptoms (tingling, dizziness), ruminative thoughts, feeling overwhelmed and helpless/hopeless, worried, sleep disturbance related to work stressors and ongoing symptoms impacting daily life  Date Initiated: 01/22/2021    Strengths: hard worker, family support, wants to feel better     LONG TERM GOAL:   Date Initiated: 01/22/2021  1 0 I will identify 3 signs that my anxiety has lessened in intensity and frequency and I am more productive in my day to day life  Target Date: 02/19/2021  Completion Date:     SHORT TERM OBJECTIVES:     Date Initiated: 01/22/2021  1 1 I will identify 3 mindfulness and/or distress tolerance skills I am practicing to reduce anxiety symptoms  Revision Date:   Target Date: 2/3/2021  Completion Date:     Date Initiated: 01/22/2021  1 2 I will identify 3 aspects of a structure I can incorporate into my daily day despite my need to be flexible with work  Revision Date:   Target Date: 2/3/2021  Completion Date:     Date Initiated: 01/22/2021  1 3 I will take medications as prescribed and share questions and concerns if arise  Revision Date:   Target Date: 2/3/2021  Completion Date:     Date Initiated: 01/22/2021  1 4 I will identify 3 ways my supports can assist in my recovery and agree to staff/support contact as indicated      Revision Date:   Target Date: 2/3/2021  Completion Date:            7 DAY REVISION:    Date Initiated:  Revision Date:   Target Date:   Completion Date:      PSYCHIATRY:  Date Initiated: 01/22/2021  Medication Management and Education       Revision Date:   The person(s) responsible for carrying out the plan is Kallie Whitman MD    NURSING:   Date Initiated: 01/22/2021  1 1,1 2,1 3,1 4 This RN will provide daily wellness group five days weekly to educate Angel Ulrich on S/S of his diagnoses and medications used in treatment  Revision Date:  The person(s) responsible for carrying out the plan is Lisbet Bynum RN    PSYCHOLOGY:   Date Initiated: 01/22/2021       1 1, 1 2, 1 4 Provide psychotherapy group 5 times per week to allow opportunity for Angel Ulrich  to explore stressors and ways of coping  Revision Date:   The person(s) responsible for carrying out the plan is Julita Crabtree    ALLIED THERAPY:   Date Initiated: 01/22/2021  1 1,1 2 Engage Angel Ulrich in AT group 5 times daily to encourage development and use of wellness tools to decrease symptoms and promote recovery through meaningful activity  Revision Date:   The person(s) responsible for carrying out the plan is LYNNETTE Hill     CASE MANAGEMENT:   Date Initiated: 01/22/2021      1 0 This  will meet with Connerlew Reichfrandy  3-4 times weekly to assess treatment progress, discharge planning, connection to community supports and UR as indicated  Revision Date:   The person(s) responsible for carrying out the plan is Min CHURCH    TREATMENT REVIEW/COMMENTS:     DISCHARGE CRITERIA: Identify 3 signs of progress and complete relapse prevention plan  DISCHARGE PLAN: OP care  Estimated Length of Stay:10 treatment days     Diagnosis and Treatment Plan explained to Kuldip Buckley, Kuldip Buckley relates understanding diagnosis and is agreeable to Treatment Plan       CLIENT COMMENTS / Please share your thoughts, feelings, need and/or experiences regarding your treatment plan: _____________________________________________________________________________________________________________________________________________________________________________________________________________________________________________________________________________________________________________________ Date/Time: ______________     Patient Signature: _________________________________     Date/Time: ______________      Signature: _________________________________     Date/Time: ______________
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
## 2021-01-22 NOTE — PSYCH
This note was not shared with the patient due to this is a psychotherapy note     Subjective:     Patient ID: Sharonda Vásquez is a 52 y o  male  [facility] Clinical Progress Notes      Specialized Services Documentation  Therapist must complete separate progress note for each specific clinical activity in which the individual participated during the day  Other 1044 Pre-Authorization: Spoke with Candace Loving at [facility] - phone [phone]  10 days authorized from 01/22/21  to 2/4/2021  Authorization number [id number]  Reviewer assigned Marvine Pipes Marylen Brinks, [facility]      Case Management Note    Marylen Brinks [facility]    Current suicide risk : Low     [id number] Met with Sharonda Vásquez  Reviewed program and given on call number  he completed releases and OP providers/ PCP notified of admission and health care coordination form completed  Completed initial psycho-social evaluation and initial treatment goals discussed  Medications changes/added/denied? Yes     Treatment session number: Evaluation only    Individual Case Management Visit provided today?  Yes     [facility] follow up physician's orders: see Dr Fara Mccloud MD

## 2021-01-25 ENCOUNTER — OFFICE VISIT (OUTPATIENT)
Dept: PSYCHOLOGY | Facility: CLINIC | Age: 51
End: 2021-01-25
Payer: COMMERCIAL

## 2021-01-25 DIAGNOSIS — F41.1 GAD (GENERALIZED ANXIETY DISORDER): Primary | ICD-10-CM

## 2021-01-25 DIAGNOSIS — F32.A DEPRESSION, UNSPECIFIED DEPRESSION TYPE: ICD-10-CM

## 2021-01-25 PROCEDURE — G0177 OPPS/PHP; TRAIN & EDUC SERV: HCPCS

## 2021-01-25 PROCEDURE — G0176 OPPS/PHP;ACTIVITY THERAPY: HCPCS

## 2021-01-25 PROCEDURE — S0201 PARTIAL HOSPITALIZATION SERV: HCPCS

## 2021-01-25 PROCEDURE — G0410 GRP PSYCH PARTIAL HOSP 45-50: HCPCS

## 2021-01-25 NOTE — PSYCH
This note was not shared with the patient due to this is a psychotherapy note   Subjective:     Patient ID: Fred Foote is a 52 y o  male  [facility] Clinical Progress Notes      Specialized Services Documentation  Therapist must complete separate progress note for each specific clinical activity in which the individual participated during the day  GROUP PSYCHOTHERAPY (2992-2690) Group was facilitated virtually in a private office using HIPAA Compliant and Approved Microsoft Teams  Fred Foote consented to the use of tele-video modality of treatment and was virtually present for group psychotherapy today  The group was offered process time to discuss current stressors  Group discussion revolved around the quote of the day, positive reframing and personal disclosures  Pablo Lopez shared that he was having a tough day because he was having difficulty concentrating and focusing  He asked questions about medications and anxiety  Pablo Lopez was encouraged to stick with medications and remember that anxiety also has symptoms and side effects  Pablo Lopez continues to make slow progress towards goals through verbal participation in group  He presented as tired and dozing off, evident by his eyes being closed, yawning and his head resting back on his pillow  Continue with Psychotherapy     TX Plan Objectives: 1 1, 1 2, 1 4   Therapist: Lilly Cavanaugh MA

## 2021-01-25 NOTE — PSYCH
This note was not shared with the patient due to this is a psychotherapy note       Subjective:     Patient ID: Navarro Serna is a 52 y o  male  [facility] Clinical Progress Notes      Specialized Services Documentation  Therapist must complete separate progress note for each specific clinical activity in which the individual participated during the day  Allied Therapy   This group was facilitated virtually in a private office using [employer] and Approved [employer] Teams  Navarro Serna consented to the use of tele-video modality of treatment  [id number] Navarro Serna moderately shared in [facility] group focused on relaxation techniques and mindfulness strategies  Prince Alejandro engaged in review of the [employer] of observe, describe and participate  Group introduced to and practiced the [facility] skills non-judgmental, one-mindfully, and effectiveness through various tasks  He appeared to struggle to stay alert  Group discussed ways to apply to slowing down to make more effective choices  Slow initial effort noted toward treatment goal   Continue AT to encourage the development and practice of mindfulness strategies to alleviate symptoms and support wellness  Tx Plan Objective: 1 1, Therapist:  Maisha CHURCH        Case Management Note    Maisha CHURCH    Current suicide risk : Low   This case management session was facilitated virtually in a private office using [employer] and Approved [employer] Teams  Navarro Serna consented to the use of tele-video modality of treatment  [id number] Met with Navarro Serna  Reported that he struggled to stay alert  He states that he slept from 9pm through 7pm but it wasn't restful  He also did not do the medication changes recommended because he was afraid he would not sleep  Reinforced he did and he is in [facility] for us to start to help him manage his anxiety yet also be alert   He was asked to hold the Melatonin and reduce Seroquel to 25mg as instructed Friday  Barriers continue to be intensity of anxiety  Reviewed treatment plan with Angel Ulrich and copy sent via email  Medications changes/added/denied? No    Treatment session number: 1    Individual Case Management Visit provided today?  Yes     [facility] follow up physician's orders: na

## 2021-01-25 NOTE — PSYCH
Virtual Regular Visit      Assessment/Plan:    Problem List Items Addressed This Visit        Other    GORDON (generalized anxiety disorder) - Primary    Depression               Reason for visit is VIRTUAL PHP DUE TO COVID-19       Encounter provider BE [address] [place] [address] PROGRAM    Provider located at [address] [address]      Recent Visits  Date Type Provider Dept   01/22/21 Office Visit [facility]   Showing recent visits within past 7 days and meeting all other requirements     Future Appointments  No visits were found meeting these conditions  Showing future appointments within next 150 days and meeting all other requirements        The patient was identified by name and date of birth  Cachorro Nguyen was informed that this is a telemedicine visit and that the visit is being conducted through [employer] and patient was informed that this is a secure, HIPAA-compliant platform  He agrees to proceed     My office door was closed  No one else was in the room  He acknowledged consent and understanding of privacy and security of the video platform  The patient has agreed to participate and understands they can discontinue the visit at any time  Patient is aware this is a billable service  Subjective  Cachorro Nguyen is a 52 y o  male          HPI     Past Medical History:   Diagnosis Date    Anxiety     Depression     Hyperlipidemia     controlled with diet and exercise    Kidney stone     Panic attack     Renal disorder     kidney stones    Suicide attempt [facility])        Past Surgical History:   Procedure Laterality Date    APPENDECTOMY      [place] CYSTO/URETERO W/LITHOTRIPSY &INDWELL STENT INSRT Left 5/9/2018    Procedure: CYSTOSCOPY URETEROSCOPY,LEFT  RETROGRADE PYELOGRAM AND INSERTION STENT URETERAL;  Surgeon: Lex Green MD;  [facility];  Service: Urology    [place] CYSTO/URETERO W/LITHOTRIPSY &INDWELL STENT INSRT Left 6/8/2018    Procedure: CYSTOSCOPY URETEROSCOPY WITH LITHOTRIPSY HOLMIUM LASER, RETROGRADE PYELOGRAM AND INSERTION STENT URETERAL;  Surgeon: Gwendolyn Mcgee MD;  [facility];  Service: Urology    TONSILLECTOMY      WISDOM TOOTH EXTRACTION      WISDOM TOOTH EXTRACTION         Current Outpatient Medications   Medication Sig Dispense Refill    gabapentin (NEURONTIN) 300 mg capsule Take 1 capsule (300 mg total) by mouth 3 (three) times a day At 9am, 4pm, 9pm 90 capsule 1    melatonin 3 mg Take 2 tablets (6 mg total) by mouth daily at bedtime 60 tablet 1    QUEtiapine (SEROquel) 50 mg tablet Take 1 tablet (50 mg total) by mouth daily at bedtime 30 tablet 1    sertraline (ZOLOFT) 50 mg tablet Take 1 tablet (50 mg total) by mouth daily At 9am 30 tablet 1     No current facility-administered medications for this visit  Allergies   Allergen Reactions    Betadine Antibiotic-Moisturize [Bacitracin-Polymyxin B] Rash       Review of Systems    Video Exam    There were no vitals filed for this visit  Physical Exam     I spent 4 hours of group + case management minutes with patient today in which greater than 50% of the time was spent in counseling/coordination of care regarding see notes      [address] [address] acknowledges that he has consented to an online visit or consultation  He understands that the online visit is based solely on information provided by him, and that, in the absence of a face-to-face physical evaluation by the physician, the diagnosis he receives is both limited and provisional in terms of accuracy and completeness  This is not intended to replace a full medical face-to-face evaluation by the physician  Julian Canela understands and accepts these terms

## 2021-01-25 NOTE — PSYCH
This note was not shared with the patient due to this is a psychotherapy note      Subjective:     Patient ID: Lakia Montemayor is a 52 y o  male  [facility] Clinical Progress Notes      Specialized Services Documentation  Therapist must complete separate progress note for each specific clinical activity in which the individual participated during the day  This group was facilitated virtually in a private office using [employer] and Approved Microsoft Teams  Lakia Montemayor consented to the use of tele-video modality of treatment  This note was not shared with the patient due to this is a psychotherapy note      Group Psychotherapy     ([id number]) Lakia Montemayor  was present in psychoeducational group which focused on exercise to improve physical and mental wellbeing  Topics included the benefits of exercise to improve physical and mental wellbeing  The group then participated in 20 minutes of low intensity chair yoga  They then discussed ideas on how to improve stamina and ways to incorporate exercise into their lives  Some slow initial progress toward goal noted  Continue psychoeducation to educate on the importance of making physical fitness a priority  Tx Plan Objective: 1 3, 1 4 Therapist:  Lina Boateng RN      Education Therapy   [id number] Lakia Monteamyor with prompts shared in morning assessment and goal review  Presented as Receptive related to readiness to learn  Lakia Montemayor did complete goal from last treatment day identifying gaining  emotional wellness  did not present with any barriers to learning  [address] engaged throughout the treatment day  Was engaged in learning related to [employer]  Staff utilized Verbal, Written, A/V, and Demonstration teaching methods  Lakia Montemayor shared area of learning and set a goal for outside of program to be mindful        Tx Plan Objective: 1 4, Therapist:  Lina Boateng RN

## 2021-01-26 ENCOUNTER — OFFICE VISIT (OUTPATIENT)
Dept: PSYCHOLOGY | Facility: CLINIC | Age: 51
End: 2021-01-26
Payer: COMMERCIAL

## 2021-01-26 DIAGNOSIS — F32.A DEPRESSION, UNSPECIFIED DEPRESSION TYPE: ICD-10-CM

## 2021-01-26 DIAGNOSIS — F41.1 GAD (GENERALIZED ANXIETY DISORDER): Primary | ICD-10-CM

## 2021-01-26 PROCEDURE — G0410 GRP PSYCH PARTIAL HOSP 45-50: HCPCS

## 2021-01-26 PROCEDURE — G0177 OPPS/PHP; TRAIN & EDUC SERV: HCPCS

## 2021-01-26 PROCEDURE — S0201 PARTIAL HOSPITALIZATION SERV: HCPCS

## 2021-01-26 NOTE — PSYCH
This note was not shared with the patient due to this is a psychotherapy note      Subjective:     Patient ID: Isreal Parish is a 52 y o  male  [facility] Clinical Progress Notes      Specialized Services Documentation  Therapist must complete separate progress note for each specific clinical activity in which the individual participated during the day  This group was facilitated virtually in a private office using [employer] and Approved [employer] Teams  Isreal Parish consented to the use of tele-video modality of treatment  This note was not shared with the patient due to this is a psychotherapy note      Group Psychotherapy     (2595-4772) Isreal Parish  was present in psychoeducational group which focused on nutrition to improve physical and mental wellbeing  Topics included appropriate serving sizes for all food groups as well as benefits to eating for health  Individuals were encouraged to think of ways they could make small changes to improve their overall health  Group learned statistics related to the benefits of healthy eating and it's link to mental health improvements  Lastly, they learned about vitamins and minerals proven to help with mental health  Good progress toward goal noted  Continue psychoeducation to educate on the importance of making nutrition a priority    Tx Plan Objective: 1 3, 1 4 Therapist:  Karla Castro RN

## 2021-01-26 NOTE — PSYCH
This note was not shared with the patient due to this is a psychotherapy note    Subjective:     Patient ID: Addy Henley is a 52 y o  male  [facility] Clinical Progress Notes      Specialized Services Documentation  Therapist must complete separate progress note for each specific clinical activity in which the individual participated during the day  Group Psychotherapy  Life Skills Group (10:25-11:10) Sophia Conklin actively engaged in group focused on the drama triangle which was facilitated virtually in a private office using HIPAA Compliant and Approved [employer] Teams  Sophia Conklin consented to the use of tele-video modality of treatment and was virtually present for group psychotherapy today  The group learned about the drama triangle and the roles of the victim, rescuer, and persecutor  Sophia Conklin identified having been in the role of a rescuer  During the activity clients learned how to stop the drama triangle from continuing  Sophia Conklin identified that he would use the technique of replying nondefensively  to stop the drama triangle  John Garcia was removed from the group for sleeping but rejoined a minute later   Sophia Conklin continues to make progress towards goals through verbal participation in group; to accomplish long term goals continue to utilize skills learned in programming  Continue with psychotherapy to educate and encourage use of wellness tools  Tx Plan Objective: 1 1,1 2 Therapist:  WALKER Velasquez

## 2021-01-26 NOTE — PSYCH
This note was not shared with the patient due to this is a psychotherapy note   Subjective:     Patient ID: Harjeet Tipton is a 52 y o  male  [facility] Clinical Progress Notes      Specialized Services Documentation  Therapist must complete separate progress note for each specific clinical activity in which the individual participated during the day  GROUP PSYCHOTHERAPY (8306  6521) Group was facilitated virtually in a private office using HIPAA Compliant and Approved Microsoft Teams  Harjeet Tipton consented to the use of tele-video modality of treatment and was virtually present for group psychotherapy today  The group members received a safe and non-judgmental space to discuss current stressors and received feedback from the group  The group discussions revolved around personal disclosures, coping skills and positive hobbies, thinking errors and naming it to tame it  Elia Trinh shared that at times he feels hopeless and he's not sure why  He recognizes that he needs to make changes rather than staying stagnant  He received encouragement and suggestions for skills from other members  Elia Trinh continues to make progress towards goals through verbal participation in group  Continue with psychotherapy  [address] [facility] Objectives: 1 1, 1 2, 1 4   Therapist: Robert Eng MA    Education Therapy   [id number] Harjeet Tipton with prompts shared in morning assessment and goal review  Presented as Receptive related to readiness to learn  Harjeet Tipton did complete goal from last treatment day identifying gaining a good feeling  did present with any barriers to learning  [address] [facility] engaged throughout the treatment day  Was engaged in learning related to Illness, Medication, Aftercare and Wellness Tools  Staff utilized Verbal, Written, A/V and Demonstration teaching methods    Harjeet Tipton shared area of learning and set a goal for outside of program to talk to wife about food choices        Tx Plan Objective: 1 1, 1 2, 1 4 Therapist:  Pari Corey MA

## 2021-01-26 NOTE — PSYCH
Virtual Regular Visit      Assessment/Plan:    Problem List Items Addressed This Visit        Other    GORDON (generalized anxiety disorder) - Primary    Depression               Reason for visit is VIRTUAL PHP DUE TO COVID-19       Encounter provider BE [address] [place] [address] PROGRAM    Provider located at [address] [place] [id number]      Recent Visits  Date Type Provider Dept   01/25/21 Office Visit [facility]   01/22/21 Office Visit [facility]   Showing recent visits within past 7 days and meeting all other requirements     Future Appointments  No visits were found meeting these conditions  Showing future appointments within next 150 days and meeting all other requirements        The patient was identified by name and date of birth  Yamile Pérez was informed that this is a telemedicine visit and that the visit is being conducted through [employer] and patient was informed that this is a secure, HIPAA-compliant platform  He agrees to proceed     My office door was closed  No one else was in the room  He acknowledged consent and understanding of privacy and security of the video platform  The patient has agreed to participate and understands they can discontinue the visit at any time  Patient is aware this is a billable service  Subjective  Yamile Pérez is a 52 y o  male          HPI     Past Medical History:   Diagnosis Date    Anxiety     Depression     Hyperlipidemia     controlled with diet and exercise    Kidney stone     Panic attack     Renal disorder     kidney stones    Suicide attempt [facility])        Past Surgical History:   Procedure Laterality Date    APPENDECTOMY      [place] CYSTO/URETERO W/LITHOTRIPSY &INDWELL STENT INSRT Left 5/9/2018    Procedure: CYSTOSCOPY URETEROSCOPY,LEFT  RETROGRADE PYELOGRAM AND INSERTION STENT URETERAL;  Surgeon: Tushar Wade MD; [facility];  Service: Urology    [place] CYSTO/URETERO W/LITHOTRIPSY &INDWELL STENT INSRT Left 6/8/2018    Procedure: CYSTOSCOPY URETEROSCOPY WITH LITHOTRIPSY HOLMIUM LASER, RETROGRADE PYELOGRAM AND INSERTION STENT URETERAL;  Surgeon: Alfredo Gomez MD;  [facility];  Service: Urology    TONSILLECTOMY      WISDOM TOOTH EXTRACTION      WISDOM TOOTH EXTRACTION         Current Outpatient Medications   Medication Sig Dispense Refill    gabapentin (NEURONTIN) 300 mg capsule Take 1 capsule (300 mg total) by mouth 3 (three) times a day At 9am, 4pm, 9pm 90 capsule 1    melatonin 3 mg Take 2 tablets (6 mg total) by mouth daily at bedtime 60 tablet 1    QUEtiapine (SEROquel) 50 mg tablet Take 1 tablet (50 mg total) by mouth daily at bedtime 30 tablet 1    sertraline (ZOLOFT) 50 mg tablet Take 1 tablet (50 mg total) by mouth daily At 9am 30 tablet 1     No current facility-administered medications for this visit  Allergies   Allergen Reactions    Betadine Antibiotic-Moisturize [Bacitracin-Polymyxin B] Rash       Review of Systems    Video Exam    There were no vitals filed for this visit  Physical Exam     I spent 4 hours of group + case management minutes with patient today in which greater than 50% of the time was spent in counseling/coordination of care regarding see notes  VIRTUAL VISIT DISCLAIMER    Vega Umanzor acknowledges that he has consented to an online visit or consultation  He understands that the online visit is based solely on information provided by him, and that, in the absence of a face-to-face physical evaluation by the physician, the diagnosis he receives is both limited and provisional in terms of accuracy and completeness  This is not intended to replace a full medical face-to-face evaluation by the physician  Vega Umanzor understands and accepts these terms

## 2021-01-26 NOTE — PSYCH
This note was not shared with the patient due to this is a psychotherapy note   Subjective:     Patient ID: Harjeet Tipton is a 52 y o  male  [facility] Clinical Progress Notes      Specialized Services Documentation  Therapist must complete separate progress note for each specific clinical activity in which the individual participated during the day  Case Management Note    [facility]    Current suicide risk : Low     This case management session was facilitated virtually in a private office using HIPPA Compliant and Approved Microsoft Teams  Harjeet Tipton consented to the use of tele-video modality of treatment  [id number] Met with Harjeet Tipton  Reported that he gained good information today  Discussed drowsiness and ways to stay alert  He did sleep okay last night without the Melatonin and only 25mg of Seroquel  He did go for a walk yesterday and plans to do so again today  He gained from the nutrition group and stated that yesterday he fought through napping and did word puzzles along with the walk  Barriers continue to be sedation and overall anxiety  Reviewed UR outcome (which he had questions on from IP)  Informed that his FMLA and STD paperwork was completed and faxed  Medications changes/added/denied? No    Treatment session number: 2    Individual Case Management Visit provided today?  Yes     [facility] follow up physician's orders: na

## 2021-01-27 ENCOUNTER — OFFICE VISIT (OUTPATIENT)
Dept: PSYCHOLOGY | Facility: CLINIC | Age: 51
End: 2021-01-27
Payer: COMMERCIAL

## 2021-01-27 DIAGNOSIS — F32.A DEPRESSION, UNSPECIFIED DEPRESSION TYPE: ICD-10-CM

## 2021-01-27 DIAGNOSIS — F41.1 GAD (GENERALIZED ANXIETY DISORDER): Primary | ICD-10-CM

## 2021-01-27 PROCEDURE — S0201 PARTIAL HOSPITALIZATION SERV: HCPCS

## 2021-01-27 PROCEDURE — G0410 GRP PSYCH PARTIAL HOSP 45-50: HCPCS

## 2021-01-27 PROCEDURE — G0176 OPPS/PHP;ACTIVITY THERAPY: HCPCS

## 2021-01-27 PROCEDURE — G0177 OPPS/PHP; TRAIN & EDUC SERV: HCPCS

## 2021-01-27 NOTE — PSYCH
Subjective:     Patient ID: Fred Foote is a 52 y o  male  [facility] Clinical Progress Notes      Specialized Services Documentation  Therapist must complete separate progress note for each specific clinical activity in which the individual participated during the day  This group was facilitated virtually in a private office using [employer] and Approved [employer] Teams  Fred Foote consented to the use of tele-video modality of treatment  This note was not shared with the patient due to this is a psychotherapy note      Group Psychotherapy     (2248-8808) Fred Foote  was present in psychoeducational group which focused on taking responsibility for physical health and well being  Group members were paired up and encouraged to explore different categories of physical health which included:  weight, flexibility, skin, strength, eyes, nutrition, dental, foot care, and preventative care  They then discussed ideas on how to take responsibility for each physical aspect  Good progress toward goal noted  Continue psychotherapy to educate on the importance of making physical health a priority  Tx Plan Objective: 1 3 Therapist:  Keith Thornton RN      Education Therapy   5225-4759 Fred Foote quietly shared in morning assessment and goal review  Presented as Receptive related to readiness to learn  Fred Foote did complete goal from last treatment day identifying gaining  emotional wellness  did not present with any barriers to learning  [address] [facility] engaged throughout the treatment day  Was engaged in learning related to [employer]  Staff utilized Verbal, Written, A/V, and Demonstration teaching methods  Fred Foote shared area of learning and set a goal for outside of program to write letter        Tx Plan Objective: 1 4, Therapist:  Keith Thornton RN This note was not shared with the patient due to this is a psychotherapy note

## 2021-01-27 NOTE — PSYCH
Virtual Regular Visit      Assessment/Plan:    Problem List Items Addressed This Visit        Other    GORDON (generalized anxiety disorder) - Primary    Depression               Reason for visit is VIRTUAL PHP DUE TO COVID-19       Encounter provider BE [address] [place] [address] PROGRAM    Provider located at [address] [place] [phone]      Recent Visits  Date Type Provider Dept   01/26/21 Office Visit [facility]   01/25/21 Office Visit [facility]   01/22/21 Office Visit [facility]   Showing recent visits within past 7 days and meeting all other requirements     Future Appointments  No visits were found meeting these conditions  Showing future appointments within next 150 days and meeting all other requirements        The patient was identified by name and date of birth  Melani Ryder was informed that this is a telemedicine visit and that the visit is being conducted through [employer] and patient was informed that this is a secure, HIPAA-compliant platform  He agrees to proceed     My office door was closed  No one else was in the room  He acknowledged consent and understanding of privacy and security of the video platform  The patient has agreed to participate and understands they can discontinue the visit at any time  Patient is aware this is a billable service  Subjective  Melani Ryder is a 52 y o  male          HPI     Past Medical History:   Diagnosis Date    Anxiety     Depression     Hyperlipidemia     controlled with diet and exercise    Kidney stone     Panic attack     Renal disorder     kidney stones    [facility]icide attempt [facility])        Past Surgical History:   Procedure Laterality Date    APPENDECTOMY      [place] CYSTO/URETERO W/LITHOTRIPSY &INDWELL STENT INSRT Left 5/9/2018    Procedure: CYSTOSCOPY URETEROSCOPY,LEFT  RETROGRADE PYELOGRAM AND INSERTION STENT URETERAL;  Surgeon: James Huddleston MD;  [facility];  Service: Urology    [place] CYSTO/URETERO W/LITHOTRIPSY &INDWELL STENT INSRT Left 6/8/2018    Procedure: CYSTOSCOPY URETEROSCOPY WITH LITHOTRIPSY HOLMIUM LASER, RETROGRADE PYELOGRAM AND INSERTION STENT URETERAL;  Surgeon: James Huddleston MD;  [facility];  Service: Urology    TONSILLECTOMY      WISDOM TOOTH EXTRACTION      WISDOM TOOTH EXTRACTION         Current Outpatient Medications   Medication Sig Dispense Refill    gabapentin (NEURONTIN) 300 mg capsule Take 1 capsule (300 mg total) by mouth 3 (three) times a day At 9am, 4pm, 9pm 90 capsule 1    melatonin 3 mg Take 2 tablets (6 mg total) by mouth daily at bedtime 60 tablet 1    QUEtiapine (SEROquel) 50 mg tablet Take 1 tablet (50 mg total) by mouth daily at bedtime 30 tablet 1    sertraline (ZOLOFT) 50 mg tablet Take 1 tablet (50 mg total) by mouth daily At 9am 30 tablet 1     No current facility-administered medications for this visit  Allergies   Allergen Reactions    Betadine Antibiotic-Moisturize [Bacitracin-Polymyxin B] Rash       Review of Systems    Video Exam    There were no vitals filed for this visit  Physical Exam     I spent 4 hours of group + case management minutes with patient today in which greater than 50% of the time was spent in counseling/coordination of care regarding see notes  VIRTUAL VISIT DISCLAIMER    Vineet Burr acknowledges that he has consented to an online visit or consultation  He understands that the online visit is based solely on information provided by him, and that, in the absence of a face-to-face physical evaluation by the physician, the diagnosis he receives is both limited and provisional in terms of accuracy and completeness  This is not intended to replace a full medical face-to-face evaluation by the physician  Vineet Burr understands and accepts these terms

## 2021-01-27 NOTE — PSYCH
This note was not shared with the patient due to this is a psychotherapy note  Subjective:     Patient ID: Galindo Luke is a 52 y o  male  [facility] Clinical Progress Notes      Specialized Services Documentation  Therapist must complete separate progress note for each specific clinical activity in which the individual participated during the day  Group Psychotherapy Life Skills Group (10:25-11:10) te Ave actively engaged in group focused on the empowerment triangle which was facilitated virtually in a private office using HIPAA Compliant and Approved Microsoft Teams  Wendy Dorado consented to the use of tele-video modality of treatment and was virtually present for group psychotherapy today  The group learned about the empowerment triangle and the roles of the creator, , and challenger  The group examined how to shift their mindset from their role(s) in the drama triangle to their role(s) in the [employer]'s  Genita Fresh discussed how he would shift his mindset from his role in the drama triangle to his role in the empowerment triangle by advocating more  te Ave continues to make progress towards goals through verbal participation in group; to accomplish long term goals continue to utilize skills learned in programming  Continue with psychotherapy to educate and encourage use of wellness tools    Tx Plan Objective: 1 1,1 2 Therapist:  WALKER Hollis

## 2021-01-27 NOTE — PSYCH
This note was not shared with the patient due to this is a psychotherapy note   Subjective:     Patient ID: Aubrie Clemens is a 52 y o  male  [facility] Clinical Progress Notes      Specialized Services Documentation  Therapist must complete separate progress note for each specific clinical activity in which the individual participated during the day  Allied Therapy   This group was facilitated virtually in a private office using [employer] and Approved Microsoft Teams  Aubrie Clemens consented to the use of tele-video modality of treatment  [id number] Aubrie Clemens actively shared in [facility] group focused on skill development and the concept of letting go and Radical Acceptance  Engaged in therapist led progressive muscle relaxation  During group discussion on letting go, Maki Urrutia participated in group reading  He was more engaged and less drowsy  Group reinforced importance of consistently caring for ones self and use of strategies explored to refocus to the present  Some beginning progress toward goal   Continue AT to increase skill awareness and use of skills consistently  Tx Plan Objective: 1 1,1 2, Therapist:  Talon Villanueva MT-BC        Case Management Note    Talon Villanueva MT-BC    Current suicide risk : Low     This case management session was facilitated virtually in a private office using SMR SITE and Approved Microsoft Teams  Aubrie Clemens consented to the use of tele-video modality of treatment  [id number] Met with Aubrie Clemens  Reported that he overall was less drowsy today  He was encouraged to continue current medication routine  He also was able to make connections between groups and his own needs  Discussed the visceral nature of anxiety and the need to not focus on how one "feels" all the time - discussed refocusing and sensory skills to do so  Medications changes/added/denied?  No    Treatment session number: 3    Individual Case Management Visit provided today?  Yes     [facility] follow up physician's orders: na

## 2021-01-28 ENCOUNTER — OFFICE VISIT (OUTPATIENT)
Dept: PSYCHOLOGY | Facility: CLINIC | Age: 51
End: 2021-01-28
Payer: COMMERCIAL

## 2021-01-28 DIAGNOSIS — F32.A DEPRESSION, UNSPECIFIED DEPRESSION TYPE: ICD-10-CM

## 2021-01-28 DIAGNOSIS — F41.1 GAD (GENERALIZED ANXIETY DISORDER): Primary | ICD-10-CM

## 2021-01-28 PROCEDURE — G0176 OPPS/PHP;ACTIVITY THERAPY: HCPCS

## 2021-01-28 PROCEDURE — S0201 PARTIAL HOSPITALIZATION SERV: HCPCS

## 2021-01-28 PROCEDURE — G0410 GRP PSYCH PARTIAL HOSP 45-50: HCPCS

## 2021-01-28 PROCEDURE — G0177 OPPS/PHP; TRAIN & EDUC SERV: HCPCS

## 2021-01-28 NOTE — PSYCH
This note was not shared with the patient due to this is a psychotherapy note   Subjective:     Patient ID: Yin Azar is a 52 y o  male  [facility] Clinical Progress Notes      Specialized Services Documentation  Therapist must complete separate progress note for each specific clinical activity in which the individual participated during the day  GROUP PSYCHOTHERAPY ([id number]) Group was facilitated virtually in a private office using HIPAA Compliant and Approved Microsoft Teams  Yin Azar consented to the use of tele-video modality of treatment and was virtually present for group psychotherapy today  Yin Azar attentively listened to [facility] share his life story as he co-led this session  Group encouraged power of learning about self, accepting illness and personal responsibility in recovery  Community resources reviewed in addition to personal resources like the affirmations  Progress toward goals noted  Continue psychotherapy to encourage self -awareness and healthy engagement of supports      TX Plan Objectives: 1 1, 1 2, 1 4   Therapist: Blase Felty, MA

## 2021-01-28 NOTE — PSYCH
This note was not shared with the patient due to this is a psychotherapy note  Subjective:     Patient ID: Shahnaz Maravilla is a 52 y o  male  [facility] Clinical Progress Notes      Specialized Services Documentation  Therapist must complete separate progress note for each specific clinical activity in which the individual participated during the day  Group Psychotherapy Life Skills Group (10:25-11:10) Jaylin Arroyo actively engaged in group focused on self compassion which was facilitated virtually in a private office using HIPAA Compliant and Approved [employer] Teams  Jaylin Arroyo consented to the use of tele-video modality of treatment and was virtually present for group psychotherapy today  The group learned about the importance of self compassion and how to bring self-compassion into their lives  The group examined why they feel bad about themselves and the emotions that come up for them  Jaylin Arroyo discussed the emotions of frustration that come up for him when he thinks about that aspect of his life  The group then explored what a friend who is unconditionally loving, accepting and compassionate would say to them about their perceived inadequacy  Jaylin Arroyo states that a friend like that would say to him that he needs to breath and it will be ok  Jaylin Arroyo continues to make progress towards goals through verbal participation in group; to accomplish long term goals continue to utilize skills learned in programming  Continue with psychotherapy to educate and encourage use of wellness tools   Tx Plan Objective: 1 1,1 2 Therapist:  Dorethea Dakins, MSW

## 2021-01-28 NOTE — PSYCH
This note was not shared with the patient due to this is a psychotherapy note      Subjective:     Patient ID: Sharonda Vásquez is a 52 y o  male  [facility] Clinical Progress Notes      Specialized Services Documentation  Therapist must complete separate progress note for each specific clinical activity in which the individual participated during the day  Allied Therapy   This group was facilitated virtually in a private office using [employer] and Approved Microsoft Teams  Sharonda Vásquez consented to the use of tele-video modality of treatment  [id number] Sharonda Vásquez  actively shared in [facility] group focused on self- care  Shabnam Garcia was encouraged to identify aspects of self-care and barriers to it  The concept of self -care versus selfishness explored  Group reinforced the necessity of self -care in wellness versus seeing this as a luxury and tips to increase self-care  When asked at end of group a specific thing she could commit to in order to increase self-care, Shabnam Garcia stated use CALM christopher before bed  Slow progress voiced toward goal   Continue AT to engage in the development and practice of wellness tools  Tx Plan Objective: 1 1, Therapist:  Marylen Brinks [facility]         Education Therapy   [id number] Sharonda Vásquez actively shared in morning assessment and goal review  Presented as Receptive related to readiness to learn  Sharonda Vásquez did complete goal from last treatment day identifying gaining responsibility  did not present with any barriers to learning  [facility] engaged throughout the treatment day  Was engaged in learning related to Illness and Wellness Tools  Staff utilized Verbal, A/V and Demonstration teaching methods  Sharonda Vásquez shared area of learning and set a goal for outside of program to do muscle relaxation        Tx Plan Objective: 1 1, Therapist:  Marylen Brinks MT-BC      Case Management Note    Marylen Brinks MT-BC    Current suicide risk : Low     This case management session was facilitated virtually in a private office using HIPPA Compliant and Approved Microsoft Teams  Arabella Agudelo consented to the use of tele-video modality of treatment  [phone] Met with Arabella Agudelo  Reported that he had a rough night  He had emailed this writer last night at 7:30pm and requested a call urgently this morning  He was called at [phone] as well as reassurance provided  He remains easily overwhelmed and somatic  He was redirected to engage in the day and to begin to increase control over his body by beginning to practice progressive muscle relaxation  Reviewed med check tomorrow  Medications changes/added/denied? No    Treatment session number: 4    Individual Case Management Visit provided today?  Yes     [facility] follow up physician's orders: na

## 2021-01-28 NOTE — PSYCH
Virtual Regular Visit      Assessment/Plan:    Problem List Items Addressed This Visit        Other    GORDON (generalized anxiety disorder) - Primary    Depression               Reason for visit is VIRTUAL PHP DUE TO COVID-19       Encounter provider BE [address] [place] [address] PROGRAM    Provider located at [address] [place] [phone]      Recent Visits  Date Type Provider Dept   01/27/21 [facility]   01/26/21 [facility]   01/25/21 [facility]   01/22/21 [facility]   Showing recent visits within past 7 days and meeting all other requirements     Today's Visits  Date Type Provider Dept   01/28/21 [facility]   Showing today's visits and meeting all other requirements     Future Appointments  No visits were found meeting these conditions  Showing future appointments within next 150 days and meeting all other requirements        The patient was identified by name and date of birth  Jesus Manuel Saul was informed that this is a telemedicine visit and that the visit is being conducted through [employer] and patient was informed that this is a secure, HIPAA-compliant platform  He agrees to proceed     My office door was closed  No one else was in the room  He acknowledged consent and understanding of privacy and security of the video platform  The patient has agreed to participate and understands they can discontinue the visit at any time  Patient is aware this is a billable service  Subjective  Jesus Manuel Saul is a 52 y o  male          HPI     Past Medical History:   Diagnosis Date    Anxiety     Depression     Hyperlipidemia     controlled with diet and exercise    Kidney stone     Panic attack     Renal disorder     kidney stones    [facility]icide attempt [facility])        Past Surgical History:   Procedure Laterality Date    APPENDECTOMY      [place] CYSTO/URETERO W/LITHOTRIPSY &INDWELL STENT INSRT Left 5/9/2018    Procedure: CYSTOSCOPY URETEROSCOPY,LEFT  RETROGRADE PYELOGRAM AND INSERTION STENT URETERAL;  Surgeon: Steve Power MD;  [facility];  Service: Urology    [place] CYSTO/URETERO W/LITHOTRIPSY &INDWELL STENT INSRT Left 6/8/2018    Procedure: CYSTOSCOPY URETEROSCOPY WITH LITHOTRIPSY HOLMIUM LASER, RETROGRADE PYELOGRAM AND INSERTION STENT URETERAL;  Surgeon: Steve Power MD;  [facility];  Service: Urology    TONSILLECTOMY      WISDOM TOOTH EXTRACTION      WISDOM TOOTH EXTRACTION         Current Outpatient Medications   Medication Sig Dispense Refill    gabapentin (NEURONTIN) 300 mg capsule Take 1 capsule (300 mg total) by mouth 3 (three) times a day At 9am, 4pm, 9pm 90 capsule 1    melatonin 3 mg Take 2 tablets (6 mg total) by mouth daily at bedtime 60 tablet 1    QUEtiapine (SEROquel) 50 mg tablet Take 1 tablet (50 mg total) by mouth daily at bedtime 30 tablet 1    sertraline (ZOLOFT) 50 mg tablet Take 1 tablet (50 mg total) by mouth daily At 9am 30 tablet 1     No current facility-administered medications for this visit  Allergies   Allergen Reactions    Betadine Antibiotic-Moisturize [Bacitracin-Polymyxin B] Rash       Review of Systems    Video Exam    There were no vitals filed for this visit  Physical Exam     I spent 4 hours of grroup + case management minutes with patient today in which greater than 50% of the time was spent in counseling/coordination of care regarding see notes  VIRTUAL VISIT DISCLAIMER    Aubrie Clemens acknowledges that he has consented to an online visit or consultation   He understands that the online visit is based solely on information provided by him, and that, in the absence of a face-to-face physical evaluation by the physician, the diagnosis he receives is both limited and provisional in terms of accuracy and completeness  This is not intended to replace a full medical face-to-face evaluation by the physician  Navarro Serna understands and accepts these terms

## 2021-01-29 ENCOUNTER — TELEMEDICINE (OUTPATIENT)
Dept: PSYCHIATRY | Facility: CLINIC | Age: 51
End: 2021-01-29
Payer: COMMERCIAL

## 2021-01-29 ENCOUNTER — OFFICE VISIT (OUTPATIENT)
Dept: PSYCHOLOGY | Facility: CLINIC | Age: 51
End: 2021-01-29
Payer: COMMERCIAL

## 2021-01-29 DIAGNOSIS — F41.1 GAD (GENERALIZED ANXIETY DISORDER): Primary | ICD-10-CM

## 2021-01-29 DIAGNOSIS — F32.A DEPRESSION, UNSPECIFIED DEPRESSION TYPE: ICD-10-CM

## 2021-01-29 DIAGNOSIS — G47.00 INSOMNIA: ICD-10-CM

## 2021-01-29 PROBLEM — Z00.8 MEDICAL CLEARANCE FOR PSYCHIATRIC ADMISSION: Status: RESOLVED | Noted: 2021-01-17 | Resolved: 2021-01-29

## 2021-01-29 PROCEDURE — S0201 PARTIAL HOSPITALIZATION SERV: HCPCS

## 2021-01-29 PROCEDURE — G0410 GRP PSYCH PARTIAL HOSP 45-50: HCPCS

## 2021-01-29 PROCEDURE — 99214 OFFICE O/P EST MOD 30 MIN: CPT | Performed by: NURSE PRACTITIONER

## 2021-01-29 PROCEDURE — G0177 OPPS/PHP; TRAIN & EDUC SERV: HCPCS

## 2021-01-29 PROCEDURE — 1036F TOBACCO NON-USER: CPT | Performed by: NURSE PRACTITIONER

## 2021-01-29 PROCEDURE — 90832 PSYTX W PT 30 MINUTES: CPT

## 2021-01-29 RX ORDER — QUETIAPINE FUMARATE 50 MG/1
25 TABLET, FILM COATED ORAL
Qty: 30 TABLET | Refills: 1
Start: 2021-01-29 | End: 2021-03-08 | Stop reason: SDUPTHER

## 2021-01-29 NOTE — PSYCH
This note was not shared with the patient due to this is a psychotherapy note       Subjective:     Patient ID: Vineet Burr is a 52 y o  male  [facility] Clinical Progress Notes      Specialized Services Documentation  Therapist must complete separate progress note for each specific clinical activity in which the individual participated during the day  Case Management Note    Naun Arndt [facility]    Current suicide risk : Low     1250 - 1306 Met with Vineet Burr to discuss weekend plans and supports  Vineet Burr identified that he was feeling better today and apologized for "freaking out yesterday"  Discussed that wellness isn't all or nothing and that ebbs and flows are okay  He was able to be resilient and get through it  He used PMR at bed and found it helpful  He has a visit with his sister and dad planned for this weekend  Goals for next week include continued work on objectives, monitor med needs, and will be excused on Monday for previously set sleep study follow up appointment  Medications changes/added/denied? No    Treatment session number: 5    Individual Case Management Visit provided today? Yes     [facility] follow up physician's orders: see ROXANNA Henao note

## 2021-01-29 NOTE — PSYCH
Virtual Regular Visit      Assessment/Plan:    Problem List Items Addressed This Visit        Other    GORDON (generalized anxiety disorder) - Primary    Depression               Reason for visit is VIRTUAL PHP DUE TO COVID-19       Encounter provider BE [address] [place] [address] PROGRAM    Provider located at [address] [place] [phone]      Recent Visits  Date Type Provider Dept   01/28/21 [facility]   01/27/21 [facility]   01/26/21 [facility]   01/25/21 [facility]   01/22/21 [facility]   Showing recent visits within past 7 days and meeting all other requirements     Future Appointments  No visits were found meeting these conditions  Showing future appointments within next 150 days and meeting all other requirements        The patient was identified by name and date of birth  Karlene Dhillon was informed that this is a telemedicine visit and that the visit is being conducted through [employer] and patient was informed that this is a secure, HIPAA-compliant platform  He agrees to proceed     My office door was closed  No one else was in the room  He acknowledged consent and understanding of privacy and security of the video platform  The patient has agreed to participate and understands they can discontinue the visit at any time  Patient is aware this is a billable service  Subjective  Karlene Dhillon is a 52 y o  male          HPI     Past Medical History:   Diagnosis Date    Anxiety     Depression     Hyperlipidemia     controlled with diet and exercise    Kidney stone     Panic attack     Renal disorder     kidney stones    [facility])        Past Surgical History:   Procedure Laterality Date    APPENDECTOMY  [place] CYSTO/URETERO W/LITHOTRIPSY &INDWELL STENT INSRT Left 5/9/2018    Procedure: CYSTOSCOPY URETEROSCOPY,LEFT  RETROGRADE PYELOGRAM AND INSERTION STENT URETERAL;  Surgeon: Ebony Lott MD;  [facility];  Service: Urology    [place] CYSTO/URETERO W/LITHOTRIPSY &INDWELL STENT INSRT Left 6/8/2018    Procedure: CYSTOSCOPY URETEROSCOPY WITH LITHOTRIPSY HOLMIUM LASER, RETROGRADE PYELOGRAM AND INSERTION STENT URETERAL;  Surgeon: Ebony Lott MD;  [facility];  Service: Urology    TONSILLECTOMY      WISDOM TOOTH EXTRACTION      WISDOM TOOTH EXTRACTION         Current Outpatient Medications   Medication Sig Dispense Refill    gabapentin (NEURONTIN) 300 mg capsule Take 1 capsule (300 mg total) by mouth 3 (three) times a day At 9am, 4pm, 9pm 90 capsule 1    melatonin 3 mg Take 2 tablets (6 mg total) by mouth daily at bedtime 60 tablet 1    QUEtiapine (SEROquel) 50 mg tablet Take 0 5 tablets (25 mg total) by mouth daily at bedtime 30 tablet 1    sertraline (ZOLOFT) 50 mg tablet Take 1 tablet (50 mg total) by mouth daily At 9am 30 tablet 1     No current facility-administered medications for this visit  Allergies   Allergen Reactions    Betadine Antibiotic-Moisturize [Bacitracin-Polymyxin B] Rash       Review of Systems    Video Exam    There were no vitals filed for this visit  Physical Exam     I spent 4 hours of group + case management minutes with patient today in which greater than 50% of the time was spent in counseling/coordination of care regarding see notes  VIRTUAL VISIT DISCLAIMER    Navarro Serna acknowledges that he has consented to an online visit or consultation  He understands that the online visit is based solely on information provided by him, and that, in the absence of a face-to-face physical evaluation by the physician, the diagnosis he receives is both limited and provisional in terms of accuracy and completeness   This is not intended to replace a full medical face-to-face evaluation by the physician  Amol Padilla understands and accepts these terms

## 2021-01-29 NOTE — PSYCH
This note was not shared with the patient due to this is a psychotherapy note    Subjective:     Patient ID: Melani Ryder is a 52 y o  male  [facility] Clinical Progress Notes      Specialized Services Documentation  Therapist must complete separate progress note for each specific clinical activity in which the individual participated during the day  Group Psychotherapy     ([id number]) Melani Ryder actively shared in psychotherapy group which focused on Weekly Wellness Assessment  Theyengaged in self-rate and discussion  Group members individually went through the assessment and rated themselves based on the past week  Group members shared assessment results  Group discussed the six domains of wellness; physical, emotional, cognitive, vocational, social, and spiritual  Group discussed strengths, challenges, barriers, and ways to increase each domain in and open forum and developed goals  Good progress towards goal observed and shared  Continue psychotherapy to further encourage self-reflection on strengths and challenges with personal wellness      Tx Plan Objective:  1 1,1 2,1 3,1 4 Therapist:  Pelon Rea RN

## 2021-01-29 NOTE — PSYCH
This note was not shared with the patient due to this is a psychotherapy note   Subjective:     Patient ID: Sonam Bob is a 52 y o  male  [facility] Clinical Progress Notes      Specialized Services Documentation  Therapist must complete separate progress note for each specific clinical activity in which the individual participated during the day  GROUP PSYCHOTHERAPY ([id number]) Group was facilitated virtually in a private office using HIPAA Compliant and Approved [employer] Teams  Sonam Bob consented to the use of tele-video modality of treatment and was virtually present for group psychotherapy today  The group members received a safe and non-judgmental space to discuss current stressors and received feedback from the group  The group discussions revolved around personal disclosures, judgements and negative self-talk  Fabien Alejandro shared, as a , he will often get criticism  He finds it difficult to deflect the negative comments  He empathized with another member in regards to believing some of the negative comments  Fabien Alejandro continues to make progress towards goals through verbal participation in group  Continue with psychotherapy  [facility] Objectives: 1 1, 1 2, 1 4   Therapist: Brenda North MA    Education Therapy   [id number] Sonam Bob with prompts shared in morning assessment and goal review  Presented as Receptive related to readiness to learn  Sonam Bob did complete goal from last treatment day identifying gaining relaxation  did not present with any barriers to learning  [address] [facility] engaged throughout the treatment day  Was engaged in learning related to Illness, Medication, Aftercare and Wellness Tools  Staff utilized Verbal, Written, A/V and Demonstration teaching methods  Sonam Bob shared area of learning and set a goal for outside of program to spending time with family        Tx Plan Objective: 1 1, 1 2, 1 4 Therapist: Hunter Patton MA

## 2021-01-29 NOTE — PSYCH
Virtual Regular Visit    Problem List Items Addressed This Visit        Other    GORDON (generalized anxiety disorder) - Primary             Encounter provider EDWAR Quintero    Provider located at   [address] [place] [address]  [address]  [facility] [id number]    Recent Visits  Date Type Provider Dept   01/22/21 Office Visit MD Keagan Tyler 426 recent visits within past 7 days and meeting all other requirements     Future Appointments  No visits were found meeting these conditions  Showing future appointments within next 150 days and meeting all other requirements      The patient was identified by name and date of birth  Aubrie Clemens was informed that this is a telemedicine visit and that the visit is being conducted through [employer]  My office door was closed  No one else was in the room  He acknowledged consent and understanding of privacy and security of the video platform  The patient has agreed to participate and understands they can discontinue the visit at any time  Patient is aware this is a billable service  HPI     Current Outpatient Medications   Medication Sig Dispense Refill    gabapentin (NEURONTIN) 300 mg capsule Take 1 capsule (300 mg total) by mouth 3 (three) times a day At 9am, 4pm, 9pm 90 capsule 1    melatonin 3 mg Take 2 tablets (6 mg total) by mouth daily at bedtime 60 tablet 1    QUEtiapine (SEROquel) 50 mg tablet Take 1 tablet (50 mg total) by mouth daily at bedtime 30 tablet 1    sertraline (ZOLOFT) 50 mg tablet Take 1 tablet (50 mg total) by mouth daily At 9am 30 tablet 1     No current facility-administered medications for this visit  Review of Systems  Video Exam    There were no vitals filed for this visit  Physical Exam   As a result of this visit, I have referred the patient for further respiratory evaluation   No    I spent 15 minutes directly with the patient during this visit  [address] Zoey Mckenna acknowledges that he has consented to an online visit or consultation  He understands that the online visit is based solely on information provided by him, and that, in the absence of a face-to-face physical evaluation by the physician, the diagnosis he receives is both limited and provisional in terms of accuracy and completeness  This is not intended to replace a full medical face-to-face evaluation by the physician  Jl Rosales understands and accepts these terms  PHP MEDICATION MANAGEMENT NOTE        [address]    Name and Date of Birth:  Jl Rosales 52 y o  1970 MRN: [id number]    Date of Visit: January 29, 2021    Allergies   Allergen Reactions    Betadine Antibiotic-Moisturize [Bacitracin-Polymyxin B] Rash     SUBJECTIVE:    Jeanna Fleming is seen today for a follow up for Generalized Anxiety Disorder  He reports that he has improved since beginning PHP  Patient identifies suicide attempt as result his  fixation anxiety resulting somatic symptoms  Is able to identify the need for better coping mechanisms and has a positive outlook for the future  Through discussion with Himanshu Olmedo and Dr Willard Roldan patient decreased quetiapine to 25 mg p o  HS  Questions and need to continue quetiapine current medications  Discussed with patient importance of consistent medication adherence for long-term stability  Patient is receptive to teaching  He denies any side effects from medications  PLAN:  Continue Lexapro, Seroquel, Zoloft at current dose    Aware of 24 hour and weekend coverage for urgent situations accessed by calling [facility] main practice number  Continue partial hospitalization program    Diagnoses and all orders for this visit:    GORDON (generalized anxiety disorder)        Current Outpatient Medications on File Prior to Visit Medication Sig Dispense Refill    gabapentin (NEURONTIN) 300 mg capsule Take 1 capsule (300 mg total) by mouth 3 (three) times a day At 9am, 4pm, 9pm 90 capsule 1    melatonin 3 mg Take 2 tablets (6 mg total) by mouth daily at bedtime 60 tablet 1    QUEtiapine (SEROquel) 50 mg tablet Take 1 tablet (50 mg total) by mouth daily at bedtime 30 tablet 1    sertraline (ZOLOFT) 50 mg tablet Take 1 tablet (50 mg total) by mouth daily At 9am 30 tablet 1     No current facility-administered medications on file prior to visit  Psychotherapy Provided:     Individual psychotherapy provided: No    HPI ROS Appetite Changes and Sleep:     He reports normal sleep, adequate appetite, adequate energy level, decreased energy   Patient denies suicidal or homicidal ideation    Review Of Systems:      General emotional problems and decreased functioning   Personality no change in personality   Constitutional negative   ENT negative   Cardiovascular negative   Respiratory negative   Gastrointestinal negative   Genitourinary negative   Musculoskeletal negative   Integumentary negative   Neurological negative   Endocrine negative   Other Symptoms none, all other systems are negative     Mental Status Evaluation:    Appearance Adequate hygiene and grooming   Behavior calm and cooperative   Mood anxious  Depression Scale -  of 10 (0 = No depression)  Anxiety Scale -  of 10 (0 = No anxiety)   Speech Normal rate and volume   Affect appropriate and mood-congruent   Thought Processes Goal directed and coherent   Thought Content Does not verbalize delusional material   Associations Tightly connected   Perceptual Disturbances Denies hallucinations and does not appear to be responding to internal stimuli   Risk Potential Suicidal/Homicidal Ideation - No evidence of suicidal or homicidal ideation and Patient does not verbalize suicidal or homicidal ideation  Risk of Violence - No evidence of risk for violence found on assessment  Risk of Self Mutilation - No evidence of risk for self mutilation found on assessment   Orientation oriented to person, place, time/date and situation   Memory recent and remote memory grossly intact   Consciousness alert and awake   Attention/Concentration attention span and concentration are age appropriate   Insight fair   Judgement fair   Muscle Strength and Gait normal muscle strength and normal muscle tone, normal gait/station and normal balance   Motor Activity no abnormal movements   Language no difficulty naming common objects, no difficulty repeating a phrase, no difficulty writing a sentence   Fund of Knowledge adequate knowledge of current events  adequate fund of knowledge regarding past history  adequate fund of knowledge regarding vocabulary      Past Psychiatric History Update:     Inpatient Psychiatric Admission Since Last Encounter:   no  Suicide Attempt Or Self Mutilation Since Last Encounter:   no  Incidence of Violent Behavior Since Last Encounter:   no    Traumatic History Update:     New Onset of Abuse Since Last Encounter:   no  Traumatic Events Since Last Encounter:   no    Past Medical History:    Past Medical History:   Diagnosis Date    Anxiety     Depression     Hyperlipidemia     controlled with diet and exercise    Kidney stone     Panic attack     Renal disorder     kidney stones    [facility])      Past Medical History Pertinent Negatives:   Diagnosis Date Noted    Head injury 01/22/2021    History of transfusion 05/17/2018    Seizures ([facility] [address] ) 01/22/2021     Past Surgical History:   Procedure Laterality Date    APPENDECTOMY      NM CYSTO/URETERO W/LITHOTRIPSY &INDWELL STENT INSRT Left 5/9/2018    Procedure: CYSTOSCOPY URETEROSCOPY,LEFT  RETROGRADE PYELOGRAM AND INSERTION STENT URETERAL;  Surgeon: Cristian Sinha MD;  [facility];  Service: Urology    NM CYSTO/URETERO W/LITHOTRIPSY &INDWELL STENT INSRT Left 6/8/2018    Procedure: CYSTOSCOPY URETEROSCOPY WITH LITHOTRIPSY HOLMIUM LASER, RETROGRADE PYELOGRAM AND INSERTION STENT URETERAL;  Surgeon: Suri Wyatt MD;  [facility];  Service: Urology    TONSILLECTOMY      WISDOM TOOTH EXTRACTION      WISDOM TOOTH EXTRACTION       Allergies   Allergen Reactions    Betadine Antibiotic-Moisturize [Bacitracin-Polymyxin B] Rash     Substance Abuse History:    Social History     Substance and Sexual Activity   Alcohol Use Yes    Alcohol/week: 1 0 standard drinks    Types: 1 Cans of beer per week    Frequency: Monthly or less    Drinks per session: 1 or 2    Binge frequency: Never    Comment: ocassional     Social History     Substance and Sexual Activity   Drug Use No    Comment: Reports last use of any substance >20years ago     Social History:    Social History     Socioeconomic History    Marital status: /Civil Union     Spouse name: Not on file    Number of children: Not on file    Years of education: Not on file    Highest education level: Master's degree (e g , MA, MS, Jesus, MEd, MSW, GLEN)   Occupational History    Not on file   Social Needs    Financial resource strain: Not hard at all   [employer] insecurity     Worry: Not on file     Inability: Not on file   [employer] needs     Medical: Not on file     Non-medical: Not on file   Tobacco Use    Smoking status: Former Smoker     Packs/day: 0 50     Types: Cigarettes     Quit date: 1998     Years since quittin 7    Smokeless tobacco: Never Used   Substance and Sexual Activity    Alcohol use:  Yes     Alcohol/week: 1 0 standard drinks     Types: 1 Cans of beer per week     Frequency: Monthly or less     Drinks per session: 1 or 2     Binge frequency: Never     Comment: ocassional    Drug use: No     Comment: Reports last use of any substance >20years ago    Sexual activity: Not on file   Lifestyle    Physical activity     Days per week: 0 days     Minutes per session: Not on file    Stress: Rather much   Relationships  Social connections     Talks on phone: Once a week     Gets together: Not on file     Attends Rastafari service: Not on file     Active member of club or organization: Not on file     Attends meetings of clubs or organizations: Not on file     Relationship status:     Intimate partner violence     Fear of current or ex partner: No     Emotionally abused: No     Physically abused: No     Forced sexual activity: No   Other Topics Concern    Not on file   Social History Narrative    Not on file     Family Psychiatric History:     Family History   Problem Relation Age of Onset    Heart disease Mother     Lung cancer Mother     Hypertension Mother     Anxiety disorder Mother     Stroke Father     Hyperlipidemia Father     Completed Suicide  Maternal Aunt      History Review: The following portions of the patient's history were reviewed and updated as appropriate: allergies, current medications, past family history, past medical history, past social history, past surgical history and problem list     OBJECTIVE:     Vital signs in last 24 hours: There were no vitals filed for this visit  Laboratory Results: I have personally reviewed all pertinent laboratory/tests results  Medications Risks/Benefits:      Risks, Benefits And Possible Side Effects Of Medications:    Discussed risks and benefits of treatment with patient including risk of suicidality, serotonin syndrome and SIADH related to treatment with antidepressants; Risk of induction of manic symptoms in certain patient populations and risk of parkinsonian symptoms, metabolic syndrome, tardive dyskinesia and neuroleptic malignant syndrome related to treatment with antipsychotic medications     Controlled Medication Discussion:     Not applicable    EDWAR Valadez 01/29/21    This note was shared with patient

## 2021-01-29 NOTE — PSYCH
This note was not shared with the patient due to this is a psychotherapy note  Subjective:     Patient ID: Isreal Parish is a 52 y o  male  [facility] Clinical Progress Notes      Specialized Services Documentation  Therapist must complete separate progress note for each specific clinical activity in which the individual participated during the day  Group Psychotherapy Life Skills Group (10:25-11:10) Scotty Marquez actively engaged in group focused on how to improve self-compassion which was facilitated virtually in a private office using HIPAA Compliant and Approved [employer] Teams  Scotty Marquez consented to the use of tele-video modality of treatment and was virtually present for group psychotherapy today  The group was a continuation of yesterdays group which learned about the importance of self compassion and how to bring self-compassion into their lives  [address] [facility] group examined possible changes they could make and discussed one compassionate thought that would comfort them  Scotty Marquez discussed making a change by making time to practice self care   Scotty Marquez states a comforting and compassionate thought that he has is there is nothing more important then love  Scotty Marquez continues to make progress towards goals through verbal participation in group; to accomplish long term goals continue to utilize skills learned in programming  Continue with psychotherapy to educate and encourage use of wellness tools  Tx Plan Objective: 1 1,1 2 Therapist:  WALKER Chiu

## 2021-02-01 ENCOUNTER — OFFICE VISIT (OUTPATIENT)
Dept: PSYCHOLOGY | Facility: CLINIC | Age: 51
End: 2021-02-01
Payer: COMMERCIAL

## 2021-02-01 DIAGNOSIS — F32.A DEPRESSION, UNSPECIFIED DEPRESSION TYPE: ICD-10-CM

## 2021-02-01 DIAGNOSIS — F41.1 GAD (GENERALIZED ANXIETY DISORDER): Primary | ICD-10-CM

## 2021-02-01 PROCEDURE — S0201 PARTIAL HOSPITALIZATION SERV: HCPCS

## 2021-02-01 PROCEDURE — G0177 OPPS/PHP; TRAIN & EDUC SERV: HCPCS

## 2021-02-01 PROCEDURE — G0176 OPPS/PHP;ACTIVITY THERAPY: HCPCS

## 2021-02-01 PROCEDURE — G0410 GRP PSYCH PARTIAL HOSP 45-50: HCPCS

## 2021-02-01 NOTE — PSYCH
Virtual Regular Visit      Assessment/Plan:    Problem List Items Addressed This Visit        Other    GORDON (generalized anxiety disorder) - Primary    Depression               Reason for visit is VIRTUAL PHP DUE TO COVID-19       Encounter provider BE [address] [place] [address] PROGRAM    Provider located at [address] [place] [id number]      Recent Visits  Date Type Provider Dept   01/29/21 Office Visit [facility]   01/28/21 Office Visit [facility]   01/27/21 Office Visit [facility]   01/26/21 Office Visit [facility]   01/25/21 Office Visit [facility]   Showing recent visits within past 7 days and meeting all other requirements     Today's Visits  Date Type Provider Dept   02/01/21 Office Visit [facility]   Showing today's visits and meeting all other requirements     Future Appointments  No visits were found meeting these conditions  Showing future appointments within next 150 days and meeting all other requirements        The patient was identified by name and date of birth  Vineet Burr was informed that this is a telemedicine visit and that the visit is being conducted through [employer] and patient was informed that this is a secure, HIPAA-compliant platform  He agrees to proceed     My office door was closed  No one else was in the room  He acknowledged consent and understanding of privacy and security of the video platform  The patient has agreed to participate and understands they can discontinue the visit at any time  Patient is aware this is a billable service  Subjective  Vineet Burr is a 52 y o  male          HPI     Past Medical History:   Diagnosis Date    Anxiety     Depression     Hyperlipidemia     controlled with diet and exercise  Kidney stone     Panic attack     Renal disorder     kidney stones    [facility])        Past Surgical History:   Procedure Laterality Date    APPENDECTOMY      CT CYSTO/URETERO W/LITHOTRIPSY &INDWELL STENT INSRT Left 5/9/2018    Procedure: CYSTOSCOPY URETEROSCOPY,LEFT  RETROGRADE PYELOGRAM AND INSERTION STENT URETERAL;  Surgeon: Lex Green MD;  [facility];  Service: Urology    CT CYSTO/URETERO W/LITHOTRIPSY &INDWELL STENT INSRT Left 6/8/2018    Procedure: CYSTOSCOPY URETEROSCOPY WITH LITHOTRIPSY HOLMIUM LASER, RETROGRADE PYELOGRAM AND INSERTION STENT URETERAL;  Surgeon: Lex Green MD;  [facility];  Service: Urology    TONSILLECTOMY      WISDOM TOOTH EXTRACTION      WISDOM TOOTH EXTRACTION         Current Outpatient Medications   Medication Sig Dispense Refill    gabapentin (NEURONTIN) 300 mg capsule Take 1 capsule (300 mg total) by mouth 3 (three) times a day At 9am, 4pm, 9pm 90 capsule 1    melatonin 3 mg Take 2 tablets (6 mg total) by mouth daily at bedtime 60 tablet 1    QUEtiapine (SEROquel) 50 mg tablet Take 0 5 tablets (25 mg total) by mouth daily at bedtime 30 tablet 1    sertraline (ZOLOFT) 50 mg tablet Take 1 tablet (50 mg total) by mouth daily At 9am 30 tablet 1     No current facility-administered medications for this visit  Allergies   Allergen Reactions    Betadine Antibiotic-Moisturize [Bacitracin-Polymyxin B] Rash       Review of Systems    Video Exam    There were no vitals filed for this visit  Physical Exam     I spent 4 hours of group + case management minutes with patient today in which greater than 50% of the time was spent in counseling/coordination of care regarding see notes      [id number] [address] acknowledges that he has consented to an online visit or consultation   He understands that the online visit is based solely on information provided by him, and that, in the absence of a face-to-face physical evaluation by the physician, the diagnosis he receives is both limited and provisional in terms of accuracy and completeness  This is not intended to replace a full medical face-to-face evaluation by the physician  Lakia Montemayor understands and accepts these terms

## 2021-02-01 NOTE — PSYCH
This note was not shared with the patient due to this is a psychotherapy note    Subjective:     Patient ID: Galindo Luke is a 52 y o  male  [facility] Clinical Progress Notes      Specialized Services Documentation  Therapist must complete separate progress note for each specific clinical activity in which the individual participated during the day  Group Psychotherapy [id number]    This group was facilitated virtually in a private office using [employer] and Approved [employer] Teams  Galindo Luke consented to the use of tele-video modality of treatment  Psychotherapy group focused on building self esteem  Galindo Luke was educated on the correlation between low self esteem and anxiety, depression, poor relationships, and addiction  Different wellness tools to improve self esteem were reviewed includin) managing your inner critic  2) focusing on what is going well for you  3) aiming for effort rather than perfection  4) viewing mistakes as learning opportunities  5) editing  thoughts that get you to feel inferior  6) reminding yourself that everyone excels at different things  7) trying new things and giving yourself credit  8) recognizing what you can change and what you cant  9) setting goals  10)  taking pride in your opinions and ideas  11)  accepting compliments  12)  making a contribution  Galindo Luke was provided a list of positive affirmations to begin incorporating into his day to day routine  Through group discussion, Galindo Luke did not share, however, he was attentive and mentioned that this topic is something that "makes him feel uncomfortable" due to prevalence to his situation in life, but mentioned that it is helpful and that he is going to be applying techniques into his daily routine to strive for improvement  Positive progress toward goals     Galindo Luke is encouraged to continue to make progress towards goals and objectives through group participation and will continue to attend psychotherapy group       Tx Plan Objective: 1 1,1 2, Therapist:  WALKER Gann

## 2021-02-01 NOTE — PSYCH
This note was not shared with the patient due to this is a psychotherapy note   Subjective:     Patient ID: Nanda Monk is a 52 y o  male  [facility] Clinical Progress Notes      Specialized Services Documentation  Therapist must complete separate progress note for each specific clinical activity in which the individual participated during the day  GROUP PSYCHOTHERAPY ([id number]) Group was facilitated virtually in a private office using HIPAA Compliant and Approved [employer] Teams  Nanda Monk consented to the use of tele-video modality of treatment and was virtually present for group psychotherapy today  The group members received a safe and non-judgmental space to discuss current stressors and received feedback from the group  The group discussions reviewing and organizing skills and effective communication in positive relationships  The group was educated on Non-Violent Communication  James Mayorga shared when he uses skills as preventative versus in crisis  James Mayorga continues to make progress towards goals through verbal participation in group  Continue with psychotherapy     TX Plan Objectives: 1 1, 1 2, 1 4   Therapist: Clive Mary MA

## 2021-02-01 NOTE — PSYCH
This note was not shared with the patient due to this is a psychotherapy note       Subjective:     Patient ID: Ewa  is a 52 y o  male  [facility] Clinical Progress Notes      Specialized Services Documentation  Therapist must complete separate progress note for each specific clinical activity in which the individual participated during the day  Allied Therapy   This group was facilitated virtually in a private office using [employer] and Approved Microsoft Teams  Ewa  consented to the use of tele-video modality of treatment  [id number]  Ewa   actively shared in [facility] group exploring DBT distress tolerance crisis survival strategies  Group explored crisis survival strategies ACCEPTS, TIP, STOP, and PROS & CONS) reinforcing actions one could take to learn to tolerate stressful experiences, thoughts and urges  Helen Jean felt he could put effort into practicing TIP  Some progress toward goal noted  Continue AT to encourage learning, practice and home practice of skills to manage distress  Tx Plan Objective: 1 1, Therapist:  Early Hopes MT-BC    Education Therapy   [id number] Ewa  actively shared in morning assessment and goal review  Presented as Receptive related to readiness to learn  Ewa  did complete goal from last treatment day identifying gaining support  did not present with any barriers to learning  [address] [facility] engaged throughout the treatment day  Was engaged in learning related to Illness and Wellness Tools  Staff utilized Verbal, A/V and Demonstration teaching methods  Ewa  shared area of learning and set a goal for outside of program to look over program notes from the day        Tx Plan Objective: 1 1,1 2, Therapist:  Yusuf Ibrahim MT-BC        Case Management Note    Yusuf Ibrahim MT-BC    Current suicide risk : Low     INDIVIDUAL PSYCHOTHERAPY  This case management session was facilitated virtually in a private office using [employer] and Approved [employer] Teams  Yin Azar consented to the use of tele-video modality of treatment  [id number] Met with Yin Azar  Reported a good program day  He presented as more relaxed and less constricted  He felt the day was "impactful" and he is learning about himself  He was able to note that during uncomfortable topic - his somatic symptoms elevated a bit - discussed connection  Progress noted in that he is sleeping better and was more alert  Reviewed goals for the week  Medications changes/added/denied? No    Treatment session number: 6    Individual Case Management Visit provided today?  Yes     [facility] follow up physician's orders: na

## 2021-02-02 ENCOUNTER — OFFICE VISIT (OUTPATIENT)
Dept: PSYCHOLOGY | Facility: CLINIC | Age: 51
End: 2021-02-02
Payer: COMMERCIAL

## 2021-02-02 DIAGNOSIS — F41.1 GAD (GENERALIZED ANXIETY DISORDER): Primary | ICD-10-CM

## 2021-02-02 DIAGNOSIS — F32.A DEPRESSION, UNSPECIFIED DEPRESSION TYPE: ICD-10-CM

## 2021-02-02 PROCEDURE — G0176 OPPS/PHP;ACTIVITY THERAPY: HCPCS

## 2021-02-02 PROCEDURE — S0201 PARTIAL HOSPITALIZATION SERV: HCPCS

## 2021-02-02 PROCEDURE — G0410 GRP PSYCH PARTIAL HOSP 45-50: HCPCS

## 2021-02-02 PROCEDURE — G0177 OPPS/PHP; TRAIN & EDUC SERV: HCPCS

## 2021-02-02 NOTE — PSYCH
Subjective:     Patient ID: Harjeet Tipton is a 52 y o  male  [facility] Clinical Progress Notes      Specialized Services Documentation  Therapist must complete separate progress note for each specific clinical activity in which the individual participated during the day  This was not shared due to this is a psychotherapy note  GROUP PSYCHOTHERAPY ([id number]) Group was facilitated virtually in a private office using HIPAA Compliant and Approved [employer] Teams  Betzy Figueroa consented to the use of tele-video modality of treatment and was virtually present for group psychotherapy today  The group engaged in education about self-sabotaging behavior  We specifically focused on procrastination and how each member engages in this self-sabotaging behavior  Each member was asked to answer the following questions:    - In what area of your life do you tend to procrastinate in the most?  - What feelings does procrastination bring up in you? Betzy Figueroa seemed attentive and was willing to participate throughout the group without prompting  Betzy Figueroa stated that they procrastinate the most when it comes to self-care, explaining that it's easier for him to engage in a task that involves paying bills or cleaning around his house  Betzy Figueroa is encouraged to make progress towards goals and objectives through group participation and will continue to attend psychotherapy group       Tx plan objective: 1 1, 1 2   Therapist: Zuhair Spaulding MA

## 2021-02-02 NOTE — PSYCH
Virtual Regular Visit      Assessment/Plan:    Problem List Items Addressed This Visit        Other    GORDON (generalized anxiety disorder) - Primary    Depression               Reason for visit is VIRTUAL PHP DUE TO COVID-19       Encounter provider BE [address] [place] [address] PROGRAM    Provider located at [address] [place] [id number]      Recent Visits  Date Type Provider Dept   02/01/21 Office Visit [facility]   01/29/21 Office Visit [facility]   01/28/21 Office Visit [facility]   01/27/21 [facility]   01/26/21 [facility] Visit [facility]   Showing recent visits within past 7 days and meeting all other requirements     Today's Visits  Date Type Provider Dept   02/02/21 Office Visit [facility]   Showing today's visits and meeting all other requirements     Future Appointments  No visits were found meeting these conditions  Showing future appointments within next 150 days and meeting all other requirements        The patient was identified by name and date of birth  George Sorto was informed that this is a telemedicine visit and that the visit is being conducted through [employer] and patient was informed that this is a secure, HIPAA-compliant platform  He agrees to proceed     My office door was closed  No one else was in the room  He acknowledged consent and understanding of privacy and security of the video platform  The patient has agreed to participate and understands they can discontinue the visit at any time  Patient is aware this is a billable service  Subjective  George Sorto is a 52 y o  male           HPI     Past Medical History:   Diagnosis Date    Anxiety     Depression     Hyperlipidemia     controlled with diet and exercise  Kidney stone     Panic attack     Renal disorder     kidney stones    [facility])        Past Surgical History:   Procedure Laterality Date    APPENDECTOMY      WA CYSTO/URETERO W/LITHOTRIPSY &INDWELL STENT INSRT Left 5/9/2018    Procedure: CYSTOSCOPY URETEROSCOPY,LEFT  RETROGRADE PYELOGRAM AND INSERTION STENT URETERAL;  Surgeon: Duncan Cook MD;  [facility];  Service: Urology    WA CYSTO/URETERO W/LITHOTRIPSY &INDWELL STENT INSRT Left 6/8/2018    Procedure: CYSTOSCOPY URETEROSCOPY WITH LITHOTRIPSY HOLMIUM LASER, RETROGRADE PYELOGRAM AND INSERTION STENT URETERAL;  Surgeon: Duncan Cook MD;  [facility];  Service: Urology    TONSILLECTOMY      WISDOM TOOTH EXTRACTION      WISDOM TOOTH EXTRACTION         Current Outpatient Medications   Medication Sig Dispense Refill    gabapentin (NEURONTIN) 300 mg capsule Take 1 capsule (300 mg total) by mouth 3 (three) times a day At 9am, 4pm, 9pm 90 capsule 1    melatonin 3 mg Take 2 tablets (6 mg total) by mouth daily at bedtime 60 tablet 1    QUEtiapine (SEROquel) 50 mg tablet Take 0 5 tablets (25 mg total) by mouth daily at bedtime 30 tablet 1    sertraline (ZOLOFT) 50 mg tablet Take 1 tablet (50 mg total) by mouth daily At 9am 30 tablet 1     No current facility-administered medications for this visit  Allergies   Allergen Reactions    Betadine Antibiotic-Moisturize [Bacitracin-Polymyxin B] Rash       Review of Systems    Video Exam    There were no vitals filed for this visit  Physical Exam     I spent 4 hours of group + case management minutes with patient today in which greater than 50% of the time was spent in counseling/coordination of care regarding see notes  VIRTUAL VISIT DISCLAIMER    Jeuss Manuel Saul acknowledges that he has consented to an online visit or consultation   He understands that the online visit is based solely on information provided by him, and that, in the absence of a face-to-face physical evaluation by the physician, the diagnosis he receives is both limited and provisional in terms of accuracy and completeness  This is not intended to replace a full medical face-to-face evaluation by the physician  Zoë Mary understands and accepts these terms

## 2021-02-02 NOTE — PSYCH
This note was not shared with the patient due to this is a psychotherapy note   Subjective:     Patient ID: Shahnaz Maravilla is a 52 y o  male  [facility] Clinical Progress Notes      Specialized Services Documentation  Therapist must complete separate progress note for each specific clinical activity in which the individual participated during the day  GROUP PSYCHOTHERAPY ([id number] [phone] [id number]) Group was facilitated virtually in a private office using HIPAA Compliant and Approved Microsoft Teams  Shahnaz Maravilla  consented to the use of tele-video modality of treatment and was virtually present for group psychotherapy today  The group engaged in education on thought diffusion and using therapy metaphors to perceive their thoughts differently  The group discussed how to utilize the thought metaphors to help break the stigma of mental health and have open conversations with supports  The group concluded with a guided meditation  Shorty Dao found the metaphors to be helpful to explain his thoughts  Shorty Dao continues to make progress towards goals through verbal participation in group  Continue with psychotherapy     TX Plan Objectives: 1 1, 1 2, 1 4  Therapist: Claudell Baars, MA

## 2021-02-03 ENCOUNTER — OFFICE VISIT (OUTPATIENT)
Dept: PSYCHOLOGY | Facility: CLINIC | Age: 51
End: 2021-02-03
Payer: COMMERCIAL

## 2021-02-03 DIAGNOSIS — F41.1 GAD (GENERALIZED ANXIETY DISORDER): Primary | ICD-10-CM

## 2021-02-03 PROCEDURE — S0201 PARTIAL HOSPITALIZATION SERV: HCPCS

## 2021-02-03 PROCEDURE — G0176 OPPS/PHP;ACTIVITY THERAPY: HCPCS

## 2021-02-03 PROCEDURE — G0177 OPPS/PHP; TRAIN & EDUC SERV: HCPCS

## 2021-02-03 PROCEDURE — G0410 GRP PSYCH PARTIAL HOSP 45-50: HCPCS

## 2021-02-03 NOTE — PSYCH
Subjective:     Patient ID: Galindo Luke is a 52 y o  male  [facility] Clinical Progress Notes      Specialized Services Documentation  Therapist must complete separate progress note for each specific clinical activity in which the individual participated during the day  This group was facilitated virtually in a private office using [employer] and Approved [employer] Teams  Galindo Luke consented to the use of tele-video modality of treatment  This note was not shared with the patient due to this is a psychotherapy note      Group Psychotherapy     ([id number]) Galindo Luke  was present in psychoeducational group which focused on sleep hygiene  Group shared current barriers contributing to decreased quality of sleep  They identified ruminating thoughts as a barrier  This writer explained the importance of quality sleep in relation to wellness  Additional tips on sleep hygiene were discussed  Galindo Luke appeared to be engaged in group, but did not participate until prompted  Some slow progress toward goal observed  Continue psychoeducation group to increase awareness of good sleeping habits to promote wellness           Tx Plan Objective: 1 3 Therapist:  Anatoly Torres RN

## 2021-02-03 NOTE — PSYCH
Assessment/Plan:      Diagnoses and all orders for this visit:    GORDON (generalized anxiety disorder)          Subjective:     Patient ID: Navarro Serna is a 52 y o  male  [facility] Treatment Plan   AREAS OF NEED: Anxiety as evidenced by physical symptoms (tingling, dizziness), ruminative thoughts, feeling overwhelmed and helpless/hopeless, worried, sleep disturbance related to work stressors and ongoing symptoms impacting daily life  Date Initiated: 01/22/2021    Strengths: hard worker, family support, wants to feel better     LONG TERM GOAL:   Date Initiated: 01/22/2021  1 0 I will identify 3 signs that my anxiety has lessened in intensity and frequency and I am more productive in my day to day life  Target Date: 02/19/2021  Completion Date:     SHORT TERM OBJECTIVES:     Date Initiated: 01/22/2021  1 1 I will identify 3 mindfulness and/or distress tolerance skills I am practicing to reduce anxiety symptoms  Revision Date:   Target Date: 2/3/2021  Completion Date: 2/3/2021     Date Initiated: 01/22/2021  1 2 I will identify 3 aspects of a structure I can incorporate into my daily day despite my need to be flexible with work  Revision Date: continue to work on 2/3/2021  Target Date: 2/3/2021  Completion Date:     Date Initiated: 01/22/2021  1 3 I will take medications as prescribed and share questions and concerns if arise  Revision Date: continue medication checks weekly 2/3/2021  Target Date: 2/3/2021  Completion Date:     Date Initiated: 01/22/2021  1 4 I will identify 3 ways my supports can assist in my recovery and agree to staff/support contact as indicated      Revision Date: continue to explore 2/3/2021  Target Date: 2/3/2021  Completion Date:            7 DAY REVISION:    Date Initiated:2/3/2021  1 5 I will identify 1 skill I am using consistently to support my wellness and challenge myself to apply at least 1 other skill daily  Revision Date:   Target Date: 2/15/2021  Completion Date:      PSYCHIATRY:  Date Initiated: 01/22/2021  Medication Management and Education       Revision Date: 2/3/2021       Continue medication management  The person(s) responsible for carrying out the plan is Janes York MD    NURSING:   Date Initiated: 01/22/2021  1 1,1 2,1 3,1 4 This RN will provide daily wellness group five days weekly to educate Karlene Dhillon on S/S of his diagnoses and medications used in treatment  Revision Date:2/3/2021  1 1,1 2,1 3,1 4,1 5 Continue to encourage Karlene Dhillon to participate in wellness groups daily to learn about symptoms, coping strategies and warning signs to promote relapse prevention  The person(s) responsible for carrying out the plan is Fifi Ahumada RN    PSYCHOLOGY:   Date Initiated: 01/22/2021       1 1, 1 2, 1 4 Provide psychotherapy group 5 times per week to allow opportunity for Karlene Dhillon to explore stressors and ways of coping  Revision Date: 2/3/2021  1 1,1 2,1 4,1 5 Continue to provide psychotherapy group daily to Karlene Dhillon and encourage sharing of stressors, skills and positive change  The person(s) responsible for carrying out the plan is Julita Lizarraga    ALLIED THERAPY:   Date Initiated: 01/22/2021  1 1,1 2 Engage Karlene Dhillon in AT group 5 times daily to encourage development and use of wellness tools to decrease symptoms and promote recovery through meaningful activity  Revision Date: 2/3/2021  1 1,1 2,1 5 Continue to engage Karlene Dhillon to participate in AT group to practice wellness tools within program and transfer to home sharing successes and barriers through healthy task involvement     The person(s) responsible for carrying out the plan is LYNNETTE Blair     CASE MANAGEMENT:   Date Initiated: 01/22/2021      1 0 This  will meet with Karlene Bernsteinr 3-4 times weekly to assess treatment progress, discharge planning, connection to community supports and UR as indicated  Revision Date: 2/3/2021  1 0 Continue to meet with Jesus Manuel Saul 3-4 times weekly to assess growth, work toward goals, continued treatment needs, dc planning and use of supports  The person(s) responsible for carrying out the plan is LYNNETTE Larsen       TREATMENT REVIEW/COMMENTS:     DISCHARGE CRITERIA: Identify 3 signs of progress and complete relapse prevention plan  DISCHARGE PLAN: OP care  Estimated Length of Stay:10 treatment days     Diagnosis and Treatment Plan explained to Didier Velásquez Ra, Ra relates understanding diagnosis and is agreeable to Treatment Plan       CLIENT COMMENTS / Please share your thoughts, feelings, need and/or experiences regarding your treatment plan: _____________________________________________________________________________________________________________________________________________________________________________________________________________________________________________________________________________________________________________________ Date/Time: ______________     Patient Signature: __Reviewed and updated with Jesus Manuel Saul via TEAMS due to COVID-19 copy sent via email_________     Date/Time: ____2/3/2021 1240__________      Signature: _____[facility]-BC____________________________     Date/Time: ____2/3/2021 1240__________

## 2021-02-03 NOTE — PSYCH
Virtual Regular Visit      Assessment/Plan:    Problem List Items Addressed This Visit        Other    GORDON (generalized anxiety disorder) - Primary               Reason for visit is VIRTUAL PHP DUE TO COVID-19       [address]    Provider located at [address] [place] [address] Tashia Munoz [phone]      Recent Visits  Date Type Provider Dept   02/02/21 [facility] Visit [facility]   02/01/21 Office Visit [facility]   01/29/21 Office Visit [facility]   01/28/21 [facility]   01/27/21 Office Visit [facility]   Showing recent visits within past 7 days and meeting all other requirements     Today's Visits  Date Type Provider Dept   02/03/21 Office Visit [facility]   Showing today's visits and meeting all other requirements     Future Appointments  No visits were found meeting these conditions  Showing future appointments within next 150 days and meeting all other requirements        The patient was identified by name and date of birth  Jl Rosales was informed that this is a telemedicine visit and that the visit is being conducted through [email or web address] and patient was informed that this is a secure, HIPAA-compliant platform  He agrees to proceed     My office door was closed  No one else was in the room  He acknowledged consent and understanding of privacy and security of the video platform  The patient has agreed to participate and understands they can discontinue the visit at any time  Patient is aware this is a billable service  Subjective  Jl Rosales is a 52 y o  male          HPI     Past Medical History:   Diagnosis Date    Anxiety     Depression     Hyperlipidemia     controlled with diet and exercise    Kidney stone  Panic attack     Renal disorder     kidney stones    Suicide attempt [facility])        Past Surgical History:   Procedure Laterality Date    APPENDECTOMY      WA CYSTO/URETERO W/LITHOTRIPSY &INDWELL STENT INSRT Left 5/9/2018    Procedure: CYSTOSCOPY URETEROSCOPY,LEFT  RETROGRADE PYELOGRAM AND INSERTION STENT URETERAL;  Surgeon: Steve Power MD;  [facility];  Service: Urology    WA CYSTO/URETERO W/LITHOTRIPSY &INDWELL STENT INSRT Left 6/8/2018    Procedure: CYSTOSCOPY URETEROSCOPY WITH LITHOTRIPSY HOLMIUM LASER, RETROGRADE PYELOGRAM AND INSERTION STENT URETERAL;  Surgeon: Steve Power MD;  [facility];  Service: Urology    TONSILLECTOMY      WISDOM TOOTH EXTRACTION      WISDOM TOOTH EXTRACTION         Current Outpatient Medications   Medication Sig Dispense Refill    gabapentin (NEURONTIN) 300 mg capsule Take 1 capsule (300 mg total) by mouth 3 (three) times a day At 9am, 4pm, 9pm 90 capsule 1    melatonin 3 mg Take 2 tablets (6 mg total) by mouth daily at bedtime 60 tablet 1    QUEtiapine (SEROquel) 50 mg tablet Take 0 5 tablets (25 mg total) by mouth daily at bedtime 30 tablet 1    sertraline (ZOLOFT) 50 mg tablet Take 1 tablet (50 mg total) by mouth daily At 9am 30 tablet 1     No current facility-administered medications for this visit  Allergies   Allergen Reactions    Betadine Antibiotic-Moisturize [Bacitracin-Polymyxin B] Rash       Review of Systems    Video Exam    There were no vitals filed for this visit  Physical Exam     I spent 4 hours of group + case management minutes with patient today in which greater than 50% of the time was spent in counseling/coordination of care regarding see notes  VIRTUAL VISIT DISCLAIMER    Aubrie Clemens acknowledges that he has consented to an online visit or consultation   He understands that the online visit is based solely on information provided by him, and that, in the absence of a face-to-face physical evaluation by the physician, the diagnosis he receives is both limited and provisional in terms of accuracy and completeness  This is not intended to replace a full medical face-to-face evaluation by the physician  Lakia Montemayor understands and accepts these terms

## 2021-02-03 NOTE — PSYCH
This note was not shared with the patient due to this is a psychotherapy note   Subjective:     Patient ID: Vineet Burr is a 52 y o  male  [facility] Clinical Progress Notes      Specialized Services Documentation  Therapist must complete separate progress note for each specific clinical activity in which the individual participated during the day  Allied Therapy   This group was facilitated virtually in a private office using [employer] and Approved [employer] Teams  Vineet Burr consented to the use of tele-video modality of treatment  [id number] Vineet Burr  actively shared in [facility] group focused DBT Module Interpersonal Effectiveness and [facility] skill  Engaged in tasks exploring what makes it difficult to share and strategies to improve with supports  Juliette Winter participated in discussion related to communication roadblocks  He was an active participant as group worked together to practice writing out a meaningful interaction using [facility]  Some progress toward goal noted  Continue AT to encourage sharing, personal advocacy and practice of wellness tools  Tx Plan Objective: 1 4,1 2, Therapist:  Naun CHURCH    Education Therapy   [id number] Vineet Burr actively shared in morning assessment and goal review  Presented as Receptive related to readiness to learn  Vineet Burr did complete goal from last treatment day identifying gaining responsibility  did not present with any barriers to learning  [address] engaged throughout the treatment day  Was engaged in learning related to Illness and Wellness Tools  Staff utilized Verbal, A/V and Demonstration teaching methods  Vineet Burr shared area of learning and set a goal for outside of program to 30 minutes of physical activity        Tx Plan Objective: 1 , Therapist:  Naun CHURCH    Case Management Note    Naun CHURCH    Current suicide risk : Low INDIVIDUAL PSYCHOTHERAPY  This case management session was facilitated virtually in a private office using [employer] and Approved [employer] Teams  Yury Whitney consented to the use of tele-video modality of treatment  [id number] Met with Yury Rolandlisha  Reported that he had a good night sleep again last night  Reviewed and updated treatment plan  OP therapy resources discussed  Additional FMLA forms received  UR due tomorrow  Medications changes/added/denied? No    Treatment session number: 8    Individual Case Management Visit provided today?  Yes     [facility] follow up physician's orders: na

## 2021-02-03 NOTE — PSYCH
This note was not shared with the patient due to this is a psychotherapy note  Subjective:     Patient ID: Shi Lim is a 52 y o  male  [facility] Clinical Progress Notes      Specialized Services Documentation  Therapist must complete separate progress note for each specific clinical activity in which the individual participated during the day  Group Psychotherapy Life Skills Group (10:25-11:10) Gina Moore actively engaged in group focused on understanding what you can control in your life which was facilitated virtually in a private office using HIPAA Compliant and Approved [employer] Teams  Gina Moore consented to the use of tele-video modality of treatment and was virtually present for group psychotherapy today  The objective of group was to increase the sense of control that clients have in their lives  Clients where asked to rate statements about the control in their lives on a scale of 1-10 as to whether or not that applied to them  Scores above a 50 indicated that the person feels like they have little control in their life  Gina Moore stated that he scored a 37  Clients completed two activities and were asked to reflect on whether or not they believed it increased their sense of control or changed the way they viewed experiences  Miley Crain stated that it made him refocus this perspective   Rodolph continues to make progress towards goals through verbal participation in group; to accomplish long term goals continue to utilize skills learned in programming  Continue with psychotherapy to educate and encourage use of wellness tools  Tx Plan Objective: 1 1,1 2 Therapist:  WALKER Hackett

## 2021-02-04 ENCOUNTER — OFFICE VISIT (OUTPATIENT)
Dept: PSYCHOLOGY | Facility: CLINIC | Age: 51
End: 2021-02-04
Payer: COMMERCIAL

## 2021-02-04 DIAGNOSIS — F41.1 GAD (GENERALIZED ANXIETY DISORDER): Primary | ICD-10-CM

## 2021-02-04 DIAGNOSIS — F32.A DEPRESSION, UNSPECIFIED DEPRESSION TYPE: ICD-10-CM

## 2021-02-04 PROCEDURE — S0201 PARTIAL HOSPITALIZATION SERV: HCPCS

## 2021-02-04 PROCEDURE — G0410 GRP PSYCH PARTIAL HOSP 45-50: HCPCS

## 2021-02-04 PROCEDURE — G0177 OPPS/PHP; TRAIN & EDUC SERV: HCPCS

## 2021-02-04 NOTE — PSYCH
This note was not shared with the patient due to this is a psychotherapy note  Subjective:     Patient ID: Yury Whitney is a 52 y o  male  [facility] Clinical Progress Notes      Specialized Services Documentation  Therapist must complete separate progress note for each specific clinical activity in which the individual participated during the day  Group Psychotherapy Life Skills Group (9:30-10:15) Meera Harding actively engaged in group focused on gratitude which was facilitated virtually in a private office using HIPAA Compliant and Approved [employer] Teams  Meera Harding consented to the use of tele-video modality of treatment and was virtually present for group psychotherapy today  Group members discussed why it is important to think about what we are grateful for  Group members created a list of things that they are grateful for  Meera Harding reflected on what he is grateful for and answered the following questions:  1  I am grateful for _sleep____  2  It is good to have my _wife____  3  _Program____ is a blessing  4  I appreciate my _sister_____  We discussed the benefits of thinking, reflecting and talking about what they are grateful for  Meera Harding continues to make progress towards goals through verbal participation in group; to accomplish long term goals continue to utilize skills learned in programming  Continue with psychotherapy to educate and encourage use of wellness tools  Tx Plan Objective: 1 1,1 2 Therapist:  WALKER Montaño      Education Therapy   [id number] Yury Whitney actively shared in morning assessment and goal review  Presented as Receptive related to readiness to learn  Yury Whitney did complete goal from last treatment day identifying gaining expending his energy stating he " got good sleep"  did present with any barriers to learning  [address] engaged throughout the treatment day   Was engaged in learning related to Illness, Medication, Aftercare and Wellness Tools  Staff utilized Verbal, Written, A/V and Demonstration teaching methods  Sandi Dukes shared area of learning and set a goal for outside of program to go to the grocery store with his daughter        Tx Plan Objective: 1 1,1 2 Therapist:  WALKER Guevara

## 2021-02-04 NOTE — PSYCH
Virtual Regular Visit      Assessment/Plan:    Problem List Items Addressed This Visit        Other    GORDON (generalized anxiety disorder) - Primary    Depression               Reason for visit is VIRTUAL PHP DUE TO COVID-19       Encounter provider BE [address] [place] [address] PROGRAM    Provider located at [address] [place] [phone]      Recent Visits  Date Type Provider Dept   02/03/21 [facility]   02/02/21 [facility] Visit [facility]   02/01/21 [facility] Visit [facility]   01/29/21 [facility]   01/28/21 [facility]   Showing recent visits within past 7 days and meeting all other requirements     Today's Visits  Date Type Provider Dept   02/04/21 Office Visit [facility]   Showing today's visits and meeting all other requirements     Future Appointments  No visits were found meeting these conditions  Showing future appointments within next 150 days and meeting all other requirements        The patient was identified by name and date of birth  Jl Rosales was informed that this is a telemedicine visit and that the visit is being conducted through [employer] and patient was informed that this is a secure, HIPAA-compliant platform  He agrees to proceed     My office door was closed  No one else was in the room  He acknowledged consent and understanding of privacy and security of the video platform  The patient has agreed to participate and understands they can discontinue the visit at any time  Patient is aware this is a billable service  Subjective  Jl Rosales is a 52 y o  male          HPI     Past Medical History:   Diagnosis Date    Anxiety     Depression     Hyperlipidemia     controlled with diet and exercise  Kidney stone     Panic attack     Renal disorder     kidney stones    [facility])        Past Surgical History:   Procedure Laterality Date    APPENDECTOMY      [place] CYSTO/URETERO W/LITHOTRIPSY &INDWELL STENT INSRT Left 5/9/2018    Procedure: CYSTOSCOPY URETEROSCOPY,LEFT  RETROGRADE PYELOGRAM AND INSERTION STENT URETERAL;  Surgeon: Ricky Brady MD;  [facility];  Service: Urology    CO CYSTO/URETERO W/LITHOTRIPSY &INDWELL STENT INSRT Left 6/8/2018    Procedure: CYSTOSCOPY URETEROSCOPY WITH LITHOTRIPSY HOLMIUM LASER, RETROGRADE PYELOGRAM AND INSERTION STENT URETERAL;  Surgeon: Ricky Brady MD;  [facility];  Service: Urology    TONSILLECTOMY      WISDOM TOOTH EXTRACTION      WISDOM TOOTH EXTRACTION         Current Outpatient Medications   Medication Sig Dispense Refill    gabapentin (NEURONTIN) 300 mg capsule Take 1 capsule (300 mg total) by mouth 3 (three) times a day At 9am, 4pm, 9pm 90 capsule 1    melatonin 3 mg Take 2 tablets (6 mg total) by mouth daily at bedtime 60 tablet 1    QUEtiapine (SEROquel) 50 mg tablet Take 0 5 tablets (25 mg total) by mouth daily at bedtime 30 tablet 1    sertraline (ZOLOFT) 50 mg tablet Take 1 tablet (50 mg total) by mouth daily At 9am 30 tablet 1     No current facility-administered medications for this visit  Allergies   Allergen Reactions    Betadine Antibiotic-Moisturize [Bacitracin-Polymyxin B] Rash       Review of Systems    Video Exam    There were no vitals filed for this visit  Physical Exam     I spent 4 hours of group + case management minutes with patient today in which greater than 50% of the time was spent in counseling/coordination of care regarding see notes  VIRTUAL VISIT DISCLAIMER    Meri Sue acknowledges that he has consented to an online visit or consultation   He understands that the online visit is based solely on information provided by him, and that, in the absence of a face-to-face physical evaluation by the physician, the diagnosis he receives is both limited and provisional in terms of accuracy and completeness  This is not intended to replace a full medical face-to-face evaluation by the physician  Jl Rosales understands and accepts these terms

## 2021-02-04 NOTE — PSYCH
This note was not shared with the patient due to this is a psychotherapy note  Subjective:     Patient ID: Shi Lim is a 52 y o  male  [facility] Clinical Progress Notes      Specialized Services Documentation  Therapist must complete separate progress note for each specific clinical activity in which the individual participated during the day  Group Psychotherapy ([id number])   This group was facilitated virtually in a private office using [employer] and Approved [employer] Teams  Shi Lim consented to the use of tele-video modality of treatment  Shi Lim actively participated in psychoeducation group this morning which focused on mental health education  Group was educated on commonly prescribed medications, the brain, stress, mental disorders, and nursing interventions  Group members expressed their concerns and asked questions regarding their current diagnosis  Patients were then divided into two groups and competed against each other in a psychiatric jeopardy game  Good progress toward goals noted  Continue psychoeducation to further learn about mental health and the importance of understanding their diagnosis to maintain wellness       Tx Plan Objective: 1 3, 1 4 Therapist:  Wyatt Escamilla RN

## 2021-02-04 NOTE — PSYCH
This note was not shared with the patient due to this is a psychotherapy note   Subjective:     Patient ID: Sandi Dukes is a 52 y o  male  [facility] Clinical Progress Notes      Specialized Services Documentation  Therapist must complete separate progress note for each specific clinical activity in which the individual participated during the day  Other Utilization Review: [address] at [facility] phone [phone],  Requested last covered day extension to 2/5/2021 and then 7 additional days through 02/16/2021 - #[id number] Rosenda SIBLEYBC    Case Management Note    Rosenda CHURCH    Current suicide risk : Low     1320 Not a case management day for Sandi Dukes  He was emailed to remind him and he did not express additional concerns  Short term disability paperwork completed and faxed  Medications changes/added/denied? No    Treatment session number: 9    Individual Case Management Visit provided today?  No    [facility] follow up physician's orders: na

## 2021-02-05 ENCOUNTER — OFFICE VISIT (OUTPATIENT)
Dept: PSYCHOLOGY | Facility: CLINIC | Age: 51
End: 2021-02-05
Payer: COMMERCIAL

## 2021-02-05 ENCOUNTER — TELEMEDICINE (OUTPATIENT)
Dept: PSYCHIATRY | Facility: CLINIC | Age: 51
End: 2021-02-05
Payer: COMMERCIAL

## 2021-02-05 DIAGNOSIS — F32.1 CURRENT MODERATE EPISODE OF MAJOR DEPRESSIVE DISORDER WITHOUT PRIOR EPISODE (HCC): ICD-10-CM

## 2021-02-05 DIAGNOSIS — F41.1 GAD (GENERALIZED ANXIETY DISORDER): Primary | ICD-10-CM

## 2021-02-05 DIAGNOSIS — F32.A DEPRESSION, UNSPECIFIED DEPRESSION TYPE: ICD-10-CM

## 2021-02-05 PROCEDURE — 99214 OFFICE O/P EST MOD 30 MIN: CPT | Performed by: NURSE PRACTITIONER

## 2021-02-05 PROCEDURE — S0201 PARTIAL HOSPITALIZATION SERV: HCPCS

## 2021-02-05 PROCEDURE — G0176 OPPS/PHP;ACTIVITY THERAPY: HCPCS

## 2021-02-05 PROCEDURE — G0410 GRP PSYCH PARTIAL HOSP 45-50: HCPCS

## 2021-02-05 PROCEDURE — G0177 OPPS/PHP; TRAIN & EDUC SERV: HCPCS

## 2021-02-05 NOTE — PSYCH
This note was not shared with the patient due to this is a psychotherapy note       Subjective:     Patient ID: Harjeet Tipton is a 52 y o  male  [facility] Clinical Progress Notes      Specialized Services Documentation  Therapist must complete separate progress note for each specific clinical activity in which the individual participated during the day  Allied Therapy   This group was facilitated virtually in a private office using [employer] and Approved [employer] Teams  Harjeet Tipton consented to the use of tele-video modality of treatment  [id number] Harjeet Tipton  actively shared in [facility] group focused on self awareness  Elia Trinh engaged in jane discussion exploring core aspects of self  Group discussed building strengths and strategies to take positives and use them to challenging negative thoughts  DBT biosocial therapy reviewed along with engaging in a loving kindness meditation  He identified he is a good writer as a strength and seeing the "positive in things" as something he would like to work on  Some progress noted toward  Continue AT to increase self awareness and skills that promote wellness  Tx Plan Objective: 1 5, Therapist:  Clara CHURCH      Case Management Note    Clara CHURCH    Current suicide risk : Low     This case management session was facilitated virtually in a private office using [employer] and Approved [employer] Teams  Harjeet Tipton consented to the use of tele-video modality of treatment  INDIVIDUAL PSYCHOTHERAPY  1130 - 7122 Met with Harjeetpro Underwoodig to discuss weekend plans and supports  Harjeet Tipton identified that he did not sleep as well and was a bit more anxious today, however overall feels he has be progressing  He is concerned about getting a new therapist as well as returning to work    Goals for next week include working on both of those concerns - preparing by reaching out to OP providers and also beginning to establish how he will incorporate skills into his work day  FMLA paperwork was sent and UR was reviewed with him  Medications changes/added/denied? No    Treatment session number: 10    Individual Case Management Visit provided today? Yes     [facility] follow up physician's orders: see S   Julaino note

## 2021-02-05 NOTE — PSYCH
Virtual Regular Visit      Assessment/Plan:    Problem List Items Addressed This Visit        Other    GORDON (generalized anxiety disorder) - Primary      Other Visit Diagnoses     Depression, unspecified depression type                   Reason for visit is VIRTUAL PHP DUE TO COVID-19       Encounter provider BE [address] [place] [address] PROGRAM    Provider located at [address] [place] [phone]      Recent Visits  Date Type Provider Dept   02/04/21 [facility]   02/03/21 [facility]   02/02/21 [facility]   02/01/21 [facility]   01/29/21 [facility]   Showing recent visits within past 7 days and meeting all other requirements     Today's Visits  Date Type Provider Dept   02/05/21 [facility]   Showing today's visits and meeting all other requirements     Future Appointments  No visits were found meeting these conditions  Showing future appointments within next 150 days and meeting all other requirements        The patient was identified by name and date of birth  Lakia Montemayor was informed that this is a telemedicine visit and that the visit is being conducted through [email or web address] and patient was informed that this is a secure, HIPAA-compliant platform  He agrees to proceed     My office door was closed  No one else was in the room  He acknowledged consent and understanding of privacy and security of the video platform  The patient has agreed to participate and understands they can discontinue the visit at any time  Patient is aware this is a billable service  Subjective  Lakia Montemayor is a 52 y o  male          HPI     Past Medical History:   Diagnosis Date    Anxiety     Depression  Hyperlipidemia     controlled with diet and exercise    Kidney stone     Panic attack     Renal disorder     kidney stones    Suicide attempt [facility])        Past Surgical History:   Procedure Laterality Date    APPENDECTOMY      [place] CYSTO/URETERO W/LITHOTRIPSY &INDWELL STENT INSRT Left 5/9/2018    Procedure: CYSTOSCOPY URETEROSCOPY,LEFT  RETROGRADE PYELOGRAM AND INSERTION STENT URETERAL;  Surgeon: Carlos Lerner MD;  [facility];  Service: Urology    NV CYSTO/URETERO W/LITHOTRIPSY &INDWELL STENT INSRT Left 6/8/2018    Procedure: CYSTOSCOPY URETEROSCOPY WITH LITHOTRIPSY HOLMIUM LASER, RETROGRADE PYELOGRAM AND INSERTION STENT URETERAL;  Surgeon: Carlos Lerner MD;  [facility];  Service: Urology    TONSILLECTOMY      WISDOM TOOTH EXTRACTION      WISDOM TOOTH EXTRACTION         Current Outpatient Medications   Medication Sig Dispense Refill    gabapentin (NEURONTIN) 300 mg capsule Take 1 capsule (300 mg total) by mouth 3 (three) times a day At 9am, 4pm, 9pm 90 capsule 1    melatonin 3 mg Take 2 tablets (6 mg total) by mouth daily at bedtime 60 tablet 1    QUEtiapine (SEROquel) 50 mg tablet Take 0 5 tablets (25 mg total) by mouth daily at bedtime 30 tablet 1    sertraline (ZOLOFT) 50 mg tablet Take 1 tablet (50 mg total) by mouth daily At 9am 30 tablet 1     No current facility-administered medications for this visit  Allergies   Allergen Reactions    Betadine Antibiotic-Moisturize [Bacitracin-Polymyxin B] Rash       Review of Systems    Video Exam    There were no vitals filed for this visit  Physical Exam     I spent 4 hours of group + case management minutes with patient today in which greater than 50% of the time was spent in counseling/coordination of care regarding see notes  VIRTUAL VISIT DISCLAIMER    Iam Eliza acknowledges that he has consented to an online visit or consultation   He understands that the online visit is based solely on information provided by him, and that, in the absence of a face-to-face physical evaluation by the physician, the diagnosis he receives is both limited and provisional in terms of accuracy and completeness  This is not intended to replace a full medical face-to-face evaluation by the physician  Shi Lim understands and accepts these terms

## 2021-02-05 NOTE — PSYCH
This note was not shared with the patient due to this is a psychotherapy note  Subjective:     Patient ID: Melani Ryder is a 52 y o  male  [facility] Clinical Progress Notes      Specialized Services Documentation  Therapist must complete separate progress note for each specific clinical activity in which the individual participated during the day  Group Psychotherapy ([id number])   This group was facilitated virtually in a private office using [employer] and Approved [employer] Teams  Melani Ryder consented to the use of tele-video modality of treatment  Melani Ryder shared, when prompted, in psychotherapy group this morning which focused on the Weekly Wellness Assessment  He engaged in self-rate and discussion  Group members individually went through the assessment and rated themselves based on the past week  Group members shared assessment results  Group discussed the six domains of wellness; physical, emotional, cognitive, vocational, social, and spiritual  Group discussed strengths, challenges, barriers, and ways to increase each domain  Margaritaedmar Kaur chose the emotional domain and expressed the need to identify his emotional triggers  Abidabubba Kaur appeared accepting of helpful suggestions and feedback from fellow peers  Slow progress toward goal noted  Continue psychotherapy to further encourage self-reflection on strengths and challenges with personal wellness      Tx Plan Objective: 1 4 Therapist:  Gaurang Bean RN

## 2021-02-05 NOTE — PSYCH
This note was not shared with the patient due to this is a psychotherapy note   Subjective:     Patient ID: Cachorro Nguyen is a 52 y o  male  [facility] Clinical Progress Notes      Specialized Services Documentation  Therapist must complete separate progress note for each specific clinical activity in which the individual participated during the day  GROUP PSYCHOTHERAPY ([id number]) Group was facilitated virtually in a private office using HIPAA Compliant and Approved [employer] Teams  Cachorro Nguyen consented to the use of tele-video modality of treatment and was virtually present for group psychotherapy today  The group was educated the physiology of anxiety and the anxiety cycle  Members engaged in open discussion about challenges when coping with anxiety and understanding the brains role  Mansiedmar Regino questioned how to apply the anxiety hierarchy to his anxiousness about sleep  Derekcelina Lacy continues to make progress towards goals through verbal participation in group  Continue with psychotherapy     TX Plan Objectives: 1 1, 1 2, 1 4   Therapist: Rasta Santillan MA

## 2021-02-05 NOTE — PSYCH
Virtual Regular Visit    Problem List Items Addressed This Visit        Other    GORDON (generalized anxiety disorder) - Primary    Current moderate episode of major depressive disorder without prior episode ([facility] [address] )             Encounter provider EDWAR Mora    Provider located at   [address] [place] [id number] [phone]    Recent Visits  Date Type Provider Dept   01/29/21 Telemedicine EDWAR Morau [address] recent visits within past 7 days and meeting all other requirements     Today's Visits  Date Type Provider Dept   02/05/21 Telemedicine Keagan [address]a [address] today's visits and meeting all other requirements     Future Appointments  No visits were found meeting these conditions  Showing future appointments within next 150 days and meeting all other requirements      The patient was identified by name and date of birth  Yury Whitney was informed that this is a telemedicine visit and that the visit is being conducted through JumpCam  My office door was closed  No one else was in the room  He acknowledged consent and understanding of privacy and security of the video platform  The patient has agreed to participate and understands they can discontinue the visit at any time  Patient is aware this is a billable service       HPI     Current Outpatient Medications   Medication Sig Dispense Refill    gabapentin (NEURONTIN) 300 mg capsule Take 1 capsule (300 mg total) by mouth 3 (three) times a day At 9am, 4pm, 9pm 90 capsule 1    melatonin 3 mg Take 2 tablets (6 mg total) by mouth daily at bedtime 60 tablet 1    QUEtiapine (SEROquel) 50 mg tablet Take 0 5 tablets (25 mg total) by mouth daily at bedtime 30 tablet 1    sertraline (ZOLOFT) 50 mg tablet Take 1 tablet (50 mg total) by mouth daily At 9am 30 tablet 1     No current facility-administered medications for this visit  Review of Systems  Video Exam    There were no vitals filed for this visit  Physical Exam   As a result of this visit, I have referred the patient for further respiratory evaluation  No    I spent 20 minutes directly with the patient during this visit  [address] acknowledges that he has consented to an online visit or consultation  He understands that the online visit is based solely on information provided by him, and that, in the absence of a face-to-face physical evaluation by the physician, the diagnosis he receives is both limited and provisional in terms of accuracy and completeness  This is not intended to replace a full medical face-to-face evaluation by the physician  Deonte Weaver understands and accepts these terms  MEDICATION MANAGEMENT NOTE        [facility]    Name and Date of Birth:  Deonte Weaver 52 y o  1970 MRN: [id number]    Date of Visit: February 5, 2021    Allergies   Allergen Reactions    Betadine Antibiotic-Moisturize [Bacitracin-Polymyxin B] Rash     SUBJECTIVE:    Rubi Brown is seen today for a follow up for Major Depressive Disorder and Generalized Anxiety Disorder  He reports that he has improved slightly since the last visit  Patient continues to fixated on anxiety and medications at times  However the patient reports that he feels that overall things have been improving  He generally sleeps well though he did sleep poorly last night and has seen improvements in his appetite  He feels he is making progress and will extend his PHP stay into next week  Finds group helpful  He denies any side effects from medications      PLAN:   continue current psychiatric medications as ordered  Aware of 24 hour and weekend coverage for urgent situations accessed by calling [facility] main practice number    Diagnoses and all orders for this visit:    GORDON (generalized anxiety disorder)    Current moderate episode of major depressive disorder without prior episode ([facility] [address] )        Current Outpatient Medications on File Prior to Visit   Medication Sig Dispense Refill    gabapentin (NEURONTIN) 300 mg capsule Take 1 capsule (300 mg total) by mouth 3 (three) times a day At 9am, 4pm, 9pm 90 capsule 1    melatonin 3 mg Take 2 tablets (6 mg total) by mouth daily at bedtime 60 tablet 1    QUEtiapine (SEROquel) 50 mg tablet Take 0 5 tablets (25 mg total) by mouth daily at bedtime 30 tablet 1    sertraline (ZOLOFT) 50 mg tablet Take 1 tablet (50 mg total) by mouth daily At 9am 30 tablet 1     No current facility-administered medications on file prior to visit  Psychotherapy Provided:     Individual psychotherapy provided: No    HPI ROS Appetite Changes and Sleep:     He reports fluctuating sleep pattern, adequate appetite, adequate energy level   Patient denies suicidal or homicidal ideation    Review Of Systems:      General emotional problems and decreased functioning   Personality no change in personality   Constitutional negative   ENT negative   Cardiovascular negative   Respiratory negative   Gastrointestinal negative   Genitourinary negative   Musculoskeletal negative   Integumentary negative   Neurological negative   Endocrine negative   Other Symptoms none, all other systems are negative     Mental Status Evaluation:    Appearance Adequate hygiene and grooming   Behavior calm and cooperative   Mood anxious and depressed  Depression Scale -  of 10 (0 = No depression)  Anxiety Scale -  of 10 (0 = No anxiety)   Speech Normal rate and volume   Affect appropriate and mood-congruent   Thought Processes Goal directed and coherent   Thought Content Does not verbalize delusional material   Associations Tightly connected   Perceptual Disturbances Denies hallucinations and does not appear to be responding to internal stimuli   Risk Potential Suicidal/Homicidal Ideation - No evidence of suicidal or homicidal ideation and Patient does not verbalize suicidal or homicidal ideation  Risk of Violence - No evidence of risk for violence found on assessment  Risk of Self Mutilation - No evidence of risk for self mutilation found on assessment   Orientation oriented to person, place, time/date and situation   Memory recent and remote memory grossly intact   Consciousness alert and awake   Attention/Concentration attention span and concentration are age appropriate   Insight fair   Judgement fair   Muscle Strength and Gait normal muscle strength and normal muscle tone, normal gait/station and normal balance   Motor Activity no abnormal movements   Language no difficulty naming common objects, no difficulty repeating a phrase, no difficulty writing a sentence   Fund of Knowledge adequate knowledge of current events  adequate fund of knowledge regarding past history  adequate fund of knowledge regarding vocabulary      Past Psychiatric History Update:     Inpatient Psychiatric Admission Since Last Encounter:   no  Changes to Outpatient Psychiatric Treatment Team:    no  Suicide Attempt Or Self Mutilation Since Last Encounter:   no  Incidence of Violent Behavior Since Last Encounter:   no    Traumatic History Update:     New Onset of Abuse Since Last Encounter:   no  Traumatic Events Since Last Encounter:   no    Past Medical History:    Past Medical History:   Diagnosis Date    Anxiety     Depression     Hyperlipidemia     controlled with diet and exercise    Kidney stone     Panic attack     Renal disorder     kidney stones    Suicide attempt [facility])      Past Medical History Pertinent Negatives:   Diagnosis Date Noted    Head injury 01/22/2021    History of transfusion 05/17/2018    Seizures ([facility] [address] ) 01/22/2021     Past Surgical History:   Procedure Laterality Date    APPENDECTOMY      VT CYSTO/URETERO W/LITHOTRIPSY &INDWELL STENT INSRT Left 2018    Procedure: CYSTOSCOPY URETEROSCOPY,LEFT  RETROGRADE PYELOGRAM AND INSERTION STENT URETERAL;  Surgeon: Trina Lopez MD;  [facility];  Service: Urology    MA CYSTO/URETERO W/LITHOTRIPSY &INDWELL STENT INSRT Left 2018    Procedure: CYSTOSCOPY URETEROSCOPY WITH LITHOTRIPSY HOLMIUM LASER, RETROGRADE PYELOGRAM AND INSERTION STENT URETERAL;  Surgeon: Trina Lopez MD;  [facility];  Service: Urology    TONSILLECTOMY      WISDOM TOOTH EXTRACTION      WISDOM TOOTH EXTRACTION       Allergies   Allergen Reactions    Betadine Antibiotic-Moisturize [Bacitracin-Polymyxin B] Rash     Substance Abuse History:    Social History     Substance and Sexual Activity   Alcohol Use Yes    Alcohol/week: 1 0 standard drinks    Types: 1 Cans of beer per week    Frequency: Monthly or less    Drinks per session: 1 or 2    Binge frequency: Never    Comment: ocassional     Social History     Substance and Sexual Activity   Drug Use No    Comment: Reports last use of any substance >20years ago     Social History:    Social History     Socioeconomic History    Marital status: /Civil Union     Spouse name: Not on file    Number of children: Not on file    Years of education: Not on file    Highest education level: Master's degree (e g , MA, MS, Jesus, MEd, MSW, GLEN)   Occupational History    Not on file   Social Needs    Financial resource strain: Not hard at all   Oliver-Pauline insecurity     Worry: Not on file     Inability: Not on file   [employer] needs     Medical: Not on file     Non-medical: Not on file   Tobacco Use    Smoking status: Former Smoker     Packs/day: 0 50     Types: Cigarettes     Quit date: 1998     Years since quittin 7    Smokeless tobacco: Never Used   Substance and Sexual Activity    Alcohol use:  Yes     Alcohol/week: 1 0 standard drinks     Types: 1 Cans of beer per week     Frequency: Monthly or less     Drinks per session: 1 or 2     Binge frequency: Never     Comment: ocassional    Drug use: No     Comment: Reports last use of any substance >20years ago    Sexual activity: Not on file   Lifestyle    Physical activity     Days per week: 0 days     Minutes per session: Not on file    Stress: Rather much   Relationships    Social connections     Talks on phone: Once a week     Gets together: Not on file     Attends Evangelical service: Not on file     Active member of club or organization: Not on file     Attends meetings of clubs or organizations: Not on file     Relationship status:     Intimate partner violence     Fear of current or ex partner: No     Emotionally abused: No     Physically abused: No     Forced sexual activity: No   Other Topics Concern    Not on file   Social History Narrative    Not on file     Family Psychiatric History:     Family History   Problem Relation Age of Onset    Heart disease Mother     Lung cancer Mother     Hypertension Mother     Anxiety disorder Mother     Stroke Father     Hyperlipidemia Father     Completed Suicide  Maternal Aunt      History Review: The following portions of the patient's history were reviewed and updated as appropriate: allergies, current medications, past family history, past medical history, past social history, past surgical history and problem list     OBJECTIVE:     Vital signs in last 24 hours: There were no vitals filed for this visit  Laboratory Results: I have personally reviewed all pertinent laboratory/tests results  Suicide/Homicide Risk Assessment:    Risk of Harm to Self:   The following ratings are based on assessment at the time of the interview   Recent Specific Risk Factors include: current depressive symptoms, current anxiety symptoms    Risk of Harm to Others:   The following ratings are based on assessment at the time of the interview   Recent Specific Risk Factors include: none      The following interventions are recommended: no intervention changes needed    Medications Risks/Benefits:      Risks, Benefits And Possible Side Effects Of Medications:    Discussed risks and benefits of treatment with patient including risk of suicidality, serotonin syndrome and SIADH related to treatment with antidepressants; Risk of induction of manic symptoms in certain patient populations and risk of parkinsonian symptoms, metabolic syndrome, tardive dyskinesia and neuroleptic malignant syndrome related to treatment with antipsychotic medications     Controlled Medication Discussion:     Not applicable    Treatment Plan:    Due for update/Updated:   EDWAR Chase 02/05/21    This note was shared with patient

## 2021-02-05 NOTE — PSYCH
This note was not shared with the patient due to this is a psychotherapy note    Subjective:     Patient ID: Yury Whitney is a 52 y o  male  [facility] Clinical Progress Notes      Specialized Services Documentation  Therapist must complete separate progress note for each specific clinical activity in which the individual participated during the day  This group was facilitated virtually in a private office using [employer] and Approved [employer] Teams  Yury Whitney consented to the use of tele-video modality of treatment  Education Therapy   [id number] Yury Whitney actively shared in morning assessment and goal review  Presented as Receptive related to readiness to learn  Yury Whitney did complete goal from last treatment day identifying gaining pride for facing his anxiety and getting outside of the house  did not present with any barriers to learning  [address] engaged throughout the treatment day  Was engaged in learning related to [employer]  Staff utilized Verbal and A/V teaching methods  Yury Whitney shared area of learning and set a goal for outside of program to use his journal more over the weekend        Tx Plan Objective: 1 1,1 2, Therapist:  WALKER Gann

## 2021-02-08 ENCOUNTER — OFFICE VISIT (OUTPATIENT)
Dept: PSYCHOLOGY | Facility: CLINIC | Age: 51
End: 2021-02-08
Payer: COMMERCIAL

## 2021-02-08 DIAGNOSIS — F41.1 GAD (GENERALIZED ANXIETY DISORDER): Primary | ICD-10-CM

## 2021-02-08 DIAGNOSIS — F32.A DEPRESSION, UNSPECIFIED DEPRESSION TYPE: ICD-10-CM

## 2021-02-08 PROCEDURE — G0176 OPPS/PHP;ACTIVITY THERAPY: HCPCS

## 2021-02-08 PROCEDURE — G0177 OPPS/PHP; TRAIN & EDUC SERV: HCPCS

## 2021-02-08 PROCEDURE — S0201 PARTIAL HOSPITALIZATION SERV: HCPCS

## 2021-02-08 PROCEDURE — G0410 GRP PSYCH PARTIAL HOSP 45-50: HCPCS

## 2021-02-08 NOTE — PSYCH
This note was not shared with the patient due to this is a psychotherapy note   Subjective:     Patient ID: Kinga Forrester is a 52 y o  male  [facility] Clinical Progress Notes      Specialized Services Documentation  Therapist must complete separate progress note for each specific clinical activity in which the individual participated during the day  Allied Therapy   This group was facilitated virtually in a private office using [employer] and Approved [employer] Teams  Kinga Forrester consented to the use of tele-video modality of treatment  [id number] Kinga Books  actively shared in [facility] group focused on DBT skill cassidy mind  Mercedes Buckley engaged in tasks exploring differences between reasonable and emotion mind and ways to get to wise mind  Group explored the benefits of mindfulness and practiced ways to slow down to begin to develop wise mind  Group reinforced role of participating with wise mind in order to prevent getting stuck in the past or fears of the future  Some effort toward treatment goal noted  Continue AT to encourage healthy skill development and practice of explored strategies  Tx Plan Objective: 1 1, Therapist:  Maxime CHURCH    Education Therapy   [id number] Kinga Books actively shared in morning assessment and goal review  Presented as Receptive related to readiness to learn  Kinga Usama did complete goal from last treatment day identifying gaining support  did not present with any barriers to learning  [address] [facility] engaged throughout the treatment day  Was engaged in learning related to Illness, Aftercare and Wellness Tools  Staff utilized Verbal and A/V teaching methods  Kinga Books shared area of learning and set a goal for outside of program to write about his program take aways        Tx Plan Objective: 1 1,1 5 Therapist:  Maxime CHURCH    Case Management Note    Maxime CHURCH    Current suicide risk : Low     This case management session was facilitated virtually in a private office using HIPPA Compliant and Approved [employer] Teams  Fred Foote consented to the use of tele-video modality of treatment  INDIVIDUAL PSYCHOTHERAPY  [id number] Met with Fred Merinod  Reported that he had an okay weekend  He is still not sleeping as well as he wants to  He also is trying to use techniques from group to begin to challenge negative thoughts  He identified more "head rushes" today - but also stated he had an argument with his wife - discussed how his symptoms escalated with a stressor - he had not made that connection prior  Discussed preparing for stressors when able but also listening to his symptoms as a warning sign that something in his life is distressing and what can he do about it  He was receptive  Discussed anticipated length of stay and beginning to formulate a schedule for return to work success  A third set of forms from [facility] received and submitted for his STD  Medications changes/added/denied? No    Treatment session number: 11    Individual Case Management Visit provided today?  Yes     [facility] follow up physician's orders: na

## 2021-02-08 NOTE — PSYCH
This note was not shared with the patient due to this is a psychotherapy note   Subjective:     Patient ID: Isreal Parish is a 52 y o  male  [facility] Clinical Progress Notes      Specialized Services Documentation  Therapist must complete separate progress note for each specific clinical activity in which the individual participated during the day  GROUP PSYCHOTHERAPY ([id number]) Group was facilitated virtually in a private office using HIPAA Compliant and Approved Microsoft Teams  Isreal Parish consented to the use of tele-video modality of treatment and was virtually present for group psychotherapy today  The group members received a safe and non-judgmental space to discuss current stressors and received feedback from the group  The group discussions included implementing and understanding topics discussed in previous group, identifying and changing negative thoughts and self-compassion   Nani osullivan for their courageous actions to leave negative relationships  He discussed he negative self judgements when he does not complete household tasks  Tami Sanders continues to make progress towards goals through verbal participation in group  Continue with psychotherapy     TX Plan Objectives: 1 1, 1 2, 1 4   Therapist: Cyril Flores MA

## 2021-02-08 NOTE — PSYCH
Subjective:     Patient ID: Vega Umanzor is a 52 y o  male  [facility] Clinical Progress Notes      Specialized Services Documentation  Therapist must complete separate progress note for each specific clinical activity in which the individual participated during the day  This was not shared due to this is a psychotherapy note  GROUP PSYCHOTHERAPY ([id number]) Group was facilitated virtually in a private office using HIPAA Compliant and Approved Microsoft Teams  Shefali Sung consented to the use of tele-video modality of treatment and was virtually present for group psychotherapy today  The group engaged in a discussion and activity regarding the relationship they currently have with themselves  Each member was asked to explore who they are through a self-exploration exercise  The self-exploration sentence completion exercise focuses on personal values, desires, strengths, and weaknesses  Members were asked to respond to the following:   - My biggest strength is:  - My biggest weakness is:   - I am afraid that:    Shefali Sung seemed somewhat engaged throughout the group session, but there were times when it seemed as if he had been falling asleep  Shefali Sung stated that their biggest weakness is letting things go  Shefali Sung is encouraged to make progress towards goals and objectives through group participation and will continue to attend psychotherapy group       Tx plan objective: 1 1, 1 2   Therapist: Vanessa Mendez MA

## 2021-02-08 NOTE — PSYCH
Virtual Regular Visit      Assessment/Plan:    Problem List Items Addressed This Visit        Other    GORDON (generalized anxiety disorder) - Primary      Other Visit Diagnoses     Depression, unspecified depression type                   Reason for visit is VIRTUAL PHP DUE TO COVID-19       Encounter provider BE [address] [place] [address] PROGRAM    Provider located at [address] [place] [id number]      Recent Visits  Date Type Provider Dept   02/05/21 Office Visit [facility]   02/04/21 Office Visit [facility]   02/03/21 Office Visit [facility]   02/02/21 Office Visit [facility]   02/01/21 Office Visit [facility]   Showing recent visits within past 7 days and meeting all other requirements     Future Appointments  No visits were found meeting these conditions  Showing future appointments within next 150 days and meeting all other requirements        The patient was identified by name and date of birth  Karlene Dhillon was informed that this is a telemedicine visit and that the visit is being conducted through [employer] and patient was informed that this is a secure, HIPAA-compliant platform  He agrees to proceed     My office door was closed  No one else was in the room  He acknowledged consent and understanding of privacy and security of the video platform  The patient has agreed to participate and understands they can discontinue the visit at any time  Patient is aware this is a billable service  Subjective  Karlene Dhillon is a 52 y o  male          HPI     Past Medical History:   Diagnosis Date    Anxiety     Depression     Hyperlipidemia     controlled with diet and exercise    Kidney stone     Panic attack     Renal disorder     kidney stones    Suicide attempt [facility])        Past Surgical History:   Procedure Laterality Date    APPENDECTOMY      NV CYSTO/URETERO W/LITHOTRIPSY &INDWELL STENT INSRT Left 5/9/2018    Procedure: CYSTOSCOPY URETEROSCOPY,LEFT  RETROGRADE PYELOGRAM AND INSERTION STENT URETERAL;  Surgeon: Latasha Woodard MD;  [facility];  Service: Urology    NV CYSTO/URETERO W/LITHOTRIPSY &INDWELL STENT INSRT Left 6/8/2018    Procedure: CYSTOSCOPY URETEROSCOPY WITH LITHOTRIPSY HOLMIUM LASER, RETROGRADE PYELOGRAM AND INSERTION STENT URETERAL;  Surgeon: Latasha Woodard MD;  [facility];  Service: Urology    TONSILLECTOMY      WISDOM TOOTH EXTRACTION      WISDOM TOOTH EXTRACTION         Current Outpatient Medications   Medication Sig Dispense Refill    gabapentin (NEURONTIN) 300 mg capsule Take 1 capsule (300 mg total) by mouth 3 (three) times a day At 9am, 4pm, 9pm 90 capsule 1    melatonin 3 mg Take 2 tablets (6 mg total) by mouth daily at bedtime 60 tablet 1    QUEtiapine (SEROquel) 50 mg tablet Take 0 5 tablets (25 mg total) by mouth daily at bedtime 30 tablet 1    sertraline (ZOLOFT) 50 mg tablet Take 1 tablet (50 mg total) by mouth daily At 9am 30 tablet 1     No current facility-administered medications for this visit  Allergies   Allergen Reactions    Betadine Antibiotic-Moisturize [Bacitracin-Polymyxin B] Rash       Review of Systems    Video Exam    There were no vitals filed for this visit  Physical Exam     I spent 4 hours of group + case management minutes with patient today in which greater than 50% of the time was spent in counseling/coordination of care regarding see notes  VIRTUAL VISIT DISCLAIMER    Ewa  acknowledges that he has consented to an online visit or consultation   He understands that the online visit is based solely on information provided by him, and that, in the absence of a face-to-face physical evaluation by the physician, the diagnosis he receives is both limited and provisional in terms of accuracy and completeness  This is not intended to replace a full medical face-to-face evaluation by the physician  Vega Umanzor understands and accepts these terms

## 2021-02-09 ENCOUNTER — OFFICE VISIT (OUTPATIENT)
Dept: PSYCHOLOGY | Facility: CLINIC | Age: 51
End: 2021-02-09
Payer: COMMERCIAL

## 2021-02-09 DIAGNOSIS — F32.A DEPRESSION, UNSPECIFIED DEPRESSION TYPE: ICD-10-CM

## 2021-02-09 DIAGNOSIS — F41.1 GAD (GENERALIZED ANXIETY DISORDER): Primary | ICD-10-CM

## 2021-02-09 PROCEDURE — S0201 PARTIAL HOSPITALIZATION SERV: HCPCS

## 2021-02-09 PROCEDURE — G0410 GRP PSYCH PARTIAL HOSP 45-50: HCPCS

## 2021-02-09 PROCEDURE — G0177 OPPS/PHP; TRAIN & EDUC SERV: HCPCS

## 2021-02-09 NOTE — PSYCH
This note was not shared with the patient due to this is a psychotherapy note  Subjective:     Patient ID: Cachorro Nguyen is a 52 y o  male  [facility] Clinical Progress Notes      Specialized Services Documentation  Therapist must complete separate progress note for each specific clinical activity in which the individual participated during the day  Group Psychotherapy ([id number])   This group was facilitated virtually in a private office using [employer] and Approved Hand[employer] Teams  Cachorro Nguyen consented to the use of tele-video modality of treatment  Cachorro Nguyen participated in psychoeducation group this morning which focused on medication management  Group discussed current medications, answered questions, expressed concerns, and created their own personal medication record  A handout which listed various strategies to manage medications was reviewed  Earline Lacy shared that he sets an alarm on his cell phone which reminds him of when it's time to take his medications  Good progress toward goal observed  Continue psychoeducation to further learn about prescribed medications and ways in which to manage them in order to maintain wellness and prevent relapse        Tx Plan Objective: 1 3 Therapist:  Placido Yoder RN

## 2021-02-09 NOTE — PSYCH
Virtual Regular Visit      Assessment/Plan:    Problem List Items Addressed This Visit        Other    GORDON (generalized anxiety disorder) - Primary      Other Visit Diagnoses     Depression, unspecified depression type                   Reason for visit is VIRTUAL PHP DUE TO COVID-19       Encounter provider BE [address] [place] [address] PROGRAM    Provider located at [address] [place] [id number]      Recent Visits  Date Type Provider Dept   02/08/21 Office Visit [facility]   02/05/21 Office Visit [facility]   02/04/21 [facility] Visit [facility]   02/03/21 [facility] Visit [facility]   02/02/21 Office Visit [facility]   Showing recent visits within past 7 days and meeting all other requirements     Today's Visits  Date Type Provider Dept   02/09/21 Office Visit [facility]   Showing today's visits and meeting all other requirements     Future Appointments  No visits were found meeting these conditions  Showing future appointments within next 150 days and meeting all other requirements        The patient was identified by name and date of birth  Deonte Weaver was informed that this is a telemedicine visit and that the visit is being conducted through [employer] and patient was informed that this is a secure, HIPAA-compliant platform  He agrees to proceed     My office door was closed  No one else was in the room  He acknowledged consent and understanding of privacy and security of the video platform  The patient has agreed to participate and understands they can discontinue the visit at any time  Patient is aware this is a billable service  Subjective  Deonte Weaver is a 52 y o  male          HPI     Past Medical History:   Diagnosis Date    Anxiety     Depression  Hyperlipidemia     controlled with diet and exercise    Kidney stone     Panic attack     Renal disorder     kidney stones    [facility]icide [facility])        Past Surgical History:   Procedure Laterality Date    APPENDECTOMY      [place] CYSTO/URETERO W/LITHOTRIPSY &INDWELL STENT INSRT Left 5/9/2018    Procedure: CYSTOSCOPY URETEROSCOPY,LEFT  RETROGRADE PYELOGRAM AND INSERTION STENT URETERAL;  Surgeon: Trina Lopez MD;  [facility];  Service: Urology    [place] CYSTO/URETERO W/LITHOTRIPSY &INDWELL STENT INSRT Left 6/8/2018    Procedure: CYSTOSCOPY URETEROSCOPY WITH LITHOTRIPSY HOLMIUM LASER, RETROGRADE PYELOGRAM AND INSERTION STENT URETERAL;  Surgeon: Trina Lopez MD;  Location: [facility];  Service: Urology    TONSILLECTOMY      WISDOM TOOTH EXTRACTION      WISDOM TOOTH EXTRACTION         Current Outpatient Medications   Medication Sig Dispense Refill    gabapentin (NEURONTIN) 300 mg capsule Take 1 capsule (300 mg total) by mouth 3 (three) times a day At 9am, 4pm, 9pm 90 capsule 1    melatonin 3 mg Take 2 tablets (6 mg total) by mouth daily at bedtime 60 tablet 1    QUEtiapine (SEROquel) 50 mg tablet Take 0 5 tablets (25 mg total) by mouth daily at bedtime 30 tablet 1    sertraline (ZOLOFT) 50 mg tablet Take 1 tablet (50 mg total) by mouth daily At 9am 30 tablet 1     No current facility-administered medications for this visit  Allergies   Allergen Reactions    Betadine Antibiotic-Moisturize [Bacitracin-Polymyxin B] Rash       Review of Systems    Video Exam    There were no vitals filed for this visit  Physical Exam     I spent 4 hours of group + case management minutes with patient today in which greater than 50% of the time was spent in counseling/coordination of care regarding see notes  VIRTUAL VISIT DISCLAIMER    Galindo Luke acknowledges that he has consented to an online visit or consultation   He understands that the online visit is based solely on information provided by him, and that, in the absence of a face-to-face physical evaluation by the physician, the diagnosis he receives is both limited and provisional in terms of accuracy and completeness  This is not intended to replace a full medical face-to-face evaluation by the physician  Melani Aleksey understands and accepts these terms

## 2021-02-09 NOTE — PSYCH
This note was not shared with the patient due to this is a psychotherapy note   Subjective:     Patient ID: Zoë Mary is a 52 y o  male  [facility] Clinical Progress Notes      Specialized Services Documentation  Therapist must complete separate progress note for each specific clinical activity in which the individual participated during the day  Case Management Note    Kostas CHURCH    Current suicide risk : Low     Not a case management day for Zoë Mary  He is aware of CM tomorrow and that he can reach out to this writer with any additional questions or concerns  Medications changes/added/denied? No    Treatment session number: 12    Individual Case Management Visit provided today?  No    [facility] follow up physician's orders: na

## 2021-02-09 NOTE — PSYCH
This note was not shared with the patient due to this is a psychotherapy note  Subjective:     Patient ID: Meri Sue is a 52 y o  male  [facility] Clinical Progress Notes      Specialized Services Documentation  Therapist must complete separate progress note for each specific clinical activity in which the individual participated during the day  Group Psychotherapy Life Skills Group (9:30-10:15) Rajesh Justice actively engaged in group focused on things that can and cannot be changed which was facilitated virtually in a private office using HIPAA Compliant and Approved [employer] Teams  Rajesh Justice consented to the use of tele-video modality of treatment and was virtually present for group psychotherapy today  During group we discussed the serenity prayer  Clients made lists of the things that they can and cannot change  One of the things Lucas Nassar listed that he could change is his attitude  One of the things he cannot change is other peoples expectations of him  Clients elida and shared a picture of how their life would look after they successfully changed the things on their list  Lucas Nassar stated his life would be happier  Lucas Nassar continues to make progress towards goals through verbal participation in group; to accomplish long term goals continue to utilize skills learned in programming  Continue with psychotherapy to educate and encourage use of wellness tools  Tx Plan Objective: 1 1,1 2 Therapist:  WALKER Kebede    Education Therapy   [id number] Meri Sue actively shared in morning assessment and goal review  Presented as Receptive related to readiness to learn  Meri Sue did complete goal from last treatment day identifying gaining a renewed perspective  did not present with any barriers to learning  [facility] engaged throughout the treatment day  Was engaged in learning related to Illness, Medication, Aftercare and Wellness Tools   Staff utilized Verbal, Written, A/V and Demonstration teaching methods  Vega Umanzor shared area of learning and set a goal for outside of program to pace himself        Tx Plan Objective: 1 1,1 2 Therapist:  WALKER Lopez

## 2021-02-09 NOTE — PSYCH
This note was not shared with the patient due to this is a psychotherapy note   Subjective:     Patient ID: Yin Azar is a 52 y o  male  [facility] Clinical Progress Notes      Specialized Services Documentation   Therapist must complete separate progress note for each specific clinical activity in which the individual participated during the day  GROUP PSYCHOTHERAPY ([id number]) Group was facilitated virtually in a private office using HIPAA Compliant and Approved [employer] Teams  Yin Azar consented to the use of tele-video modality of treatment and was virtually present for group psychotherapy today  Members engaged in education about brain responses to perceived danger, problem focused coping skills and emotion focused coping skills  Each member discussed their preferred type of coping skill and received information about various skills utilized by other group members  Juice Ramirez shared that he uses meditation and jazz music when he feels his "head rushes" to gain perspective  Juice Ramirez continues to demonstrate insight through verbal participation in group  Continue with psychotherapy     TX Plan Objectives: 1 1, 1 2, 1 4   Therapist: Andreina Gandhi MA

## 2021-02-10 ENCOUNTER — OFFICE VISIT (OUTPATIENT)
Dept: PSYCHOLOGY | Facility: CLINIC | Age: 51
End: 2021-02-10
Payer: COMMERCIAL

## 2021-02-10 ENCOUNTER — TELEMEDICINE (OUTPATIENT)
Dept: PSYCHIATRY | Facility: CLINIC | Age: 51
End: 2021-02-10
Payer: COMMERCIAL

## 2021-02-10 DIAGNOSIS — R42 DIZZINESS: ICD-10-CM

## 2021-02-10 DIAGNOSIS — R20.2 PARESTHESIA: ICD-10-CM

## 2021-02-10 DIAGNOSIS — F32.1 CURRENT MODERATE EPISODE OF MAJOR DEPRESSIVE DISORDER WITHOUT PRIOR EPISODE (HCC): ICD-10-CM

## 2021-02-10 DIAGNOSIS — F41.1 GAD (GENERALIZED ANXIETY DISORDER): Primary | ICD-10-CM

## 2021-02-10 PROCEDURE — G0410 GRP PSYCH PARTIAL HOSP 45-50: HCPCS

## 2021-02-10 PROCEDURE — S0201 PARTIAL HOSPITALIZATION SERV: HCPCS

## 2021-02-10 PROCEDURE — G0176 OPPS/PHP;ACTIVITY THERAPY: HCPCS

## 2021-02-10 PROCEDURE — 99213 OFFICE O/P EST LOW 20 MIN: CPT | Performed by: NURSE PRACTITIONER

## 2021-02-10 PROCEDURE — G0177 OPPS/PHP; TRAIN & EDUC SERV: HCPCS

## 2021-02-10 NOTE — PSYCH
This note was not shared with the patient due to this is a psychotherapy note  Subjective:     Patient ID: Julian Canela is a 52 y o  male  [facility] Progress Notes      Specialized Services Documentation  Therapist must complete separate progress note for each specific clinical activity in which the individual participated during the day  Group Psychotherapy Life Skills Group (9:30-10:15) Daniel Lowery actively engaged in group focused on each individual walking us through their own guided meditation which was facilitated virtually in a private office using HIPAA Compliant and Approved [employer] Teams  Piyush Ascencio consented to the use of tele-video modality of treatment and was virtually present for group psychotherapy today  During group each individual created their own meditation scene and provided as much detail as possible for the group  The group members disclosed feelings that came up for them and their favorite scene that their peer described in group  Margaritaedmar Ascencio liked the snow flakes scene that was described  Piyush Ascencio shared feelings of relaxation that came up for him during that scene  Piyush Silva continues to make progress towards goals through verbal participation in group; to accomplish long term goals continue to utilize skills learned in programming  Continue with psychotherapy to educate and encourage use of wellness tools  Tx Plan Objective: 1 1,1 2 Therapist:  Mejia Rutledge [facility]      Education Therapy   [id number] Julina Canela actively shared in morning assessment and goal review  Presented as Receptive related to readiness to learn  Julian Canela did complete goal from last treatment day identifying gaining stress but then had a realization that he was going to be ok  did not present with any barriers to learning  [address] engaged throughout the treatment day  Was engaged in learning related to Illness, Medication, Aftercare and Wellness Tools   Staff utilized Verbal, Written, A/V and Demonstration teaching methods  Cachorro Nguyen shared area of learning and set a goal for outside of program to call his step son        Tx Plan Objective: 1 1,1 2 Therapist:  WALKER Islas

## 2021-02-10 NOTE — PSYCH
This note was not shared with the patient due to this is a psychotherapy note   Subjective:     Patient ID: Sharonda Vásquez is a 52 y o  male  [facility] Clinical Progress Notes      Specialized Services Documentation  Therapist must complete separate progress note for each specific clinical activity in which the individual participated during the day  Case Management Note    Marylen Brinks [facility]    Current suicide risk : Low     INDIVIDUAL PSYCHOTHERAPY  This case management session was facilitated virtually in a private office using [employer] and Approved [employer] Teams  Sharonda Vásquez consented to the use of tele-video modality of treatment  [id number] Met with Sharonda Vásquez  Reported that his day did not start well, however he was able to turn his thinking around about it  His plans for today include reaching out to 2 supports that he has not talked to recently and when he has talked to them he has not been honest about what has been going on  Discussed that subtle ways of advocating are meaningful  Reviewed IOP step down plan next week  Medications changes/added/denied? No    Treatment session number: 13    Individual Case Management Visit provided today?  Yes     [facility] follow up physician's orders: see S Venkata note

## 2021-02-10 NOTE — PSYCH
Virtual Regular Visit    Problem List Items Addressed This Visit        Other    GORDON (generalized anxiety disorder) - Primary    Current moderate episode of major depressive disorder without prior episode ([facility] [address] )    Paresthesia    Dizziness             Encounter provider EDWAR Antonio    Provider located at   [address] [place] [address] [id number] [phone]    Recent Visits  Date Type Provider Dept   02/05/21 Telemedicine Andreychi [address] recent visits within past 7 days and meeting all other requirements     Today's Visits  Date Type Provider Dept   02/10/21 Telemedicine Andreychi [address] today's visits and meeting all other requirements     Future Appointments  No visits were found meeting these conditions  Showing future appointments within next 150 days and meeting all other requirements      The patient was identified by name and date of birth  Angel Ulrich was informed that this is a telemedicine visit and that the visit is being conducted through [employer]  My office door was closed  No one else was in the room  He acknowledged consent and understanding of privacy and security of the video platform  The patient has agreed to participate and understands they can discontinue the visit at any time  Patient is aware this is a billable service       HPI     Current Outpatient Medications   Medication Sig Dispense Refill    gabapentin (NEURONTIN) 300 mg capsule Take 1 capsule (300 mg total) by mouth 3 (three) times a day At 9am, 4pm, 9pm 90 capsule 1    melatonin 3 mg Take 2 tablets (6 mg total) by mouth daily at bedtime 60 tablet 1    QUEtiapine (SEROquel) 50 mg tablet Take 0 5 tablets (25 mg total) by mouth daily at bedtime 30 tablet 1    sertraline (ZOLOFT) 50 mg tablet Take 1 tablet (50 mg total) by mouth daily At 9am 30 tablet 1     No current facility-administered medications for this visit  Review of Systems  Video Exam    There were no vitals filed for this visit  Physical Exam   As a result of this visit, I have referred the patient for further respiratory evaluation  No    I spent 15 minutes directly with the patient during this visit  [address] All Pastor acknowledges that he has consented to an online visit or consultation  He understands that the online visit is based solely on information provided by him, and that, in the absence of a face-to-face physical evaluation by the physician, the diagnosis he receives is both limited and provisional in terms of accuracy and completeness  This is not intended to replace a full medical face-to-face evaluation by the physician  Jaskaran Luna understands and accepts these terms  PHP MEDICATION MANAGEMENT NOTE        [facility]    Name and Date of Birth:  Jaskaran Luna 52 y o  1970 MRN: [id number]    Date of Visit: February 10, 2021    Allergies   Allergen Reactions    Betadine Antibiotic-Moisturize [Bacitracin-Polymyxin B] Rash     SUBJECTIVE:    Vivienne Dubin is seen today for a follow up for depression, Major Depressive Disorder and Generalized Anxiety Disorder  He reports that he has done fairly well since the last visit  States that he had some difficulty sleeping over the weekend result really do okay  Will reduced to 3 times weekly next week  Rates depression as higher than anxiety  States he has low energy, low drive however he also states overall he is  he is doing well and feels has made progress  Patient is learning to identify stressors and apply healthy coping techniques  To stressors currently concerning the patient falling into old dynamics between him and his wife and concern over return to work  He denies any side effects from medications      PLAN: -No medication changes; continue current psychiatric medications as ordered  - planned transition to IOP next week  Aware of 24 hour and weekend coverage for urgent situations accessed by calling [facility] main practice number  Continue partial hospitalization program    Diagnoses and all orders for this visit:    GORDON (generalized anxiety disorder)    Current moderate episode of major depressive disorder without prior episode ([facility] [address] )    Paresthesia    Dizziness        Current Outpatient Medications on File Prior to Visit   Medication Sig Dispense Refill    gabapentin (NEURONTIN) 300 mg capsule Take 1 capsule (300 mg total) by mouth 3 (three) times a day At 9am, 4pm, 9pm 90 capsule 1    melatonin 3 mg Take 2 tablets (6 mg total) by mouth daily at bedtime 60 tablet 1    QUEtiapine (SEROquel) 50 mg tablet Take 0 5 tablets (25 mg total) by mouth daily at bedtime 30 tablet 1    sertraline (ZOLOFT) 50 mg tablet Take 1 tablet (50 mg total) by mouth daily At 9am 30 tablet 1     No current facility-administered medications on file prior to visit  Psychotherapy Provided:     Individual psychotherapy provided: No    HPI ROS Appetite Changes and Sleep:     He reports adequate number of sleep hours (6-8 hours), adequate appetite, adequate energy level, low energy   Patient denies suicidal or homicidal ideation    Review Of Systems:      General emotional problems, sleep disturbances and decreased functioning   Personality no change in personality   Constitutional negative   ENT negative   Cardiovascular negative   Respiratory negative   Gastrointestinal negative   Genitourinary negative   Musculoskeletal negative   Integumentary negative   Neurological negative   Endocrine negative   Other Symptoms none, all other systems are negative     Mental Status Evaluation:    Appearance Adequate hygiene and grooming   Behavior calm and cooperative   Mood anxious and depressed  Depression Scale - 6 of 10 (0 = No depression)  Anxiety Scale - 4 of 10 (0 = No anxiety)   Speech Normal rate and volume   Affect appropriate and mood-congruent   Thought Processes Goal directed and coherent   Thought Content Does not verbalize delusional material   Associations Tightly connected   Perceptual Disturbances Denies hallucinations and does not appear to be responding to internal stimuli   Risk Potential Suicidal/Homicidal Ideation - No evidence of suicidal or homicidal ideation and Patient does not verbalize suicidal or homicidal ideation  Risk of Violence - No evidence of risk for violence found on assessment  Risk of Self Mutilation - No evidence of risk for self mutilation found on assessment   Orientation oriented to person, place, time/date and situation   Memory recent and remote memory grossly intact   Consciousness alert and awake   Attention/Concentration attention span and concentration are age appropriate   Insight fair   Judgement fair   Muscle Strength and Gait normal muscle strength and normal muscle tone, normal gait/station and normal balance   Motor Activity no abnormal movements   Language no difficulty naming common objects, no difficulty repeating a phrase, no difficulty writing a sentence   Fund of Knowledge adequate knowledge of current events  adequate fund of knowledge regarding past history  adequate fund of knowledge regarding vocabulary      Past Psychiatric History Update:     Inpatient Psychiatric Admission Since Last Encounter:   no  Suicide Attempt Or Self Mutilation Since Last Encounter:   no  Incidence of Violent Behavior Since Last Encounter:   no    Traumatic History Update:     New Onset of Abuse Since Last Encounter:   no  Traumatic Events Since Last Encounter:   no    Past Medical History:    Past Medical History:   Diagnosis Date    Anxiety     Depression     Hyperlipidemia     controlled with diet and exercise    Kidney stone     Panic attack     Renal disorder     kidney stones    Suicide attempt [facility])      Past Medical History Pertinent Negatives:   Diagnosis Date Noted    Head injury 01/22/2021    History of transfusion 05/17/2018    Seizures ([address] ) 01/22/2021     Past Surgical History:   Procedure Laterality Date    APPENDECTOMY      [place] CYSTO/URETERO W/LITHOTRIPSY &INDWELL STENT INSRT Left 5/9/2018    Procedure: CYSTOSCOPY URETEROSCOPY,LEFT  RETROGRADE PYELOGRAM AND INSERTION STENT URETERAL;  Surgeon: Jarad Copeland MD;  [facility];  Service: Urology    HI CYSTO/URETERO W/LITHOTRIPSY &INDWELL STENT INSRT Left 6/8/2018    Procedure: CYSTOSCOPY URETEROSCOPY WITH LITHOTRIPSY HOLMIUM LASER, RETROGRADE PYELOGRAM AND INSERTION STENT URETERAL;  Surgeon: Jarad Copeland MD;  [facility];  Service: Urology    TONSILLECTOMY      WISDOM TOOTH EXTRACTION      WISDOM TOOTH EXTRACTION       Allergies   Allergen Reactions    Betadine Antibiotic-Moisturize [Bacitracin-Polymyxin B] Rash     Substance Abuse History:    Social History     Substance and Sexual Activity   Alcohol Use Yes    Alcohol/week: 1 0 standard drinks    Types: 1 Cans of beer per week    Frequency: Monthly or less    Drinks per session: 1 or 2    Binge frequency: Never    Comment: ocassional     Social History     Substance and Sexual Activity   Drug Use No    Comment: Reports last use of any substance >20years ago     Social History:    Social History     Socioeconomic History    Marital status: /Civil Union     Spouse name: Not on file    Number of children: Not on file    Years of education: Not on file    Highest education level: Master's degree (e g , MA, MS, Jesus, MEd, MSW, GLEN)   Occupational History    Not on file   Social Needs    Financial resource strain: Not hard at all   Clinton-Pauline insecurity     Worry: Not on file     Inability: Not on file   [employer] needs     Medical: Not on file     Non-medical: Not on file   Tobacco Use    Smoking status: Former Smoker Packs/day: 0 50     Types: Cigarettes     Quit date: 1998     Years since quittin 7    Smokeless tobacco: Never Used   Substance and Sexual Activity    Alcohol use: Yes     Alcohol/week: 1 0 standard drinks     Types: 1 Cans of beer per week     Frequency: Monthly or less     Drinks per session: 1 or 2     Binge frequency: Never     Comment: ocassional    Drug use: No     Comment: Reports last use of any substance >20years ago    Sexual activity: Not on file   Lifestyle    Physical activity     Days per week: 0 days     Minutes per session: Not on file    Stress: Rather much   Relationships    Social connections     Talks on phone: Once a week     Gets together: Not on file     Attends Scientologist service: Not on file     Active member of club or organization: Not on file     Attends meetings of clubs or organizations: Not on file     Relationship status:     Intimate partner violence     Fear of current or ex partner: No     Emotionally abused: No     Physically abused: No     Forced sexual activity: No   Other Topics Concern    Not on file   Social History Narrative    Not on file     Family Psychiatric History:     Family History   Problem Relation Age of Onset    Heart disease Mother     Lung cancer Mother     Hypertension Mother     Anxiety disorder Mother     Stroke Father     Hyperlipidemia Father     Completed Suicide  Maternal Aunt      History Review: The following portions of the patient's history were reviewed and updated as appropriate: allergies, current medications, past family history, past medical history, past social history, past surgical history and problem list     OBJECTIVE:     Vital signs in last 24 hours: There were no vitals filed for this visit  Laboratory Results: I have personally reviewed all pertinent laboratory/tests results      Medications Risks/Benefits:      Risks, Benefits And Possible Side Effects Of Medications:    Discussed risks and benefits of treatment with patient including risk of suicidality, serotonin syndrome and SIADH related to treatment with antidepressants; Risk of induction of manic symptoms in certain patient populations and risk of parkinsonian symptoms, metabolic syndrome, tardive dyskinesia and neuroleptic malignant syndrome related to treatment with antipsychotic medications     Controlled Medication Discussion:     Ariaamanda Jolly has been filling controlled prescriptions on time as prescribed according to [address] 02/10/21    This note was shared with patient

## 2021-02-10 NOTE — PSYCH
Subjective:     Patient ID: Yamile Pérez is a 52 y o  male  [facility] Clinical Progress Notes      Specialized Services Documentation  Therapist must complete separate progress note for each specific clinical activity in which the individual participated during the day  This group was facilitated virtually in a private office using [employer] Teams  Yamile Pérez consented to the use of tele-video modality of treatment  This note was not shared with the patient due to this is a psychotherapy note      Group Psychotherapy     ([id number]) Yamile Pérez  was present in wellness group entitled [employer]  Group opened by discussing verbal vs non verbal communication and the benefits on developing good social skills  The group then played Social Bingo which featured a board with social prompts on it  When members got a letter and number combination from their board, they used the prompt to talk about themselves or something relevant to treatment or the world today  Activity helped the group to develop social talking points and to learn about each other  Good progress towards goal noted through participation and peer support  Continue wellness and psychoeducational groups to educate on the importance of developing social skills      Tx Plan Objective: 1 4, Therapist:  Geno Belle RN

## 2021-02-10 NOTE — PSYCH
This note was not shared with the patient due to this is a psychotherapy note    Subjective:     Patient ID: Harjeet Tipton is a 52 y o  male    [facility] Clinical Progress Notes      Specialized Services Documentation  Therapist must complete separate progress note for each specific clinical activity in which the individual participated during the day  Allied Therapy Group ([id number]) This group was facilitated virtually in a private office using HIPAA Compliant and Approved [employer] Teams  Harjeet Tipton consented to the use of tele-video modality of treatment and was virtually present for [employer] leisure  group  Pt actively participated  This group emphasized the importance of engaging in productive and meaningful leisure activities that facilitate emotional and self expression  Educated pt's on how achieving a healthy "flow" state can facilitate mindfulness and distress tolerance by refocusing thoughts on solely the presented activity instead of ones problems  Presented group activity included engagement in painting task based on the [address] [place] [address] of practice in occupational therapy which involves creating a visual representation of ones "life flow" using a river metaphor  Patients encouraged to identify problems or "rocks", personal qualities (strengths or areas in need of improvement) or "driftwood", and supportive or overwhelming contextual factors or "thickness of riverbed"  The goal is to self-reflect and identify what is impeding a healthy "life flow", and how to make the river flow more efficiently in order to remove the barriers and reach one's individual long-term goals  Pt actively engaged with peers and shared willingly during group discussion  Pt shared that he feels he has a good support system  He shared that he has had a difficult couple of days and he feels there are currently a lot of rocks in his life   He identified music and writing as things in his life that facilitate his river flow  Pt was able to attend to activity and discussion throughout duration of group session  Vgea Umanzor appears to be making good progress towards goals and objectives  Continue to engage pt in [facility] in order to continue to progress toward long-term goal achievement  Tx Plan Objective: 1 1 and 1 2    Therapist: Jostin Meeks MS, OTR/L

## 2021-02-10 NOTE — PSYCH
Virtual Regular Visit      Assessment/Plan:    Problem List Items Addressed This Visit        Other    GORDON (generalized anxiety disorder) - Primary    Current moderate episode of major depressive disorder without prior episode ([facility] [address] )               Reason for visit is PHP VIRTUAL GROUP DUE TO COVID-19       Encounter provider BE [address] [facility]    Provider located at [place] [address]  [address] [id number]      The patient was identified by name and date of birth  Yury Whitney was informed that this is a telemedicine visit and that the visit is being conducted through [employer] and patient was informed that this is a secure, HIPAA-compliant platform  He agrees to proceed     My office door was closed  No one else was in the room  He acknowledged consent and understanding of privacy and security of the video platform  The patient has agreed to participate and understands they can discontinue the visit at any time  Patient is aware this is a billable service  Subjective  Yury Whitney is a 52 y o  male   I spent FOUR GROUP HOURS PLUS CASE MANAGEMENT minutes with patient today in which greater than 50% of the time was spent in counseling/coordination of care regarding PHP - SEE NOTES  VIRTUAL VISIT DISCLAIMER    Yury Whitney acknowledges that he has consented to an online visit or consultation  He understands that the online visit is based solely on information provided by him, and that, in the absence of a face-to-face physical evaluation by the physician, the diagnosis he receives is both limited and provisional in terms of accuracy and completeness  This is not intended to replace a full medical face-to-face evaluation by the physician  Yury Whitney understands and accepts these terms

## 2021-02-11 ENCOUNTER — OFFICE VISIT (OUTPATIENT)
Dept: PSYCHOLOGY | Facility: CLINIC | Age: 51
End: 2021-02-11
Payer: COMMERCIAL

## 2021-02-11 DIAGNOSIS — F41.1 GAD (GENERALIZED ANXIETY DISORDER): Primary | ICD-10-CM

## 2021-02-11 DIAGNOSIS — F32.1 CURRENT MODERATE EPISODE OF MAJOR DEPRESSIVE DISORDER WITHOUT PRIOR EPISODE (HCC): ICD-10-CM

## 2021-02-11 PROBLEM — F32.A DEPRESSION: Status: ACTIVE | Noted: 2021-02-11

## 2021-02-11 PROCEDURE — G0410 GRP PSYCH PARTIAL HOSP 45-50: HCPCS

## 2021-02-11 PROCEDURE — G0177 OPPS/PHP; TRAIN & EDUC SERV: HCPCS

## 2021-02-11 PROCEDURE — S0201 PARTIAL HOSPITALIZATION SERV: HCPCS

## 2021-02-11 NOTE — PSYCH
Virtual Regular Visit      Assessment/Plan:    Problem List Items Addressed This Visit        Other    GORDON (generalized anxiety disorder) - Primary    Current moderate episode of major depressive disorder without prior episode ([address] )               Reason for visit is VIRTUAL PHP DUE TO COVID-19       [facility] [address]    Provider [facility] [address] [place] [phone]      Recent Visits  Date Type Provider Dept   02/10/21 Office Visit [facility]   02/09/21 Office Visit [facility]   02/08/21 Office Visit [facility]   02/05/21 Office Visit [facility]   02/04/21 Office Visit [facility]   Showing recent visits within past 7 days and meeting all other requirements     Future Appointments  No visits were found meeting these conditions  Showing future appointments within next 150 days and meeting all other requirements        The patient was identified by name and date of birth  Zoë Mary was informed that this is a telemedicine visit and that the visit is being conducted through [employer] and patient was informed that this is a secure, HIPAA-compliant platform  He agrees to proceed     My office door was closed  No one else was in the room  He acknowledged consent and understanding of privacy and security of the video platform  The patient has agreed to participate and understands they can discontinue the visit at any time  Patient is aware this is a billable service  Subjective  Zoë Mary is a 52 y o  male          HPI     Past Medical History:   Diagnosis Date    Anxiety     Depression     Hyperlipidemia     controlled with diet and exercise    Kidney stone     Panic attack     Renal disorder     kidney stones    Suicide attempt ([facility] [address] ) Past Surgical History:   Procedure Laterality Date    APPENDECTOMY      [place] CYSTO/URETERO W/LITHOTRIPSY &INDWELL STENT INSRT Left 5/9/2018    Procedure: CYSTOSCOPY URETEROSCOPY,LEFT  RETROGRADE PYELOGRAM AND INSERTION STENT URETERAL;  Surgeon: Karen Abraham MD;  [facility];  Service: Urology    [place] CYSTO/URETERO W/LITHOTRIPSY &INDWELL STENT INSRT Left 6/8/2018    Procedure: CYSTOSCOPY URETEROSCOPY WITH LITHOTRIPSY HOLMIUM LASER, RETROGRADE PYELOGRAM AND INSERTION STENT URETERAL;  Surgeon: Karen Abraham MD;  [facility]: [facility];  Service: Urology    TONSILLECTOMY      WISDOM TOOTH EXTRACTION      WISDOM TOOTH EXTRACTION         Current Outpatient Medications   Medication Sig Dispense Refill    gabapentin (NEURONTIN) 300 mg capsule Take 1 capsule (300 mg total) by mouth 3 (three) times a day At 9am, 4pm, 9pm 90 capsule 1    melatonin 3 mg Take 2 tablets (6 mg total) by mouth daily at bedtime 60 tablet 1    QUEtiapine (SEROquel) 50 mg tablet Take 0 5 tablets (25 mg total) by mouth daily at bedtime 30 tablet 1    sertraline (ZOLOFT) 50 mg tablet Take 1 tablet (50 mg total) by mouth daily At 9am 30 tablet 1     No current facility-administered medications for this visit  Allergies   Allergen Reactions    Betadine Antibiotic-Moisturize [Bacitracin-Polymyxin B] Rash       Review of Systems    Video Exam    There were no vitals filed for this visit  Physical Exam     I spent 4 hours of group + case management minutes with patient today in which greater than 50% of the time was spent in counseling/coordination of care regarding see notes  VIRTUAL VISIT DISCLAIMER    Fred Qamar acknowledges that he has consented to an online visit or consultation   He understands that the online visit is based solely on information provided by him, and that, in the absence of a face-to-face physical evaluation by the physician, the diagnosis he receives is both limited and provisional in terms of accuracy and completeness  This is not intended to replace a full medical face-to-face evaluation by the physician  Kinga Forrester understands and accepts these terms

## 2021-02-11 NOTE — PSYCH
This note was not shared with the patient due to this is a psychotherapy note   Subjective:     Patient ID: Angel Ulrich is a 52 y o  male  [facility] Clinical Progress Notes      Specialized Services Documentation  Therapist must complete separate progress note for each specific clinical activity in which the individual participated during the day  Case Management Note    Min [facility]    Current suicide risk : Low     Not a case management day for Angel Ulrich  - no additional concerns presented - will be speaking tomorrow 2/12/2021  Medications changes/added/denied? No    Treatment session number: 14    Individual Case Management Visit provided today?  No    [facility] follow up physician's orders: na

## 2021-02-11 NOTE — PSYCH
This note was not shared with the patient due to this is a psychotherapy note   Subjective:     Patient ID: Julian Canela is a 52 y o  male  [facility] Clinical Progress Notes      Specialized Services Documentation  Therapist must complete separate progress note for each specific clinical activity in which the individual participated during the day  GROUP PSYCHOTHERAPY ([id number]) Group was facilitated virtually in a private office using HIPAA Compliant and Approved Microsoft Teams  Julian Canela consented to the use of tele-video modality of treatment and was virtually present for group psychotherapy today  The group was educated and evaluated their protective factors (i e  social support, coping skills, physical health, sense of purpose, self-esteem and healthy thinking)  Members were encouraged to share 2 positive factors and one that needs improvement  The group explored the benefit of the factors that scored low  Piyush Ascencio shared that his social support is strong  He would like to improve his healthy thinking  He feels that he tends to think negatively which continues his anxious cycle  Piyush Ascencio continues to demonstrate insight and progress towards goals through verbal participation in group  Continue with psychotherapy     TX Plan Objectives: 1 1, 1 2, 1 4   Therapist: Mer Moralez MA, [place]

## 2021-02-11 NOTE — PSYCH
Subjective:     Patient ID: Vineet Burr is a 52 y o  male  [facility] Clinical Progress Notes      Specialized Services Documentation  Therapist must complete separate progress note for each specific clinical activity in which the individual participated during the day  This group was facilitated virtually in a private office using [employer] and Approved [employer] Teams  Vineet Burr consented to the use of tele-video modality of treatment  This note was not shared with the patient due to this is a psychotherapy note      Group Psychotherapy     ([id number]) Vineet Burr  was present in nurse education group this morning which focused on relapse prevention  Group shared personal symptoms/warning signs of relapse, what they tend to do when these symptoms/signs are present, what they plan to do in the future, and how a support can help  They then discussed the importance of sharing these symptoms/warning signs with a current support  He identified all or nothing thinking and poor compliance as symptoms of his mental illness  In the future, they will be able to better identify warning signs and understand when to seek support  Group members then created an individualized relapse prevention plan to keep readily available when warning signs/symptoms are recognized  Good progress toward goal noted  Continue nurse education group to educate on the importance of recognizing signs of relapse, developing a plan, and seeking support in effort to prevent a crisis from occurring             Tx Plan Objective: 1 3; 1 4, Therapist:  Naomi Brown RN

## 2021-02-11 NOTE — PSYCH
This note was not shared with the patient due to this is a psychotherapy note  Subjective:     Patient ID: Jesus Manuel Saul is a 52 y o  male  [facility] Clinical Progress Notes      Specialized Services Documentation  Therapist must complete separate progress note for each specific clinical activity in which the individual participated during the day  Group Psychotherapy Life Skills Group (9:30-10:15) Margarito Walker actively engaged in group focused on understanding your emotions when you are upset which was facilitated virtually in a private office using HIPAA Compliant and Approved [employer] Teams  Margarito Walker consented to the use of tele-video modality of treatment and was virtually present for group psychotherapy today  During group we discussed what emotional intelligence is and how our emotions affect our behavior  Clients shared about a situation that made them upset  Margarito Walker described a situation involving his wife commenting on being tired of hearing about his mental health stuff  During exercise, Margarito Walker identified that walking away was a coping behavior that he practiced during that moment   He stated that it completely deescalated the situation  Margarito Walker continues to make progress towards goals through verbal participation in group; to accomplish long term goals continue to utilize skills learned in programming  Continue with psychotherapy to educate and encourage use of wellness tools  Tx Plan Objective: 1 1,1 2 Therapist:  WALKER Fernandes    Education Therapy   [id number] Jesus Manuel Saul actively shared in morning assessment and goal review  Presented as Receptive related to readiness to learn  Jesus Manuel Saul did complete goal from last treatment day identifying gaining a sense of calm  did not present with any barriers to learning  [address] engaged throughout the treatment day   Was engaged in learning related to Illness, Medication, Aftercare and Wellness Tools  Staff utilized Verbal, Written, A/V and Demonstration teaching methods  Navarro Serna shared area of learning and set a goal for outside of program to remove more snow        Tx Plan Objective: 1 1,1 2 Therapist:  WALKER Marsh

## 2021-02-12 ENCOUNTER — OFFICE VISIT (OUTPATIENT)
Dept: PSYCHOLOGY | Facility: CLINIC | Age: 51
End: 2021-02-12
Payer: COMMERCIAL

## 2021-02-12 DIAGNOSIS — F32.1 CURRENT MODERATE EPISODE OF MAJOR DEPRESSIVE DISORDER WITHOUT PRIOR EPISODE (HCC): ICD-10-CM

## 2021-02-12 DIAGNOSIS — F41.1 GAD (GENERALIZED ANXIETY DISORDER): Primary | ICD-10-CM

## 2021-02-12 PROCEDURE — G0410 GRP PSYCH PARTIAL HOSP 45-50: HCPCS

## 2021-02-12 PROCEDURE — S0201 PARTIAL HOSPITALIZATION SERV: HCPCS

## 2021-02-12 PROCEDURE — G0176 OPPS/PHP;ACTIVITY THERAPY: HCPCS

## 2021-02-12 PROCEDURE — G0177 OPPS/PHP; TRAIN & EDUC SERV: HCPCS

## 2021-02-12 NOTE — PSYCH
This note was not shared with the patient due to this is a psychotherapy note    Subjective:     Patient ID: Yamile Pérez is a 52 y o  male  [facility] Clinical Progress Notes      Specialized Services Documentation  Therapist must complete separate progress note for each specific clinical activity in which the individual participated during the day  Group Psychotherapy     ([id number]) Yamile Pérez actively shared in psychotherapy group which focused on Weekly Wellness Assessment  Theyengaged in self-rate and discussion  Group members individually went through the assessment and rated themselves based on the past week  Group members shared assessment results  Group discussed the six domains of wellness; physical, emotional, cognitive, vocational, social, and spiritual  Group discussed strengths, challenges, barriers, and ways to increase each domain in and open forum and developed goals  Good progress towards goal observed and shared  Continue psychotherapy to further encourage self-reflection on strengths and challenges with personal wellness      Tx Plan Objective:  1 1,1 2,1 3,1 4 Therapist:  Geno Belle RN

## 2021-02-12 NOTE — PSYCH
Assessment/Plan:      Diagnoses and all orders for this visit:    GORDON (generalized anxiety disorder)    Current moderate episode of major depressive disorder without prior episode ([facility] [address] )          Subjective:     Patient ID: Meri Sue is a 52 y o  male  [facility] Treatment Plan   AREAS OF NEED: Anxiety as evidenced by physical symptoms (tingling, dizziness), ruminative thoughts, feeling overwhelmed and helpless/hopeless, worried, sleep disturbance related to work stressors and ongoing symptoms impacting daily life  Date Initiated: 01/22/2021    Strengths: hard worker, family support, wants to feel better     LONG TERM GOAL:   Date Initiated: 01/22/2021  1 0 I will identify 3 signs that my anxiety has lessened in intensity and frequency and I am more productive in my day to day life  Target Date: 02/19/2021  Completion Date:     SHORT TERM OBJECTIVES:     Date Initiated: 01/22/2021  1 1 I will identify 3 mindfulness and/or distress tolerance skills I am practicing to reduce anxiety symptoms  Revision Date:   Target Date: 2/3/2021  Completion Date: 2/3/2021     Date Initiated: 01/22/2021  1 2 I will identify 3 aspects of a structure I can incorporate into my daily day despite my need to be flexible with work  Revision Date: continue to work on 2/3/2021  Target Date: 2/3/2021  Completion Date: see revision 2/12/2021    Date Initiated: 01/22/2021  1 3 I will take medications as prescribed and share questions and concerns if arise  Revision Date: continue medication checks weekly 2/3/2021; 2/12/2021  Target Date: 2/3/2021  Completion Date:     Date Initiated: 01/22/2021  1 4 I will identify 3 ways my supports can assist in my recovery and agree to staff/support contact as indicated      Revision Date: continue to explore 2/3/2021  Target Date: 2/3/2021  Completion Date: 2/12/2021           7 DAY REVISION:    Date Initiated:2/3/2021  1 5 I will identify 1 skill I am using consistently to support my wellness and challenge myself to apply at least 1 other skill daily  Revision Date: continue 2/12/2021  Target Date: 2/15/2021  Completion Date:    Date Initiated:2/12/2021  1 6 I will identify at least 1 skill I can begin to implement before, during, and after my work day to move towards a productive return  Revision Date:   Target Date: 3/3/2021  Completion Date:      PSYCHIATRY:  Date Initiated: 01/22/2021  Medication Management and Education       Revision Date: 2/3/2021       Continue medication management  The person(s) responsible for carrying out the plan is Cheryl Ritchie MD    NURSING:   Date Initiated: 01/22/2021  1 1,1 2,1 3,1 4 This RN will provide daily wellness group five days weekly to educate Isreal Parish on S/S of his diagnoses and medications used in treatment  Revision Date:2/3/2021  1 1,1 2,1 3,1 4,1 5 Continue to encourage Isreal Parish to participate in wellness groups daily to learn about symptoms, coping strategies and warning signs to promote relapse prevention  The person(s) responsible for carrying out the plan is Karla Castro RN    PSYCHOLOGY:   Date Initiated: 01/22/2021       1 1, 1 2, 1 4 Provide psychotherapy group 5 times per week to allow opportunity for Isreal Parish to explore stressors and ways of coping  Revision Date: 2/3/2021  1 1,1 2,1 4,1 5 Continue to provide psychotherapy group daily to Isreal Parish and encourage sharing of stressors, skills and positive change  The person(s) responsible for carrying out the plan is Julita Knox    ALLIED THERAPY:   Date Initiated: 01/22/2021  1 1,1 2 Engage Isreal Parish in AT group 5 times daily to encourage development and use of wellness tools to decrease symptoms and promote recovery through meaningful activity    Revision Date: 2/3/2021  1 1,1 2,1 5 Continue to engage Isreal Parish to participate in AT group to practice wellness tools within program and transfer to home sharing successes and barriers through healthy task involvement  The person(s) responsible for carrying out the plan is MARYJO BeardBC     CASE MANAGEMENT:   Date Initiated: 01/22/2021      1 0 This  will meet with Sonam Bob 3-4 times weekly to assess treatment progress, discharge planning, connection to community supports and UR as indicated  Revision Date: 2/3/2021  1 0 Continue to meet with Sonam Bob 3-4 times weekly to assess growth, work toward goals, continued treatment needs, dc planning and use of supports  The person(s) responsible for carrying out the plan is MARYJO BeardBC       TREATMENT REVIEW/COMMENTS:     DISCHARGE CRITERIA: Identify 3 signs of progress and complete relapse prevention plan  DISCHARGE PLAN: OP care  Estimated Length of Stay:10 treatment days     Diagnosis and Treatment Plan explained to Rolando Cheema relates understanding diagnosis and is agreeable to Treatment Plan       CLIENT COMMENTS / Please share your thoughts, feelings, need and/or experiences regarding your treatment plan: _____________________________________________________________________________________________________________________________________________________________________________________________________________________________________________________________________________________________________________________ Date/Time: ______________     Patient Signature: __Reviewed and updated with Sonam Bob via TEAMS due to COVID-19 copy sent via email_________     Date/Time: ____2/12/2021 1312__________      Signature: _____[facility]____________________________     Date/Time: ____2/12/2021 1312__________

## 2021-02-12 NOTE — PSYCH
This note was not shared with the patient due to this is a psychotherapy note    Subjective:     Patient ID: Melani Ryder is a 52 y o  male  [facility] Clinical Progress Notes      Specialized Services Documentation  Therapist must complete separate progress note for each specific clinical activity in which the individual participated during the day  Group Psychotherapy Life Skills Group (9:30-10:15) Melani Ryder actively engaged in group focused on core beliefs which was facilitated virtually in a private office using HIPAA Compliant and Approved [employer] Teams  Carlos Arias consented to the use of tele-video modality of treatment and was virtually present for group psychotherapy today  Clients shared about their core beliefs and explored ways to reshape into positive beliefs  During exercise, Carlos Arias identified a core belief of   letting his fears get the best of him  and worked to write out positive statements to challenge the thought/belief  Brook Corea positive thoughts were  there are times when his fears did not get the best of him   Clients were asked to write down daily for one week a negative core belief/thought and three positive ones to challenge the negative belief  Piyuhs Kaur continues to make progress towards goals through verbal participation in group; to accomplish long term goals continue to utilize skills learned in programming  Continue with psychotherapy to educate and encourage use of wellness tools  Tx Plan Objective: 1 1,1 2 Therapist:  WALKER Wells      Education Therapy   [id number] Melani Ryder actively shared in morning assessment and goal review  Presented as Receptive related to readiness to learn  Melani Ryder did complete goal from last treatment day identifying gaining having a good day with his daughter  did not present with any barriers to learning  [address] engaged throughout the treatment day   Was engaged in learning related to Illness, Medication, Aftercare and Wellness Tools  Staff utilized Verbal, Written, A/V and Demonstration teaching methods  George Sorto shared area of learning and set a goal for outside of program to have a good Valentines day        Tx Plan Objective: 1 1,1 2 Therapist:  WALKER Rojas

## 2021-02-12 NOTE — PSYCH
Virtual Regular Visit      Assessment/Plan:    Problem List Items Addressed This Visit        Other    GORDON (generalized anxiety disorder) - Primary    Current moderate episode of major depressive disorder without prior episode ([facility] [address] )               Reason for visit is VIRTUAL PHP DUE TO COVID-19       Encounter provider BE [address]    Provider located at [address] [address]      Recent Visits  Date Type Provider Dept   02/11/21 Office Visit [facility]   02/10/21 [facility] Visit [facility]   02/09/21 [facility] Visit [facility]   02/08/21 [facility] Visit [facility]   02/05/21 Office Visit [facility]   Showing recent visits within past 7 days and meeting all other requirements     Today's Visits  Date Type Provider Dept   02/12/21 Office Visit [facility]   Showing today's visits and meeting all other requirements     Future Appointments  No visits were found meeting these conditions  Showing future appointments within next 150 days and meeting all other requirements        The patient was identified by name and date of birth  Lurdes Comment was informed that this is a telemedicine visit and that the visit is being conducted through [employer] and patient was informed that this is a secure, HIPAA-compliant platform  He agrees to proceed     My office door was closed  No one else was in the room  He acknowledged consent and understanding of privacy and security of the video platform  The patient has agreed to participate and understands they can discontinue the visit at any time  Patient is aware this is a billable service  Subjective  Lurdes Comment is a 52 y o  male          HPI     Past Medical History:   Diagnosis Date    Anxiety     Depression     Hyperlipidemia     controlled with diet and exercise    Kidney stone     Panic attack     Renal disorder     kidney stones    Suicide attempt [facility])        Past Surgical History:   Procedure Laterality Date    APPENDECTOMY      CT CYSTO/URETERO W/LITHOTRIPSY &INDWELL STENT INSRT Left 5/9/2018    Procedure: CYSTOSCOPY URETEROSCOPY,LEFT  RETROGRADE PYELOGRAM AND INSERTION STENT URETERAL;  Surgeon: Chelly Guidry MD;  [facility];  Service: Urology    CT CYSTO/URETERO W/LITHOTRIPSY &INDWELL STENT INSRT Left 6/8/2018    Procedure: CYSTOSCOPY URETEROSCOPY WITH LITHOTRIPSY HOLMIUM LASER, RETROGRADE PYELOGRAM AND INSERTION STENT URETERAL;  Surgeon: Chelly Guidry MD;  Location: [facility];  Service: Urology    TONSILLECTOMY      WISDOM TOOTH EXTRACTION      WISDOM TOOTH EXTRACTION         Current Outpatient Medications   Medication Sig Dispense Refill    gabapentin (NEURONTIN) 300 mg capsule Take 1 capsule (300 mg total) by mouth 3 (three) times a day At 9am, 4pm, 9pm 90 capsule 1    melatonin 3 mg Take 2 tablets (6 mg total) by mouth daily at bedtime 60 tablet 1    QUEtiapine (SEROquel) 50 mg tablet Take 0 5 tablets (25 mg total) by mouth daily at bedtime 30 tablet 1    sertraline (ZOLOFT) 50 mg tablet Take 1 tablet (50 mg total) by mouth daily At 9am 30 tablet 1     No current facility-administered medications for this visit  Allergies   Allergen Reactions    Betadine Antibiotic-Moisturize [Bacitracin-Polymyxin B] Rash       Review of Systems    Video Exam    There were no vitals filed for this visit  Physical Exam     I spent 4 hours of group + case management  minutes with patient today in which greater than 50% of the time was spent in counseling/coordination of care regarding see notes  VIRTUAL VISIT DISCLAIMER    Yin Azar acknowledges that he has consented to an online visit or consultation   He understands that the online visit is based solely on information provided by him, and that, in the absence of a face-to-face physical evaluation by the physician, the diagnosis he receives is both limited and provisional in terms of accuracy and completeness  This is not intended to replace a full medical face-to-face evaluation by the physician  Lurdes Green understands and accepts these terms

## 2021-02-12 NOTE — PSYCH
This note was not shared with the patient due to privacy exception: note includes other individuals     Subjective:     Patient ID: Jesus Manuel Saul is a 52 y o  male  [facility] Clinical Progress Notes      Specialized Services Documentation  Therapist must complete separate progress note for each specific clinical activity in which the individual participated during the day  Allied Therapy   This group was facilitated virtually in a private office using [employer] and Approved [employer] Teams  Jesus Manuel Saul consented to the use of tele-video modality of treatment  [id number] Jesus Manuel Saul  actively shared in [facility] group exploring DBT skill changing emotional responses  Marjorie Shirley engaged in task sharing triggers and responses to given emotions  Group explored differences between justified and unjustified emotions to prompting events and effective versus ineffective responses  With weekend approach, group explored risk of feeling lazy and he was able to see this as a risk  Group reviewed skill Opposite Action related to depression withdraw versus get active and productive weekend choices offered  Some effort and progress noted toward goals  Continue AT to explore healthy emotional regulation and role in practicing wellness tools  Tx Plan Objective: 1 5, Therapist:  Osbaldo CHURCH        Case Management Note  This case management session was facilitated virtually in a private office using H[employer] Compliant and Approved [employer] Teams  Jesus Manuel Saul consented to the use of tele-video modality of treatment  Osbaldo CHURCH    Current suicide risk : Low     1300 - 1320 Met with Jesus Manuel Saul to discuss weekend plans and supports  Jesus Manuel Saul identified that he was more anxious again today  He remains somatic  He identifies begin worried about work return - review and updated treatment plan with Marjorie Shirley to focus on this   Goals for next week include step down to IOP  Medications changes/added/denied? No    Treatment session number: 15    Individual Case Management Visit provided today?  Yes     [facility] follow up physician's orders: na

## 2021-02-15 ENCOUNTER — OFFICE VISIT (OUTPATIENT)
Dept: PSYCHOLOGY | Facility: CLINIC | Age: 51
End: 2021-02-15
Payer: COMMERCIAL

## 2021-02-15 DIAGNOSIS — F41.1 GAD (GENERALIZED ANXIETY DISORDER): Primary | ICD-10-CM

## 2021-02-15 DIAGNOSIS — F32.1 CURRENT MODERATE EPISODE OF MAJOR DEPRESSIVE DISORDER WITHOUT PRIOR EPISODE (HCC): ICD-10-CM

## 2021-02-15 PROCEDURE — G0177 OPPS/PHP; TRAIN & EDUC SERV: HCPCS

## 2021-02-15 PROCEDURE — G0410 GRP PSYCH PARTIAL HOSP 45-50: HCPCS

## 2021-02-15 PROCEDURE — S0201 PARTIAL HOSPITALIZATION SERV: HCPCS

## 2021-02-15 NOTE — PSYCH
This note was not shared with the patient due to this is a psychotherapy note  Subjective:     Patient ID: Ewa  is a 52 y o  male  [facility] Clinical Progress Notes      Specialized Services Documentation  Therapist must complete separate progress note for each specific clinical activity in which the individual participated during the day  Group Psychotherapy Life Skills Group (9:30-10:15)  Ewa  actively engaged in group focused on character strengths and how to spot strengths in ourselves and others  Group was facilitated virtually in a private office using H[employer]AA Compliant and Approved [employer] Teams  The group discussed why it is important to be able to identify and spot character strengths  During the activity group members were randomly selected to answer two out of eight questions in regards to their own character strengths and character strengths of others that they know  Helen Pena responded to one of the questions by stating that his 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  He also stated that his coworkers tell him he is a good listener because they observed his interactions with others  Helen Pena continues to make progress towards goals through verbal participation in group; to accomplish long term goals continue to utilize skills learned in programming  Continue with psychotherapy to educate and encourage use of wellness tools  Tx Plan Objective: 1 1,1 2 Therapist:  WALKER Marcial      Education Therapy   [id number] Ewa  actively shared in morning assessment and goal review  Presented as Receptive related to readiness to learn  Ewa  did complete goal from last treatment day identifying gaining feeling well  did not present with any barriers to learning  [address] [facility] engaged throughout the treatment day   Was engaged in learning related to Illness, Medication, Aftercare and Wellness Tools  Staff utilized Verbal, Written, A/V and Demonstration teaching methods  Sallynie Patch shared area of learning and set a goal for outside of program to get outside        Tx Plan Objective: 1 1,1 2 Therapist:  WALKER Sullivan

## 2021-02-15 NOTE — PSYCH
This note was not shared with the patient due to this is a psychotherapy note   Subjective:     Patient ID: Dante Grady is a 52 y o  male  [facility] Clinical Progress Notes      Specialized Services Documentation  Therapist must complete separate progress note for each specific clinical activity in which the individual participated during the day  GROUP PSYCHOTHERAPY ([id number]) Group was facilitated virtually in a private office using [employer]iant and Approved [employer] Teams  Dante Grady consented to the use of tele-video modality of treatment and was virtually present for group psychotherapy today  The group members received a safe and non-judgmental space to discuss current stressors and received feedback from the group  The group discussions anxieties and fears and coping skills to increase productivity  Ludmila Hardwick shared that he was feeling anxious today  He listed that he was thinking about, his negative self-esteem, the health of his wife, going back to work and being discharged from program  Ludmila Hardwick was encouraged to prioritize his worries and address one at a time  Ludmila Hardwick demonstrates significant insight through verbal participation in group  Continue with psychotherapy     TX Plan Objectives: 1 1, 1 2, 1 4   Therapist: Zaira López MA, [place]

## 2021-02-15 NOTE — PSYCH
Subjective:     Patient ID: Windy Mir is a 52 y o  male  [facility] Clinical Progress Notes      Specialized Services Documentation  Therapist must complete separate progress note for each specific clinical activity in which the individual participated during the day  Group Psychotherapy (10:25-11:10)Group was facilitated virtually in a private office using HIPAA Compliant and Approved [employer] Teams  Jennifer Mendoza consented to the use of tele-video modality of treatment and was virtually present for group psychotherapy today  This group was focused on the emotion of anger  Participants discussed the biological changes anger can produce,  as well as behavioral expressions and the potential physical and psychological harm long term anger can have on the body  Participants were asked to share one situation that leads them to feeling anger  Common themes associated with anger were reviewed, such as feeling injustice, criticized, or threatened, or having threats made involving things, ideas or people one values  Participants learned that sometimes ones reactions are related to childhood events and how others around one deal or dealt with stress and were asked to process this within the group  Participants were introduced to different mindfulness strategies to cope with anger, such as "Name it to [facility] It" and H A L T  Esther Gregory stated he feels angry when he is stuck somewhere waiting in a long line  He realizes it may be an exaggerated emotion to get so angry  He processed that maybe its the unexpected event or things not going right that leads him to anger  Esther Gregory discussed how having a mother who did not allow her children to show any emotions may have impacted his ability to process difficult emotions as an adult  Case Management Note    MONO Camp    Current suicide risk : Low      Medications changes/added/denied? No    Treatment session number: Day 16    Individual Case Management Visit provided today? Yes    This therapist reached out to Mercy via phone as he had asked for a check in during group  Mercy discussed how he feels he improved overall in the group but feels he may be "backliding " He is finding it difficult to focus in group and feels tired often  He is still struggling to sleep  He feels he has learned a lot that he tries to incorporate during the day, reaching out to family, keeping busy when he can, but some days are a real struggle and he cannot  He wonders why he was unable to focus and read a book or do more over the weekend  This therapist discussed having a mix of self compassion, giving oneself a break that people do not have to constantly be busy to be well with a mix of doing things when were are able  This therapist discussed exercise and moving the body to remain active during the day to promote better sleep  Mercy stated he has been trying to walk or do stairs but he does not always do it  He wants to try and plans to try today  He feels his wife is frustrated with him although she has been patient overall  He has fear about the future and fear about getting worse but challenged his own negative thoughts in discussion which this therapist helped him reframe and process  Mercy was committed to giving himself a break to rest some days but said he is plans to get out and walk today  Mercy was appreciative of the call         Tx Goal Objective: 1 1,1 2  Therapist: MONO Ashton     This note was not shared with the patient due to this is a psychotherapy note

## 2021-02-15 NOTE — PSYCH
Virtual Regular Visit      Assessment/Plan:    Problem List Items Addressed This Visit        Other    GORDON (generalized anxiety disorder) - Primary    Current moderate episode of major depressive disorder without prior episode ([facility] [address] )               Reason for visit is VIRTUAL PHP DUE TO COVID-19       Encounter provider BE [address]    Provider located at [address] [id number]      Recent Visits  Date Type Provider Dept   02/12/21 Office Visit [facility]   02/11/21 Office Visit [facility]   02/10/21 Office Visit [facility]   02/09/21 Office Visit [facility]   02/08/21 Office Visit [facility]   Showing recent visits within past 7 days and meeting all other requirements     Today's Visits  Date Type Provider Dept   02/15/21 Office Visit [facility]   Showing today's visits and meeting all other requirements     Future Appointments  No visits were found meeting these conditions  Showing future appointments within next 150 days and meeting all other requirements        The patient was identified by name and date of birth  Jesus Manuel Saul was informed that this is a telemedicine visit and that the visit is being conducted through [employer] and patient was informed that this is a secure, HIPAA-compliant platform  He agrees to proceed     My office door was closed  No one else was in the room  He acknowledged consent and understanding of privacy and security of the video platform  The patient has agreed to participate and understands they can discontinue the visit at any time  Patient is aware this is a billable service  Subjective  Jesus Manuel Saul is a 52 y o  male          HPI     Past Medical History:   Diagnosis Date    Anxiety     Depression     Hyperlipidemia     controlled with diet and exercise    Kidney stone     Panic attack     Renal disorder     kidney stones    Suicide attempt [facility])        Past Surgical History:   Procedure Laterality Date    APPENDECTOMY      DC CYSTO/URETERO W/LITHOTRIPSY &INDWELL STENT INSRT Left 5/9/2018    Procedure: CYSTOSCOPY URETEROSCOPY,LEFT  RETROGRADE PYELOGRAM AND INSERTION STENT URETERAL;  Surgeon: Sharon Shea MD;  [facility];  Service: Urology    DC CYSTO/URETERO W/LITHOTRIPSY &INDWELL STENT INSRT Left 6/8/2018    Procedure: CYSTOSCOPY URETEROSCOPY WITH LITHOTRIPSY HOLMIUM LASER, RETROGRADE PYELOGRAM AND INSERTION STENT URETERAL;  Surgeon: Sharon Shea MD;  [facility];  Service: Urology    TONSILLECTOMY      WISDOM TOOTH EXTRACTION      WISDOM TOOTH EXTRACTION         Current Outpatient Medications   Medication Sig Dispense Refill    gabapentin (NEURONTIN) 300 mg capsule Take 1 capsule (300 mg total) by mouth 3 (three) times a day At 9am, 4pm, 9pm 90 capsule 1    melatonin 3 mg Take 2 tablets (6 mg total) by mouth daily at bedtime 60 tablet 1    QUEtiapine (SEROquel) 50 mg tablet Take 0 5 tablets (25 mg total) by mouth daily at bedtime 30 tablet 1    sertraline (ZOLOFT) 50 mg tablet Take 1 tablet (50 mg total) by mouth daily At 9am 30 tablet 1     No current facility-administered medications for this visit  Allergies   Allergen Reactions    Betadine Antibiotic-Moisturize [Bacitracin-Polymyxin B] Rash       Review of Systems    Video Exam    There were no vitals filed for this visit  Physical Exam     I spent 4 hours of group + case management minutes with patient today in which greater than 50% of the time was spent in counseling/coordination of care regarding see notes      [id number] [address]un acknowledges that he has consented to an online visit or consultation   He understands that the online visit is based solely on information provided by him, and that, in the absence of a face-to-face physical evaluation by the physician, the diagnosis he receives is both limited and provisional in terms of accuracy and completeness  This is not intended to replace a full medical face-to-face evaluation by the physician  Deonte Weaver understands and accepts these terms

## 2021-02-16 ENCOUNTER — DOCUMENTATION (OUTPATIENT)
Dept: PSYCHOLOGY | Facility: CLINIC | Age: 51
End: 2021-02-16

## 2021-02-16 ENCOUNTER — APPOINTMENT (OUTPATIENT)
Dept: PSYCHOLOGY | Facility: CLINIC | Age: 51
End: 2021-02-16
Payer: COMMERCIAL

## 2021-02-16 NOTE — PROGRESS NOTES
This note was not shared with the patient due to this is a psychotherapy note   Subjective:     Patient ID: Arabella Agudelo is a 52 y o  male  [facility] Clinical Progress Notes      Specialized Services Documentation  Therapist must complete separate progress note for each specific clinical activity in which the individual participated during the day  Other () VM left for Vincent Kitchen with Duy Carpenter to extend last cover day until tomorrow 02/17/21 to utilize authorized days  Case Management Note    Richy Salazar MA, Alvaro    Current suicide risk : Unable to assess due to absence  Arabella Agudelo was excused from program today 02/16/21  Medications changes/added/denied? No    Treatment session number: N/A    Individual Case Management Visit provided today? No    [facility] follow up physician's orders: None at this time

## 2021-02-17 ENCOUNTER — TELEMEDICINE (OUTPATIENT)
Dept: PSYCHIATRY | Facility: CLINIC | Age: 51
End: 2021-02-17
Payer: COMMERCIAL

## 2021-02-17 ENCOUNTER — OFFICE VISIT (OUTPATIENT)
Dept: PSYCHOLOGY | Facility: CLINIC | Age: 51
End: 2021-02-17
Payer: COMMERCIAL

## 2021-02-17 DIAGNOSIS — F41.1 GAD (GENERALIZED ANXIETY DISORDER): Primary | ICD-10-CM

## 2021-02-17 DIAGNOSIS — F32.1 CURRENT MODERATE EPISODE OF MAJOR DEPRESSIVE DISORDER WITHOUT PRIOR EPISODE (HCC): ICD-10-CM

## 2021-02-17 DIAGNOSIS — F41.1 GAD (GENERALIZED ANXIETY DISORDER): ICD-10-CM

## 2021-02-17 PROCEDURE — S0201 PARTIAL HOSPITALIZATION SERV: HCPCS

## 2021-02-17 PROCEDURE — 90833 PSYTX W PT W E/M 30 MIN: CPT | Performed by: NURSE PRACTITIONER

## 2021-02-17 PROCEDURE — G0177 OPPS/PHP; TRAIN & EDUC SERV: HCPCS

## 2021-02-17 PROCEDURE — 99213 OFFICE O/P EST LOW 20 MIN: CPT | Performed by: NURSE PRACTITIONER

## 2021-02-17 PROCEDURE — G0176 OPPS/PHP;ACTIVITY THERAPY: HCPCS

## 2021-02-17 PROCEDURE — G0410 GRP PSYCH PARTIAL HOSP 45-50: HCPCS

## 2021-02-17 NOTE — PSYCH
This note was not shared with the patient due to this is a psychotherapy note   Subjective:     Patient ID: George Sorto is a 52 y o  male  [facility] Clinical Progress Notes      Specialized Services Documentation  Therapist must complete separate progress note for each specific clinical activity in which the individual participated during the day  Other: Utilization Review: 1115 Message left with Ricardo at [facility] phone [phone],  IOP days requested  Awaiting call back  LYNNETTE Hall     Case Management Note    Isis CHURCH    Current suicide risk : Low     This case management session was facilitated virtually in a private office using [employer] and Approved [employer] Teams  George Sorto consented to the use of tele-video modality of treatment  INDIVIDUAL PSYCHOTHERAPY     [id number] Met with George Sorto  Reported that he is having a stressful day and is frustrated feeling he is not getting better quickly enough  He did make an appointment for Op therapy  He did not sleep well and that continues to be a contributing factor to how he goes about his day  He voices that he is trying to use skills  He was concerned today as he doesn't want to "ruin the day because it is his daughter's birthday"  Discussed ways to refocus and meet goals for his day  Medications changes/added/denied? No    Treatment session number: 17    Individual Case Management Visit provided today? Yes     [facility] follow up physician's orders: see S   Juliano note

## 2021-02-17 NOTE — PSYCH
Virtual Regular Visit    Problem List Items Addressed This Visit        Other    GORDON (generalized anxiety disorder)    Current moderate episode of major depressive disorder without prior episode ([facility] [address] )             Encounter provider EDWAR Davis    Provider located at   [address] [place] [id number] [phone]    Recent Visits  Date Type Provider Dept   02/10/21 Telemedicine Hung Olivera [address] recent visits within past 7 days and meeting all other requirements     Today's Visits  Date Type Provider Dept   02/17/21 Telemedicine Hung Olivera [address] today's visits and meeting all other requirements     Future Appointments  No visits were found meeting these conditions  Showing future appointments within next 150 days and meeting all other requirements      The patient was identified by name and date of birth  Nanda Monk was informed that this is a telemedicine visit and that the visit is being conducted through [employer]  My office door was closed  No one else was in the room  He acknowledged consent and understanding of privacy and security of the video platform  The patient has agreed to participate and understands they can discontinue the visit at any time  Patient is aware this is a billable service       HPI     Current Outpatient Medications   Medication Sig Dispense Refill    gabapentin (NEURONTIN) 300 mg capsule Take 1 capsule (300 mg total) by mouth 3 (three) times a day At 9am, 4pm, 9pm 90 capsule 1    melatonin 3 mg Take 2 tablets (6 mg total) by mouth daily at bedtime 60 tablet 1    QUEtiapine (SEROquel) 50 mg tablet Take 0 5 tablets (25 mg total) by mouth daily at bedtime 30 tablet 1    sertraline (ZOLOFT) 50 mg tablet Take 1 tablet (50 mg total) by mouth daily At 9am 30 tablet 1     No current facility-administered medications for this visit  Review of Systems  Video Exam    There were no vitals filed for this visit  Physical Exam   As a result of this visit, I have referred the patient for further respiratory evaluation  No    I spent 21 minutes directly with the patient during this visit  [address] Danae Echeverria acknowledges that he has consented to an online visit or consultation  He understands that the online visit is based solely on information provided by him, and that, in the absence of a face-to-face physical evaluation by the physician, the diagnosis he receives is both limited and provisional in terms of accuracy and completeness  This is not intended to replace a full medical face-to-face evaluation by the physician  Meri Sue understands and accepts these terms  PHP MEDICATION MANAGEMENT NOTE        [address]    Name and Date of Birth:  Meri Sue 52 y o  1970 MRN: [id number]    Date of Visit: February 17, 2021    Allergies   Allergen Reactions    Betadine Antibiotic-Moisturize [Bacitracin-Polymyxin B] Rash     SUBJECTIVE:    Chaim Cornejo is seen today for a follow up for Major Depressive Disorder and Generalized Anxiety Disorder  He reports that he has decompensated since the last visit  Patient states that yesterday was a good day, but overnight he slept poorly  Patient woke from a nightmare was unable to fall asleep, getting approximately 5 hours of sleep  Today he feels hopeless  States that "I feel like I can not handle the pressures of daily life, it's too much " Patient is unsure if the way he is feeling is due to medication side effect or if it is related to his anxiety  Patient has had similar feelings in the past when not on medication    Spent majority visit discussing cognitive distortions and analyzing original trigger for increased anxiety at the beginning of the year   Patient feels that there has been a loss of control and that he no longer feels same sense of purpose fulfillment from his day-to-day life  He denies any side effects from medications  PLAN:  -patient would like to remain on the same medications at this time  Willing to explore changes in medications at next week's med check   -Consider increase in seroquel vs increase in zoloft  Aware of 24 hour and weekend coverage for urgent situations accessed by calling [facility] main practice number  Continue partial hospitalization program    Diagnoses and all orders for this visit:    Current moderate episode of major depressive disorder without prior episode (HCC)    GORDON (generalized anxiety disorder)        Current Outpatient Medications on File Prior to Visit   Medication Sig Dispense Refill    gabapentin (NEURONTIN) 300 mg capsule Take 1 capsule (300 mg total) by mouth 3 (three) times a day At 9am, 4pm, 9pm 90 capsule 1    melatonin 3 mg Take 2 tablets (6 mg total) by mouth daily at bedtime 60 tablet 1    QUEtiapine (SEROquel) 50 mg tablet Take 0 5 tablets (25 mg total) by mouth daily at bedtime 30 tablet 1    sertraline (ZOLOFT) 50 mg tablet Take 1 tablet (50 mg total) by mouth daily At 9am 30 tablet 1     No current facility-administered medications on file prior to visit  Psychotherapy Provided:     Individual psychotherapy provided: Yes  Counseling was provided during the session today for 16 minutes  Supportive counseling provided  Analyze cognitive distortions and trigger for decompensation and increased anxiety  HPI ROS Appetite Changes and Sleep:     He reports decrease in number of sleep hours (5 hours), nightmares, early morning awakening, adequate appetite, low energy, fluctuating energy levels   Patient denies suicidal or homicidal ideation    Review Of Systems:      General emotional problems, sleep disturbances and decreased functioning   Personality no change in personality   Constitutional negative   ENT negative   Cardiovascular negative   Respiratory negative   Gastrointestinal negative   Genitourinary negative   Musculoskeletal negative   Integumentary negative   Neurological negative   Endocrine negative   Other Symptoms none, all other systems are negative     Mental Status Evaluation:    Appearance Adequate hygiene and grooming and Poor eye contact   Behavior cooperative   Mood anxious and depressed  Depression Scale -  of 10 (0 = No depression)  Anxiety Scale -  of 10 (0 = No anxiety)   Speech Normal rate and volume   Affect mood-congruent and constricted   Thought Processes Goal directed and coherent   Thought Content Does not verbalize delusional material   Associations Tightly connected   Perceptual Disturbances Denies hallucinations and does not appear to be responding to internal stimuli   Risk Potential Suicidal/Homicidal Ideation - No evidence of suicidal or homicidal ideation and Patient does not verbalize suicidal or homicidal ideation  Risk of Violence - No evidence of risk for violence found on assessment  Risk of Self Mutilation - No evidence of risk for self mutilation found on assessment   Orientation oriented to person, place, time/date and situation   Memory recent and remote memory grossly intact   Consciousness alert and awake   Attention/Concentration attention span and concentration are age appropriate   Insight limited   Judgement limited   Muscle Strength and Gait normal muscle strength and normal muscle tone, normal gait/station and normal balance   Motor Activity no abnormal movements   Language no difficulty naming common objects, no difficulty repeating a phrase, no difficulty writing a sentence   Fund of Knowledge adequate knowledge of current events  adequate fund of knowledge regarding past history  adequate fund of knowledge regarding vocabulary      Past Psychiatric History Update:     Inpatient Psychiatric Admission Since Last Encounter:   no  Suicide Attempt Or Self Mutilation Since Last Encounter:   no  Incidence of Violent Behavior Since Last Encounter:   no    Traumatic History Update:     New Onset of Abuse Since Last Encounter:   no  Traumatic Events Since Last Encounter:   no    Past Medical History:    Past Medical History:   Diagnosis Date    Anxiety     Depression     Hyperlipidemia     controlled with diet and exercise    Kidney stone     Panic attack     Renal disorder     kidney stones    Suicide attempt [facility])      Past Medical History Pertinent Negatives:   Diagnosis Date Noted    Head injury 01/22/2021    History of transfusion 05/17/2018    Seizures ([address] ) 01/22/2021     Past Surgical History:   Procedure Laterality Date    APPENDECTOMY      MA CYSTO/URETERO W/LITHOTRIPSY &INDWELL STENT INSRT Left 5/9/2018    Procedure: CYSTOSCOPY URETEROSCOPY,LEFT  RETROGRADE PYELOGRAM AND INSERTION STENT URETERAL;  Surgeon: Jake Carpenter MD;  [facility];  Service: Urology    MA CYSTO/URETERO W/LITHOTRIPSY &INDWELL STENT INSRT Left 6/8/2018    Procedure: CYSTOSCOPY URETEROSCOPY WITH LITHOTRIPSY HOLMIUM LASER, RETROGRADE PYELOGRAM AND INSERTION STENT URETERAL;  Surgeon: Jake Carpenter MD;  [facility];  Service: Urology    TONSILLECTOMY      WISDOM TOOTH EXTRACTION      WISDOM TOOTH EXTRACTION       Allergies   Allergen Reactions    Betadine Antibiotic-Moisturize [Bacitracin-Polymyxin B] Rash     Substance Abuse History:    Social History     Substance and Sexual Activity   Alcohol Use Yes    Alcohol/week: 1 0 standard drinks    Types: 1 Cans of beer per week    Frequency: Monthly or less    Drinks per session: 1 or 2    Binge frequency: Never    Comment: ocassional     Social History     Substance and Sexual Activity   Drug Use No    Comment: Reports last use of any substance >20years ago     Social History:    Social History     Socioeconomic History    Marital status: /Civil [employer]     Spouse name: Not on file    Number of children: Not on file    Years of education: Not on file    Highest education level: Master's degree (e g , MA, MS, Jesus, MEd, MSW, GLEN)   Occupational History    Not on file   Social Needs    Financial resource strain: Not hard at all   [place] insecurity     Worry: Not on file     Inability: Not on file   [employer] needs     Medical: Not on file     Non-medical: Not on file   Tobacco Use    Smoking status: Former Smoker     Packs/day: 0 50     Types: Cigarettes     Quit date: 1998     Years since quittin 7    Smokeless tobacco: Never Used   Substance and Sexual Activity    Alcohol use: Yes     Alcohol/week: 1 0 standard drinks     Types: 1 Cans of beer per week     Frequency: Monthly or less     Drinks per session: 1 or 2     Binge frequency: Never     Comment: ocassional    Drug use: No     Comment: Reports last use of any substance >20years ago    Sexual activity: Not on file   Lifestyle    Physical activity     Days per week: 0 days     Minutes per session: Not on file    Stress: Rather much   Relationships    Social connections     Talks on phone: Once a week     Gets together: Not on file     Attends Hinduism service: Not on file     Active member of club or organization: Not on file     Attends meetings of clubs or organizations: Not on file     Relationship status:     Intimate partner violence     Fear of current or ex partner: No     Emotionally abused: No     Physically abused: No     Forced sexual activity: No   Other Topics Concern    Not on file   Social History Narrative    Not on file     Family Psychiatric History:     Family History   Problem Relation Age of Onset    Heart disease Mother     Lung cancer Mother     Hypertension Mother     Anxiety disorder Mother     Stroke Father     Hyperlipidemia Father     Completed Suicide  Maternal Aunt      History Review: The following portions of the patient's history were reviewed and updated as appropriate: allergies, current medications, past family history, past medical history, past social history, past surgical history and problem list     OBJECTIVE:     Vital signs in last 24 hours: There were no vitals filed for this visit  Laboratory Results: I have personally reviewed all pertinent laboratory/tests results  Medications Risks/Benefits:      Risks, Benefits And Possible Side Effects Of Medications:    Discussed risks and benefits of treatment with patient including risk of suicidality, serotonin syndrome and SIADH related to treatment with antidepressants;  Risk of induction of manic symptoms in certain patient populations and risk of parkinsonian symptoms, metabolic syndrome, tardive dyskinesia and neuroleptic malignant syndrome related to treatment with antipsychotic medications     Controlled Medication Discussion:     Not applicable    EDWAR Freeman 02/17/21    This note was not shared with the patient due to reasonable likelihood of causing patient harm

## 2021-02-17 NOTE — PSYCH
This note was not shared with the patient due to this is a psychotherapy note  Subjective:     Patient ID: Lurdes Green is a 52 y o  male  [facility] Clinical Progress Notes      Specialized Services Documentation  Therapist must complete separate progress note for each specific clinical activity in which the individual participated during the day  Group Psychotherapy Life Skills Group (10:15-11:10) Lurdes Comment actively engaged in group focused on learning about self-disclosure  Group was facilitated virtually in a private office using HIPAA Compliant and Approved Microsoft Teams  Griffin Ascencio consented to the use of tele-video modality of treatment and was virtually present for group psychotherapy today  During group we discussed the following questions:  1  What are your concerns about self-disclosure? 2  What is appropriate to disclose? 3  What is inappropriate to disclose? 4  Is it easy or difficult for you to self-disclose and why?  5  Why is self-disclosure important? Griffin Ascencio identified that it is difficult to self-disclose to people that he cares about  During group we did an activity to facilitate self-disclosure  Griffin Ascencio disclosed that he needs to decide if he wants a career change soon  Griffin Ascencio continues to make progress towards goals through verbal participation in group; to accomplish long term goals continue to utilize skills learned in programming  Continue with psychotherapy to educate and encourage use of wellness tools  Tx Plan Objective: 1 1,1 2 Therapist:  WALKER Moran        Education Therapy   [id number] Lurdes Comment actively shared in morning assessment and goal review  Presented as Receptive related to readiness to learn  Lurdes Comment did complete goal from last treatment day identifying gaining fresh air  did not present with any barriers to learning  [address] engaged throughout the treatment day  Was engaged in learning related to Illness, Medication, Aftercare and Wellness Tools  Staff utilized Verbal, Written, A/V and Demonstration teaching methods  Melani Aleksey shared area of learning and set a goal for outside of program to  his medicine         Tx Plan Objective: 1 1,1 2 Therapist:  WALKER Wells

## 2021-02-17 NOTE — PSYCH
Virtual Regular Visit      Assessment/Plan:    Problem List Items Addressed This Visit        Other    GORDON (generalized anxiety disorder) - Primary    Current moderate episode of major depressive disorder without prior episode ([facility] [address] )               Reason for visit is VIRTUAL PHP DUE TO COVID-19       [address] [facility] [address]    Provider located at [address] [address]      Recent Visits  Date Type Provider Dept   02/15/21 Office Visit [facility]   02/12/21 [facility] Visit [facility]   02/11/21 [facility] Visit [facility]   02/10/21 [facility] Visit [facility]   Showing recent visits within past 7 days and meeting all other requirements     Today's Visits  Date Type Provider Dept   02/17/21 Office Visit [facility]   Showing today's visits and meeting all other requirements     Future Appointments  No visits were found meeting these conditions  Showing future appointments within next 150 days and meeting all other requirements        The patient was identified by name and date of birth  Kinga Forrester was informed that this is a telemedicine visit and that the visit is being conducted through [employer] and patient was informed that this is a secure, HIPAA-compliant platform  He agrees to proceed     My office door was closed  No one else was in the room  He acknowledged consent and understanding of privacy and security of the video platform  The patient has agreed to participate and understands they can discontinue the visit at any time  Patient is aware this is a billable service  Subjective  Kinga Forrester is a 52 y o  male          HPI     Past Medical History:   Diagnosis Date    Anxiety     Depression     Hyperlipidemia     controlled with diet and exercise  Kidney stone     Panic attack     Renal disorder     kidney stones    Suicide attempt [facility])        Past Surgical History:   Procedure Laterality Date    APPENDECTOMY      [place] CYSTO/URETERO W/LITHOTRIPSY &INDWELL STENT INSRT Left 5/9/2018    Procedure: CYSTOSCOPY URETEROSCOPY,LEFT  RETROGRADE PYELOGRAM AND INSERTION STENT URETERAL;  Surgeon: Haresh Marin MD;  [facility];  Service: Urology    [place] CYSTO/URETERO W/LITHOTRIPSY &INDWELL STENT INSRT Left 6/8/2018    Procedure: CYSTOSCOPY URETEROSCOPY WITH LITHOTRIPSY HOLMIUM LASER, RETROGRADE PYELOGRAM AND INSERTION STENT URETERAL;  Surgeon: Haresh Marin MD;  [facility];  Service: Urology    TONSILLECTOMY      WISDOM TOOTH EXTRACTION      WISDOM TOOTH EXTRACTION         Current Outpatient Medications   Medication Sig Dispense Refill    gabapentin (NEURONTIN) 300 mg capsule Take 1 capsule (300 mg total) by mouth 3 (three) times a day At 9am, 4pm, 9pm 90 capsule 1    melatonin 3 mg Take 2 tablets (6 mg total) by mouth daily at bedtime 60 tablet 1    QUEtiapine (SEROquel) 50 mg tablet Take 0 5 tablets (25 mg total) by mouth daily at bedtime 30 tablet 1    sertraline (ZOLOFT) 50 mg tablet Take 1 tablet (50 mg total) by mouth daily At 9am 30 tablet 1     No current facility-administered medications for this visit  Allergies   Allergen Reactions    Betadine Antibiotic-Moisturize [Bacitracin-Polymyxin B] Rash       Review of Systems    Video Exam    There were no vitals filed for this visit  Physical Exam     I spent 4 hours of group + case management minutes with patient today in which greater than 50% of the time was spent in counseling/coordination of care regarding see notes  VIRTUAL VISIT DISCLAIMER    Harjeet Tipton acknowledges that he has consented to an online visit or consultation   He understands that the online visit is based solely on information provided by him, and that, in the absence of a face-to-face physical evaluation by the physician, the diagnosis he receives is both limited and provisional in terms of accuracy and completeness  This is not intended to replace a full medical face-to-face evaluation by the physician  Karlene Dhillon understands and accepts these terms

## 2021-02-17 NOTE — PSYCH
This note was not shared with the patient due to this is a psychotherapy note    Subjective:     Patient ID: Jaskaran Luna is a 52 y o  male    [facility] Clinical Progress Notes      Specialized Services Documentation  Therapist must complete separate progress note for each specific clinical activity in which the individual participated during the day  Allied Therapy Group ([id number]) This group was facilitated virtually in a private office using HIPAA Compliant and Approved Microsoft Teams  Jaskaran Luna consented to the use of tele-video modality of treatment and was virtually present for Peter Chi exercise group  Pt actively participated  This group focused on the engagement in 30 minutes of gentle physical exercise, with modifications provided for seated and standing  Activity required pt's to follow visual demonstration to complete various exercises and postures to challenge balance, activity tolerance, and flexibility  Educated group on benefits of engaging in [place] to decrease stress and anxiety related symptoms, improve mood, improve sleep quality, promote weight management, improve memory, improve self-awareness, improve attention, improve judgment, decrease fibromyalgia symptoms and pain, and decrease blood pressure and heart rate  Encouraged pt's to incorporate some type of gentle exercise and breathing techniques into daily routine to promote effective distress tolerance and healthy habits/routines  Reviewed basic strategies, tips, and precautions when engaging in [place]  Pt completed all exercises while seated - pt with no complaints at end of session  Pt shared that he felt stressed at the start of the session  At the end of the session, pt reported that he thought he would not like the activity, however at the end reported feeling relaxed and that he enjoyed the imagery aspect of [place]  Pt responded when prompted but did not initiate sharing during group discussion   Pt was able to attend to activity and discussion throughout duration of group session  George Sorto appears to be making good progress towards goals and objectives  Continue to engage pt in [facility] in order to continue to progress toward long-term goal achievement  Tx Plan Objective: 1 1 and 1 2    Therapist: Homero Quiles MS, OTR/L

## 2021-02-17 NOTE — PSYCH
This note was not shared with the patient due to this is a psychotherapy note   Subjective:     Patient ID: Jesus Manuel Saul is a 52 y o  male  [facility] Clinical Progress Notes      Specialized Services Documentation  Therapist must complete separate progress note for each specific clinical activity in which the individual participated during the day  GROUP PSYCHOTHERAPY ([id number]) Group was facilitated virtually in a private office using HIPAA Compliant and Approved Microsoft Teams  Jesus Manuel Saul consented to the use of tele-video modality of treatment and was virtually present for group psychotherapy today  The group engaged in a guided journey meditation to reduce anxiety and address emotions  Members created and shared their own guided journey, with a location of their choosing, to create a peaceful mental environment for when overwhelmed or stressed  Marjorie Shirley shared that his guided imagery was [place]  Marjorie Shirley demonstrates insight through prompted verbal participation in group  Continue with psychotherapy     TX Plan Objectives: 1 1, 1 2, 1 4   Therapist: Nahid Garcia MA, [place]

## 2021-02-18 ENCOUNTER — APPOINTMENT (OUTPATIENT)
Dept: PSYCHOLOGY | Facility: CLINIC | Age: 51
End: 2021-02-18
Payer: COMMERCIAL

## 2021-02-18 ENCOUNTER — DOCUMENTATION (OUTPATIENT)
Dept: PSYCHOLOGY | Facility: CLINIC | Age: 51
End: 2021-02-18

## 2021-02-18 NOTE — PROGRESS NOTES
This note was not shared with the patient due to this is a psychotherapy note   Subjective:     Patient ID: Sharonda Vásquez is a 52 y o  male  [facility] Clinical Progress Notes      Specialized Services Documentation  Therapist must complete separate progress note for each specific clinical activity in which the individual participated during the day  Case Management Note    Marylen Brinks MT-BC    Current suicide risk : Low     Sharonda Vásquez excused due to IOP status  Medications changes/added/denied? No    Treatment session number: na    Individual Case Management Visit provided today?  No    [facility] follow up physician's orders: na

## 2021-02-19 ENCOUNTER — OFFICE VISIT (OUTPATIENT)
Dept: PSYCHOLOGY | Facility: CLINIC | Age: 51
End: 2021-02-19
Payer: COMMERCIAL

## 2021-02-19 DIAGNOSIS — F32.1 CURRENT MODERATE EPISODE OF MAJOR DEPRESSIVE DISORDER WITHOUT PRIOR EPISODE (HCC): ICD-10-CM

## 2021-02-19 DIAGNOSIS — F41.1 GAD (GENERALIZED ANXIETY DISORDER): Primary | ICD-10-CM

## 2021-02-19 DIAGNOSIS — F32.A DEPRESSION, UNSPECIFIED DEPRESSION TYPE: ICD-10-CM

## 2021-02-19 PROCEDURE — S9480 INTENSIVE OUTPATIENT PSYCHIA: HCPCS

## 2021-02-19 NOTE — PSYCH
Virtual Regular Visit      Assessment/Plan:    Problem List Items Addressed This Visit        Other    GORDON (generalized anxiety disorder) - Primary    Current moderate episode of major depressive disorder without prior episode ([facility] [address] )    Depression               Reason for visit is VIRTUAL PHP DUE TO COVID-19       [address]    Provider located at [address] [address]      Recent Visits  Date Type Provider Dept   02/17/21 [facility] Visit [facility]   02/15/21 [facility]   02/12/21 [facility]   Showing recent visits within past 7 days and meeting all other requirements     Today's Visits  Date Type Provider Dept   02/19/21 [facility] Visit [facility]   Showing today's visits and meeting all other requirements     Future Appointments  No visits were found meeting these conditions  Showing future appointments within next 150 days and meeting all other requirements        The patient was identified by name and date of birth  Karlene Dhillon was informed that this is a telemedicine visit and that the visit is being conducted through [employer] and patient was informed that this is a secure, HIPAA-compliant platform  He agrees to proceed     My office door was closed  No one else was in the room  He acknowledged consent and understanding of privacy and security of the video platform  The patient has agreed to participate and understands they can discontinue the visit at any time  Patient is aware this is a billable service  Subjective  Karlene Dhillon is a 52 y o  male          HPI     Past Medical History:   Diagnosis Date    Anxiety     Depression     Hyperlipidemia     controlled with diet and exercise    Kidney stone     Panic attack     Renal disorder     kidney stones    Suicide attempt [facility])        Past Surgical History:   Procedure Laterality Date    APPENDECTOMY      [place] CYSTO/URETERO W/LITHOTRIPSY &INDWELL STENT INSRT Left 5/9/2018    Procedure: CYSTOSCOPY URETEROSCOPY,LEFT  RETROGRADE PYELOGRAM AND INSERTION STENT URETERAL;  Surgeon: Carlos Lerner MD;  [facility];  Service: Urology    [place] CYSTO/URETERO W/LITHOTRIPSY &INDWELL STENT INSRT Left 6/8/2018    Procedure: CYSTOSCOPY URETEROSCOPY WITH LITHOTRIPSY HOLMIUM LASER, RETROGRADE PYELOGRAM AND INSERTION STENT URETERAL;  Surgeon: Carlos Lerner MD;  [facility];  Service: Urology    TONSILLECTOMY      WISDOM TOOTH EXTRACTION      WISDOM TOOTH EXTRACTION         Current Outpatient Medications   Medication Sig Dispense Refill    gabapentin (NEURONTIN) 300 mg capsule Take 1 capsule (300 mg total) by mouth 3 (three) times a day At 9am, 4pm, 9pm 90 capsule 1    melatonin 3 mg Take 2 tablets (6 mg total) by mouth daily at bedtime 60 tablet 1    QUEtiapine (SEROquel) 50 mg tablet Take 0 5 tablets (25 mg total) by mouth daily at bedtime 30 tablet 1    sertraline (ZOLOFT) 50 mg tablet Take 1 tablet (50 mg total) by mouth daily At 9am 30 tablet 1     No current facility-administered medications for this visit  Allergies   Allergen Reactions    Betadine Antibiotic-Moisturize [Bacitracin-Polymyxin B] Rash       Review of Systems    Video Exam    There were no vitals filed for this visit  Physical Exam     I spent 4 hours of group + case management minutes with patient today in which greater than 50% of the time was spent in counseling/coordination of care regarding see notes  VIRTUAL VISIT DISCLAIMER    Iam Eliza acknowledges that he has consented to an online visit or consultation   He understands that the online visit is based solely on information provided by him, and that, in the absence of a face-to-face physical evaluation by the physician, the diagnosis he receives is both limited and provisional in terms of accuracy and completeness  This is not intended to replace a full medical face-to-face evaluation by the physician  Julian Lighter understands and accepts these terms

## 2021-02-19 NOTE — PSYCH
This note was not shared with the patient due to this is a psychotherapy note  Subjective:     Patient ID: Amol Padilla is a 52 y o  male  [facility] Clinical Progress Notes      Specialized Services Documentation  Therapist must complete separate progress note for each specific clinical activity in which the individual participated during the day  Group Psychotherapy Life Skills Group (9:30-10:15) Amol Padilla actively engaged in group focused on rehearsing values based behaviors which was facilitated virtually in a private office using HIPAA Compliant and Approved Microsoft Teams  Dominik Riedel consented to the use of tele-video modality of treatment and was virtually present for group psychotherapy today  This group was a continuation of yesterdays group  Today we discussed personal situations where they wanted to act and react with their values and not their emotions  During the activity group members shared a situation, discussed their value and intentions to respond to the situation  Charlies Goltz did not share in group but did respond to another group members question about her personal situation  Charlies Goltz continues to make progress towards goals through verbal participation in group; to accomplish long term goals continue to utilize skills learned in programming  Continue with psychotherapy to educate and encourage use of wellness tools  Tx Plan Objective: 1 1,1 2 Therapist:  WALKER Mccray        Education Therapy   [id number] Amol Padilla actively shared in morning assessment and goal review  Presented as Receptive related to readiness to learn  Amol Padilla did complete goal from last treatment day identifying gaining less anxiety  did not present with any barriers to learning  [address] engaged throughout the treatment day  Was engaged in learning related to Illness, Medication, Aftercare and Wellness Tools   Staff utilized Verbal, Written, A/V and Demonstration teaching methods  Harjeet Tipton shared area of learning and set a goal for outside of program to clear the snow        Tx Plan Objective: 1 1,1 2 Therapist:  WALKER Wright

## 2021-02-19 NOTE — PSYCH
[facility] Clinical Progress Notes      Specialized Services Documentation  Therapist must complete separate progress note for each specific clinical activity in which the individual participated during the day         [facility] follow up physician's orders:   DATE  2/19/2021  TIME 10:26   IOP TODAY  Evelin Swan MD

## 2021-02-19 NOTE — PSYCH
This note was not shared with the patient due to this is a psychotherapy note   Subjective:     Patient ID: George Sorto is a 52 y o  male  [facility] Clinical Progress Notes      Specialized Services Documentation  Therapist must complete separate progress note for each specific clinical activity in which the individual participated during the day  Allied Therapy   This group was facilitated virtually in a private office using [employer] and Approved Microsoft Teams  George Sorto consented to the use of tele-video modality of treatment  [id number] George Sorto   actively shared in [facility] group focused on DBT emotional regulation skill accumulating positive emotion and weekend planning  Adolph Daily engaged in object mediation  Group emphasized participating and focusing on positive tasks to change uncomfortable emotions (active versus passive process)  He was given Pleasant Events List and shared he could [place] with his daughter to work on increasing pleasant events this weekend  Some exploration of goal observed and shared  Continue AT to encourage the awareness of strengths; strategies and personal application  Tx Plan Objective: 1 5,1 2, Therapist:  Isis CHURCH    Other Utilization Review: 1030 per [facility] 10 IOP days authorized from 2/18/2021 to 3/31/2021  Authorization number [id number]  LYNNETTE Hall     Case Management Note    Isis CHURCH    Current suicide risk : Low     This case management session was facilitated virtually in a private office using [employer] and Approved [employer] Teams  George Sorto consented to the use of tele-video modality of treatment  INDIVIDUAL PSYCHOTHERAPY   [id number] [phone] [id number] Met with George Sorto to discuss weekend plans and supports  George Sorto identified that yesterday he had a very enjoyable day, however he had another nightmare and struggling with his day today    He is wondering if he is still needing the Seroquel  Goals for next week include monitoring for more stability in gains  Medications changes/added/denied? No    Treatment session number: 18 IOP 1    Individual Case Management Visit provided today?  Yes     [facility] follow up physician's orders: na

## 2021-02-22 ENCOUNTER — OFFICE VISIT (OUTPATIENT)
Dept: PSYCHOLOGY | Facility: CLINIC | Age: 51
End: 2021-02-22
Payer: COMMERCIAL

## 2021-02-22 DIAGNOSIS — F32.1 CURRENT MODERATE EPISODE OF MAJOR DEPRESSIVE DISORDER WITHOUT PRIOR EPISODE (HCC): ICD-10-CM

## 2021-02-22 DIAGNOSIS — F41.1 GAD (GENERALIZED ANXIETY DISORDER): Primary | ICD-10-CM

## 2021-02-22 PROCEDURE — S9480 INTENSIVE OUTPATIENT PSYCHIA: HCPCS

## 2021-02-22 NOTE — PSYCH
This note was not shared with the patient due to this is a psychotherapy note       Subjective:     Patient ID: Lurdes Green is a 52 y o  male  [facility] Clinical Progress Notes      Specialized Services Documentation  Therapist must complete separate progress note for each specific clinical activity in which the individual participated during the day  Allied Therapy   This group was facilitated virtually in a private office using [employer] and Approved Microsoft Teams  Lurdes Green consented to the use of tele-video modality of treatment  [id number] Lurdes Green  actively shared in [facility] group focused on work stressors  Rolerj Messer was able to speak to challenges in his work and home environment  Group explored ways to set his day up for success and explore ways to set priorities  Relaxation technique reviewed  Some positive exploration of tx goals  Continue AT to encourage identification of stressors and wellness tools to manage stress consistently  Tx Plan Objective: 1 5,1 6, Therapist:  Padmini CHURCH        Case Management Note    Padmini CHURCH    Current suicide risk : Low   This case management session was facilitated virtually in a private office using [employer] and Approved Microsoft Teams  Lurdes Green consented to the use of tele-video modality of treatment  [id number] Met with Lurdes Green  Reported a positive weekend without "any meltdowns"  He is still drowsy - discussed balance between symptoms and side effects  Remains receptive  He is also going to check out KAREN groups  Medications changes/added/denied? No    Treatment session number: 19 IOP 2    Individual Case Management Visit provided today?  Yes     [facility] follow up physician's orders: na

## 2021-02-22 NOTE — PSYCH
Virtual Regular Visit      Assessment/Plan:    Problem List Items Addressed This Visit        Other    GORDON (generalized anxiety disorder) - Primary    Current moderate episode of major depressive disorder without prior episode ([address] )               Reason for visit is VIRTUAL PHP DUE TO COVID-19       [address] [facility] [address]    Provider [address] [place] [id number]      Recent Visits  Date Type Provider Dept   02/19/21 Telemedicine Shirline Pulse [facility]   02/19/21 Office Visit [facility]   02/17/21 Office Visit [facility]   02/15/21 Office Visit [facility]   Showing recent visits within past 7 days and meeting all other requirements     Today's Visits  Date Type Provider Dept   02/22/21 Office Visit [facility]   Showing today's visits and meeting all other requirements     Future Appointments  No visits were found meeting these conditions  Showing future appointments within next 150 days and meeting all other requirements        The patient was identified by name and date of birth  Amol Padilla was informed that this is a telemedicine visit and that the visit is being conducted through [employer] and patient was informed that this is a secure, HIPAA-compliant platform  He agrees to proceed     My office door was closed  No one else was in the room  He acknowledged consent and understanding of privacy and security of the video platform  The patient has agreed to participate and understands they can discontinue the visit at any time  Patient is aware this is a billable service  Subjective  Amol Padilla is a 52 y o  male          HPI     Past Medical History:   Diagnosis Date    Anxiety     Depression     Hyperlipidemia     controlled with diet and exercise    Kidney stone     Panic attack     Renal disorder     kidney stones    [facility])        Past Surgical History:   Procedure Laterality Date    APPENDECTOMY      DC CYSTO/URETERO W/LITHOTRIPSY &INDWELL STENT INSRT Left 5/9/2018    Procedure: CYSTOSCOPY URETEROSCOPY,LEFT  RETROGRADE PYELOGRAM AND INSERTION STENT URETERAL;  Surgeon: Ronald Huffman MD;  [facility];  Service: Urology    DC CYSTO/URETERO W/LITHOTRIPSY &INDWELL STENT INSRT Left 6/8/2018    Procedure: CYSTOSCOPY URETEROSCOPY WITH LITHOTRIPSY HOLMIUM LASER, RETROGRADE PYELOGRAM AND INSERTION STENT URETERAL;  Surgeon: Ronald Huffman MD;  [facility];  Service: Urology    TONSILLECTOMY      WISDOM TOOTH EXTRACTION      WISDOM TOOTH EXTRACTION         Current Outpatient Medications   Medication Sig Dispense Refill    gabapentin (NEURONTIN) 300 mg capsule Take 1 capsule (300 mg total) by mouth 3 (three) times a day At 9am, 4pm, 9pm 90 capsule 1    melatonin 3 mg Take 2 tablets (6 mg total) by mouth daily at bedtime 60 tablet 1    QUEtiapine (SEROquel) 50 mg tablet Take 0 5 tablets (25 mg total) by mouth daily at bedtime 30 tablet 1    sertraline (ZOLOFT) 50 mg tablet Take 1 tablet (50 mg total) by mouth daily At 9am 30 tablet 1     No current facility-administered medications for this visit  Allergies   Allergen Reactions    Betadine Antibiotic-Moisturize [Bacitracin-Polymyxin B] Rash       Review of Systems    Video Exam    There were no vitals filed for this visit  Physical Exam     I spent 4 hours of group + case management minutes with patient today in which greater than 50% of the time was spent in counseling/coordination of care regarding see notes  VIRTUAL VISIT DISCLAIMER    Shahnaz Maravilla acknowledges that he has consented to an online visit or consultation   He understands that the online visit is based solely on information provided by him, and that, in the absence of a face-to-face physical evaluation by the physician, the diagnosis he receives is both limited and provisional in terms of accuracy and completeness  This is not intended to replace a full medical face-to-face evaluation by the physician  Lurdes Green understands and accepts these terms

## 2021-02-22 NOTE — PSYCH
This note was not shared with the patient due to this is a psychotherapy note   Subjective:     Patient ID: Fred Foote is a 52 y o  male  [facility] Clinical Progress Notes      Specialized Services Documentation  Therapist must complete separate progress note for each specific clinical activity in which the individual participated during the day  GROUP PSYCHOTHERAPY ([id number]) Group was facilitated virtually in a private office using HIPAA Compliant and Approved [employer] Teams  Fred Foote consented to the use of tele-video modality of treatment and was virtually present for group psychotherapy today  The group was educated on hope and Emotional Freedom Tapping (EFT)  Group followed along with a video by Kaci Vernon called Kamila Fried  Members rated the intensity of their hopelessness before and after participating in the tapping exercise  Pablo Lopez shared that the practice was relaxing  He noticed that he felt more tired and less anxious by doing the exercise  Pablo Lopez continues to make progress towards goals through verbal participation in group  Continue with Psychotherapy     [place] Plan Objectives: 1 1, 1 2, 1 4   Therapist: Rody Mojica MA, [place]

## 2021-02-22 NOTE — PSYCH
This note was not shared with the patient due to this is a psychotherapy note  Subjective:     Patient ID: Jesus Manuel Saul is a 52 y o  male  [facility] Clinical Progress Notes      Specialized Services Documentation  Therapist must complete separate progress note for each specific clinical activity in which the individual participated during the day  Group Psychotherapy Life Skills Group (9:30-10:15)  Jesus Manuel Saul actively engaged in group focused on what to keep in your life and what to let go of which was facilitated virtually in a private office using HIPAA Compliant and Approved [employer] Teams  Didier Ch consented to the use of tele-video modality of treatment and was virtually present for group psychotherapy today  The group was asked several questions related to the topic  Group members considered how their life would change if they were able to keep or let go of some of these things  Didier Ch stated that his life would have more enjoyment and he would live in the moment if he chose to let go of some of the things  The group was asked to think about the next steps they need to take in order to move towards having what they want and need in their lives  Citronelle Ra continues to make progress towards goals through verbal participation in group; to accomplish long term goals continue to utilize skills learned in programming  Continue with psychotherapy to educate and encourage use of wellness tools  Tx Plan Objective: 1 1,1 2 Therapist:  WALKER Fernandes        Education Therapy   [id number] Jesus Manuel Saul actively shared in morning assessment and goal review  Presented as Receptive related to readiness to learn  Jesus Manuel Saul did complete goal from last treatment day identifying gaining a sense of accomplishment and clean sidewalks  did not present with any barriers to learning  [address] engaged throughout the treatment day   Was engaged in learning related to Illness, Medication, Aftercare and Wellness Tools  Staff utilized Verbal, Written, A/V and Demonstration teaching methods  Shahnaz Maravilla shared area of learning and set a goal for outside of program to look at the packet of information about jobs that was sent out         Tx Plan Objective: 1 1,1 2 Therapist:  Dorethea Dakins, MSW

## 2021-02-23 ENCOUNTER — OFFICE VISIT (OUTPATIENT)
Dept: PSYCHOLOGY | Facility: CLINIC | Age: 51
End: 2021-02-23
Payer: COMMERCIAL

## 2021-02-23 DIAGNOSIS — F41.1 GAD (GENERALIZED ANXIETY DISORDER): Primary | ICD-10-CM

## 2021-02-23 DIAGNOSIS — F32.1 CURRENT MODERATE EPISODE OF MAJOR DEPRESSIVE DISORDER WITHOUT PRIOR EPISODE (HCC): ICD-10-CM

## 2021-02-23 PROCEDURE — S9480 INTENSIVE OUTPATIENT PSYCHIA: HCPCS

## 2021-02-23 NOTE — PSYCH
This note was not shared with the patient due to this is a psychotherapy note   Subjective:     Patient ID: Sonam Bob is a 52 y o  male  [facility] Clinical Progress Notes      Specialized Services Documentation  Therapist must complete separate progress note for each specific clinical activity in which the individual participated during the day  Allied Therapy   This group was facilitated virtually in a private office using [employer] and Approved [employer] Teams  Sonam Bob consented to the use of tele-video modality of treatment  [id number] Sonam Bob   actively shared in [facility] group focused on self- care  Fabien Alejandro was encouraged to identify aspects of self-care and barriers to it  The concept of self -care versus selfishness explored  Group reinforced the necessity of self -care in wellness versus seeing this as a luxury and tips to increase self-care - a stretching exercise introduced and practiced  When asked at end of a specific thing he could commit to in order to increase self-care, he stated take a walk  Some positive progress voiced toward goal   Continue AT to engage in the development and practice of wellness tools  Tx Plan Objective: 1 5,1 6, Therapist:  Maribel Dominguez MT-BC      Case Management Note    Maribel SIBLEYBC    Current suicide risk : Low     This case management session was facilitated virtually in a private office using [employer] and Approved [employer] Teams  Sonam Bob consented to the use of tele-video modality of treatment  [id number] Met with Sonam Bob  Reported that he was having a good day  He sounded more enthusiastic and is looking toward work return with a positive attitude  He was taking coping skills list from today's session and listing stressors and what skills may be beneficial when  Reviewed next few days off as IOP schedule and preparing for discharge next week   Reviewed med check tomorrow    Medications changes/added/denied? No    Treatment session number: 20 IOP 3    Individual Case Management Visit provided today?  Yes     [facility] follow up physician's orders: na

## 2021-02-23 NOTE — PSYCH
This note was not shared with the patient due to this is a psychotherapy note   Subjective:     Patient ID: Galindo Luke is a 52 y o  male  [facility] Clinical Progress Notes      Specialized Services Documentation  Therapist must complete separate progress note for each specific clinical activity in which the individual participated during the day  GROUP PSYCHOTHERAPY ([id number]) Group was facilitated virtually in a private office using HIPAA Compliant and Approved Microsoft Teams  Galindo Luke consented to the use of tele-video modality of treatment and was virtually present for group psychotherapy today  The group engaged in education about anger and Acceptance and Commitment Therapy  They disclosed their thoughts, experiences and understanding about anger  Basilio Alvarez shared that with anger he also feels shame and guilt  He felt his wife reacts more in anger than he does  Basilio Alvarez continues to demonstrate insight through non-prompted verbal participation in group  Continue with psychotherapy  [address] [place] Objectives: 1 1, 1 2, 1 4   Therapist: Maria E Daley MA, [facility]    Education Therapy   [id number] Galindo Luke actively shared in morning assessment and goal review  Presented as Receptive related to readiness to learn  Galindo Luke did complete goal from last treatment day identifying gaining accomplishment of tasks  did not present with any barriers to learning  [address] [facility] engaged throughout the treatment day  Was engaged in learning related to Illness, Medication, Aftercare and Wellness Tools  Staff utilized Verbal, Written, A/V and Demonstration teaching methods  Galindo Luke shared area of learning and set a goal for outside of program to go for a walk        Tx Plan Objective: 1 1, 1 2, 1 4 Therapist:  Maria E Daley MA, [facility]

## 2021-02-23 NOTE — PSYCH
Subjective:     Patient ID: Yin Azar is a 52 y o  male  [facility] Clinical Progress Notes      Specialized Services Documentation  Therapist must complete separate progress note for each specific clinical activity in which the individual participated during the day  This group was facilitated virtually in a private office using [employer] and Approved [employer] Teams  Yin Azar consented to the use of tele-video modality of treatment  This note was not shared with the patient due to this is a psychotherapy note      Group Psychotherapy     ([id number]) Yin Azar was present in psychoeducational group which focused on coping skills  Group first discussed adaptive vs maladaptive skills and active vs avoidant skills  Group discussed the benefit of positively coping with stressors for mental, emotional and physical well being  Together, the group then formulated a list of A to Z coping skills, which the writer sent at the end of the group  Good progress towards goal observed  Continue psychoeducational groups to teach the benefits of adaptive coping skills       Tx Plan Objective: 1 2,1 3,1 4; Therapist:  Ewa Bautista RN    This note was not shared with the patient due to this is a psychotherapy note

## 2021-02-23 NOTE — PSYCH
Virtual Regular Visit      Assessment/Plan:    Problem List Items Addressed This Visit        Other    GORDON (generalized anxiety disorder) - Primary    Current moderate episode of major depressive disorder without prior episode ([address] )               Reason for visit is VIRTUAL PHP DUE TO COVID-19       [address] [address] [facility] [address]    Provider located at [address] [phone]      Recent Visits  Date Type Provider Dept   02/22/21 Office Visit [facility]   02/19/21 Telemedicine Maraljuan ojse Mccarthy [facility]   02/19/21 Office Visit [facility]   02/17/21 Office Visit [facility]   Showing recent visits within past 7 days and meeting all other requirements     Future Appointments  No visits were found meeting these conditions  Showing future appointments within next 150 days and meeting all other requirements        The patient was identified by name and date of birth  Sonam Bob was informed that this is a telemedicine visit and that the visit is being conducted through [employer] and patient was informed that this is a secure, HIPAA-compliant platform  He agrees to proceed     My office door was closed  No one else was in the room  He acknowledged consent and understanding of privacy and security of the video platform  The patient has agreed to participate and understands they can discontinue the visit at any time  Patient is aware this is a billable service  Subjective  Sonam Bob is a 52 y o  male          HPI     Past Medical History:   Diagnosis Date    Anxiety     Depression     Hyperlipidemia     controlled with diet and exercise    Kidney stone     Panic attack     Renal disorder     kidney stones    [facility])        Past Surgical History:   Procedure Laterality Date    APPENDECTOMY      [place] CYSTO/URETERO W/LITHOTRIPSY &INDWELL STENT INSRT Left 5/9/2018    Procedure: CYSTOSCOPY URETEROSCOPY,LEFT  RETROGRADE PYELOGRAM AND INSERTION STENT URETERAL;  Surgeon: Haja Harp MD;  [facility];  Service: Urology    [place] CYSTO/URETERO W/LITHOTRIPSY &INDWELL STENT INSRT Left 6/8/2018    Procedure: CYSTOSCOPY URETEROSCOPY WITH LITHOTRIPSY HOLMIUM LASER, RETROGRADE PYELOGRAM AND INSERTION STENT URETERAL;  Surgeon: Haja Harp MD;  [facility];  Service: Urology    TONSILLECTOMY      WISDOM TOOTH EXTRACTION      WISDOM TOOTH EXTRACTION         Current Outpatient Medications   Medication Sig Dispense Refill    gabapentin (NEURONTIN) 300 mg capsule Take 1 capsule (300 mg total) by mouth 3 (three) times a day At 9am, 4pm, 9pm 90 capsule 1    melatonin 3 mg Take 2 tablets (6 mg total) by mouth daily at bedtime 60 tablet 1    QUEtiapine (SEROquel) 50 mg tablet Take 0 5 tablets (25 mg total) by mouth daily at bedtime 30 tablet 1    sertraline (ZOLOFT) 50 mg tablet Take 1 tablet (50 mg total) by mouth daily At 9am 30 tablet 1     No current facility-administered medications for this visit  Allergies   Allergen Reactions    Betadine Antibiotic-Moisturize [Bacitracin-Polymyxin B] Rash       Review of Systems    Video Exam    There were no vitals filed for this visit  Physical Exam     I spent 4 hours of group + case management minutes with patient today in which greater than 50% of the time was spent in counseling/coordination of care regarding see notes  VIRTUAL VISIT DISCLAIMER    Windy Mir acknowledges that he has consented to an online visit or consultation  He understands that the online visit is based solely on information provided by him, and that, in the absence of a face-to-face physical evaluation by the physician, the diagnosis he receives is both limited and provisional in terms of accuracy and completeness   This is not intended to replace a full medical face-to-face evaluation by the physician  Julian Canela understands and accepts these terms

## 2021-02-24 ENCOUNTER — APPOINTMENT (OUTPATIENT)
Dept: PSYCHOLOGY | Facility: CLINIC | Age: 51
End: 2021-02-24
Payer: COMMERCIAL

## 2021-02-24 ENCOUNTER — DOCUMENTATION (OUTPATIENT)
Dept: PSYCHOLOGY | Facility: CLINIC | Age: 51
End: 2021-02-24

## 2021-02-24 ENCOUNTER — TELEMEDICINE (OUTPATIENT)
Dept: PSYCHIATRY | Facility: CLINIC | Age: 51
End: 2021-02-24
Payer: COMMERCIAL

## 2021-02-24 ENCOUNTER — OFFICE VISIT (OUTPATIENT)
Dept: SLEEP CENTER | Facility: CLINIC | Age: 51
End: 2021-02-24
Payer: COMMERCIAL

## 2021-02-24 VITALS
HEART RATE: 92 BPM | DIASTOLIC BLOOD PRESSURE: 62 MMHG | BODY MASS INDEX: 23.28 KG/M2 | HEIGHT: 70 IN | SYSTOLIC BLOOD PRESSURE: 110 MMHG | WEIGHT: 162.6 LBS

## 2021-02-24 DIAGNOSIS — F32.1 CURRENT MODERATE EPISODE OF MAJOR DEPRESSIVE DISORDER WITHOUT PRIOR EPISODE (HCC): ICD-10-CM

## 2021-02-24 DIAGNOSIS — G47.09 OTHER INSOMNIA: ICD-10-CM

## 2021-02-24 DIAGNOSIS — F41.1 GAD (GENERALIZED ANXIETY DISORDER): ICD-10-CM

## 2021-02-24 DIAGNOSIS — F32.1 CURRENT MODERATE EPISODE OF MAJOR DEPRESSIVE DISORDER WITHOUT PRIOR EPISODE (HCC): Primary | ICD-10-CM

## 2021-02-24 DIAGNOSIS — R40.0 DAYTIME SLEEPINESS: ICD-10-CM

## 2021-02-24 DIAGNOSIS — G47.33 MILD OBSTRUCTIVE SLEEP APNEA: Primary | ICD-10-CM

## 2021-02-24 PROCEDURE — 99244 OFF/OP CNSLTJ NEW/EST MOD 40: CPT | Performed by: INTERNAL MEDICINE

## 2021-02-24 PROCEDURE — 99213 OFFICE O/P EST LOW 20 MIN: CPT | Performed by: NURSE PRACTITIONER

## 2021-02-24 NOTE — PATIENT INSTRUCTIONS

## 2021-02-24 NOTE — PSYCH
Virtual Regular Visit    Problem List Items Addressed This Visit        Other    GORDON (generalized anxiety disorder)    Current moderate episode of major depressive disorder without prior episode ([facility] [address] ) - Primary             Encounter provider Karmen Soler    Provider located at   [address] [address] [phone]    Recent Visits  Date Type Provider Dept   02/17/21 Telemedicine Keagan Simmons 426 recent visits within past 7 days and meeting all other requirements     Future Appointments  No visits were found meeting these conditions  Showing future appointments within next 150 days and meeting all other requirements      The patient was identified by name and date of birth  Karlene Dhillon was informed that this is a telemedicine visit and that the visit is being conducted through [employer]  My office door was closed  No one else was in the room  He acknowledged consent and understanding of privacy and security of the video platform  The patient has agreed to participate and understands they can discontinue the visit at any time  Patient is aware this is a billable service  HPI     Current Outpatient Medications   Medication Sig Dispense Refill    gabapentin (NEURONTIN) 300 mg capsule Take 1 capsule (300 mg total) by mouth 3 (three) times a day At 9am, 4pm, 9pm 90 capsule 1    melatonin 3 mg Take 2 tablets (6 mg total) by mouth daily at bedtime 60 tablet 1    QUEtiapine (SEROquel) 50 mg tablet Take 0 5 tablets (25 mg total) by mouth daily at bedtime 30 tablet 1    sertraline (ZOLOFT) 50 mg tablet Take 1 tablet (50 mg total) by mouth daily At 9am 30 tablet 1     No current facility-administered medications for this visit  Review of Systems  Video Exam    There were no vitals filed for this visit      Physical Exam   As a result of this visit, I have referred the patient for further respiratory evaluation  No    I spent 15 minutes directly with the patient during this visit  [address] Cee Green acknowledges that he has consented to an online visit or consultation  He understands that the online visit is based solely on information provided by him, and that, in the absence of a face-to-face physical evaluation by the physician, the diagnosis he receives is both limited and provisional in terms of accuracy and completeness  This is not intended to replace a full medical face-to-face evaluation by the physician  Nanda Lacho understands and accepts these terms  PHP MEDICATION MANAGEMENT NOTE        [address]    Name and Date of Birth:  Nanda oMnk 52 y o  1970 MRN: [id number]    Date of Visit: February 24, 2021    Allergies   Allergen Reactions    Betadine Antibiotic-Moisturize [Bacitracin-Polymyxin B] Rash     SUBJECTIVE:    Ayo Jolly is seen today for a follow up for Major Depressive Disorder and Generalized Anxiety Disorder  He reports that he has Done well since the last visit  States that following last week's visit patient has significantly improved  He had a positive experience during birth daughter's birthday party  States he has some ups and Downs but overall is feeling happier  Sleep has been good and anxiety has been low  Is functioning well and has been engaged in group  Has a plan to return to work next week with the option to extend PHP if needed  States that he now believes he should remain on Seroquel for the time being as making a change prior to return to work is not advisable  He denies any side effects from medications      PLAN:    Continue current psychiatric medications as ordered  Aware of 24 hour and weekend coverage for urgent situations accessed by calling [facility] main practice number  Continue partial hospitalization program    Diagnoses and all orders for this visit:    Current moderate episode of major depressive disorder without prior episode (HCC)    GORDON (generalized anxiety disorder)        Current Outpatient Medications on File Prior to Visit   Medication Sig Dispense Refill    gabapentin (NEURONTIN) 300 mg capsule Take 1 capsule (300 mg total) by mouth 3 (three) times a day At 9am, 4pm, 9pm 90 capsule 1    melatonin 3 mg Take 2 tablets (6 mg total) by mouth daily at bedtime 60 tablet 1    QUEtiapine (SEROquel) 50 mg tablet Take 0 5 tablets (25 mg total) by mouth daily at bedtime 30 tablet 1    sertraline (ZOLOFT) 50 mg tablet Take 1 tablet (50 mg total) by mouth daily At 9am 30 tablet 1     No current facility-administered medications on file prior to visit  Psychotherapy Provided:     Individual psychotherapy provided: No    HPI ROS Appetite Changes and Sleep:     He reports normal sleep, normal appetite, normal energy level   Patient denies suicidal or homicidal ideation    Review Of Systems:      General emotional problems and decreased functioning   Personality no change in personality   Constitutional negative   ENT negative   Cardiovascular negative   Respiratory negative   Gastrointestinal negative   Genitourinary negative   Musculoskeletal negative   Integumentary negative   Neurological negative   Endocrine negative   Other Symptoms none, all other systems are negative     Mental Status Evaluation:    Appearance Adequate hygiene and grooming   Behavior calm and cooperative   Mood euthymic  Depression Scale -  of 10 (0 = No depression)  Anxiety Scale -  of 10 (0 = No anxiety)   Speech Normal rate and volume   Affect appropriate and mood-congruent   Thought Processes Goal directed and coherent   Thought Content Does not verbalize delusional material   Associations Tightly connected   Perceptual Disturbances Denies hallucinations and does not appear to be responding to internal stimuli   Risk Potential Suicidal/Homicidal Ideation - No evidence of suicidal or homicidal ideation and Patient does not verbalize suicidal or homicidal ideation  Risk of Violence - No evidence of risk for violence found on assessment  Risk of Self Mutilation - No evidence of risk for self mutilation found on assessment   Orientation oriented to person, place, time/date and situation   Memory recent and remote memory grossly intact   Consciousness alert and awake   Attention/Concentration attention span and concentration are age appropriate   Insight fair   Judgement fair   Muscle Strength and Gait normal muscle strength and normal muscle tone, normal gait/station and normal balance   Motor Activity no abnormal movements   Language no difficulty naming common objects, no difficulty repeating a phrase, no difficulty writing a sentence   Fund of Knowledge adequate knowledge of current events  adequate fund of knowledge regarding past history  adequate fund of knowledge regarding vocabulary      Past Psychiatric History Update:     Inpatient Psychiatric Admission Since Last Encounter:   no  Suicide Attempt Or Self Mutilation Since Last Encounter:   no  Incidence of Violent Behavior Since Last Encounter:   no    Traumatic History Update:     New Onset of Abuse Since Last Encounter:   no  Traumatic Events Since Last Encounter:   no    Past Medical History:    Past Medical History:   Diagnosis Date    Anxiety     Depression     Hyperlipidemia     controlled with diet and exercise    Kidney stone     Panic attack     Renal disorder     kidney stones    Suicide attempt [facility])      Past Medical History Pertinent Negatives:   Diagnosis Date Noted    Head injury 01/22/2021    History of transfusion 05/17/2018    Seizures ([facility] [address] ) 01/22/2021     Past Surgical History:   Procedure Laterality Date    APPENDECTOMY      IN CYSTO/URETERO W/LITHOTRIPSY &INDWELL STENT INSRT Left 5/9/2018    Procedure: CYSTOSCOPY URETEROSCOPY,LEFT  RETROGRADE PYELOGRAM AND INSERTION STENT URETERAL;  Surgeon: Avinash Alejandro MD;  Location: [facility] Main OR;  Service: Urology    ME CYSTO/URETERO W/LITHOTRIPSY &INDWELL STENT INSRT Left 2018    Procedure: CYSTOSCOPY URETEROSCOPY WITH LITHOTRIPSY HOLMIUM LASER, RETROGRADE PYELOGRAM AND INSERTION STENT URETERAL;  Surgeon: Avinash Alejandro MD;  [facility];  Service: Urology    TONSILLECTOMY      WISDOM TOOTH EXTRACTION      WISDOM TOOTH EXTRACTION       Allergies   Allergen Reactions    Betadine Antibiotic-Moisturize [Bacitracin-Polymyxin B] Rash     Substance Abuse History:    Social History     Substance and Sexual Activity   Alcohol Use Yes    Alcohol/week: 1 0 standard drinks    Types: 1 Cans of beer per week    Frequency: Monthly or less    Drinks per session: 1 or 2    Binge frequency: Never    Comment: ocassional     Social History     Substance and Sexual Activity   Drug Use No    Comment: Reports last use of any substance >20years ago     Social History:    Social History     Socioeconomic History    Marital status: /Civil Union     Spouse name: Not on file    Number of children: Not on file    Years of education: Not on file    Highest education level: Master's degree (e g , MA, MS, Jesus, MEd, MSW, GLEN)   Occupational History    Not on file   Social Needs    Financial resource strain: Not hard at all   [employer] Funding insecurity     Worry: Not on file     Inability: Not on file   [employer] needs     Medical: Not on file     Non-medical: Not on file   Tobacco Use    Smoking status: Former Smoker     Packs/day: 0 50     Types: Cigarettes     Quit date: 1998     Years since quittin 7    Smokeless tobacco: Never Used   Substance and Sexual Activity    Alcohol use:  Yes     Alcohol/week: 1 0 standard drinks     Types: 1 Cans of beer per week     Frequency: Monthly or less     Drinks per session: 1 or 2     Binge frequency: Never     Comment: ocassional    Drug use: No     Comment: Reports last use of any substance >20years ago    Sexual activity: Not on file   Lifestyle    Physical activity     Days per week: 0 days     Minutes per session: Not on file    Stress: Rather much   Relationships    Social connections     Talks on phone: Once a week     Gets together: Not on file     Attends Synagogue service: Not on file     Active member of club or organization: Not on file     Attends meetings of clubs or organizations: Not on file     Relationship status:     Intimate partner violence     Fear of current or ex partner: No     Emotionally abused: No     Physically abused: No     Forced sexual activity: No   Other Topics Concern    Not on file   Social History Narrative    Not on file     Family Psychiatric History:     Family History   Problem Relation Age of Onset    Heart disease Mother     Lung cancer Mother     Hypertension Mother     Anxiety disorder Mother     Stroke Father     Hyperlipidemia Father     Completed Suicide  Maternal Aunt      History Review: The following portions of the patient's history were reviewed and updated as appropriate: allergies, current medications, past family history, past medical history, past social history, past surgical history and problem list     OBJECTIVE:     Vital signs in last 24 hours: There were no vitals filed for this visit  Laboratory Results: I have personally reviewed all pertinent laboratory/tests results  Medications Risks/Benefits:      Risks, Benefits And Possible Side Effects Of Medications:    Discussed risks and benefits of treatment with patient including risk of suicidality, serotonin syndrome and SIADH related to treatment with antidepressants;  Risk of induction of manic symptoms in certain patient populations and risk of parkinsonian symptoms, metabolic syndrome, tardive dyskinesia and neuroleptic malignant syndrome related to treatment with antipsychotic medications Controlled Medication Discussion:     Not applicable    EDWAR Sinha 02/24/21    This note was shared with patient

## 2021-02-24 NOTE — PROGRESS NOTES
This note was not shared with the patient due to this is a psychotherapy note   Subjective:     Patient ID: Arabella Agudelo is a 52 y o  male  [facility] Clinical Progress Notes      Specialized Services Documentation  Therapist must complete separate progress note for each specific clinical activity in which the individual participated during the day  Case Management Note    Lamonte CHURCH    Current suicide risk : Low     Arabella Agudelo excused due to IOP status  Medications changes/added/denied? No    Treatment session number: na    Individual Case Management Visit provided today? No    [facility] follow up physician's orders: see ROXANNA Henao note

## 2021-02-24 NOTE — PROGRESS NOTES
Consultation - [address]  52 y o  male  :1970  FSM:[id number]    Physician Requesting Consult: Rafael Renee MD     Reason for Consult : At your kind request I saw Galindo Luke for initial sleep evaluation today  Home sleep testing was undertaken to evaluate for sleep disordered breathing and patient is here to review results and further options  The study demonstrated : BERNADETTE (respiratory event index of) 9 4 /hour  Minimum oxygen saturation was 87 %  and 0 1% of total sleep time was spent with saturations less than 90%  The snore index was 1 5%  PFSH, Problem List, Medications & Allergies were reviewed in EMR  Wally Brand  has a past medical history of Anxiety, Depression, Hyperlipidemia, Kidney stone, Panic attack, Renal disorder, and Suicide attempt ([facility] [address] )  He has a current medication list which includes the following prescription(s): gabapentin, quetiapine, sertraline, and melatonin  HPI:  Study was undertaken for complaint of excessive daytime drowsiness of several months duration  He also reports paresthesias involving extremity that have improved since he is on treatment for anxiety  He has been snoring for at least 15 years  It is loud and disturbs his wife  He is not aware of snoring, breathing difficulties during sleep or modifying factors  Other Complaints: none  Restless Leg Syndrome: reports no suggestive symptoms    Parasomnia: no features reported    Sleep Routine (averages): Typical Bedtime:  9:00 p m  Gets OOB:  7:00 a m  TIB:9 hrs  Sleep latency: upto 60 minutes and attributes to racing thoughts  Sleep Interruptions:2-3/nite but this is improved since he was started on on gabapentin  Awakens: spontaneously  Upon awakening: feels refreshed  He estimates getting 7 hrs sleep   Wally Brand reports Excessive Daytime Sleepiness , feels like napping and tries but is unable to  but rated himself at Total score: 4 /24 on the [place] Sleepiness Scale  Habits:  reports that he quit smoking about 22 years ago  His smoking use included cigarettes  He smoked 0 50 packs per day  He has never used smokeless tobacco  ;   E-Cigarette/Vaping    E-Cigarette Use Never User     ;  reports no history of drug use ;  reports current alcohol use of about 1 0 standard drinks of alcohol per week  ; Caffeine use:none ; Exercise routine: regular    Family History: Negative for sleep disturbance  ROS - see attached  EXAM:  /62   Pulse 92   Ht 5' 10" (1 778 m)   Wt 73 8 kg (162 lb 9 6 oz)   BMI 23 33 kg/m²    General: Well groomed male, well appearing, in no apparent distress  Psychiatric: Alert and orientated; Cooperative; Speech:clear and coherent; appears anxious, otherwise normal mood, affect & thought   HEENT:  Craniofacial anatomy: narrow facies Sinuses: non- tender  TMJ: Normal    Eyes: EOM's intact;  conjunctiva/corneas clear   Ears: Externallyappear normal     Nasal Airway: narrow nares Septum:intact; Mucosa: normal; Turbinates: normal; Rhinorrhea: None   Mouth: Lips: normal posture; Dentition: normal   Mucosa:moist  ; Hard Palate:narrow and high arched   Oropharryx: crowded and AP narrowing Tongue: Mallampati:Class IV, Mobile and ScallopedSoft Palate:  redundant  Tonsils: cryptic  Neck: Neck Circumference: 13 "; Supple; no abnormal masses; Thyroid:normal  Trachea:central     Lymph: No Cervical or Submandibular Lymhadenopathy  Heart: S1,S2 normal; RRR; no gallop; nomurmurs   Lungs: Respiratory Effort:normal  Air entry good bilaterally  No wheezes  No rales  Abdomen: Obese, Soft & non-tender    Extremities: No pedal edema  No clubbing or cyanosis  Skin: warm and dry; Color& Hydration good; no facial rashes or lesions   Neurological:  Cranial nerves intact;  Motor normal; Sensation normal  Musculoskeletal: Muscle bulk, tone and power WNL Gait:normal        IMPRESSION: Primary, Secondary (to Medical or Psych conditions) Diagnoses & Comorbidities   1  Mild obstructive sleep apnea  Ambulatory referral to Sleep Medicine   2  Other insomnia     3  Daytime sleepiness     4  Current moderate episode of major depressive disorder without prior episode ([address] )     5  GORDON (generalized anxiety disorder)     2-5 improved    PLAN:   1  I reviewed results of the Sleep study with the patient  2  With respect to above conditions, I  counseled on pathophysiology, diagnosis, treatment options, risks and benefits; inter-relationship and effects on symptoms and comorbidities; risks of no treatment; costs and insurance aspects  3  Since symptoms have improved, he declined all treatment options including positive airway pressure therapy at this time  4  Indicates understanding risks of untreated obstructive sleep apnea  5  I recommended adjunctive positional therapy and weight maintenance  6   He is under care of psychiatrist and neurologist    7   He will call for follow-up if symptoms recur or escalate  Thank you for allowing me to participate in the care of this patient          Sincerely,     Authenticated electronically by Angelique Taveras MD   on 71/13/61   Board Certified Specialist

## 2021-02-24 NOTE — PROGRESS NOTES
Review of Systems      Genitourinary none   Cardiology palpitations/fluttering feeling in the chest   Gastrointestinal none   Neurology numbness/tingling of an extremity, forgetfulness and poor concentration or confusion,    Constitutional excessive sweating at night   Integumentary none   Psychiatry anxiety, depression and mood change   Musculoskeletal joint pain   Pulmonary snoring   ENT none   Endocrine none   Hematological none

## 2021-02-25 ENCOUNTER — APPOINTMENT (OUTPATIENT)
Dept: PSYCHOLOGY | Facility: CLINIC | Age: 51
End: 2021-02-25
Payer: COMMERCIAL

## 2021-02-25 ENCOUNTER — DOCUMENTATION (OUTPATIENT)
Dept: PSYCHOLOGY | Facility: CLINIC | Age: 51
End: 2021-02-25

## 2021-02-25 NOTE — PROGRESS NOTES
This note was not shared with the patient due to this is a psychotherapy note   Subjective:     Patient ID: Arabella Agudelo is a 52 y o  male  [facility] Clinical Progress Notes      Specialized Services Documentation  Therapist must complete separate progress note for each specific clinical activity in which the individual participated during the day  Case Management Note    Lamonte CHURCH    Current suicide risk : Low     Arabella Agudelo excused due to IOP status  Medications changes/added/denied? No    Treatment session number: na    Individual Case Management Visit provided today?  No    [facility] follow up physician's orders: na

## 2021-02-26 ENCOUNTER — OFFICE VISIT (OUTPATIENT)
Dept: PSYCHOLOGY | Facility: CLINIC | Age: 51
End: 2021-02-26
Payer: COMMERCIAL

## 2021-02-26 DIAGNOSIS — F32.1 CURRENT MODERATE EPISODE OF MAJOR DEPRESSIVE DISORDER WITHOUT PRIOR EPISODE (HCC): ICD-10-CM

## 2021-02-26 DIAGNOSIS — F41.1 GAD (GENERALIZED ANXIETY DISORDER): Primary | ICD-10-CM

## 2021-02-26 PROCEDURE — S9480 INTENSIVE OUTPATIENT PSYCHIA: HCPCS

## 2021-02-26 NOTE — PSYCH
This note was not shared with the patient due to this is a psychotherapy note    Subjective:     Patient ID: Fred Foote is a 52 y o  male  [facility] Clinical Progress Notes      Specialized Services Documentation  Therapist must complete separate progress note for each specific clinical activity in which the individual participated during the day  Group Psychotherapy     ([id number]) Fred Foote actively shared in psychotherapy group which focused on Weekly Wellness Assessment  Theyengaged in self-rate and discussion  Group members individually went through the assessment and rated themselves based on the past week  Group members shared assessment results  Group discussed the six domains of wellness; physical, emotional, cognitive, vocational, social, and spiritual  Group discussed strengths, challenges, barriers, and ways to increase each domain in and open forum and developed goals  Pablo Ronaldernestine shared overall improvements during his time at [facility]  Good progress towards goal observed and shared  Continue psychotherapy to further encourage self-reflection on strengths and challenges with personal wellness      Tx Plan Objective:  1 1,1 2,1 3,1 4 Therapist:  Keith Thornton RN;

## 2021-02-26 NOTE — PSYCH
This note was not shared with the patient due to this is a psychotherapy note       Subjective:     Patient ID: Julian Canela is a 52 y o  male  [facility] Clinical Progress Notes      Specialized Services Documentation  Therapist must complete separate progress note for each specific clinical activity in which the individual participated during the day  Case Management Note    Katrina CHURCH    Current suicide risk : Low   This case management session was facilitated virtually in a private office using HIPPA Compliant and Approved [employer] Teams  Julian Canela consented to the use of tele-video modality of treatment  1200 - 1210 Met with Julian Canela to discuss weekend plans and supports  Julian Canela identified that he is feeling 'really good'  He is ready for discharge on Monday and is "excited" to go back to work on Wednesday  He identified that he felt 'uncomfortable learning' some things about himself - like today's cognitive distortions but knows this is where he needs to work  Goals for next week include anticipated discharge Monday 3/1/2021  Medications changes/added/denied? No    Treatment session number: 21IOP 4    Individual Case Management Visit provided today?  Yes     [facility] follow up physician's orders: na

## 2021-02-26 NOTE — PSYCH
[facility] Discharge Instructions:   Disposition: home  Address: [facility] [address]    Diagnosis:  Encounter Diagnoses   Name Primary?  GORDON (generalized anxiety disorder) Yes    Current moderate episode of major depressive disorder without prior episode ([address] )       Allergies (Drug/Food): Allergies   Allergen Reactions    Betadine Antibiotic-Moisturize [Bacitracin-Polymyxin B] Rash     Activity: no restrictions  Diet:no recommendations  Smoking Cessation:not a smoker   Diagnostic/Laboratory Orders: none ordered  Vaccines: If you received a vaccine, please notify your family physician on your next visit  For more information, please call [phone]  Follow-up appointments/Referrals:   Medication Management:  Dr Claritza Corona [place]  [phone]  March 8, 2021  2pm    Individual Counseling:  Michelle Hernandez, [address]  March 8, 2021    Keep Follow Up Appointments, Take Medication As Prescribed, Utilize [employer]AP, Consider Support Groups and/or Sandip Nolasoc [phone] (phone number change as of April [phone])    Crisis Intervention (Emergency) [facility] Service:  [place]: [phone]  National Crisis Intervention Hotline: 8-116.951.7578  National Suicide Crisis Hotline: 3-451.183.6523  I, the undersigned, have received and understand the above instructions        Patient/Rep Signature: __________________________________     Date/Time: ______________     Physician Signature: ____________________________________    Date/Time: ______________      Signature: ________________________________     Date/Time: ______________

## 2021-02-26 NOTE — PSYCH
This note was not shared with the patient due to this is a psychotherapy note  Subjective:     Patient ID: Iam Kay is a 52 y o  male  [facility] Clinical Progress Notes      Specialized Services Documentation  Therapist must complete separate progress note for each specific clinical activity in which the individual participated during the day  Group Psychotherapy Life Skills Group (9:30-10:15) Iam Kay actively engaged in group focused on cognitive distortions and restructuring their cognitive distortions which was facilitated virtually in a private office using HIPAA Compliant and Approved [employer] Teams  Renatomirna Carnesufman consented to the use of tele-video modality of treatment and was virtually present for group psychotherapy today  The group learned about cognitive distortions  During the activity clients identified their irrational thoughts, one or more of the type of cognitive distortion that they demonstrated, and a rational alternative  Kar Rao identified a thought that he had and stated that this demonstrated the cognitive distortion of  All or nothing thinking  Kar Rao continues to make progress towards goals through verbal participation in group; to accomplish long term goals continue to utilize skills learned in programming  Continue with psychotherapy to educate and encourage use of wellness tools  Tx Plan Objective: 1 1,1 2 Therapist:  WALKER Sullivan      Education Therapy   [id number] Iam Kay actively shared in morning assessment and goal review  Presented as Receptive related to readiness to learn  Iam Kay did complete goal from last treatment day identifying gaining more self care   did not present with any barriers to learning  [address] [facility] engaged throughout the treatment day  Was engaged in learning related to Illness, Medication, Aftercare and Wellness Tools   Staff utilized Verbal, Written, A/V and Demonstration teaching methods  Aubrie Clemens shared area of learning and set a goal for outside of program to prepare to start work again next week        Tx Plan Objective: 1 1,1 2 Therapist:  WALKER Bennett

## 2021-02-26 NOTE — PSYCH
Virtual Regular Visit      Assessment/Plan:    Problem List Items Addressed This Visit        Other    GORDON (generalized anxiety disorder) - Primary    Current moderate episode of major depressive disorder without prior episode ([address] )               Reason for visit is VIRTUAL PHP DUE TO COVID-19       Encounter provider BE [address]    Provider located at [address] [facility] [address]      Recent Visits  Date Type Provider Dept   02/23/21 Office Visit [facility]   02/22/21 Office Visit [facility]   02/19/21 Telemedicine Maral Mccarthy [facility]   02/19/21 Office Visit [facility]   Showing recent visits within past 7 days and meeting all other requirements     Future Appointments  No visits were found meeting these conditions  Showing future appointments within next 150 days and meeting all other requirements        The patient was identified by name and date of birth  Deonte Weaver was informed that this is a telemedicine visit and that the visit is being conducted through [employer] and patient was informed that this is a secure, HIPAA-compliant platform  He agrees to proceed     My office door was closed  No one else was in the room  He acknowledged consent and understanding of privacy and security of the video platform  The patient has agreed to participate and understands they can discontinue the visit at any time  Patient is aware this is a billable service  Subjective  Deonte Weaver is a 52 y o  male          HPI     Past Medical History:   Diagnosis Date    Anxiety     Depression     Hyperlipidemia     controlled with diet and exercise    Kidney stone     Panic attack     Renal disorder     kidney stones    Suicide attempt [facility])        Past Surgical History:   Procedure Laterality Date    APPENDECTOMY      [place] CYSTO/URETERO W/LITHOTRIPSY &INDWELL STENT INSRT Left 5/9/2018    Procedure: CYSTOSCOPY URETEROSCOPY,LEFT  RETROGRADE PYELOGRAM AND INSERTION STENT URETERAL;  Surgeon: Jenny Gong MD;  [facility];  Service: Urology    [place] CYSTO/URETERO W/LITHOTRIPSY &INDWELL STENT INSRT Left 6/8/2018    Procedure: CYSTOSCOPY URETEROSCOPY WITH LITHOTRIPSY HOLMIUM LASER, RETROGRADE PYELOGRAM AND INSERTION STENT URETERAL;  Surgeon: Jenny Gong MD;  [facility];  Service: Urology    TONSILLECTOMY      WISDOM TOOTH EXTRACTION      WISDOM TOOTH EXTRACTION         Current Outpatient Medications   Medication Sig Dispense Refill    gabapentin (NEURONTIN) 300 mg capsule Take 1 capsule (300 mg total) by mouth 3 (three) times a day At 9am, 4pm, 9pm 90 capsule 1    melatonin 3 mg Take 2 tablets (6 mg total) by mouth daily at bedtime (Patient not taking: Reported on 2/24/2021) 60 tablet 1    QUEtiapine (SEROquel) 50 mg tablet Take 0 5 tablets (25 mg total) by mouth daily at bedtime 30 tablet 1    sertraline (ZOLOFT) 50 mg tablet Take 1 tablet (50 mg total) by mouth daily At 9am 30 tablet 1     No current facility-administered medications for this visit  Allergies   Allergen Reactions    Betadine Antibiotic-Moisturize [Bacitracin-Polymyxin B] Rash       Review of Systems    Video Exam    There were no vitals filed for this visit  Physical Exam     I spent 4 hours of group + case management minutes with patient today in which greater than 50% of the time was spent in counseling/coordination of care regarding see notes  VIRTUAL VISIT DISCLAIMER    Angel Ulrich acknowledges that he has consented to an online visit or consultation   He understands that the online visit is based solely on information provided by him, and that, in the absence of a face-to-face physical evaluation by the physician, the diagnosis he receives is both limited and provisional in terms of accuracy and completeness  This is not intended to replace a full medical face-to-face evaluation by the physician  Deonte Weaver understands and accepts these terms

## 2021-03-01 ENCOUNTER — OFFICE VISIT (OUTPATIENT)
Dept: PSYCHOLOGY | Facility: CLINIC | Age: 51
End: 2021-03-01
Payer: COMMERCIAL

## 2021-03-01 DIAGNOSIS — F41.1 GAD (GENERALIZED ANXIETY DISORDER): Primary | ICD-10-CM

## 2021-03-01 DIAGNOSIS — F32.1 CURRENT MODERATE EPISODE OF MAJOR DEPRESSIVE DISORDER WITHOUT PRIOR EPISODE (HCC): ICD-10-CM

## 2021-03-01 PROCEDURE — S9480 INTENSIVE OUTPATIENT PSYCHIA: HCPCS

## 2021-03-01 NOTE — PSYCH
This note was not shared with the patient due to this is a psychotherapy note   Subjective:     Patient ID: Sonam Bob is a 52 y o  male  [facility] Discharge Summary:   Admission Date: 1/22/2021  Patient was referred by [address] [place] [address] Drive  Discharge Date: 03/01/21  Was this a routine discharge? yes     Diagnosis: Axis I:   1  GORDON (generalized anxiety disorder)     2  Current moderate episode of major depressive disorder without prior episode [facility])        Treating Physician: Dr Reji Anthony MD    Treatment Complications: somatic complaints     Presenting Need:   Per Dr Lukasz De La O MD:   Rose Clark a 52 y  o  male with generalized anxiety disorder, depression and hyperlipidemia referred by [facility] inpatient psychiatric unit where he was admitted from January 16, 2021 to January 21, 2021 because he had increased anxiety, increased depression and he overdosed in trazodone  Onset of symptoms was  a few weeks ago with gradually worsening course since that time   Psychosocial Stressors: financial and occupational   He has a history of anxiety for many years, was treated with Lexapro for a long time but he did better and this medication was discontinued, during the pandemic he started having more anxiety, has been treated with multiple medication including Lexapro, Xanax, clonazepam, trazodone, propranolol   He states that a few weeks ago he started increasing anxiety, irritability, depression, poor sleep, feeling hopeless, poor concentration, and suicidal ideation when he overdosed and was admitted to a psychiatric unit   He states that since March 2020 he was having tingling sensation in his head that was going down to his body and he became obsessed about it, he went to the neurologist who did a MRI but was negative and told him that the was possible his anxiety   He started having issue with sleep only sleeping few hours at night this was affecting his job he was trying to do more effort to do his job but is anxiety became worse became depressed and suicidal    TodayRujeniferchu Harp feels anxious, drowsy due to the medication, he states that he had good night sleep last night, he still worries about his anxiety and also that his wife need to take care of him   He denies any suicidal thoughts plans or intent, patient denies any psychotic symptoms, patient does not have any history manic episodes his PHQ-9 is 14  Per this writer:   Zoë Mary [facility]  He was feeling desperate and hopeless and that folks were "not getting the problem" so went to the parking lot of hospital - took pills - in order to get admitted  He had less than 6 hours sleep in the few days prior to that  He presents as somatic, overwhelmed, sedated (with current medications)  He voices he is a worrier and pushes himself to do well in life  Since COVID-19 has lost structure  Obsesses over what he does with his time and if he is working hard enough  Some OP treatment no prior IP      Per Zoë Mary: "my nerves feel all wound up in my brain"    Strengths: family support, likes his work, hard worker, wants to feel better    Course of treatment includes:    group counseling, medication management, individual case management, allied therapy, psychoeducation, psychiatric evaluation and individual therapy     Treatment Progress:   Zoë Mary attended 17 PHP and 5 IOP sessions while in [facility]  Treatment plan objectives encouraged anxiety reduction strategies and engaged in education and practice of mindfulness and distress tolerance skills  Chacorta Baldwin was frequently focused on physical symptoms, yet was able to begin to relate them to a stressor (ie  Argument with his wife - his head felt funny)  He responded to breathing and Progressive muscle relaxation strategies   He also very much responded to refocusing thoughts as he became aware he tends to take a negativistic view  He was initially quite sedated and medications were adjusted  He continues to feel tired at times, however needs reassurance to the balance between controlling anxiety and feeling tired  He questions continued need for the Seroquel, however has been increasingly stable with current medication  He was receptive to continuing both Individual therapy and medication management and was "excited to return to work"  Fred Foote shared improvement through how he reacts to physical symptoms, how he responds to relationships, and overall his "attitude is more positive"  He reports sleeping better as well    Denied SI, HI, and psychosis  Aftercare providers to receive summary       Aftercare recommendations include:   Medication Management:  Dr Brien Cheung  [phone]  March 8, 2021  2pm     Individual Counseling:  Giuseppe Aldrich, [address]  March 8, 2021     Discharge Medications include:  Current Outpatient Medications:     gabapentin (NEURONTIN) 300 mg capsule, Take 1 capsule (300 mg total) by mouth 3 (three) times a day At 9am, 4pm, 9pm, Disp: 90 capsule, Rfl: 1    melatonin 3 mg, Take 2 tablets (6 mg total) by mouth daily at bedtime (Patient not taking: Reported on 2/24/2021), Disp: 60 tablet, Rfl: 1    QUEtiapine (SEROquel) 50 mg tablet, Take 0 5 tablets (25 mg total) by mouth daily at bedtime, Disp: 30 tablet, Rfl: 1    sertraline (ZOLOFT) 50 mg tablet, Take 1 tablet (50 mg total) by mouth daily At 9am, Disp: 30 tablet, Rfl: 1

## 2021-03-01 NOTE — PSYCH
Virtual Regular Visit      Assessment/Plan:    Problem List Items Addressed This Visit        Other    GORDON (generalized anxiety disorder) - Primary    Current moderate episode of major depressive disorder without prior episode ([address] )               Reason for visit is VIRTUAL PHP DUE TO COVID-19       Encounter provider [facility] [address]    Provider located at [address] [address]      Recent Visits  Date Type Provider Dept   02/26/21 Office Visit [facility]   02/23/21 [facility] Visit [facility]   02/22/21 Office Visit [facility]   Showing recent visits within past 7 days and meeting all other requirements     Today's Visits  Date Type Provider Dept   03/01/21 Office Visit [facility]   Showing today's visits and meeting all other requirements     Future Appointments  No visits were found meeting these conditions  Showing future appointments within next 150 days and meeting all other requirements        The patient was identified by name and date of birth  Yury Whitney was informed that this is a telemedicine visit and that the visit is being conducted through [employer] and patient was informed that this is a secure, HIPAA-compliant platform  He agrees to proceed     My office door was closed  No one else was in the room  He acknowledged consent and understanding of privacy and security of the video platform  The patient has agreed to participate and understands they can discontinue the visit at any time  Patient is aware this is a billable service  Subjective  Yury Whitney is a 52 y o  male          HPI     Past Medical History:   Diagnosis Date    Anxiety     Depression     Hyperlipidemia     controlled with diet and exercise    Kidney stone     Panic attack     Renal disorder     kidney stones    Suicide attempt [facility])        Past Surgical History:   Procedure Laterality Date    APPENDECTOMY      [place] CYSTO/URETERO W/LITHOTRIPSY &INDWELL STENT INSRT Left 5/9/2018    Procedure: CYSTOSCOPY URETEROSCOPY,LEFT  RETROGRADE PYELOGRAM AND INSERTION STENT URETERAL;  Surgeon: Gwendolyn Mcgee MD;  [facility];  Service: Urology    [place] CYSTO/URETERO W/LITHOTRIPSY &INDWELL STENT INSRT Left 6/8/2018    Procedure: CYSTOSCOPY URETEROSCOPY WITH LITHOTRIPSY HOLMIUM LASER, RETROGRADE PYELOGRAM AND INSERTION STENT URETERAL;  Surgeon: Gwendolyn Mcgee MD;  [facility];  Service: Urology    TONSILLECTOMY      WISDOM TOOTH EXTRACTION      WISDOM TOOTH EXTRACTION         Current Outpatient Medications   Medication Sig Dispense Refill    gabapentin (NEURONTIN) 300 mg capsule Take 1 capsule (300 mg total) by mouth 3 (three) times a day At 9am, 4pm, 9pm 90 capsule 1    melatonin 3 mg Take 2 tablets (6 mg total) by mouth daily at bedtime (Patient not taking: Reported on 2/24/2021) 60 tablet 1    QUEtiapine (SEROquel) 50 mg tablet Take 0 5 tablets (25 mg total) by mouth daily at bedtime 30 tablet 1    sertraline (ZOLOFT) 50 mg tablet Take 1 tablet (50 mg total) by mouth daily At 9am 30 tablet 1     No current facility-administered medications for this visit  Allergies   Allergen Reactions    Betadine Antibiotic-Moisturize [Bacitracin-Polymyxin B] Rash       Review of Systems    Video Exam    There were no vitals filed for this visit  Physical Exam     I spent 4 hours of group + case management minutes with patient today in which greater than 50% of the time was spent in counseling/coordination of care regarding see notes  VIRTUAL VISIT DISCLAIMER    Julian Canela acknowledges that he has consented to an online visit or consultation   He understands that the online visit is based solely on information provided by him, and that, in the absence of a face-to-face physical evaluation by the physician, the diagnosis he receives is both limited and provisional in terms of accuracy and completeness  This is not intended to replace a full medical face-to-face evaluation by the physician  Yin Azar understands and accepts these terms

## 2021-03-01 NOTE — PSYCH
This note was not shared with the patient due to this is a psychotherapy note    Subjective:     Patient ID: Cachorro Nguyen is a 52 y o  male  [facility] Clinical Progress Notes      Specialized Services Documentation  Therapist must complete separate progress note for each specific clinical activity in which the individual participated during the day  Group Psychotherapy [id number]    This group was facilitated virtually in a private office using [employer] and Approved [employer] Teams  Cachorro Nguyen consented to the use of tele-video modality of treatment  Psychotherapy group focused on protective factors to benefit ones wellness  The group explored what a protective factor is, examples of protective factors in and out of ones control, and building upon the protective factors one is in control of to improve ability to cope with lifes challenges  Cachorro Nguyen did not openly share in group  He did appear to be attentive as observed by camera  Some progress toward goals  Continue psychotherapy group to explore stressors and ways of coping            Tx Plan Objective: 1 1,1 2, Therapist:  WALKER Hernandez

## 2021-03-01 NOTE — PSYCH
This note was not shared with the patient due to this is a psychotherapy note       Subjective:     Patient ID: Kinga Forrester is a 52 y o  male  [facility] Clinical Progress Notes      Specialized Services Documentation  Therapist must complete separate progress note for each specific clinical activity in which the individual participated during the day  Allied Therapy   This group was facilitated virtually in a private office using [employer] and Approved [employer]  Kinga Forrester consented to the use of tele-video modality of treatment  [id number] Kinga Forrester  actively shared in [facility] group focused on concept and goals of mindfulness  Longedmar Buckley was observed to be engaged in therapist led progressive muscle relaxation exercise and jane discussion  Goals of mindfulness reviewed and he identified a specific way to utilize mindfulness tonight through taking a walk  He was able to share how he has been applying mindfulness throughout the last several weeks  Group discussed specific ways to practice refocusing thoughts to the present and offered encouragement to use these things regularly to manage stressors consistently  Positive effort noted toward tx goal   Discharge at the end of the treatment day  Tx Plan Objective: 1 1,1 6, Therapist:  Maxime CHURCH    Case Management Note    Maxime CHURCH    Current suicide risk : Low     This case management session was facilitated virtually in a private office using [employer] and Approved [employer] Teams  Kinga Books consented to the use of tele-video modality of treatment  [id number] Met with Kinga Forrester  Reviewed relapse prevention plan, aftercare plan, and medication list (copies provided)  Kinga Forrester shared improvement through how he reacts to physical symptoms, how he responds to relationships, and overall his "attitude is more positive"  He reports sleeping better as well  Kyler Wood Denied SI, HI, and psychosis  Aftercare providers to receive summary  Medications changes/added/denied? No    Treatment session number: 22 - IOP 5    Individual Case Management Visit provided today?  Yes     [facility] follow up physician's orders: Discharge today

## 2021-03-01 NOTE — PSYCH
This note was not shared with the patient due to this is a psychotherapy note    Subjective:     Patient ID: Angel Ulrich is a 52 y o  male  [facility] Clinical Progress Notes      Specialized Services Documentation  Therapist must complete separate progress note for each specific clinical activity in which the individual participated during the day  Group Psychotherapy Life Skills Group (9:30-10:25) Angel Ulrich actively engaged in group focused on positive affirmations which was facilitated virtually in a private office using HIPAA Compliant and Approved [employer] Teams  Kuldip Buckley consented to the use of tele-video modality of treatment and was virtually present for group psychotherapy today  The group focused on acknowledging and accepting positive qualities in front of peers  During the activity group members stated 3 positive affirmations of themselves  Group members processed the activity by discussing their experience when doing this exercise  Kuldip Buckley stated that she could use personal affirmations by writing them down in the morning   Kuldip Buckley continues to make progress towards goals through verbal participation in group;will be discharged at the end of treatment day  Tx Plan Objective: 1 1,1 2 Therapist:  WALKER Banegas      Education Therapy   [id number] Angel Ulrich actively shared in morning assessment and goal review  Presented as Receptive related to readiness to learn  Angel Ulrich did complete goal from last treatment day identifying gaining a sense of feeling prepared   did not present with any barriers to learning  Will be discharged at the end of treatment day  [address] [facility] engaged throughout the treatment day  Was engaged in learning related to Illness, Medication, Aftercare and Wellness Tools  Staff utilized Verbal, Written, A/V and Demonstration teaching methods    Angel Ulrich shared area of learning and set a goal for outside of program to check email  Will be discharged at the end of treatment day      Tx Plan Objective: 1 1,1 2 Therapist:  WALKER Lopez

## 2021-03-01 NOTE — PSYCH
Assessment/Plan:      Diagnoses and all orders for this visit:    GORDON (generalized anxiety disorder)    Current moderate episode of major depressive disorder without prior episode ([facility] [address] )          Subjective:     Patient ID: Kinga Forrester is a 52 y o  male  [facility]   AREAS OF NEED: Anxiety as evidenced by physical symptoms (tingling, dizziness), ruminative thoughts, feeling overwhelmed and helpless/hopeless, worried, sleep disturbance related to work stressors and ongoing symptoms impacting daily life  Date Initiated: 01/22/2021    Strengths: hard worker, family support, wants to feel better     LONG TERM GOAL:   Date Initiated: 01/22/2021  1 0 I will identify 3 signs that my anxiety has lessened in intensity and frequency and I am more productive in my day to day life  Target Date: 02/19/2021  Completion Date: 03/01/21     SHORT TERM OBJECTIVES:     Date Initiated: 01/22/2021  1 1 I will identify 3 mindfulness and/or distress tolerance skills I am practicing to reduce anxiety symptoms  Revision Date:   Target Date: 2/3/2021  Completion Date: 2/3/2021     Date Initiated: 01/22/2021  1 2 I will identify 3 aspects of a structure I can incorporate into my daily day despite my need to be flexible with work  Revision Date: continue to work on 2/3/2021  Target Date: 2/3/2021  Completion Date: see revision 2/12/2021    Date Initiated: 01/22/2021  1 3 I will take medications as prescribed and share questions and concerns if arise  Revision Date: continue medication checks weekly 2/3/2021; 2/12/2021  Target Date: 2/3/2021  Completion Date: 03/01/21     Date Initiated: 01/22/2021  1 4 I will identify 3 ways my supports can assist in my recovery and agree to staff/support contact as indicated      Revision Date: continue to explore 2/3/2021  Target Date: 2/3/2021  Completion Date: 2/12/2021           7 DAY REVISION:    Date Initiated:2/3/2021  1 5 I will identify 1 skill I am using consistently to support my wellness and challenge myself to apply at least 1 other skill daily  Revision Date: continue 2/12/2021  Target Date: 2/15/2021  Completion Date: 03/01/21     Date Initiated:2/12/2021  1 6 I will identify at least 1 skill I can begin to implement before, during, and after my work day to move towards a productive return  Revision Date:   Target Date: 3/3/2021  Completion Date:03/01/21        PSYCHIATRY:  Date Initiated: 01/22/2021  Medication Management and Education       Revision Date: 2/3/2021       Continue medication management  The person(s) responsible for carrying out the plan is Merilynn Dandy, MD    NURSING:   Date Initiated: 01/22/2021  1 1,1 2,1 3,1 4 This RN will provide daily wellness group five days weekly to educate Windy Mir on S/S of his diagnoses and medications used in treatment  Revision Date:2/3/2021  1 1,1 2,1 3,1 4,1 5 Continue to encourage Windy Mir to participate in wellness groups daily to learn about symptoms, coping strategies and warning signs to promote relapse prevention  The person(s) responsible for carrying out the plan is eKya Alarcon RN    PSYCHOLOGY:   Date Initiated: 01/22/2021       1 1, 1 2, 1 4 Provide psychotherapy group 5 times per week to allow opportunity for Windy Mir to explore stressors and ways of coping  Revision Date: 2/3/2021  1 1,1 2,1 4,1 5 Continue to provide psychotherapy group daily to Windy Mir and encourage sharing of stressors, skills and positive change  The person(s) responsible for carrying out the plan is Julita Cardenas    ALLIED THERAPY:   Date Initiated: 01/22/2021  1 1,1 2 Engage Windy Mir in AT group 5 times daily to encourage development and use of wellness tools to decrease symptoms and promote recovery through meaningful activity    Revision Date: 2/3/2021  1 1,1 2,1 5 Continue to engage Windy Mir to participate in AT group to practice wellness tools within program and transfer to home sharing successes and barriers through healthy task involvement  The person(s) responsible for carrying out the plan is Marylen Brinks, MT-BC     CASE MANAGEMENT:   Date Initiated: 01/22/2021      1 0 This  will meet with Sharonda Vásquez 3-4 times weekly to assess treatment progress, discharge planning, connection to community supports and UR as indicated  Revision Date: 2/3/2021  1 0 Continue to meet with Sharonda Vásquez 3-4 times weekly to assess growth, work toward goals, continued treatment needs, dc planning and use of supports  The person(s) responsible for carrying out the plan is Marylen Brinks, MT-BC       TREATMENT REVIEW/COMMENTS:     DISCHARGE CRITERIA: Identify 3 signs of progress and complete relapse prevention plan      DISCHARGE PLAN: OP care  Estimated Length of Stay:10 treatment days     Diagnosis and Treatment Plan explained to Ernie Wilburn, Ernie Wilburn relates understanding diagnosis and is agreeable to Treatment Plan      _

## 2021-03-01 NOTE — PSYCH
[facility] Clinical Progress Notes      Specialized Services Documentation  Therapist must complete separate progress note for each specific clinical activity in which the individual participated during the day         [facility] follow up physician's orders:   DATE 3/1/2021  TIME 10:12   Terri Lanier MD

## 2021-03-02 ENCOUNTER — APPOINTMENT (OUTPATIENT)
Dept: PSYCHOLOGY | Facility: CLINIC | Age: 51
End: 2021-03-02
Payer: COMMERCIAL

## 2021-03-05 NOTE — PSYCH
[address]    Name and Date of Birth:  Lubna Acevedo 52 y o  1970 MRN: [id number]    Date of Visit: March 8, 2021    Reason for visit:   Chief Complaint   Patient presents with    Psychiatric Evaluation    Anxiety    Depression    Medication Management       HPI:     Lunba Acevedo is a 52 y o   male, , domiciled w/ wife and 15 y/o daughter, employed at [employer] as a , w/ PMH of CHRISTOPHER (not on CPAP), kidney stone, HLD, parasthesia, fatigue and PPH of MDD, GORDON, one prior psychiatric admission at [facility] (1/16 - 1/21/21 due to worsening of anxiety, depression and SA [OD on Trazodone]), on prior SA (OD'ed on Trazodone in 1/2021), no h/o self-injurious behavior, referred to [facility] and [facility] (discharged from [facility] on 3/1/2021), on Zoloft 50 mg daily, Seroquel 25 mg nightly and Neurontin 300 mg TID, who presented to the mental health clinic for the initial intake and psychiatric evaluation  The pt was visited in the clinic; chart reviewed  Presented calm, cooperative and well related, casually dressed w/ good hygiene, wearing a facemask, good eye contact, anxious mood, constricted affect, talking in normal tone, volume and amount, w/ linear thought process, fair insight and judgement  Reportedly the patient started feeling more anxious and depressed in the context of financial and occupational stressors since Jan 2021, w/ increased anxiety and depression, somatic preoccupations and insomnia w/ SI which led to OD on Trazodone on 1/15, and recent inpatient admission and referral to the [facility] (discharged from [facility] on 3/1/2021)  He returned to work last week after few weeks, and found it challenging but manageable  He stated that PHP was very helpful, and stated that he was "in a very bad shape" while came out   He noted that he falls asleep well, but has terminal insomnia, and now that he is transitioning to work, things are changing, and as he takes more responsibility, he feels more stressed out, and has episodes of having tightness in chest, and tried to utilize breathing techniques which has been helpful  He noted having "head rushes" as "wave of drowsiness and cloudy thoughts, feels "almost sedated" and has quick transition from racing thoughts to cloudy thoughts, which depends from day to day  He noted that last few days, he has been more anxious about returning to work  He stated that there are a lot of deadlines and uncertainty in his job, but his supervisor is understanding, and allows him to control his pace at job, but he puts a lot of pressure on himself, as described himself as a perfectionist  Denied feeling depressed  He reported feeling hopeless while getting panic attacks  Endorsed good appetite and fair appetite  Denied anhedonia, hopelessness, worthlessness or feeling guilty as intense as before  Enjoys reading books, exercise and listening to music  Endorsed good relationship with his family in general  Reported 3-4 panic attacks since started job, and most recent was this morning as he woke up around 4 AM, and feeling of "dread persisted throughout the day"  Sleeps for ~7 h nightly, but 1-2 nights less than 6 hours  He reported anticipatory anxiety for insomnia  No specific phobia reported  Denied A/VH  No manic sxs, paranoid ideations or fixed delusions were elicited  Reported passing thought of death "as it was a lot easier as I was dead" this morning briefly as he woke up with a panic attacks at 4 AM, but was brief, vehemently denied SI/HI, intent or plan upon direct inquiry at this time  Named his family as main protective factor  Drinks alcohol rarely  Experienced LSD, cocaine, ecstasy, and Marijuana while he was younger  Denied smoking cigarettes or other illicit substance use  Reported FH of anxiety and depression in mother; aunt committed suicide, and other aunt has psychiatric problems  Denied h/o physical or sexual abuse  Denied any history of eating disorder or obsessive/compulsive sxs  He reported somatic sxs as burning in her skin and tingling and chest tightness while he gets angry  Denied any pain at this time  The patient was educated about the [address]  and Environmental Approach to the mental health  Also, was educated about the importance of sleep hygiene, mindfulness, meditation and breathing techniques, and recommended to use Mindfulness  application and CBT-i  application for insomnia  The patient was receptive to the education  He is going to start therapy with Ms Thakur Standing in [place] today  The patient talked about his negative attitude towards psychiatric medication management, and Psycho-education regarding Zoloft indications, benefits, risks, side effects, and alternative options provided to the patient, and the importance of the compliance with psychiatric treatment reiterated  The patient verbalized understanding and agreed to increase the dose from 50 mg to 75 mg po daily, and Seroquel 25 mg po nightly continued  Recommended to f/u w/ sleep medicine regarding CHRISTOPHER and considering CPAP vs  Alternative options  Review Of Systems:  Pertinent items are noted in HPI; all others are negative; no recent changes in medications or health status reported     PHQ-9 Depression Screening    PHQ-9:   Frequency of the following problems over the past two weeks:      Little interest or pleasure in doing things: 0 - not at all  Feeling down, depressed, or hopeless: 0 - not at all  Trouble falling or staying asleep, or sleeping too much: 1 - several days  Feeling tired or having little energy: 1 - several days  Poor appetite or overeatin - not at all  Feeling bad about yourself - or that you are a failure or have let yourself or your family down: 1 - several days  Trouble concentrating on things, such as reading the newspaper or watching television: 1 - several days  Moving or speaking so slowly that other people could have noticed  Or the opposite - being so fidgety or restless that you have been moving around a lot more than usual: 1 - several days  Thoughts that you would be better off dead, or of hurting yourself in some way: 1 - several days  PHQ-2 Score: 0  PHQ-9 Score: 6         GORDON-7 Flowsheet Screening      Most Recent Value   Over the last two weeks, how often have you been bothered by the following problems? Feeling nervous, anxious, or on edge  1   Not being able to stop or control worrying  1   Worrying too much about different things  1   Trouble relaxing   1   Being so restless that it's hard to sit still  0   Becoming easily annoyed or irritable   0   Feeling afraid as if something awful might happen  1   How difficult have these problems made it for you to do your work, take care of things at home, or get along with other people? Very difficult   GORDON Score   5            Past Psychiatric History:   - Please see the [place] [facility] summary on 3/1/2020 for more details    Past Inpatient Psychiatric Treatment:   Past inpatient psychiatric admission at [address] [place] [address] in 1/2021  Past Outpatient Psychiatric Treatment:    Was in outpatient psychiatric treatment in the past with a Eddie Justin, and then in PHP/IOP following the recent admission  Past Suicide Attempts: yes, by overdose on Trazodone  Past Violent Behavior: no  Past Psychiatric Medication Trials: Zoloft, Lexapro, Trazodone and Seroquel, Xanax, Klonopin    Traumatic History:     Abuse: no history of physical or sexual abuse  Other Traumatic Events: none     Family Psychiatric History:     Family History   Problem Relation Age of Onset    Heart disease Mother     Lung cancer Mother     Hypertension Mother     Anxiety disorder Mother     Stroke Father     Hyperlipidemia Father     Completed Suicide  Maternal Aunt        Substance Use History:    Social History     Substance and Sexual Activity   Alcohol Use Yes    Alcohol/week: 1 0 standard drinks    Types: 1 Cans of beer per week    Frequency: Monthly or less    Drinks per session: 1 or 2    Binge frequency: Never    Comment: ocassional     Social History     Substance and Sexual Activity   Drug Use No    Comment: Reports last use of any substance >20years ago       Social History:  Developmental:  Education: Master's degree in [place]  Marital history:   Children: 15 y/o daughter  Living arrangement, social support: wife and daughter  Occupational History:   employed at [employer] as a   Access to firearms: denied    Social History     Socioeconomic History    Marital status: /Civil Union     Spouse name: Not on file    Number of children: Not on file    Years of education: Not on file    Highest education level: Master's degree (e g , MA, MS, Jesus, MEd, MSW, GLEN)   Occupational History    Not on file   Social Needs    Financial resource strain: Not hard at all   [employer] insecurity     Worry: Not on file     Inability: Not on file   [address] needs     Medical: Not on file     Non-medical: Not on file   Tobacco Use    Smoking status: Former Smoker     Packs/day: 0 50     Types: Cigarettes     Quit date: 1998     Years since quittin 8    Smokeless tobacco: Never Used   Substance and Sexual Activity    Alcohol use:  Yes     Alcohol/week: 1 0 standard drinks     Types: 1 Cans of beer per week     Frequency: Monthly or less     Drinks per session: 1 or 2     Binge frequency: Never     Comment: ocassional    Drug use: No     Comment: Reports last use of any substance >20years ago    Sexual activity: Not on file   Lifestyle    Physical activity     Days per week: 0 days     Minutes per session: Not on file    Stress: Rather much   Relationships    Social connections     Talks on phone: Once a week     Gets together: Not on file     Attends Protestant service: Not on file     Active member of club or organization: Not on file     Attends meetings of clubs or organizations: Not on file     Relationship status:     Intimate partner violence     Fear of current or ex partner: No     Emotionally abused: No     Physically abused: No     Forced sexual activity: No   Other Topics Concern    Not on file   Social History Narrative    Not on file       Past Medical History:    Past Medical History:   Diagnosis Date    Anxiety     Depression     Hyperlipidemia     controlled with diet and exercise    Kidney stone     Panic attack     Renal disorder     kidney stones    Su[facility] attempt [facility])      Past Medical History Pertinent Negatives:   Diagnosis Date Noted    Head injury 01/22/2021    History of transfusion 05/17/2018    Seizures ([address] ) 01/22/2021     Past Surgical History:   Procedure Laterality Date    APPENDECTOMY      MN CYSTO/URETERO W/LITHOTRIPSY &INDWELL STENT INSRT Left 5/9/2018    Procedure: CYSTOSCOPY URETEROSCOPY,LEFT  RETROGRADE PYELOGRAM AND INSERTION STENT URETERAL;  Surgeon: Alfredo Gomez MD;  Location: [facility];  Service: Urology    MN CYSTO/URETERO W/LITHOTRIPSY &INDWELL STENT INSRT Left 6/8/2018    Procedure: CYSTOSCOPY URETEROSCOPY WITH LITHOTRIPSY HOLMIUM LASER, RETROGRADE PYELOGRAM AND INSERTION STENT URETERAL;  Surgeon: Alfredo Gomez MD;  Location: [facility];  Service: Urology    TONSILLECTOMY      WISDOM TOOTH EXTRACTION      WISDOM TOOTH EXTRACTION       Allergies   Allergen Reactions    Betadine Antibiotic-Moisturize [Bacitracin-Polymyxin B] Rash       History Review:     The following portions of the patient's history were reviewed and updated as appropriate: allergies, current medications, past family history, past medical history, past social history, past surgical history and problem list     OBJECTIVE:    Vital signs in last 24 hours:    Vitals:    03/08/21 1401   Weight: 72 1 kg (159 lb)   Height: 5' 10" (1 778 m)       Mental Status Evaluation:  Appearance and attitude: appeared as stated age, cooperative and attentive, casually dressed with good hygiene, wearing a facemask  Eye contact: good  Motor Function: within normal limits, intact gait, No PMA/PMR  Gait/station: normal gait/station and normal balance  Speech: normal for rate, rhythm, volume, latency, amount  Language: Able to name objects and Able to repeat phrases  Mood/affect: anxious / Affect was constricted but reactive, mood congruent  Thought Processes: sequential and goal-directed  Thought content: denies suicidal ideation or homicidal ideation; no delusions or first rank symptoms  Associations: intact associations  Perceptual disturbances: denies Auditory/Visual/Tactile Hallucinations  Orientation: oriented to time, person, place and to the situational context  Cognitive Function: intact  Memory: intact short-term and long-term  Intellect: average  Fund of knowledge: aware of current events, aware of past history and vocabulary average  Impulse control: good  Insight/judgment: fair/fair    Pain: denied  Pain scale: 0    Lab Results: I have personally reviewed pertinent lab results          WBC   Date Value Ref Range Status   01/17/2021 5 67 4 31 - 10 16 Thousand/uL Final     WBC, UA   Date Value Ref Range Status   05/17/2018 4-10 (A) None Seen, 0-5, 5-55, 5-65 /hpf Final     MCV   Date Value Ref Range Status   01/17/2021 93 82 - 98 fL Final     Lab Results   Component Value Date    BUN 11 01/15/2021    SODIUM 137 01/15/2021    CO2 28 01/15/2021     Lab Results   Component Value Date    ALKPHOS 60 01/15/2021     No results found for: CKMB  No results found for: TSH  No results found for: INR  No results found for: APTT  No results found for: PHENO  Sodium   Date Value Ref Range Status   01/15/2021 137 136 - 145 mmol/L Final     BUN   Date Value Ref Range Status   01/15/2021 11 5 - 25 mg/dL Final     Creatinine   Date Value Ref Range Status   01/15/2021 0 82 0 60 - 1 30 mg/dL Final     Comment:     Standardized to IDMS reference method     TSH 3RD GENERATON   Date Value Ref Range Status   10/13/2020 1 300 0 358 - 3 740 uIU/mL Final     Comment:     Using supplements with high doses of biotin 20 to more than 300 times greater than the adequate daily intake for adults of 30 mcg/day as established by the [place] [facility], can cause falsely depress results  WBC   Date Value Ref Range Status   2021 5 67 4 31 - 10 16 Thousand/uL Final     WBC, UA   Date Value Ref Range Status   2018 4-10 (A) None Seen, 0-5, 5-55, 5-65 /hpf Final     No components found for: B12  No results found for: FOLATE  No results found for: RPR    Imaging Studies: reviewed  - Brain MRI on 2021: unremarkable    EKG, Pathology, and Other Studies: reviewed    Suicide/Homicide Risk Assessment:    Risk of Harm to Self:  The following ratings are based on assessment at the time of the interview  Demographic risk factors include:   Historical Risk Factors include: chronic depression, chronic anxiety symptoms, history of suicide attempt, history of substance use, a relative or close friend who  by suicide  Recent Specific Risk Factors include: current anxiety symptoms, passive death wishes  Protective Factors: no current suicidal ideation, being a parent, being , contact with caregivers, good health  Weapons: none  The following steps have been taken to ensure weapons are properly secured: not applicable  Based on today's assessment, Mendy Lopes presents the following risk of harm to self: chronically high risk; low to moderate at this time  The patient consented for safety and agreed to notify his wife, call 911 or go to the closest ED in case of crisis or any safety concerns    Risk of Harm to Others: The following ratings are based on assessment at the time of the interview  Demographic Risk Factors include: none    Historical Risk Factors include: none  Recent Specific Risk Factors include: none  Protective Factors: no current homicidal ideation  Weapons: none  The following steps have been taken to ensure weapons are properly secured: not applicable  Based on today's assessment, Derek Headley presents the following risk of harm to others: low    The following interventions are recommended: contracts for safety at present - agrees to go to ED if feeling unsafe, contracts for safety at present - agrees to call Crisis Intervention Service if feeling unsafe, referral for psychotherapy    Assessment/Plan:   In summary, the patient is  a 52 y o   male, , domiciled w/ wife and 15 y/o daughter, employed at [employer] as a , w/ PMH of CHRISTOPHER (not on CPAP), kidney stone, HLD, parasthesia, fatigue and PPH of MDD, GORDON, one prior psychiatric admission at [facility] (1/16 - 1/21/21 due to worsening of anxiety, depression and SA [OD on Trazodone]), on prior SA (OD'ed on Trazodone in 1/2021), no h/o self-injurious behavior, referred to [facility] and IOP (discharged from [facility] on 3/1/2021), on Zoloft 50 mg daily, Seroquel 25 mg nightly and Neurontin 300 mg TID, who presented to the mental health clinic for the initial intake and psychiatric evaluation  Presented w/ depression, anxiety, insomnia, panic attacks and somatic sxs worse recently, which has been improved markedly since admission and PHP/IOP, slightly worse since returned to work after finishing PHP  Denied SI/HI, intent or plan upon direct inquiry at this time  PHQ-9: 6' GORDON-7: 5  His current presentation meets criteria for GORDON w/ panic attacks and MDD  Currently he is not at risk for suicide, homicide, self-injury, aggressive behaviors, self-neglect, or neglect of dependents or children  Given this presentation, the patient will benefit from further outpatient follow up for management of his symptoms   Zoloft increased to 75 mg po daily, and Neurontin 300 mg TID and Seroquel 25 mg nightly continued  Vit D supplements and f/u w/ sleep medicine for treatment options regarding CHRISTOPHER recommended  Started therapy with Levi Colon (in [place]) and will continue  Diagnoses and all orders for this visit:    Generalized anxiety disorder  -     gabapentin (NEURONTIN) 300 mg capsule; Take 1 capsule (300 mg total) by mouth 3 (three) times a day At 9am, 4pm, 9pm  -     sertraline (ZOLOFT) 25 mg tablet; Take 1 tablet (25 mg total) by mouth daily  -     sertraline (ZOLOFT) 50 mg tablet; Take 1 tablet (50 mg total) by mouth daily At 9am    Insomnia  -     QUEtiapine (SEROquel) 50 mg tablet; Take 0 5 tablets (25 mg total) by mouth daily at bedtime    Current moderate episode of major depressive disorder without prior episode (HCC)  -     sertraline (ZOLOFT) 25 mg tablet; Take 1 tablet (25 mg total) by mouth daily          PROVISIONAL DIAGNOSIS :   - GORDON w/ panic attacks  - MDD    TREATMENT AND RECOMMENDATIONS:  - f/u w/ sleep medicine regarding CHRISTOPHER and considering CPAP  - Consider vit D supplements  - Increase Zoloft to 75 mg po daily for anxiety, panic attacks and depression  - Continue Neurontin 300 mg TID for anxiety and somatic sxs  - Continue Seroquel 25 mg po nightly as adjunct treatment for depression and insomnia  - Psychoeducation regarding medication benefits and risks, side effects, indications  and alternatives provided to the patient and the importance of compliance with psychiatric medication reiterated   The pt verbalized understanding and agreed with the plan  - Patient is in individual psychotherapy   - RTC in 4 weeks  - The patient was educated about 24 hour and weekend coverage for urgent situations accessed by calling [facility] main practice number  - Patient was educated to call [address] [facility] ([phone]]) for behavioral crisis at anytime or 851 for any safety concerns, or go to nearest ER if his symptoms become overwhelming or unmanageable  Medications Risks/Benefits:      Risks, Benefits And Possible Side Effects Of Medications:    Risks, benefits, and possible side effects of medications explained to Avelino and he verbalizes understanding and agreement for treatment      Controlled Medication Discussion:     Not applicable    Treatment Plan:    Completed and signed during the session: Yes - with Laura Ricardo MD 03/08/21

## 2021-03-08 ENCOUNTER — OFFICE VISIT (OUTPATIENT)
Dept: PSYCHIATRY | Facility: CLINIC | Age: 51
End: 2021-03-08
Payer: COMMERCIAL

## 2021-03-08 VITALS — HEIGHT: 70 IN | BODY MASS INDEX: 22.76 KG/M2 | WEIGHT: 159 LBS

## 2021-03-08 DIAGNOSIS — F41.1 GENERALIZED ANXIETY DISORDER: Primary | ICD-10-CM

## 2021-03-08 DIAGNOSIS — F32.1 CURRENT MODERATE EPISODE OF MAJOR DEPRESSIVE DISORDER WITHOUT PRIOR EPISODE (HCC): ICD-10-CM

## 2021-03-08 DIAGNOSIS — G47.00 INSOMNIA: ICD-10-CM

## 2021-03-08 PROCEDURE — 3008F BODY MASS INDEX DOCD: CPT | Performed by: STUDENT IN AN ORGANIZED HEALTH CARE EDUCATION/TRAINING PROGRAM

## 2021-03-08 PROCEDURE — 1036F TOBACCO NON-USER: CPT | Performed by: STUDENT IN AN ORGANIZED HEALTH CARE EDUCATION/TRAINING PROGRAM

## 2021-03-08 PROCEDURE — 99215 OFFICE O/P EST HI 40 MIN: CPT | Performed by: STUDENT IN AN ORGANIZED HEALTH CARE EDUCATION/TRAINING PROGRAM

## 2021-03-08 RX ORDER — GABAPENTIN 300 MG/1
300 CAPSULE ORAL 3 TIMES DAILY
Qty: 90 CAPSULE | Refills: 1 | Status: SHIPPED | OUTPATIENT
Start: 2021-03-08 | End: 2021-05-04

## 2021-03-08 RX ORDER — SERTRALINE HYDROCHLORIDE 25 MG/1
25 TABLET, FILM COATED ORAL DAILY
Qty: 30 TABLET | Refills: 0 | Status: SHIPPED | OUTPATIENT
Start: 2021-03-08 | End: 2021-03-18 | Stop reason: SDUPTHER

## 2021-03-08 RX ORDER — QUETIAPINE FUMARATE 50 MG/1
25 TABLET, FILM COATED ORAL
Qty: 15 TABLET | Refills: 1
Start: 2021-03-08 | End: 2021-03-11 | Stop reason: SDUPTHER

## 2021-03-08 NOTE — BH TREATMENT PLAN
Treatment Plan done but not signed at time of office visit due to:  Plan reviewed by phone or in person  and verbal consent given due to COVID social distancing    TREATMENT PLAN (Medication Management Only)        [facility]    Name and Date of Birth:  Zulma Gill 52 y o  1970  Date of Treatment Plan: March 8, 2021  Diagnosis/Diagnoses:    1  Generalized anxiety disorder    2  Insomnia    3  Current moderate episode of major depressive disorder without prior episode [facility])      Strengths/Personal Resources for Self-Care: supportive family  Area/Areas of need (in own words): "depression, insomnia, anxiety and panic attacks"  1  Long Term Goal: improve depression, insomnia, anxiety and panic attacks  Target Date:4 months - 7/8/2021  Person/Persons responsible for completion of goal: Abhishek Tyler  2  Short Term Objective (s) - How will we reach this goal?:   A  Provider new recommended medication/dosage changes and/or continue medication(s): continue current medications as prescribed  B  individual therapy  C  N/A  Target Date:4 months - 7/8/2021  Person/Persons Responsible for Completion of Goal: Abhishek Tyler  Progress Towards Goals: initiating treatment  Treatment Modality: medication management every 4 months, continue psychotherapy with own therapist  Review due 180 days from date of this plan: 4 months - 7/8/2021  Expected length of service: maintenance  My Physician/PA/NP and I have developed this plan together and I agree to work on the goals and objectives  I understand the treatment goals that were developed for my treatment

## 2021-03-09 ENCOUNTER — TELEPHONE (OUTPATIENT)
Dept: SLEEP CENTER | Facility: CLINIC | Age: 51
End: 2021-03-09

## 2021-03-11 DIAGNOSIS — G47.00 INSOMNIA: ICD-10-CM

## 2021-03-11 RX ORDER — QUETIAPINE FUMARATE 50 MG/1
25 TABLET, FILM COATED ORAL
Qty: 15 TABLET | Refills: 1
Start: 2021-03-11 | End: 2021-03-18

## 2021-03-11 NOTE — TELEPHONE ENCOUNTER
Please resend script for SEROquel  The pharmacy received his other refills but not this one  Thank you

## 2021-03-15 NOTE — TELEPHONE ENCOUNTER
Patient called, asking for order for machine  Dr Debbie Khan, are you willing to write order?   You just saw him on 2/24/2021

## 2021-03-16 DIAGNOSIS — G47.33 MILD OBSTRUCTIVE SLEEP APNEA: Primary | ICD-10-CM

## 2021-03-17 ENCOUNTER — TELEPHONE (OUTPATIENT)
Dept: SLEEP CENTER | Facility: CLINIC | Age: 51
End: 2021-03-17

## 2021-03-17 NOTE — TELEPHONE ENCOUNTER
Order placed  Spoke with patient, he would like set up at [facility]    Advised will send his information and they will contact him, phone # provided for patient to follow up  [facility] representative aware    Compliance follow up scheduled 5/12/2021

## 2021-03-17 NOTE — TELEPHONE ENCOUNTER
----- Message from Jude Campbell MD sent at 3/16/2021  4:48 PM EDT -----   He will need to schedule a 2 month follow-up for progress and compliance check

## 2021-03-18 DIAGNOSIS — F41.1 GENERALIZED ANXIETY DISORDER: ICD-10-CM

## 2021-03-18 DIAGNOSIS — G47.00 INSOMNIA: ICD-10-CM

## 2021-03-18 DIAGNOSIS — F32.1 CURRENT MODERATE EPISODE OF MAJOR DEPRESSIVE DISORDER WITHOUT PRIOR EPISODE (HCC): ICD-10-CM

## 2021-03-18 RX ORDER — QUETIAPINE FUMARATE 50 MG/1
TABLET, FILM COATED ORAL
Qty: 30 TABLET | Refills: 1 | Status: SHIPPED | OUTPATIENT
Start: 2021-03-18 | End: 2021-03-18 | Stop reason: SDUPTHER

## 2021-03-18 RX ORDER — QUETIAPINE FUMARATE 50 MG/1
50 TABLET, FILM COATED ORAL
Qty: 30 TABLET | Refills: 1 | Status: SHIPPED | OUTPATIENT
Start: 2021-03-18 | End: 2021-04-12

## 2021-03-18 RX ORDER — SERTRALINE HYDROCHLORIDE 25 MG/1
TABLET, FILM COATED ORAL
Qty: 30 TABLET | Refills: 0 | OUTPATIENT
Start: 2021-03-18

## 2021-03-18 RX ORDER — SERTRALINE HYDROCHLORIDE 25 MG/1
25 TABLET, FILM COATED ORAL DAILY
Qty: 30 TABLET | Refills: 0 | Status: SHIPPED | OUTPATIENT
Start: 2021-03-18 | End: 2021-03-19 | Stop reason: SDUPTHER

## 2021-03-18 NOTE — TELEPHONE ENCOUNTER
Patient called back again in regards to the the refills  The 2 went through but the zoloft 50 mg did not because it was not a 90 day supply  Needs a 90 day supply sent for insurance to cover

## 2021-03-18 NOTE — TELEPHONE ENCOUNTER
Refill is not necessary as the patient was provided new 30 day medication supply as dose increased on 3/8/2021

## 2021-03-18 NOTE — TELEPHONE ENCOUNTER
Patient called to get a refill on these medications  Eitenne Rowan he needed a 90 day supple for all 3 in order for his insurance to cover it  Has not been able to get them refilled due to that

## 2021-03-19 ENCOUNTER — TELEPHONE (OUTPATIENT)
Dept: BEHAVIORAL/MENTAL HEALTH CLINIC | Facility: CLINIC | Age: 51
End: 2021-03-19

## 2021-03-19 DIAGNOSIS — F41.1 GENERALIZED ANXIETY DISORDER: ICD-10-CM

## 2021-03-19 DIAGNOSIS — F32.1 CURRENT MODERATE EPISODE OF MAJOR DEPRESSIVE DISORDER WITHOUT PRIOR EPISODE (HCC): ICD-10-CM

## 2021-03-19 RX ORDER — SERTRALINE HYDROCHLORIDE 25 MG/1
25 TABLET, FILM COATED ORAL DAILY
Qty: 90 TABLET | Refills: 0 | Status: SHIPPED | OUTPATIENT
Start: 2021-03-19 | End: 2021-04-29 | Stop reason: SDUPTHER

## 2021-03-19 NOTE — TELEPHONE ENCOUNTER
I sent 90 day supply of Sertraline 50 and 25 mg (to take 75 mg daily) to the [facility] pharmacy on [address] [facility]

## 2021-03-19 NOTE — TELEPHONE ENCOUNTER
Patient called requesting Zoloft 50mg (30 day) be resent to pharmacy  Needs 90 day due to insurance will not pay for the 30 day supply
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
## 2021-04-01 ENCOUNTER — TELEMEDICINE (OUTPATIENT)
Dept: PSYCHIATRY | Facility: CLINIC | Age: 51
End: 2021-04-01
Payer: COMMERCIAL

## 2021-04-01 DIAGNOSIS — F41.0 GENERALIZED ANXIETY DISORDER WITH PANIC ATTACKS: ICD-10-CM

## 2021-04-01 DIAGNOSIS — F41.1 GENERALIZED ANXIETY DISORDER WITH PANIC ATTACKS: ICD-10-CM

## 2021-04-01 DIAGNOSIS — F32.1 CURRENT MODERATE EPISODE OF MAJOR DEPRESSIVE DISORDER WITHOUT PRIOR EPISODE (HCC): Primary | ICD-10-CM

## 2021-04-01 PROCEDURE — 99213 OFFICE O/P EST LOW 20 MIN: CPT | Performed by: STUDENT IN AN ORGANIZED HEALTH CARE EDUCATION/TRAINING PROGRAM

## 2021-04-01 PROCEDURE — 90833 PSYTX W PT W E/M 30 MIN: CPT | Performed by: STUDENT IN AN ORGANIZED HEALTH CARE EDUCATION/TRAINING PROGRAM

## 2021-04-08 DIAGNOSIS — Z23 ENCOUNTER FOR IMMUNIZATION: ICD-10-CM

## 2021-04-10 DIAGNOSIS — G47.00 INSOMNIA: ICD-10-CM

## 2021-04-12 RX ORDER — QUETIAPINE FUMARATE 50 MG/1
TABLET, FILM COATED ORAL
Qty: 30 TABLET | Refills: 1 | Status: SHIPPED | OUTPATIENT
Start: 2021-04-12 | End: 2021-07-27 | Stop reason: SDUPTHER

## 2021-04-19 ENCOUNTER — TELEPHONE (OUTPATIENT)
Dept: SLEEP CENTER | Facility: CLINIC | Age: 51
End: 2021-04-19

## 2021-04-19 ENCOUNTER — TELEPHONE (OUTPATIENT)
Dept: PSYCHIATRY | Facility: CLINIC | Age: 51
End: 2021-04-19

## 2021-04-19 NOTE — TELEPHONE ENCOUNTER
Returned the patient's call  CPAP last week he is frustrated with it   Patient keeps taking his mask off while sleeping  He feels like the mask doesn't fit  He wants to get rid of the machine  Discussed a mask fitting with the patient he declined  Also discussed him trying to wear his PAP while watching TV to get used to the machine  Patient stated he just getting rid of it     Canceled his compliance appointment with Dr Bill Juarez at patient's request

## 2021-04-19 NOTE — TELEPHONE ENCOUNTER
Gricelda Colin called requesting a call back  He has using the CPAP machine and it has made his sleeping habits worse  He does not like it  He wants to know if he should continue with it or stop    Please call [phone]
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
## 2021-04-29 ENCOUNTER — TELEMEDICINE (OUTPATIENT)
Dept: PSYCHIATRY | Facility: CLINIC | Age: 51
End: 2021-04-29
Payer: COMMERCIAL

## 2021-04-29 DIAGNOSIS — F41.1 GENERALIZED ANXIETY DISORDER: ICD-10-CM

## 2021-04-29 DIAGNOSIS — F32.1 CURRENT MODERATE EPISODE OF MAJOR DEPRESSIVE DISORDER WITHOUT PRIOR EPISODE (HCC): ICD-10-CM

## 2021-04-29 PROCEDURE — 1036F TOBACCO NON-USER: CPT | Performed by: STUDENT IN AN ORGANIZED HEALTH CARE EDUCATION/TRAINING PROGRAM

## 2021-04-29 PROCEDURE — 90833 PSYTX W PT W E/M 30 MIN: CPT | Performed by: STUDENT IN AN ORGANIZED HEALTH CARE EDUCATION/TRAINING PROGRAM

## 2021-04-29 PROCEDURE — 99214 OFFICE O/P EST MOD 30 MIN: CPT | Performed by: STUDENT IN AN ORGANIZED HEALTH CARE EDUCATION/TRAINING PROGRAM

## 2021-04-29 PROCEDURE — 3725F SCREEN DEPRESSION PERFORMED: CPT | Performed by: STUDENT IN AN ORGANIZED HEALTH CARE EDUCATION/TRAINING PROGRAM

## 2021-04-29 RX ORDER — SERTRALINE HYDROCHLORIDE 25 MG/1
25 TABLET, FILM COATED ORAL DAILY
Qty: 90 TABLET | Refills: 0 | Status: SHIPPED | OUTPATIENT
Start: 2021-04-29 | End: 2021-06-23

## 2021-05-04 DIAGNOSIS — F41.1 GENERALIZED ANXIETY DISORDER: ICD-10-CM

## 2021-05-04 RX ORDER — GABAPENTIN 300 MG/1
300 CAPSULE ORAL 3 TIMES DAILY
Qty: 90 CAPSULE | Refills: 1 | Status: SHIPPED | OUTPATIENT
Start: 2021-05-04 | End: 2021-07-14 | Stop reason: SDUPTHER

## 2021-06-23 DIAGNOSIS — F32.1 CURRENT MODERATE EPISODE OF MAJOR DEPRESSIVE DISORDER WITHOUT PRIOR EPISODE (HCC): ICD-10-CM

## 2021-06-23 DIAGNOSIS — F41.1 GENERALIZED ANXIETY DISORDER: ICD-10-CM

## 2021-06-23 RX ORDER — SERTRALINE HYDROCHLORIDE 25 MG/1
TABLET, FILM COATED ORAL
Qty: 90 TABLET | Refills: 0 | Status: SHIPPED | OUTPATIENT
Start: 2021-06-23 | End: 2021-07-27 | Stop reason: SDUPTHER

## 2021-07-14 DIAGNOSIS — F41.1 GENERALIZED ANXIETY DISORDER: ICD-10-CM

## 2021-07-14 RX ORDER — GABAPENTIN 300 MG/1
300 CAPSULE ORAL 3 TIMES DAILY
Qty: 90 CAPSULE | Refills: 1 | Status: SHIPPED | OUTPATIENT
Start: 2021-07-14 | End: 2021-07-27 | Stop reason: SDUPTHER

## 2021-07-18 ENCOUNTER — OFFICE VISIT (OUTPATIENT)
Dept: URGENT CARE | Facility: CLINIC | Age: 51
End: 2021-07-18
Payer: COMMERCIAL

## 2021-07-18 VITALS
HEIGHT: 70 IN | HEART RATE: 61 BPM | BODY MASS INDEX: 23.48 KG/M2 | RESPIRATION RATE: 16 BRPM | OXYGEN SATURATION: 98 % | SYSTOLIC BLOOD PRESSURE: 115 MMHG | TEMPERATURE: 97.5 F | WEIGHT: 164 LBS | DIASTOLIC BLOOD PRESSURE: 76 MMHG

## 2021-07-18 DIAGNOSIS — L25.5 DERMATITIS DUE TO PLANTS, INCLUDING POISON IVY, SUMAC, AND OAK: Primary | ICD-10-CM

## 2021-07-18 PROCEDURE — 99213 OFFICE O/P EST LOW 20 MIN: CPT | Performed by: FAMILY MEDICINE

## 2021-07-18 RX ORDER — METHYLPREDNISOLONE 4 MG/1
TABLET ORAL
Qty: 21 TABLET | Refills: 0 | Status: SHIPPED | OUTPATIENT
Start: 2021-07-18 | End: 2021-07-27 | Stop reason: ALTCHOICE

## 2021-07-18 NOTE — PATIENT INSTRUCTIONS
1  Contact Dermatitis from plants  - Medrol dose pack prescribed, to be completed as directed, no interaction with daily meds  - instructed to gently wash the skin with soap and water daily, keep clean and dry   - apply ice packs and cool compresses to the rash   - may continue Benadryl cream as needed for itching   - follow up w/ PCP office for re-check in 3-5 days  - if symptoms persist despite treatment, worsen/spread, or any new symptoms present, patient is to be seen in the ER

## 2021-07-18 NOTE — PROGRESS NOTES
[facility]        NAME: uMsa Jeffrey is a 54 y o  male  : 1970    MRN: [id number]  DATE: 2021  TIME: 10:41 AM    Assessment and Plan   Dermatitis due to plants, including poison ivy, sumac, and oak [L25 5]  1  Dermatitis due to plants, including poison ivy, sumac, and oak  methylPREDNISolone 4 MG tablet therapy pack         Patient Instructions     Patient Instructions   1  Contact Dermatitis from plants  - Medrol dose pack prescribed, to be completed as directed, no interaction with daily meds  - instructed to gently wash the skin with soap and water daily, keep clean and dry   - apply ice packs and cool compresses to the rash   - may continue Benadryl cream as needed for itching   - follow up w/ PCP office for re-check in 3-5 days  - if symptoms persist despite treatment, worsen/spread, or any new symptoms present, patient is to be seen in the ER  Follow up with PCP in 3-5 days  Proceed to  ER if symptoms worsen  Chief Complaint     Chief Complaint   Patient presents with   [facility]         History of Present Illness       Patient presents for a rash that has been present since yesterday  The rash is located on the volar surface of the right forearm, the dorsal surface of the right hand, and he states he notes a few scattered spots around the left eye  No eye involvement  No eye redness or discharge  No eye pain or vision changes  He states he was working in his yard at home 2 days ago pulling weeds and plants and thinks he came in contact with poison ivy plants  He describes the rash and red, bumpy, and very itchy  He has not noted any drainage from the rash  No associated pain  No known bug or tick bites  No bull's eye rash  No fever/chills  No headache or body aches  No joint pains or fatigue  He has been applying a Benadryl cream to the rash with no improvement  Review of Systems   Review of Systems   Constitutional: Negative  HENT: Negative      Eyes: Negative  Respiratory: Negative  Cardiovascular: Negative  Gastrointestinal: Negative  Musculoskeletal: Negative  Skin:        As noted in HPI   Allergic/Immunologic:        As noted in chart   Neurological: Negative  Hematological: Negative            Current Medications       Current Outpatient Medications:     gabapentin (NEURONTIN) 300 mg capsule, Take 1 capsule (300 mg total) by mouth 3 (three) times a day At 9am, 4pm, 9pm, Disp: 90 capsule, Rfl: 1    QUEtiapine (SEROquel) 50 mg tablet, TAKE 1 TABLET BY MOUTH DAILY AT BEDTIME, Disp: 30 tablet, Rfl: 1    sertraline (ZOLOFT) 25 mg tablet, TAKE 1 TABLET BY MOUTH EVERY DAY, Disp: 90 tablet, Rfl: 0    sertraline (ZOLOFT) 50 mg tablet, Take 1 tablet (50 mg total) by mouth daily At 9am, Disp: 90 tablet, Rfl: 0    methylPREDNISolone 4 MG tablet therapy pack, Use as directed on package, Disp: 21 tablet, Rfl: 0    Current Allergies     Allergies as of 07/18/2021 - Reviewed 07/18/2021   Allergen Reaction Noted    Betadine antibiotic-moisturize [bacitracin-polymyxin b] Rash 05/07/2018            The following portions of the patient's history were reviewed and updated as appropriate: allergies, current medications, past family history, past medical history, past social history, past surgical history and problem list      Past Medical History:   Diagnosis Date    Anxiety     Depression     Hyperlipidemia     controlled with diet and exercise    Kidney stone     Panic attack     Renal disorder     kidney stones    Suicide attempt [facility])        Past Surgical History:   Procedure Laterality Date    APPENDECTOMY      MS CYSTO/URETERO W/LITHOTRIPSY &INDWELL STENT INSRT Left 5/9/2018    Procedure: CYSTOSCOPY URETEROSCOPY,LEFT  RETROGRADE PYELOGRAM AND INSERTION STENT URETERAL;  Surgeon: Nasima Sanchez MD;  [facility];  Service: Urology    MS CYSTO/URETERO W/LITHOTRIPSY &INDWELL STENT INSRT Left 6/8/2018    Procedure: CYSTOSCOPY URETEROSCOPY WITH LITHOTRIPSY HOLMIUM LASER, RETROGRADE PYELOGRAM AND INSERTION STENT URETERAL;  Surgeon: Goyo Kendall MD;  [facility];  Service: Urology    TONSILLECTOMY      WISDOM TOOTH EXTRACTION      WISDOM TOOTH EXTRACTION         Family History   Problem Relation Age of Onset    Heart disease Mother     Lung cancer Mother     Hypertension Mother     Anxiety disorder Mother     Stroke Father     Hyperlipidemia Father     Completed Suicide  Maternal Aunt          Medications have been verified  Objective   /76   Pulse 61   Temp 97 5 °F (36 4 °C)   Resp 16   Ht 5' 10" (1 778 m)   Wt 74 4 kg (164 lb)   SpO2 98%   BMI 23 53 kg/m²   No LMP for male patient  Physical Exam     Physical Exam  Vitals and nursing note reviewed  Constitutional:       General: He is awake  He is not in acute distress  Appearance: Normal appearance  He is well-developed, well-groomed and normal weight  He is not ill-appearing, toxic-appearing or diaphoretic  Eyes:      General: Vision grossly intact  Gaze aligned appropriately  Extraocular Movements: Extraocular movements intact  Conjunctiva/sclera: Conjunctivae normal       Pupils: Pupils are equal, round, and reactive to light  Cardiovascular:      Rate and Rhythm: Normal rate and regular rhythm  Pulses: Normal pulses  Heart sounds: Normal heart sounds  Pulmonary:      Effort: Pulmonary effort is normal  No tachypnea, accessory muscle usage or respiratory distress  Breath sounds: Normal breath sounds and air entry  Musculoskeletal:         General: No swelling, tenderness, deformity or signs of injury  Skin:     General: Skin is warm and dry  Capillary Refill: Capillary refill takes less than 2 seconds  Findings: Rash present  No abscess, bruising or ecchymosis  Rash is papular and vesicular  Comments:  There are mixed streaks and clusters of erythematous papules and vesicles on the volar surface of the right forearm, and the dorsal surface of the right hand  There are a few scattered lesions in the left periorbital region  No drainage/oozing noted  No bleeding  Non-tender  No abscess  No surrounding cellulitis  No bull's eye rash  Neurological:      Mental Status: He is alert and oriented to person, place, and time  Mental status is at baseline  Psychiatric:         Attention and Perception: Attention and perception normal          Mood and Affect: Mood and affect normal          Speech: Speech normal          Behavior: Behavior normal  Behavior is cooperative  Thought Content:  Thought content normal          Cognition and Memory: Cognition and memory normal          Judgment: Judgment normal
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
## 2021-07-27 ENCOUNTER — TELEMEDICINE (OUTPATIENT)
Dept: PSYCHIATRY | Facility: CLINIC | Age: 51
End: 2021-07-27
Payer: COMMERCIAL

## 2021-07-27 DIAGNOSIS — F41.1 GENERALIZED ANXIETY DISORDER: ICD-10-CM

## 2021-07-27 DIAGNOSIS — G47.00 INSOMNIA: ICD-10-CM

## 2021-07-27 DIAGNOSIS — F41.1 GENERALIZED ANXIETY DISORDER WITH PANIC ATTACKS: ICD-10-CM

## 2021-07-27 DIAGNOSIS — F32.1 CURRENT MODERATE EPISODE OF MAJOR DEPRESSIVE DISORDER WITHOUT PRIOR EPISODE (HCC): Primary | ICD-10-CM

## 2021-07-27 DIAGNOSIS — F41.0 GENERALIZED ANXIETY DISORDER WITH PANIC ATTACKS: ICD-10-CM

## 2021-07-27 PROCEDURE — 90833 PSYTX W PT W E/M 30 MIN: CPT | Performed by: STUDENT IN AN ORGANIZED HEALTH CARE EDUCATION/TRAINING PROGRAM

## 2021-07-27 PROCEDURE — 99214 OFFICE O/P EST MOD 30 MIN: CPT | Performed by: STUDENT IN AN ORGANIZED HEALTH CARE EDUCATION/TRAINING PROGRAM

## 2021-07-27 PROCEDURE — 1036F TOBACCO NON-USER: CPT | Performed by: STUDENT IN AN ORGANIZED HEALTH CARE EDUCATION/TRAINING PROGRAM

## 2021-07-27 PROCEDURE — 3725F SCREEN DEPRESSION PERFORMED: CPT | Performed by: STUDENT IN AN ORGANIZED HEALTH CARE EDUCATION/TRAINING PROGRAM

## 2021-07-27 RX ORDER — QUETIAPINE FUMARATE 50 MG/1
50 TABLET, FILM COATED ORAL
Qty: 90 TABLET | Refills: 1 | Status: SHIPPED | OUTPATIENT
Start: 2021-07-27 | End: 2021-11-23 | Stop reason: SDUPTHER

## 2021-07-27 RX ORDER — SERTRALINE HYDROCHLORIDE 25 MG/1
25 TABLET, FILM COATED ORAL DAILY
Qty: 90 TABLET | Refills: 1 | Status: SHIPPED | OUTPATIENT
Start: 2021-07-27 | End: 2021-11-23 | Stop reason: SDUPTHER

## 2021-07-27 RX ORDER — GABAPENTIN 300 MG/1
300 CAPSULE ORAL 3 TIMES DAILY
Qty: 90 CAPSULE | Refills: 1 | Status: SHIPPED | OUTPATIENT
Start: 2021-07-27 | End: 2021-11-23 | Stop reason: SDUPTHER

## 2021-07-27 NOTE — BH TREATMENT PLAN
Treatment Plan done but not signed at time of office visit due to:  Plan reviewed with the patient during the virtual visit and verbal consent given  TREATMENT PLAN (Medication Management Only)        [facility]    Name and Date of Birth:  Marilyn Calvillo 54 y o  1970  Date of Treatment Plan: July 27, 2021  Diagnosis/Diagnoses:    1  Current moderate episode of major depressive disorder without prior episode ([address] )    2  Generalized anxiety disorder with panic attacks    3  Generalized anxiety disorder    4  Insomnia      Strengths/Personal Resources for Self-Care: supportive family  Area/Areas of need (in own words): "insomnia, depression, anxiety and panic attacks"  1  Long Term Goal: maintain improvement in anxiety, panic attacks, insomnia and depression  Target Date:4 months - 11/27/2021  Person/Persons responsible for completion of goal: Cali Cruz  2  Short Term Objective (s) - How will we reach this goal?:   A  Provider new recommended medication/dosage changes and/or continue medication(s): continue current medications as prescribed  B  continue individual therapy  C  N/A  Target Date:4 months - 11/27/2021  Person/Persons Responsible for Completion of Goal: Cali Cruz  Progress Towards Goals: continuing treatment  Treatment Modality: medication management every 4 months, continue psychotherapy with own therapist  Review due 180 days from date of this plan: 4 months - 11/27/2021  Expected length of service: maintenance  My Physician/PA/NP and I have developed this plan together and I agree to work on the goals and objectives  I understand the treatment goals that were developed for my treatment

## 2021-08-29 ENCOUNTER — HOSPITAL ENCOUNTER (EMERGENCY)
Facility: HOSPITAL | Age: 51
Discharge: HOME | End: 2021-08-29
Attending: EMERGENCY MEDICINE
Payer: COMMERCIAL

## 2021-08-29 ENCOUNTER — APPOINTMENT (EMERGENCY)
Dept: CT IMAGING | Facility: HOSPITAL | Age: 51
End: 2021-08-29
Payer: COMMERCIAL

## 2021-08-29 VITALS
OXYGEN SATURATION: 100 % | RESPIRATION RATE: 18 BRPM | HEART RATE: 70 BPM | TEMPERATURE: 98.2 F | DIASTOLIC BLOOD PRESSURE: 84 MMHG | SYSTOLIC BLOOD PRESSURE: 134 MMHG

## 2021-08-29 DIAGNOSIS — S80.211A ABRASION OF RIGHT KNEE, INITIAL ENCOUNTER: ICD-10-CM

## 2021-08-29 DIAGNOSIS — S01.81XA FACIAL LACERATION, INITIAL ENCOUNTER: ICD-10-CM
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
## 2021-08-29 DIAGNOSIS — S09.90XA INJURY OF HEAD, INITIAL ENCOUNTER: ICD-10-CM

## 2021-08-29 DIAGNOSIS — S80.212A ABRASION OF LEFT KNEE, INITIAL ENCOUNTER: ICD-10-CM

## 2021-08-29 PROCEDURE — 90471 IMMUNIZATION ADMIN: CPT

## 2021-08-29 PROCEDURE — 99284 EMERGENCY DEPT VISIT MOD MDM: CPT | Performed by: EMERGENCY MEDICINE

## 2021-08-29 PROCEDURE — 90715 TDAP VACCINE 7 YRS/> IM: CPT | Performed by: EMERGENCY MEDICINE

## 2021-08-29 PROCEDURE — 70450 CT HEAD/BRAIN W/O DYE: CPT

## 2021-08-29 PROCEDURE — 99284 EMERGENCY DEPT VISIT MOD MDM: CPT

## 2021-08-29 RX ORDER — ACETAMINOPHEN 325 MG/1
650 TABLET ORAL ONCE
Status: COMPLETED | OUTPATIENT
Start: 2021-08-29 | End: 2021-08-29

## 2021-08-29 RX ORDER — BUPIVACAINE HYDROCHLORIDE 5 MG/ML
30 INJECTION, SOLUTION EPIDURAL; INTRACAUDAL ONCE
Status: COMPLETED | OUTPATIENT
Start: 2021-08-29 | End: 2021-08-29

## 2021-08-29 RX ADMIN — TETANUS TOXOID, REDUCED DIPHTHERIA TOXOID AND ACELLULAR PERTUSSIS VACCINE, ADSORBED 0.5 ML: 5; 2.5; 8; 8; 2.5 SUSPENSION INTRAMUSCULAR at 13:21

## 2021-08-29 RX ADMIN — BUPIVACAINE HYDROCHLORIDE 30 ML: 5 INJECTION, SOLUTION EPIDURAL; INTRACAUDAL; PERINEURAL at 13:22

## 2021-08-29 RX ADMIN — ACETAMINOPHEN 650 MG: 325 TABLET, FILM COATED ORAL at 13:22

## 2021-08-29 NOTE — Clinical Note
Musa Jeffrey was seen and treated in our emergency department on 8/29/2021  Diagnosis:     Deonna Cipro    He may return on this date: 08/31/2021         If you have any questions or concerns, please don't hesitate to call        Dianne Calle MD    ______________________________           _______________          _______________  Hospital Representative                              Date                                Time

## 2021-08-29 NOTE — ED PROCEDURE NOTE
Procedure  Laceration repair    Date/Time: 8/29/2021 3:45 PM  Performed by: Nicki Reyes MD  Authorized by: Nicki Reyes MD   Consent: Verbal consent obtained  Consent given by: patient  Patient understanding: patient states understanding of the procedure being performed  Radiology Images displayed and confirmed   If images not available, report reviewed: imaging studies available  Required items: required blood products, implants, devices, and special equipment available  Patient identity confirmed: verbally with patient and arm band  Body area: head/neck  Location details: right eyebrow  Laceration length: 6 cm  Foreign bodies: no foreign bodies  Tendon involvement: none  Nerve involvement: none  Vascular damage: no    Anesthesia:  Local Anesthetic: bupivacaine 0 5% without epinephrine  Anesthetic total: 5 mL    Sedation:  Patient sedated: no      Wound Dehiscence:  Superficial Wound Dehiscence: simple closure      Procedure Details:  Irrigation solution: saline  Irrigation method: syringe  Amount of cleaning: standard  Debridement: none  Degree of undermining: none  Skin closure: 4-0 Prolene and Ethiloreyna Reyes MD  08/29/21 1605
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
## 2021-09-05 ENCOUNTER — OFFICE VISIT (OUTPATIENT)
Dept: URGENT CARE | Facility: CLINIC | Age: 51
End: 2021-09-05
Payer: COMMERCIAL

## 2021-09-05 VITALS
RESPIRATION RATE: 18 BRPM | TEMPERATURE: 97.6 F | HEART RATE: 69 BPM | DIASTOLIC BLOOD PRESSURE: 71 MMHG | WEIGHT: 160 LBS | HEIGHT: 70 IN | OXYGEN SATURATION: 100 % | BODY MASS INDEX: 22.9 KG/M2 | SYSTOLIC BLOOD PRESSURE: 109 MMHG

## 2021-09-05 DIAGNOSIS — Z48.02 ENCOUNTER FOR REMOVAL OF SUTURES: Primary | ICD-10-CM

## 2021-09-05 PROCEDURE — 99213 OFFICE O/P EST LOW 20 MIN: CPT | Performed by: NURSE PRACTITIONER
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
## 2021-09-13 ENCOUNTER — TELEPHONE (OUTPATIENT)
Dept: PSYCHIATRY | Facility: CLINIC | Age: 51
End: 2021-09-13

## 2021-11-23 ENCOUNTER — TELEMEDICINE (OUTPATIENT)
Dept: PSYCHIATRY | Facility: CLINIC | Age: 51
End: 2021-11-23
Payer: COMMERCIAL

## 2021-11-23 DIAGNOSIS — F41.1 GENERALIZED ANXIETY DISORDER: ICD-10-CM

## 2021-11-23 DIAGNOSIS — F32.1 CURRENT MODERATE EPISODE OF MAJOR DEPRESSIVE DISORDER WITHOUT PRIOR EPISODE (HCC): ICD-10-CM

## 2021-11-23 DIAGNOSIS — G47.00 INSOMNIA: ICD-10-CM

## 2021-11-23 PROCEDURE — 90833 PSYTX W PT W E/M 30 MIN: CPT | Performed by: STUDENT IN AN ORGANIZED HEALTH CARE EDUCATION/TRAINING PROGRAM

## 2021-11-23 PROCEDURE — 99214 OFFICE O/P EST MOD 30 MIN: CPT | Performed by: STUDENT IN AN ORGANIZED HEALTH CARE EDUCATION/TRAINING PROGRAM

## 2021-11-23 RX ORDER — SERTRALINE HYDROCHLORIDE 25 MG/1
25 TABLET, FILM COATED ORAL DAILY
Qty: 90 TABLET | Refills: 1 | Status: SHIPPED | OUTPATIENT
Start: 2021-11-23 | End: 2022-03-15 | Stop reason: SDUPTHER

## 2021-11-23 RX ORDER — GABAPENTIN 300 MG/1
300 CAPSULE ORAL 2 TIMES DAILY
Qty: 180 CAPSULE | Refills: 1 | Status: SHIPPED | OUTPATIENT
Start: 2021-11-23 | End: 2021-11-24

## 2021-11-23 RX ORDER — QUETIAPINE FUMARATE 50 MG/1
25 TABLET, FILM COATED ORAL
Qty: 45 TABLET | Refills: 1 | Status: SHIPPED | OUTPATIENT
Start: 2021-11-23 | End: 2022-03-15 | Stop reason: SDUPTHER

## 2021-12-16 ENCOUNTER — TELEPHONE (OUTPATIENT)
Dept: FAMILY MEDICINE CLINIC | Facility: CLINIC | Age: 51
End: 2021-12-16

## 2021-12-16 DIAGNOSIS — Z20.822 EXPOSURE TO COVID-19 VIRUS: Primary | ICD-10-CM

## 2021-12-19 PROCEDURE — U0005 INFEC AGEN DETEC AMPLI PROBE: HCPCS | Performed by: FAMILY MEDICINE

## 2021-12-19 PROCEDURE — U0003 INFECTIOUS AGENT DETECTION BY NUCLEIC ACID (DNA OR RNA); SEVERE ACUTE RESPIRATORY SYNDROME CORONAVIRUS 2 (SARS-COV-2) (CORONAVIRUS DISEASE [COVID-19]), AMPLIFIED PROBE TECHNIQUE, MAKING USE OF HIGH THROUGHPUT TECHNOLOGIES AS DESCRIBED BY CMS-2020-01-R: HCPCS | Performed by: FAMILY MEDICINE
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
## 2022-03-15 ENCOUNTER — TELEMEDICINE (OUTPATIENT)
Dept: PSYCHIATRY | Facility: CLINIC | Age: 52
End: 2022-03-15
Payer: COMMERCIAL

## 2022-03-15 DIAGNOSIS — G47.00 INSOMNIA: ICD-10-CM

## 2022-03-15 DIAGNOSIS — F41.0 PANIC DISORDER: ICD-10-CM

## 2022-03-15 DIAGNOSIS — F32.1 CURRENT MODERATE EPISODE OF MAJOR DEPRESSIVE DISORDER WITHOUT PRIOR EPISODE (HCC): ICD-10-CM

## 2022-03-15 DIAGNOSIS — F41.1 GENERALIZED ANXIETY DISORDER: Primary | ICD-10-CM

## 2022-03-15 PROCEDURE — 1036F TOBACCO NON-USER: CPT | Performed by: STUDENT IN AN ORGANIZED HEALTH CARE EDUCATION/TRAINING PROGRAM

## 2022-03-15 PROCEDURE — 90833 PSYTX W PT W E/M 30 MIN: CPT | Performed by: STUDENT IN AN ORGANIZED HEALTH CARE EDUCATION/TRAINING PROGRAM

## 2022-03-15 PROCEDURE — 3725F SCREEN DEPRESSION PERFORMED: CPT | Performed by: STUDENT IN AN ORGANIZED HEALTH CARE EDUCATION/TRAINING PROGRAM

## 2022-03-15 PROCEDURE — 99214 OFFICE O/P EST MOD 30 MIN: CPT | Performed by: STUDENT IN AN ORGANIZED HEALTH CARE EDUCATION/TRAINING PROGRAM

## 2022-03-15 RX ORDER — QUETIAPINE FUMARATE 50 MG/1
25 TABLET, FILM COATED ORAL
Qty: 45 TABLET | Refills: 1 | Status: SHIPPED | OUTPATIENT
Start: 2022-03-15 | End: 2022-06-07 | Stop reason: SDUPTHER

## 2022-03-15 RX ORDER — GABAPENTIN 300 MG/1
300 CAPSULE ORAL 2 TIMES DAILY
Qty: 180 CAPSULE | Refills: 1 | Status: SHIPPED | OUTPATIENT
Start: 2022-03-15 | End: 2022-06-07 | Stop reason: SDUPTHER

## 2022-03-15 RX ORDER — SERTRALINE HYDROCHLORIDE 25 MG/1
25 TABLET, FILM COATED ORAL DAILY
Qty: 90 TABLET | Refills: 1 | Status: SHIPPED | OUTPATIENT
Start: 2022-03-15 | End: 2022-06-07 | Stop reason: SDUPTHER

## 2022-03-15 NOTE — BH TREATMENT PLAN
Treatment Plan done but not signed at time of office visit due to:  Plan reviewed with the patient during the virtual visit / in person and verbal consent given due to [address] distancing  TREATMENT PLAN (Medication Management Only)        [facility]    Name and Date of Birth:  Geronimo Rivera 54 y o  1970  Date of Treatment Plan: March 15, 2022  Diagnosis/Diagnoses:    1  Generalized anxiety disorder    2  Current moderate episode of major depressive disorder without prior episode ([address] )    3  Panic disorder    4  Insomnia      Strengths/Personal Resources for Self-Care: "supportive family, access to therapy and interensted in his job"  Area/Areas of need (in own words): "depression, anxiety and somatic sxs"  1  Long Term Goal: maintain control of depression, anxiety and somatic sxs  Target Date:6 months - 9/15/2022  Person/Persons responsible for completion of goal: Osie Enter  2  Short Term Objective (s) - How will we reach this goal?:   A  Provider new recommended medication/dosage changes and/or continue medication(s): continue current medications as prescribed  B  continue individual therapy  C  N/A  Target Date:6 months - 9/15/2022  Person/Persons Responsible for Completion of Goal: Osie Enter  Progress Towards Goals: continuing treatment  Treatment Modality: medication management every 6 months, continue psychotherapy with own therapist  Review due 180 days from date of this plan: 6 months - 9/15/2022  Expected length of service: maintenance  My Physician/PA/NP and I have developed this plan together and I agree to work on the goals and objectives  I understand the treatment goals that were developed for my treatment

## 2022-05-16 ENCOUNTER — OFFICE VISIT (OUTPATIENT)
Dept: URGENT CARE | Facility: CLINIC | Age: 52
End: 2022-05-16
Payer: COMMERCIAL

## 2022-05-16 VITALS
DIASTOLIC BLOOD PRESSURE: 64 MMHG | RESPIRATION RATE: 16 BRPM | BODY MASS INDEX: 24.34 KG/M2 | WEIGHT: 170 LBS | OXYGEN SATURATION: 95 % | HEART RATE: 66 BPM | SYSTOLIC BLOOD PRESSURE: 105 MMHG | TEMPERATURE: 97.8 F | HEIGHT: 70 IN

## 2022-05-16 DIAGNOSIS — H00.024 HORDEOLUM INTERNUM OF LEFT UPPER EYELID: Primary | ICD-10-CM

## 2022-05-16 DIAGNOSIS — H00.036 CELLULITIS OF LEFT EYELID: ICD-10-CM

## 2022-05-16 DIAGNOSIS — H01.004 BLEPHARITIS OF LEFT UPPER EYELID, UNSPECIFIED TYPE: ICD-10-CM

## 2022-05-16 PROCEDURE — 99213 OFFICE O/P EST LOW 20 MIN: CPT | Performed by: NURSE PRACTITIONER

## 2022-05-16 RX ORDER — ERYTHROMYCIN 5 MG/G
0.5 OINTMENT OPHTHALMIC EVERY 6 HOURS SCHEDULED
Qty: 3.5 G | Refills: 0 | Status: SHIPPED | OUTPATIENT
Start: 2022-05-16 | End: 2022-05-26

## 2022-05-16 RX ORDER — CEPHALEXIN 500 MG/1
500 CAPSULE ORAL EVERY 8 HOURS SCHEDULED
Qty: 15 CAPSULE | Refills: 0 | Status: SHIPPED | OUTPATIENT
Start: 2022-05-16 | End: 2022-05-21

## 2022-05-16 NOTE — PATIENT INSTRUCTIONS
--Warm compresses three times a day (or cool, for itching)  --Take oral antibiotic, apply antibiotic eye ointment as directed  --Avoid touching eye if possible, wash hands afterward  --Seek re-evaluation with eye doctor if no improvement/worsening over the next 24-48 hours

## 2022-05-16 NOTE — PROGRESS NOTES
[facility]        NAME: Tatum Camacho is a 50 y o  male  : 1970    MRN: [id number]  DATE: May 16, 2022  TIME: 9:31 AM    Assessment and Plan   Hordeolum internum of left upper eyelid [H00 024]  1  Hordeolum internum of left upper eyelid     2  Blepharitis of left upper eyelid, unspecified type  erythromycin (ILOTYCIN) ophthalmic ointment   3  Cellulitis of left eyelid  cephalexin (KEFLEX) 500 mg capsule     --Will treat for hordeolum with associated blepharitis, early upper lid cellulitis, per below    Patient Instructions     --Warm compresses three times a day (or cool, for itching)  --Take oral antibiotic, apply antibiotic eye ointment as directed  --Avoid touching eye if possible, wash hands afterward  --Seek re-evaluation with eye doctor if no improvement/worsening over the next 24-48 hours  Chief Complaint     Chief Complaint   Patient presents with    Eye Problem     Pt has upper left eyelid edema that he noticed on Sat- slightly itchy and annoying         History of Present Illness       Here with complaints of left eye infection since yesterday  Initial itching of left upper eyelid  Has since become more tender, red, swollen  No tearing, drainage noted  No vision changes, photophobia, foreign body sensation  No associated URI/allergy symptoms  Right eye feels fine  No known injury, FB, infectious contacts  No contact lens use  No previous episodes of this  Review of Systems   Review of Systems   Constitutional: Negative for fever  HENT: Negative for rhinorrhea and sore throat  Eyes: Positive for pain, redness and itching  Negative for photophobia, discharge and visual disturbance           Current Medications       Current Outpatient Medications:     cephalexin (KEFLEX) 500 mg capsule, Take 1 capsule (500 mg total) by mouth every 8 (eight) hours for 5 days, Disp: 15 capsule, Rfl: 0    erythromycin (ILOTYCIN) ophthalmic ointment, Administer 0 5 inches into the left eye every 6 (six) hours, Disp: 3 5 g, Rfl: 0    gabapentin (NEURONTIN) 300 mg capsule, Take 1 capsule (300 mg total) by mouth 2 (two) times a day, Disp: 180 capsule, Rfl: 1    QUEtiapine (SEROquel) 50 mg tablet, Take 0 5 tablets (25 mg total) by mouth daily at bedtime, Disp: 45 tablet, Rfl: 1    sertraline (ZOLOFT) 25 mg tablet, Take 1 tablet (25 mg total) by mouth daily, Disp: 90 tablet, Rfl: 1    sertraline (ZOLOFT) 50 mg tablet, Take 1 tablet (50 mg total) by mouth daily At 9am, Disp: 90 tablet, Rfl: 1    Current Allergies     Allergies as of 05/16/2022 - Reviewed 03/15/2022   Allergen Reaction Noted    Betadine antibiotic-moisturize [bacitracin-polymyxin b] Rash 05/07/2018            The following portions of the patient's history were reviewed and updated as appropriate: allergies, current medications, past family history, past medical history, past social history, past surgical history and problem list      Past Medical History:   Diagnosis Date    Anxiety     Depression     Hyperlipidemia     controlled with diet and exercise    Kidney stone     Panic attack     Renal disorder     kidney stones    Suicide attempt [facility])        Past Surgical History:   Procedure Laterality Date    APPENDECTOMY      CO CYSTO/URETERO W/LITHOTRIPSY &INDWELL STENT INSRT Left 5/9/2018    Procedure: CYSTOSCOPY URETEROSCOPY,LEFT  RETROGRADE PYELOGRAM AND INSERTION STENT URETERAL;  Surgeon: Janessa Jean MD;  [facility];  Service: Urology    CO CYSTO/URETERO W/LITHOTRIPSY &INDWELL STENT INSRT Left 6/8/2018    Procedure: CYSTOSCOPY URETEROSCOPY WITH LITHOTRIPSY HOLMIUM LASER, RETROGRADE PYELOGRAM AND INSERTION STENT URETERAL;  Surgeon: Janessa Jean MD;  [facility];  Service: Urology    TONSILLECTOMY      WISDOM TOOTH EXTRACTION      WISDOM TOOTH EXTRACTION         Family History   Problem Relation Age of Onset    Heart disease Mother     Lung cancer Mother    Kavon Pop Hypertension Mother     Anxiety disorder Mother     Stroke Father     Hyperlipidemia Father     Completed Suicide  Maternal Aunt          Medications have been verified  Objective   /64   Pulse 66   Temp 97 8 °F (36 6 °C)   Resp 16   Ht 5' 10" (1 778 m)   Wt 77 1 kg (170 lb)   SpO2 95%   BMI 24 39 kg/m²   No LMP for male patient  Physical Exam     Physical Exam  Constitutional:       General: He is not in acute distress  Eyes:      General:         Right eye: No discharge  Left eye: No discharge  Extraocular Movements: Extraocular movements intact  Conjunctiva/sclera: Conjunctivae normal       Pupils: Pupils are equal, round, and reactive to light  Comments: Left upper lid swollen, erythematous, tender along margin of lid, medial>lateral aspect  Indistinctly bordered internal hordeolum palpated along medial lid margin  No associated tearing, drainage  Remainder of left eye including sclera, conjunctiva, lower lid with normal appearance  Pulmonary:      Effort: Pulmonary effort is normal    Neurological:      Mental Status: He is alert     Psychiatric:         Mood and Affect: Mood normal

## 2022-05-19 ENCOUNTER — RA CDI HCC (OUTPATIENT)
Dept: OTHER | Facility: HOSPITAL | Age: 52
End: 2022-05-19

## 2022-05-19 NOTE — PROGRESS NOTES
[facility]  coding opportunities       Chart reviewed, no opportunity found: CHART REVIEWED, NO OPPORTUNITY FOUND        Patients Insurance        Commercial Insurance: [address] [place] [address]

## 2022-05-26 ENCOUNTER — OFFICE VISIT (OUTPATIENT)
Dept: FAMILY MEDICINE CLINIC | Facility: CLINIC | Age: 52
End: 2022-05-26
Payer: COMMERCIAL

## 2022-05-26 VITALS
BODY MASS INDEX: 24.48 KG/M2 | SYSTOLIC BLOOD PRESSURE: 110 MMHG | OXYGEN SATURATION: 97 % | HEART RATE: 64 BPM | WEIGHT: 171 LBS | HEIGHT: 70 IN | DIASTOLIC BLOOD PRESSURE: 70 MMHG

## 2022-05-26 DIAGNOSIS — Z13.1 SCREENING FOR DIABETES MELLITUS: ICD-10-CM

## 2022-05-26 DIAGNOSIS — Z79.899 MEDICATION MANAGEMENT: ICD-10-CM

## 2022-05-26 DIAGNOSIS — Z13.0 SCREENING FOR DEFICIENCY ANEMIA: ICD-10-CM

## 2022-05-26 DIAGNOSIS — H91.93 HEARING DIFFICULTY OF BOTH EARS: ICD-10-CM

## 2022-05-26 DIAGNOSIS — Z00.01 ENCOUNTER FOR WELL ADULT EXAM WITH ABNORMAL FINDINGS: Primary | ICD-10-CM

## 2022-05-26 DIAGNOSIS — Z13.29 SCREENING FOR THYROID DISORDER: ICD-10-CM

## 2022-05-26 DIAGNOSIS — F32.1 CURRENT MODERATE EPISODE OF MAJOR DEPRESSIVE DISORDER WITHOUT PRIOR EPISODE (HCC): ICD-10-CM

## 2022-05-26 DIAGNOSIS — E78.00 HYPERCHOLESTEROLEMIA: ICD-10-CM

## 2022-05-26 DIAGNOSIS — F41.1 GAD (GENERALIZED ANXIETY DISORDER): ICD-10-CM

## 2022-05-26 DIAGNOSIS — H91.93 BILATERAL CHANGE IN HEARING: ICD-10-CM

## 2022-05-26 DIAGNOSIS — Z00.00 ENCOUNTER FOR SCREENING AND PREVENTATIVE CARE: ICD-10-CM

## 2022-05-26 PROBLEM — N20.0 KIDNEY STONE: Status: RESOLVED | Noted: 2018-05-08 | Resolved: 2022-05-26

## 2022-05-26 PROBLEM — R45.851 SUICIDAL IDEATION: Status: RESOLVED | Noted: 2021-01-17 | Resolved: 2022-05-26

## 2022-05-26 PROBLEM — R42 DIZZINESS: Status: RESOLVED | Noted: 2021-01-17 | Resolved: 2022-05-26

## 2022-05-26 PROBLEM — R53.83 FATIGUE: Status: RESOLVED | Noted: 2020-10-12 | Resolved: 2022-05-26

## 2022-05-26 PROBLEM — F32.A DEPRESSION: Status: RESOLVED | Noted: 2021-02-11 | Resolved: 2022-05-26

## 2022-05-26 PROCEDURE — 99396 PREV VISIT EST AGE 40-64: CPT | Performed by: FAMILY MEDICINE

## 2022-05-26 PROCEDURE — 3008F BODY MASS INDEX DOCD: CPT | Performed by: FAMILY MEDICINE

## 2022-05-26 PROCEDURE — 1036F TOBACCO NON-USER: CPT | Performed by: FAMILY MEDICINE

## 2022-05-26 NOTE — PATIENT INSTRUCTIONS
Please obtain the labs that I have ordered for you today  This is fasting, no food or drink except for water for 8-12 hours  This is to make sure that your sugar and cholesterol values are accurate  Please call your insurance company and review with them if they cover:  Guard for low risk patients  If they do not, then my recommendation would be to go through with the full colonoscopy  Please follow up with the audiologist, they should be giving you some information and paperwork outside

## 2022-05-26 NOTE — PROGRESS NOTES
New Patient Outpatient Note    HPI:     Pablo Mejia, 50 y o  male  presents today as a new patient and to establish care  The patient has his chronic health conditions of GORDON and major depression well managed by [facility]  The patient is currently on Zoloft and Seroquel  He is also on Gabapentin 2/2 to episodes of numbness/tingling/ altered sensation  The current regimen along with the care of psychiatry has kept his symptoms under control for the last year  He is looking to change or decrease some meds with upcoming psychiatry appointment  Other then mental health concerns the patient had an elevated lipid panel back in 2020 and his last labs were in 2021  He is interested in determining the levels for kidney, liver, and lipid function       Family Hx  UTD in Chart    Past Medical History:   Diagnosis Date    Anxiety     Depression     Hyperlipidemia     controlled with diet and exercise    Kidney stone     Obstructive sleep apnea     Panic attack     Renal disorder     kidney stones    Suicidal ideation 1/17/2021    Suicide attempt [facility])         Past Surgical History:   Procedure Laterality Date    APPENDECTOMY      DE CYSTO/URETERO W/LITHOTRIPSY &INDWELL STENT INSRT Left 5/9/2018    Procedure: CYSTOSCOPY URETEROSCOPY,LEFT  RETROGRADE PYELOGRAM AND INSERTION STENT URETERAL;  Surgeon: Sai Pollard MD;  [facility];  Service: Urology    DE CYSTO/URETERO W/LITHOTRIPSY &INDWELL STENT INSRT Left 6/8/2018    Procedure: CYSTOSCOPY URETEROSCOPY WITH LITHOTRIPSY HOLMIUM LASER, RETROGRADE PYELOGRAM AND INSERTION STENT URETERAL;  Surgeon: Sai Pollard MD;  [facility];  Service: Urology    TONSILLECTOMY      WISDOM TOOTH EXTRACTION      WISDOM TOOTH EXTRACTION            Current Outpatient Medications:     gabapentin (NEURONTIN) 300 mg capsule, Take 1 capsule (300 mg total) by mouth 2 (two) times a day, Disp: 180 capsule, Rfl: 1    QUEtiapine (SEROquel) 50 mg tablet, Take 0 5 tablets (25 mg total) by mouth daily at bedtime, Disp: 45 tablet, Rfl: 1    sertraline (ZOLOFT) 25 mg tablet, Take 1 tablet (25 mg total) by mouth daily, Disp: 90 tablet, Rfl: 1    sertraline (ZOLOFT) 50 mg tablet, Take 1 tablet (50 mg total) by mouth daily At 9am, Disp: 90 tablet, Rfl: 1     SOCIAL:   Rent/Own: Own  Currently living with: Wife, daughter  Stable food: Yes  Safe At Home: Yes  Hobbies: Biking [employer]  Profession/ employment: [employer]  Restriction to medical procedures:  No restrictions    SEXUAL HISTORY:   Preference:Women  Sexually Active: yes  Birth Control: Male condoms    Psychological History  Psychiatric history: since early twenties  Had break after marriage  Exacerbation with COIVD   Major depression , GORDON  History of inpatient: 1 week voluntary, about 2 weeks, suicide attempt  Current Therapy/ Provider: psychiatrist- Dr Jimena Small  Current Medications: Seroquel, zoloft    Substance History  Smoking: former, minimal  Alcohol Use: None currently  Substance use:  History, recreational, no prolonged or IVDU  Over 20 years or more  ROS:     Review of Systems   Constitutional: Negative for chills, fever and unexpected weight change  HENT: Positive for hearing loss  Negative for congestion, ear discharge, ear pain, rhinorrhea, sinus pressure, sinus pain, sneezing and sore throat  Eyes: Positive for visual disturbance (wears glasses)  Respiratory: Negative for cough, chest tightness and shortness of breath  Cardiovascular: Negative for chest pain and palpitations  Gastrointestinal: Negative for abdominal pain, constipation, diarrhea, nausea and vomiting  Genitourinary: Negative for dysuria and frequency  Skin: Negative for rash and wound  Allergic/Immunologic: Negative for environmental allergies  Neurological: Negative for dizziness, light-headedness and headaches     Psychiatric/Behavioral: Negative for self-injury and suicidal ideas  OBJECTIVE  Vitals:    05/26/22 0921   BP: 110/70   Pulse: 64   SpO2: 97%        Physical Exam  Constitutional:       General: He is not in acute distress  Appearance: Normal appearance  He is normal weight  He is not ill-appearing, toxic-appearing or diaphoretic  HENT:      Head: Normocephalic and atraumatic  Right Ear: Tympanic membrane, ear canal and external ear normal  There is no impacted cerumen  Left Ear: Tympanic membrane, ear canal and external ear normal  There is no impacted cerumen  Nose: Nose normal  No congestion or rhinorrhea  Mouth/Throat:      Mouth: Mucous membranes are moist       Pharynx: Oropharynx is clear  No oropharyngeal exudate or posterior oropharyngeal erythema  Comments: Small polyp about 1cm x1cm near tonsillar area  Eyes:      General:         Right eye: No discharge  Left eye: No discharge  Extraocular Movements: Extraocular movements intact  Conjunctiva/sclera: Conjunctivae normal       Pupils: Pupils are equal, round, and reactive to light  Cardiovascular:      Rate and Rhythm: Normal rate and regular rhythm  Pulses: Normal pulses  Heart sounds: Normal heart sounds  No murmur heard  No friction rub  No gallop  Pulmonary:      Effort: Pulmonary effort is normal  No respiratory distress  Breath sounds: Normal breath sounds  No stridor  No wheezing, rhonchi or rales  Abdominal:      General: Bowel sounds are normal  There is no distension  Palpations: Abdomen is soft  Tenderness: There is no abdominal tenderness  Musculoskeletal:         General: No swelling or tenderness  Normal range of motion  Cervical back: Neck supple  No tenderness  Lymphadenopathy:      Cervical: No cervical adenopathy  Skin:     General: Skin is warm and dry  Capillary Refill: Capillary refill takes less than 2 seconds  Neurological:      Mental Status: He is alert        Sensory: No sensory deficit (no abnormal sensory changes in all 4 extremities)  Motor: No weakness ( 5/5 in all 4 extremities)  Deep Tendon Reflexes: Reflexes normal ( 2/4, intact, C5, C6, L4, S1)  ASSESSMENT AND PLAN   Darryl Harper was seen today for establish care  Diagnoses and all orders for this visit:    Encounter for well adult exam with abnormal findings  Screening for diabetes mellitus  Screening for deficiency anemia  Screening for thyroid disorder  Patient presents to establish care at new office due to location more convenient  We will follow up baseline labs for patient to review kidney, liver, thyroid function, and glucose  We will also review most recent labs for lipid panel    -     TSH, 3rd generation with Free T4 reflex; Future  -     Lipid panel; Future  -     CBC and differential; Future  -     Comprehensive metabolic panel; Future    Hypercholesterolemia  Patient has a history of elevated total and LDL cholesterol  Will have follow up labs to determine if patient requires statin  Patient has strong family history of heart disease and stroke and we should consider this with possible treatment  ASCVD risk is likely to change if values have increased  -     Lipid panel; Future    Bilateral change in hearing  Hearing difficulty of both ears  Patient noted some mild changes in hearing especially rooms with poor acoustics  Will send for Audiology appointment for further evaluation  No cerumen present at time of examination    -     Ambulatory Referral to Audiology; Future    Current moderate episode of major depressive disorder without prior episode (HCC)  GORDON (generalized anxiety disorder)  Medication management  Patient is on multiple psychiatric medications  Appreciate psych treatment and recommendations  Will obtain labs to follow up on lipids, kidney and liver function    TSH obtained due to history of depression and anxiety    -     TSH, 3rd generation with Free T4 reflex; Future  -     Lipid panel; Future  -     CBC and differential; Future  -     Comprehensive metabolic panel;  Future         J Carlos Gomez DO  [facility]  5/26/2022 10:21 AM

## 2022-05-31 NOTE — PROGRESS NOTES
Home Sleep Study Documentation    Pre-Sleep Home Study:    Set-up and instructions performed by: Boubacar Longoria    Technician performed demonstration for Patient: yes    Return demonstration performed by Patient: yes    Written instructions provided to Patient: yes    Patient signed consent form: yes        Post-Sleep Home Study:    Additional comments by Patient:         Home Sleep Study Failed:no:    Failure reason: N/A    Reported or Detected: N/A    Scored by: MARCIAL Cheney Adult
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
## 2022-06-07 ENCOUNTER — TELEMEDICINE (OUTPATIENT)
Dept: PSYCHIATRY | Facility: CLINIC | Age: 52
End: 2022-06-07
Payer: COMMERCIAL

## 2022-06-07 DIAGNOSIS — F41.1 GENERALIZED ANXIETY DISORDER: Primary | ICD-10-CM

## 2022-06-07 DIAGNOSIS — F41.0 PANIC DISORDER: ICD-10-CM

## 2022-06-07 DIAGNOSIS — F32.1 CURRENT MODERATE EPISODE OF MAJOR DEPRESSIVE DISORDER WITHOUT PRIOR EPISODE (HCC): ICD-10-CM

## 2022-06-07 DIAGNOSIS — G47.00 INSOMNIA: ICD-10-CM

## 2022-06-07 PROCEDURE — 1036F TOBACCO NON-USER: CPT | Performed by: STUDENT IN AN ORGANIZED HEALTH CARE EDUCATION/TRAINING PROGRAM

## 2022-06-07 PROCEDURE — 3725F SCREEN DEPRESSION PERFORMED: CPT | Performed by: STUDENT IN AN ORGANIZED HEALTH CARE EDUCATION/TRAINING PROGRAM

## 2022-06-07 PROCEDURE — 99214 OFFICE O/P EST MOD 30 MIN: CPT | Performed by: STUDENT IN AN ORGANIZED HEALTH CARE EDUCATION/TRAINING PROGRAM

## 2022-06-07 PROCEDURE — 90833 PSYTX W PT W E/M 30 MIN: CPT | Performed by: STUDENT IN AN ORGANIZED HEALTH CARE EDUCATION/TRAINING PROGRAM

## 2022-06-07 RX ORDER — QUETIAPINE FUMARATE 50 MG/1
25 TABLET, FILM COATED ORAL
Qty: 45 TABLET | Refills: 1 | Status: SHIPPED | OUTPATIENT
Start: 2022-06-07 | End: 2022-12-04

## 2022-06-07 RX ORDER — SERTRALINE HYDROCHLORIDE 25 MG/1
25 TABLET, FILM COATED ORAL DAILY
Qty: 90 TABLET | Refills: 1 | Status: SHIPPED | OUTPATIENT
Start: 2022-06-07 | End: 2022-12-04

## 2022-06-07 RX ORDER — GABAPENTIN 300 MG/1
300 CAPSULE ORAL 2 TIMES DAILY
Qty: 180 CAPSULE | Refills: 1 | Status: SHIPPED | OUTPATIENT
Start: 2022-06-07 | End: 2022-12-04

## 2022-06-07 NOTE — BH TREATMENT PLAN
Treatment Plan done but not signed at time of office visit due to:  Plan reviewed with the patient during the virtual visit and verbal consent given  TREATMENT PLAN (Medication Management Only)        Jim Judy BRODY    Name and Date of Birth:  Christy Sequeira 50 y o  1970  Date of Treatment Plan: June 7, 2022  Diagnosis/Diagnoses:    1  Generalized anxiety disorder    2  Current moderate episode of major depressive disorder without prior episode ([address] )    3  Panic disorder    4  Insomnia      Strengths/Personal Resources for Self-Care: "supportive family; access to therapy"  Area/Areas of need (in own words): "depression, anxiety and somatic sxs"  1  Long Term Goal: maintain improvements in depression, anxiety and somatic sxs  Target Date:6 months - 12/7/2022  Person/Persons responsible for completion of goal: [employer]  2  Short Term Objective (s) - How will we reach this goal?:   A  Provider new recommended medication/dosage changes and/or continue medication(s): continue current medications as prescribed  B  Attend psychotherapy regularly  C  N/A  Target Date:6 months - 12/7/2022  Person/Persons Responsible for Completion of Goal: [employer]  Progress Towards Goals: stable, continuing treatment  Treatment Modality: medication management every 6 months, continue psychotherapy with own therapist  Review due 180 days from date of this plan: 6 months - 12/7/2022  Expected length of service: maintenance  My Physician/PA/NP and I have developed this plan together and I agree to work on the goals and objectives  I understand the treatment goals that were developed for my treatment  [facility]  Office: [address], DO, Ace Alanis, DO, Arin Kirby, DO, Erik Brad Blood, DO, Jnig Fischer MD, Anthony Yañez MD, Jerome Medrano MD, Karlie Galindo MD, Herman Day MD, Sally Shepard MD, Florinda Casiano MD, Chiquis Riggs, DO, Sintia Cuevas, DO, Kanika Goodwin MD,  Kathryn Guardado DO, Opal Chairez MD, Flora Brasher MD, Niranjan Watson MD, Stone Galdamez MD, Nixon Corbett MD, Shireen Patel [facility], [facility], CNP, Sophia Avelar, CNP, Michele Diaz, CNS, Aicha Schmidt, CNP, Beryle Fix, CNP, Tamara Lechuga, CNP, Mack Arriaga, CNP, Vineet Mayer, CNP, Jeff Kumari PA-C, Pankaj Martinez, [facility], Gurmeet Clemente, [facility], Nilsa Sosa, CNP, Torres Jensen, CNP, David Davila, CNP, Trevor Foy, CNP, Harley Barksdale, CNP, Giovanni Todd, [facility]    Discharge Summary     Patient ID: Scar Salas  :  1960   MRN: [id number]     ACCOUNT:  [de-identified]   Patient's PCP: Ana Rodriguez PA-C  Admit Date: 2022   Discharge Date: 3/3/2022     Length of Stay: 7  Code Status:  DNR-CC  Admitting Physician: Isela Carrington DO  Discharge Physician: Isela Carrington DO     Active Discharge Diagnoses:     Hospital Problem Lists:  Principal Problem:    Cardiac arrest [facility])  Active Problems:    Primary hypertension    Tobacco abuse    COPD without exacerbation ([facility]    Type 2 diabetes mellitus without complication, without long-term current use of insulin ([facility]    Alcohol abuse    Portal hypertension ([facility]    Severe malnutrition ([facility]    Hypoxic ischemic encephalopathy    Anoxic brain damage (HCC)    On mechanically assisted ventilation ([facility]  Resolved Problems:    * No resolved hospital problems.  *      Admission Condition:  critical     Discharged Condition: [address] Stay:     Hospital Course:  Scar Salas is a 63 y.o. female who was admitted for the management of  Cardiac arrest ([facility] , presented to ER with Cardiac Arrest    This is a 61-year-old female who is admitted after being found unresponsive at home by family. I was called and she was found to be in cardiac arrest with asystole and hypoxic. She was intubated underwent CPR and ACLS measures with ROSC obtained. She was down for greater than 20 minutes and once evaluated here she was admitted to the intensive care unit. She was evaluated by critical care and cardiology and ultimately she had evidence of severe anoxic brain injury and neurology was consulted. After discussions with family by the neurology service and critical care services they opted for comfort care measures and ultimately was changed to DNR CC and life connections was consulted for organ procurement for her prior wishes to be an organ donor.   Ultimately she was terminally weaned and extubated and underwent organ procurement as she  with terminal weaning      Significant therapeutic interventions: Vent support, Critical care, neuro and cardiology consults     Significant Diagnostic Studies:   Labs / Micro:  CBC:   Lab Results   Component Value Date    WBC 6.5 2022    RBC 2.61 2022    HGB 8.9 2022    HCT 26.8 2022    .7 2022    MCH 34.1 2022    MCHC 33.2 2022    RDW 11.8 2022     2022     CMP:    Lab Results   Component Value Date    GLUCOSE 205 2022     2022    K 3.5 2022     2022    CO2 26 2022    BUN 18 2022    CREATININE 0.45 2022    ANIONGAP 12 2022    ALKPHOS 91 2022    ALT 50 2022    AST 71 2022    BILITOT 0.16 2022    LABALBU 2.9 2022    ALBUMIN NOT REPORTED 2021    LABGLOM >60 2022    GFRAA >60 2022    GFR      2022    PROT 5.0 2022    CALCIUM 7.7 2022        Radiology:  CT ABDOMEN PELVIS WO CONTRAST Additional Contrast? Radiologist Recommendation    Result Date: 3/2/2022  1. Nonspecific small volume abdominal and pelvic ascites. 2. Nonspecific contracted, thick walled appearance of the gallbladder can be seen with cholecystitis. 3. Punctate, nonobstructing calculus inferior pole RIGHT kidney. 4. Other solid organs have an unremarkable appearance on noncontrast CT. 5. Very small volume left pleural effusion with associated left basilar relaxation atelectasis. XR CHEST PORTABLE    Result Date: 3/3/2022  Support tubes and lines as above. Interval clearing of right perihilar opacities. Mild left perihilar interstitial prominence remains without effusion or extrapleural air. XR CHEST PORTABLE    Result Date: 3/2/2022  Endotracheal and enteric tubes remain in place. Unchanged mild perihilar interstitial opacities. XR CHEST PORTABLE    Result Date: 3/1/2022  Endotracheal tube has been retracted in the interval, now 6.5 cm above the katy. Recommend advancement 2 cm for optimal positioning. XR CHEST PORTABLE    Result Date: 2/28/2022  No acute cardiopulmonary process. Support tubes as described above. XR CHEST PORTABLE    Result Date: 2/27/2022  No focal consolidation or pneumothorax. Stable support tubes and line. XR CHEST PORTABLE    Result Date: 2/26/2022  Endotracheal tube now 6.2 cm above the katy. Remaining support lines and tubes stable. Lungs remain clear with redemonstrated probable skin fold versus small pneumothorax left upper lung. There do appear to be lung markings beyond this. XR CHEST PORTABLE    Result Date: 2/25/2022  Endotracheal tube tip is 4 cm above the katy. Right femoral central venous catheter tip is at the SVC/RA junction region. Nasogastric tube tip is in the stomach. Probable skin fold projecting over the left apex which can be reassessed on follow-up. Findings were discussed with Flako Britt RN at 8:08 am on 2/25/2022.      XR ABDOMEN FOR NG/OG/NE TUBE PLACEMENT    Result Date: 2/25/2022  Endotracheal tube tip is 4 cm above the katy. Right femoral central venous catheter tip is at the SVC/RA junction region. Nasogastric tube tip is in the stomach. Probable skin fold projecting over the left apex which can be reassessed on follow-up. Findings were discussed with Dee Dee Menjivar RN at 8:08 am on 2022. MRI BRAIN WO CONTRAST    Result Date: 2022  Diffuse abnormal signal on the diffusion-weighted images involving the cortex of the supratentorial brain as well as the basal ganglia which is highly suggestive of anoxic/ischemic injury. Cerebral atrophy. Mild chronic small vessel ischemic changes. Consultations:    Consults:     Final Specialist Recommendations/Findings:   IP CONSULT TO CRITICAL CARE  IP CONSULT TO INTERNAL MEDICINE  IP CONSULT TO NEUROLOGY  IP CONSULT TO CARDIOLOGY  IP CONSULT TO NEPHROLOGY  IP CONSULT TO DIETITIAN  IP CONSULT TO PALLIATIVE CARE  IP CONSULT TO ETHICS      The patient was seen and examined on day of discharge and this discharge summary is in conjunction with any daily progress note from day of discharge. Discharge plan:     Disposition:     Physician Follow Up:   NA  No follow-up provider specified.      Requiring Further Evaluation/Follow Up POST HOSPITALIZATION/Incidental Findings: NA    Diet: NA    Activity:     Instructions to Patient: NA    Discharge Medications:      Medication List      STOP taking these medications    albuterol (2.5 MG/3ML) 0.083% nebulizer solution  Commonly known as: PROVENTIL     albuterol sulfate  (90 Base) MCG/ACT inhaler  Commonly known as: Proventil HFA     calcium carbonate 600 MG Tabs tablet  Commonly known as: Calcium 600     Calcium-Vitamin D 600-200 MG-UNIT Tabs     diphenhydrAMINE 25 MG capsule  Commonly known as: BENADRYL     folic acid 1 MG tablet  Commonly known as: FOLVITE     magnesium oxide 400 (241.3 Mg) MG Tabs tablet  Commonly known as: MAG-OX     pantoprazole 40 MG tablet  Commonly known as: PROTONIX     propranolol 10 MG tablet  Commonly known as: INDERAL     Serevent Diskus 50 MCG/DOSE diskus inhaler  Generic drug: salmeterol     thiamine 100 MG tablet            No discharge procedures on file. Time Spent on discharge is  26 minutes in patient examination, evaluation, counseling as well as medication reconciliation, prescriptions for required medications, discharge plan and follow up. Electronically signed by   Abdiel Pascal DO  3/3/2022  6:24 PM      Thank you Dr. Edu Hale PA-C for the opportunity to be involved in this patient's care.

## 2022-06-24 ENCOUNTER — TELEPHONE (OUTPATIENT)
Dept: FAMILY MEDICINE CLINIC | Facility: CLINIC | Age: 52
End: 2022-06-24

## 2022-06-24 ENCOUNTER — APPOINTMENT (OUTPATIENT)
Dept: LAB | Facility: CLINIC | Age: 52
End: 2022-06-24
Payer: COMMERCIAL

## 2022-06-24 DIAGNOSIS — E78.00 HYPERCHOLESTEROLEMIA: ICD-10-CM

## 2022-06-24 DIAGNOSIS — Z13.29 SCREENING FOR THYROID DISORDER: ICD-10-CM

## 2022-06-24 DIAGNOSIS — Z00.01 ENCOUNTER FOR WELL ADULT EXAM WITH ABNORMAL FINDINGS: ICD-10-CM

## 2022-06-24 DIAGNOSIS — F32.1 CURRENT MODERATE EPISODE OF MAJOR DEPRESSIVE DISORDER WITHOUT PRIOR EPISODE (HCC): ICD-10-CM

## 2022-06-24 DIAGNOSIS — Z12.11 SCREENING FOR COLON CANCER: ICD-10-CM

## 2022-06-24 DIAGNOSIS — Z79.899 MEDICATION MANAGEMENT: ICD-10-CM

## 2022-06-24 DIAGNOSIS — F41.1 GAD (GENERALIZED ANXIETY DISORDER): ICD-10-CM

## 2022-06-24 DIAGNOSIS — E78.2 MIXED HYPERLIPIDEMIA: Primary | ICD-10-CM

## 2022-06-24 DIAGNOSIS — Z13.1 SCREENING FOR DIABETES MELLITUS: ICD-10-CM

## 2022-06-24 DIAGNOSIS — Z13.0 SCREENING FOR DEFICIENCY ANEMIA: ICD-10-CM

## 2022-06-24 LAB
ALBUMIN SERPL BCP-MCNC: 3.8 G/DL (ref 3.5–5)
ALP SERPL-CCNC: 61 U/L (ref 46–116)
ALT SERPL W P-5'-P-CCNC: 22 U/L (ref 12–78)
ANION GAP SERPL CALCULATED.3IONS-SCNC: 4 MMOL/L (ref 4–13)
AST SERPL W P-5'-P-CCNC: 13 U/L (ref 5–45)
BASOPHILS # BLD AUTO: 0.02 THOUSANDS/ΜL (ref 0–0.1)
BASOPHILS NFR BLD AUTO: 0 % (ref 0–1)
BILIRUB SERPL-MCNC: 0.92 MG/DL (ref 0.2–1)
BUN SERPL-MCNC: 16 MG/DL (ref 5–25)
CALCIUM SERPL-MCNC: 9 MG/DL (ref 8.3–10.1)
CHLORIDE SERPL-SCNC: 107 MMOL/L (ref 100–108)
CHOLEST SERPL-MCNC: 239 MG/DL
CO2 SERPL-SCNC: 27 MMOL/L (ref 21–32)
CREAT SERPL-MCNC: 0.91 MG/DL (ref 0.6–1.3)
EOSINOPHIL # BLD AUTO: 0.04 THOUSAND/ΜL (ref 0–0.61)
EOSINOPHIL NFR BLD AUTO: 1 % (ref 0–6)
ERYTHROCYTE [DISTWIDTH] IN BLOOD BY AUTOMATED COUNT: 11.9 % (ref 11.6–15.1)
GFR SERPL CREATININE-BSD FRML MDRD: 97 ML/MIN/1.73SQ M
GLUCOSE P FAST SERPL-MCNC: 96 MG/DL (ref 65–99)
HCT VFR BLD AUTO: 42 % (ref 36.5–49.3)
HDLC SERPL-MCNC: 34 MG/DL
HGB BLD-MCNC: 15 G/DL (ref 12–17)
IMM GRANULOCYTES # BLD AUTO: 0.01 THOUSAND/UL (ref 0–0.2)
IMM GRANULOCYTES NFR BLD AUTO: 0 % (ref 0–2)
LDLC SERPL CALC-MCNC: 174 MG/DL (ref 0–100)
LYMPHOCYTES # BLD AUTO: 1.66 THOUSANDS/ΜL (ref 0.6–4.47)
LYMPHOCYTES NFR BLD AUTO: 32 % (ref 14–44)
MCH RBC QN AUTO: 31.7 PG (ref 26.8–34.3)
MCHC RBC AUTO-ENTMCNC: 35.7 G/DL (ref 31.4–37.4)
MCV RBC AUTO: 89 FL (ref 82–98)
MONOCYTES # BLD AUTO: 0.39 THOUSAND/ΜL (ref 0.17–1.22)
MONOCYTES NFR BLD AUTO: 8 % (ref 4–12)
NEUTROPHILS # BLD AUTO: 3.01 THOUSANDS/ΜL (ref 1.85–7.62)
NEUTS SEG NFR BLD AUTO: 59 % (ref 43–75)
NONHDLC SERPL-MCNC: 205 MG/DL
NRBC BLD AUTO-RTO: 0 /100 WBCS
PLATELET # BLD AUTO: 151 THOUSANDS/UL (ref 149–390)
PMV BLD AUTO: 10 FL (ref 8.9–12.7)
POTASSIUM SERPL-SCNC: 4.1 MMOL/L (ref 3.5–5.3)
PROT SERPL-MCNC: 7.3 G/DL (ref 6.4–8.2)
RBC # BLD AUTO: 4.73 MILLION/UL (ref 3.88–5.62)
SODIUM SERPL-SCNC: 138 MMOL/L (ref 136–145)
TRIGL SERPL-MCNC: 153 MG/DL
TSH SERPL DL<=0.05 MIU/L-ACNC: 1.43 UIU/ML (ref 0.45–4.5)
WBC # BLD AUTO: 5.13 THOUSAND/UL (ref 4.31–10.16)

## 2022-06-24 PROCEDURE — 80061 LIPID PANEL: CPT

## 2022-06-24 PROCEDURE — 36415 COLL VENOUS BLD VENIPUNCTURE: CPT

## 2022-06-24 PROCEDURE — 84443 ASSAY THYROID STIM HORMONE: CPT

## 2022-06-24 PROCEDURE — 80053 COMPREHEN METABOLIC PANEL: CPT

## 2022-06-24 PROCEDURE — 85025 COMPLETE CBC W/AUTO DIFF WBC: CPT

## 2022-06-24 RX ORDER — ROSUVASTATIN CALCIUM 20 MG/1
TABLET, COATED ORAL
Qty: 34 TABLET | Refills: 0 | Status: SHIPPED | OUTPATIENT
Start: 2022-06-24 | End: 2022-07-22

## 2022-06-24 NOTE — TELEPHONE ENCOUNTER
Pt stopped in the office and wanted to make Dr Sandra Ayala aware that his insurance will pay for him to get a Cologuard  Please let pt know if one can be ordered

## 2022-06-24 NOTE — TELEPHONE ENCOUNTER
Called patient and reviewed his labs  Currently his lipids are elevated and seem to have been elevated in the future as well  We reviewed the options of diet control and medication management  The patient decided on medication management  Will start crestor 10 mg and titrte up to 20mg if tolerated

## 2022-07-14 ENCOUNTER — OFFICE VISIT (OUTPATIENT)
Dept: AUDIOLOGY | Facility: CLINIC | Age: 52
End: 2022-07-14
Payer: COMMERCIAL

## 2022-07-14 DIAGNOSIS — H90.3 SENSORINEURAL HEARING LOSS (SNHL), BILATERAL: Primary | ICD-10-CM

## 2022-07-14 PROCEDURE — 92557 COMPREHENSIVE HEARING TEST: CPT | Performed by: AUDIOLOGIST

## 2022-07-14 PROCEDURE — 92567 TYMPANOMETRY: CPT | Performed by: AUDIOLOGIST

## 2022-07-14 NOTE — PROGRESS NOTES
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  Onset of symptoms reportedly began several years ago and have been gradually progressive with time  Mr Trent Camarillo denied otalgia, otorrhea, aural fullness, tinnitus and dizziness  History of otitis was positive   Mr Trent Camarillo has no history of ear surgeries  Family history of hearing loss was positive, with older members of his family having been diagnosed with acquired hearing loss over time  History of noise exposure is positive (recreational music noise)  Other documented medical history states that Mr Trent Camarillo  has a past medical history of Anxiety, Depression, Hyperlipidemia, Kidney stone, Obstructive sleep apnea, Panic attack, Renal disorder, Suicidal ideation (2021), and Su[facility] attempt ([facility] )  Mr Trent Camarillo has not had his hearing tested previously       RESULTS:    Otoscopic Evaluation:   Right Ear: Unremarkable, canal clear   Left Ear: Unremarkable, canal clear    Tympanometry:   Right Ear: Type A; normal middle ear pressure and static compliance    Left Ear: Type A; normal middle ear pressure and static compliance     Audiometry:  Conventional pure tone audiometry from 250 - 8000 Hz  obtained with good reliability and revealed the following:     Right Ear: normal to moderate sensorineural hearing loss (SNHL)   Left Ear: normal to moderate sensorineural hearing loss (SNHL)     Speech Audiometry:    Speech Reception (SRT)   Right Ear: 15 dB HL   Left Ear: 15 dB HL    Word Recognition Scores (WRS):  Right Ear: excellent (100 % correct)     Left Ear: excellent (100 % correct)   Stimuli: [id number]    IMPRESSIONS:  Bilateral normal to moderate SNHL  The results of today's findings were reviewed with Mr Mark Bobo and his hearing thresholds were explained at length  Treatment options, including amplification and communication strategies, were discussed as appropriate  Mr Mark Bobo voiced understanding of his test results and had no further questions  RECOMMENDATIONS:    1 ) Follow-up with referring provider  2 ) Based on today's findings and patient symptoms, Mr Mark Bobo is a candidate for a trial with amplification  A hearing aid evaluation to further discuss Mr Don Loving lifestyle, communication needs, and develop a treatment plan for their hearing loss is recommended  Mr Mark Bobo wished to defer the purchase at this time until his wife's insurance changes over  3 ) Annual audiograms, sooner if problems/concerns arise  *see attached audiogram*    It was a pleasure working with Mr Mark Bobo today  Thank you for referring this patient  Herber Ramirez    Clinical Audiologist    [address] [place] [id number]

## 2022-07-15 ENCOUNTER — TELEPHONE (OUTPATIENT)
Dept: FAMILY MEDICINE CLINIC | Facility: CLINIC | Age: 52
End: 2022-07-15

## 2022-07-15 NOTE — TELEPHONE ENCOUNTER
Patient had labs done on 6/24  He states that  He got a bill for the cholesterol  He said the insurance told him that they would pay for it if it was billed preventative    Please advise

## 2022-07-15 NOTE — TELEPHONE ENCOUNTER
I have added the preventative code to his billing on the day of  I am not sure what is necessary for them to cover it since I changed it  Please call patient and inform him I added the code and he will need to see what we need to do on our end  Thank you       Jeramy Shah DO  [facility]  7/15/2022 2:44 PM

## 2022-07-20 LAB — COLOGUARD RESULT REPORTABLE: NEGATIVE

## 2022-07-22 DIAGNOSIS — E78.2 MIXED HYPERLIPIDEMIA: ICD-10-CM

## 2022-07-22 RX ORDER — ROSUVASTATIN CALCIUM 20 MG/1
TABLET, COATED ORAL
Qty: 34 TABLET | Refills: 0 | Status: SHIPPED | OUTPATIENT
Start: 2022-07-22 | End: 2022-07-27 | Stop reason: SDUPTHER

## 2022-07-25 ENCOUNTER — TELEPHONE (OUTPATIENT)
Dept: FAMILY MEDICINE CLINIC | Facility: CLINIC | Age: 52
End: 2022-07-25

## 2022-07-25 DIAGNOSIS — E78.2 MIXED HYPERLIPIDEMIA: ICD-10-CM

## 2022-07-25 DIAGNOSIS — E78.00 HYPERCHOLESTEROLEMIA: Primary | ICD-10-CM

## 2022-07-25 NOTE — TELEPHONE ENCOUNTER
Patient called regarding refill of rosuvastatin sent to the pharmacy on 7/22  He stated he will be losing coverage at the end of the month and would like to know if he can have the qty changed to 90 tablets to hold him over until his new coverage goes into effect  He would also like to know if he should have blood work done to check his cholesterol before his coverage runs out

## 2022-07-27 RX ORDER — ROSUVASTATIN CALCIUM 20 MG/1
20 TABLET, COATED ORAL DAILY
Qty: 90 TABLET | Refills: 0 | Status: SHIPPED | OUTPATIENT
Start: 2022-07-27 | End: 2022-10-18 | Stop reason: SDUPTHER

## 2022-07-27 NOTE — TELEPHONE ENCOUNTER
Please inform the patient that I have placed an order for a 90 day supply of the 20mg dose  He can obtain a lipid panel, there may still be some improvement at the 20mg dose since he likely just increased about 2-3 weeks ago  But we can review prior to losing current insurance if he wants  It will need to be fasting and he should confirm with insurance since last time it needed to be preventative for it to be covered  Thank you       Kyle Goyal DO  [facility]  7/27/2022 9:25 AM

## 2022-10-18 DIAGNOSIS — E78.2 MIXED HYPERLIPIDEMIA: ICD-10-CM

## 2022-10-18 RX ORDER — ROSUVASTATIN CALCIUM 20 MG/1
20 TABLET, COATED ORAL DAILY
Qty: 90 TABLET | Refills: 0 | Status: SHIPPED | OUTPATIENT
Start: 2022-10-18 | End: 2023-01-16

## 2022-10-19 DIAGNOSIS — G47.00 INSOMNIA: ICD-10-CM

## 2022-10-19 RX ORDER — QUETIAPINE FUMARATE 50 MG/1
25 TABLET, FILM COATED ORAL
Qty: 45 TABLET | Refills: 1 | Status: SHIPPED | OUTPATIENT
Start: 2022-10-19 | End: 2023-04-17
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
## 2022-11-22 ENCOUNTER — TELEMEDICINE (OUTPATIENT)
Dept: PSYCHIATRY | Facility: CLINIC | Age: 52
End: 2022-11-22

## 2022-11-22 DIAGNOSIS — F32.1 CURRENT MODERATE EPISODE OF MAJOR DEPRESSIVE DISORDER WITHOUT PRIOR EPISODE (HCC): Primary | ICD-10-CM

## 2022-11-22 DIAGNOSIS — G47.00 INSOMNIA, UNSPECIFIED TYPE: ICD-10-CM

## 2022-11-22 DIAGNOSIS — F41.1 GENERALIZED ANXIETY DISORDER: ICD-10-CM

## 2022-11-22 DIAGNOSIS — F41.0 PANIC DISORDER: ICD-10-CM

## 2022-11-22 RX ORDER — QUETIAPINE FUMARATE 50 MG/1
25 TABLET, FILM COATED ORAL
Qty: 45 TABLET | Refills: 1 | Status: SHIPPED | OUTPATIENT
Start: 2022-11-22 | End: 2023-05-21

## 2022-11-22 RX ORDER — GABAPENTIN 300 MG/1
300 CAPSULE ORAL 2 TIMES DAILY
Qty: 180 CAPSULE | Refills: 1 | Status: SHIPPED | OUTPATIENT
Start: 2022-11-22 | End: 2023-05-21

## 2022-11-22 RX ORDER — SERTRALINE HYDROCHLORIDE 25 MG/1
25 TABLET, FILM COATED ORAL DAILY
Qty: 90 TABLET | Refills: 1 | Status: SHIPPED | OUTPATIENT
Start: 2022-11-22 | End: 2023-05-21

## 2022-11-22 NOTE — BH TREATMENT PLAN
Treatment Plan done but not signed at time of office visit due to:  Plan reviewed with the patient during the virtual visit and verbal consent given  TREATMENT PLAN (Medication Management Only)        [facility]    Name and Date of Birth:  Alfreda Noriega y o  1970  Date of Treatment Plan: November 22, 2022  Diagnosis/Diagnoses:    1  Current moderate episode of major depressive disorder without prior episode ([address] )    2  Generalized anxiety disorder    3  Panic disorder    4  Insomnia, unspecified type      Strengths/Personal Resources for Self-Care: supportive family and access to therapy  Area/Areas of need (in own words): anxiety, depression, and somatic sxs  1  Long Term Goal: maintain control of anxiety, psycho-somatic sxs and depression  Target Date:6 months - 5/22/2023  Person/Persons responsible for completion of goal: Leslie Hernandez  2  Short Term Objective (s) - How will we reach this goal?:   A  Provider new recommended medication/dosage changes and/or continue medication(s): continue current medications as prescribed  B  Attend psychotherapy regularly  C  N/A  Target Date:6 months - 5/22/2023  Person/Persons Responsible for Completion of Goal: Leslie Rubioshyam  Progress Towards Goals: stable, continuing treatment  Treatment Modality: medication management every 6 months, continue psychotherapy with own therapist  Review due 180 days from date of this plan: 6 months - 5/22/2023  Expected length of service: maintenance  My Physician/PA/NP and I have developed this plan together and I agree to work on the goals and objectives  I understand the treatment goals that were developed for my treatment

## 2023-01-07 DIAGNOSIS — E78.2 MIXED HYPERLIPIDEMIA: ICD-10-CM

## 2023-01-09 RX ORDER — ROSUVASTATIN CALCIUM 20 MG/1
TABLET, COATED ORAL
Qty: 90 TABLET | Refills: 0 | Status: SHIPPED | OUTPATIENT
Start: 2023-01-09

## 2023-01-11 ENCOUNTER — TELEPHONE (OUTPATIENT)
Dept: PSYCHIATRY | Facility: CLINIC | Age: 53
End: 2023-01-11

## 2023-01-11 NOTE — TELEPHONE ENCOUNTER
Contacted Patient in regards to [facility], Patient would like to schedule, Patient is scheduled to see Lucia Clancy 5/2/2023 at 10am  Patient stated he was unsure if he wanted to transition to pcp level, will discuss this at [facility] appointment   Patient appointment with Lucero Braden has been c/x

## 2023-05-02 ENCOUNTER — OFFICE VISIT (OUTPATIENT)
Dept: PSYCHIATRY | Facility: CLINIC | Age: 53
End: 2023-05-02

## 2023-05-02 DIAGNOSIS — F41.1 GENERALIZED ANXIETY DISORDER: ICD-10-CM

## 2023-05-02 DIAGNOSIS — F32.1 CURRENT MODERATE EPISODE OF MAJOR DEPRESSIVE DISORDER WITHOUT PRIOR EPISODE (HCC): ICD-10-CM

## 2023-05-02 DIAGNOSIS — F41.1 GAD (GENERALIZED ANXIETY DISORDER): Primary | ICD-10-CM

## 2023-05-02 DIAGNOSIS — F33.42 RECURRENT MAJOR DEPRESSIVE DISORDER, IN FULL REMISSION (HCC): ICD-10-CM

## 2023-05-02 PROBLEM — F32.5 MAJOR DEPRESSIVE DISORDER IN FULL REMISSION (HCC): Status: ACTIVE | Noted: 2021-01-16

## 2023-05-02 RX ORDER — SERTRALINE HYDROCHLORIDE 25 MG/1
25 TABLET, FILM COATED ORAL DAILY
Qty: 90 TABLET | Refills: 1 | Status: SHIPPED | OUTPATIENT
Start: 2023-05-02 | End: 2023-10-29

## 2023-05-02 RX ORDER — GABAPENTIN 300 MG/1
300 CAPSULE ORAL 2 TIMES DAILY
Qty: 180 CAPSULE | Refills: 1 | Status: SHIPPED | OUTPATIENT
Start: 2023-05-02 | End: 2023-10-29

## 2023-05-02 NOTE — PSYCH
" Flavio Santosjames Simonealaina Royer    Name and Date of Birth:  Martinez Lanier 48 y o  1970 MRN: [id number]    Date of Visit: May 2, 2023    Reason for visit: Full psychiatric intake assessment for medication management     HPI     Martinez Lanier is a 48 y o  male with a past psychiatric history significant for MDD, GORDON, and insomnia who presents to the [address] [place] [address] [address] outpatient clinic for intake assessment  Eh Lopez presents as mildly anxious yet pleasant and cooperative  His thoughts are linear and organized  Traci Lieberman completes assessment without difficulty  Traci Lieberman was previously under the care of Dr Karen Cruz  He first entered [place] [address] treatment in 2003 and shares that he has struggled with anxiety \"throughout his life\"  Traci Lieberman was managed by providers within the [facility] since 2021, following an inpatient psychiatric admission at [facility]  In addition to full psychiatric assessment today, I reviewed documentation from that visit as well as past visits with providers  Traci Lieberman endorses problematic depression and anxiety at that time in the context of marital discord, increased occupational demands, and COVID-19  He subsequently overdosed on Trazodone and was psychiatrically stabilized on the inpatient unit  Following discharge, Traci Lieberman was linked with PHP and completed the program, which he states was beneficial  Traci Lieberman presents to the clinic today endorsing compliance with Zoloft 75mg Daily and Gabapentin 300mg BID  He denies adverse medication side effects  Traci Lieberman was formally diagnosed with MDD and GORDON in the past  He has been trialled on numerous psychotropic agents and was involved in intense psychotherapy  He continues to engage with his therapist, Anuradha Wilkinson, on a monthly basis   Traci Lieberman has also prioritized mindfulness, yoga, and spirituality since discharge from the hospital  His interest in Yazdanism has peaked, which has been difficult for his " "marriage, as his wife is not accepting of this  Acutely, Eli Ybarra currently denies most neurovegetative symptomatology suggestive of an acute episode of major depressive disorder or dysthymia  Eli Ybarra denies fragmented or non-restorative sleep  Eli Ybarra endorses robust appetite, baseline energy, and no impairment of motivation  Eli Ybarra reports adequate concentration/memory and denies new-onset forgetfulness or inattentiveness  Eli Ybarra does not experience daily crying spells or limited pleasure in activities previously found pleasurable  Eli Ybarra adamantly denies acute thoughts of suicide or self-harm  Eli Ybarra has no plans to harm others  There is a documented history of prior suicidal attempts (overdose -1x)  Eli Ybarra denies historical non-suicidal self injurious behavior  Eli Ybarra is future-oriented and demonstrates self preservation as evidenced by today's evaluation in which Eli Ybarra is seeking psychiatric intervention to improve overall mental health and outlook on life  Eli Ybarra denies a pervasive history of worthlessness, hopelessness, or guilt  PHQ-9 score obtained today was 0  Eli Ybarra currently endorses occasional and appropriate anxiety that is not pathologic in nature  Eli Ybarra denies recent periods of excessive nervousness, irrational worry, or overt anxiousness  Eli Ybarra is not pervasively restless or tense nor does Eli Ybarra feel \"keyed up\" or on-edge  Eli Ybarra does not experience disruption in energy or concentration secondary to baseline anxiety  There is no evidence to suggest that Eli Ybarra experiences irritability, inability to relax, or disruption in sleep secondary to baseline, non-pathologic anxiety  Eli Ybarra denies new-onset panic symptomatology or maladaptive behaviors  Throughout today's session, Eli Ybarra does not appear visibly perturbed  GORDON-7 score today was 0  Eli Ybarra vehemently denies any acute or chronic history suggestive of an underlying affective (bipolar) organization   Eli Ybarra denies " previous episodes of elevated/expansive mood, lengthy periods without sleep, grandiosity, or intense and prolonged irritability  Makenna Levy denies atypical periods of increased goal-directed behavior, excessive spending, or sexual promiscuity  The patient has no history of pathologic impulsivity or extreme mood lability  During today's evaluation, Makenna Levy does not exhibit objective evidence of hypomania/jay  Makenna Levy is mostly organized in thought without flight of ideas or loosening of associations  Speech does not appear to be pressured or rapid and Makenna Levy responds well to verbal redirecting  Makenna Levy denies historical symptomatology suggestive of an underlying psychotic process  Makenna Levy does not currently endorse acute perceptual disturbances such as A/V hallucinations, paranoia, referential ideation, or delusions  Makenna Levy denies acute and chronic Schneiderian symptoms, including: thought-broadcasting, thought-insertion, thought-withdrawal or audible thoughts  During today's evaluation, Makenna Levy does not exhibit objective evidence of ayde psychosis as the patient does not appear internally preoccupied or easily distracted  Dat's thoughts are organized, linear, and reality-based  Makenna Levy denies historical symptomatology suggestive of PTSD, OCD, or disordered eating  Jesus Louie denies ETOH or illicit substance abuse  He has no access to firearms/weapons       Current Rating Scores:     Current PHQ-9   PHQ-2/9 Depression Screening    Little interest or pleasure in doing things: 0 - not at all  Feeling down, depressed, or hopeless: 0 - not at all  Trouble falling or staying asleep, or sleeping too much: 0 - not at all  Feeling tired or having little energy: 0 - not at all  Poor appetite or overeatin - not at all  Feeling bad about yourself - or that you are a failure or have let yourself or your family down: 0 - not at all  Trouble concentrating on things, such as reading the newspaper or watching television: 0 - not at all  Moving or speaking so slowly that other people could have noticed  Or the opposite - being so fidgety or restless that you have been moving around a lot more than usual: 0 - not at all  Thoughts that you would be better off dead, or of hurting yourself in some way: 0 - not at all  PHQ-9 Score: 0   PHQ-9 Interpretation: No or Minimal depression        Current GORDON-7 is   GORDON-7 Flowsheet Screening    Flowsheet Row Most Recent Value   Over the last 2 weeks, how often have you been bothered by any of the following problems? Feeling nervous, anxious, or on edge 0   Not being able to stop or control worrying 0   Worrying too much about different things 0   Trouble relaxing 0   Being so restless that it is hard to sit still 0   Becoming easily annoyed or irritable 0   Feeling afraid as if something awful might happen 0   GORDON-7 Total Score 0            Psychiatric Review Of Systems:    Sleep changes: no  Appetite changes: no  Weight changes: no  Energy/anergy: no  Interest/pleasure/anhedonia: no  Somatic symptoms: no  Anxiety/panic: yes, worrying  Aiyana: no  Guilty/hopeless: no  Self injurious behavior/risky behavior: no  Suicidal ideation: no  Homicidal ideation: no  Auditory hallucinations: no  Visual hallucinations: no  Other hallucinations: no  Delusional thinking: no  Eating disorder history: no  Obsessive/compulsive symptoms: no    Review Of Systems:    Constitutional negative   ENT negative   Cardiovascular negative   Respiratory negative   Gastrointestinal negative   Genitourinary negative   Musculoskeletal negative   Integumentary negative   Neurological negative   Endocrine negative   Other Symptoms none, all other systems are negative       Family Psychiatric History:     Family History   Problem Relation Age of Onset    Depression Mother     Heart disease Mother     Lung cancer Mother     Hypertension Mother     Anxiety disorder Mother     Stroke Father     Hyperlipidemia Father    Constance Mcnulty "Completed Suicide  Maternal Aunt     Kidney failure Maternal Grandfather     Heart attack Paternal Grandfather     Testicular cancer Neg Hx     Prostate cancer Neg Hx     Colon cancer Neg Hx          Past Psychiatric History:     Inpatient psychiatric admissions: 1x - [facility] in January 2021 secondary to depression, anxiety, and overdose on Trazodone - He also completed PHP in March 2021   Prior outpatient psychiatric linkage: Previously linked with EDWAR Gabriel and Dr Lane Herbert via [facility]  Past/current psychotherapy: Currently linked with Rolando Layton (private therapist)  History of suicidal attempts/gestures: 1x- overdose on Trazodone in 2021  History of violence/aggressive behaviors: Denies  Psychotropic medication trials: Lexapro, Zoloft, Xanax, Klonopin, Hydroxyzine, Seroquel, Gabapentin  Substance abuse inpatient/outpatient rehabilitation: Denies    Substance Abuse History:    No history of ETOH, illict substance, or tobacco abuse  No past legal actions or arrests secondary to substance intoxication  The patient denies prior DWIs/DUIs  No history of outpatient/inpatient rehabilitation programs  Christal Watson does not exhibit objective evidence of substance withdrawal during today's examination nor does Christal Watson appear under the influence of any psychoactive substance  Social History:    Developmental: Denies a history of milestone/developmental delay  Denies a history of in-utero exposure to toxins/illicit substances  There is no documented history of IEP or need for special education  Manolo Grigsby shares that his mother struggled immensely during childhood with MH concerns  His father was supportive  Manolo Grigsby describes the emotionality of the house as \"tense\"  He has 2 siblings, both of which he speaks to     Education: post college graduate work or degree - Master's degree in [address] [place] [address] Drive from [address] [place] [address]   Marital history:   Living arrangement, social support: wife, 12year old " daughter , also has 29year old step son    Occupational History: Employed full time as  for [employer]   Access to firearms: Denies direct access to weapons/firearms  Steph Sanchez has no history of arrests or violence with a deadly weapon  Traumatic History:     Abuse:none is reported  Other Traumatic Events: Mother's death in 2018 was challenging     Past Medical History:    Past Medical History:   Diagnosis Date    Anxiety     Depression     Hyperlipidemia     controlled with diet and exercise    Kidney stone     Obstructive sleep apnea     Panic attack     Renal disorder     kidney stones    Suicidal ideation 1/17/2021    Suicide attempt [facility])         Past Surgical History:   Procedure Laterality Date    APPENDECTOMY      TN CYSTO/URETERO W/LITHOTRIPSY &INDWELL STENT INSRT Left 5/9/2018    Procedure: CYSTOSCOPY URETEROSCOPY,LEFT  RETROGRADE PYELOGRAM AND INSERTION STENT URETERAL;  Surgeon: Benigno Mosqueda MD;  Location: [facility];  Service: Urology    TN CYSTO/URETERO W/LITHOTRIPSY &INDWELL STENT INSRT Left 6/8/2018    Procedure: CYSTOSCOPY URETEROSCOPY WITH LITHOTRIPSY HOLMIUM LASER, RETROGRADE PYELOGRAM AND INSERTION STENT URETERAL;  Surgeon: Benigno Mosqueda MD;  Location: [facility]  MAIN [facility];  Service: Urology    TONSILLECTOMY      WISDOM TOOTH EXTRACTION      WISDOM TOOTH EXTRACTION       Allergies   Allergen Reactions    Betadine Antibiotic-Moisturize [Bacitracin-Polymyxin B] Rash       History Review: The following portions of the patient's history were reviewed and updated as appropriate: allergies, current medications, past family history, past medical history, past social history, past surgical history and problem list     OBJECTIVE:    Vital signs in last 24 hours: There were no vitals filed for this visit      Mental Status Evaluation:    Appearance age appropriate, casually dressed, dressed appropriately, looks stated age   Behavior pleasant, cooperative, mildly anxious, good eye contact   Speech normal rate, normal volume, normal pitch   Mood euthymic   Affect normal range and intensity, appropriate   Thought Processes organized, logical, goal directed   Associations intact associations   Thought Content no overt delusions   Perceptual Disturbances: no auditory hallucinations, no visual hallucinations   Abnormal Thoughts  Risk Potential Suicidal ideation - None at present  Homicidal ideation - None at present  Potential for aggression - No   Orientation oriented to person, place, time/date and situation   Memory recent and remote memory grossly intact   Consciousness alert and awake   Attention Span Concentration Span attention span and concentration are age appropriate   Intellect appears to be of average intelligence   Insight intact and good   Judgement intact and good   Muscle Strength and  Gait normal gait and normal balance   Motor Activity no abnormal movements   Language no difficulty naming common objects   Fund of Knowledge adequate knowledge of current events   Pain none   Pain Scale 0       Laboratory Results: I have personally reviewed all pertinent laboratory/tests results    Recent Labs (last 2 months):   No visits with results within 2 Month(s) from this visit     Latest known visit with results is:   Appointment on 06/24/2022   Component Date Value    TSH 3RD GENERATON 06/24/2022 1 430     Cholesterol 06/24/2022 239 (H)     Triglycerides 06/24/2022 153 (H)     HDL, Direct 06/24/2022 34 (L)     LDL Calculated 06/24/2022 174 (H)     Non-HDL-Chol (CHOL-HDL) 06/24/2022 205     WBC 06/24/2022 5 13     RBC 06/24/2022 4 73     Hemoglobin 06/24/2022 15 0     Hematocrit 06/24/2022 42 0     MCV 06/24/2022 89     MCH 06/24/2022 31 7     MCHC 06/24/2022 35 7     RDW 06/24/2022 11 9     MPV 06/24/2022 10 0     Platelets 16/88/0158 151     nRBC 06/24/2022 0     Neutrophils Relative 06/24/2022 59     Immat GRANS % 06/24/2022 0     Lymphocytes Relative 06/24/2022 32     Monocytes Relative 06/24/2022 8     Eosinophils Relative 06/24/2022 1     Basophils Relative 06/24/2022 0     Neutrophils Absolute 06/24/2022 3 01     Immature Grans Absolute 06/24/2022 0 01     Lymphocytes Absolute 06/24/2022 1 66     Monocytes Absolute 06/24/2022 0 39     Eosinophils Absolute 06/24/2022 0 04     Basophils Absolute 06/24/2022 0 02     Sodium 06/24/2022 138     Potassium 06/24/2022 4 1     Chloride 06/24/2022 107     CO2 06/24/2022 27     ANION GAP 06/24/2022 4     BUN 06/24/2022 16     Creatinine 06/24/2022 0 91     Glucose, Fasting 06/24/2022 96     Calcium 06/24/2022 9 0     AST 06/24/2022 13     ALT 06/24/2022 22     Alkaline Phosphatase 06/24/2022 61     Total Protein 06/24/2022 7 3     Albumin 06/24/2022 3 8     Total Bilirubin 06/24/2022 0 92     eGFR 06/24/2022 97        Suicide/Homicide Risk Assessment:    Risk of Harm to Self:  The following ratings are based on assessment at the time of the interview and review of records  Demographic risk factors include: , male, age: over 48 or older  Historical Risk Factors include: history of depression, history of anxiety, history of suicide attempt  Recent Specific Risk Factors include: diagnosis of depression  Protective Factors: no current suicidal ideation, ability to adapt to change, able to manage anger well, access to mental health treatment, being a parent, being , compliant with medications, compliant with mental health treatment, connection to community, connection to own children, contact with caregivers, effective coping skills, effective decision-making skills, effective problem solving skills, good health, good self-esteem, having a desire to be alive, having a sense of purpose or meaning in life, impulse control, medical compliance, no substance use problems, opportunities to contribute to community, opportunities to participate in community, personal beliefs about the meaning and value of life, resiliency, responsibilities and duties to others, restricted access to lethal means, stable living environment, stable job, sense of determination, sense of importance of health and wellness, sense of personal control, strong relationships, supportive family, supportive friends  Weapons: none  The following steps have been taken to ensure weapons are properly secured: not applicable  Based on today's assessment, Salome Leong presents the following risk of harm to self: moderate    Risk of Harm to Others: The following ratings are based on assessment at the time of the interview and review of records  Demographic Risk Factors include: male  Historical Risk Factors include: none  Recent Specific Risk Factors include: none  Protective Factors: no current homicidal ideation  Weapons: none  The following steps have been taken to ensure weapons are properly secured: not applicable  Based on today's assessment, Salome Leong presents the following risk of harm to others: none    The following interventions are recommended: no intervention changes needed  Although patient's acute lethality risk is LOW, long-term/chronic lethality risk is mildly elevated given family history of completed suicide, family history of MH concerns, and previous suicidal attempt  However, at the current moment, Salome Leong is future-oriented, forward-thinking, and demonstrates ability to act in a self-preserving manner as evidenced by volitionally presenting to the clinic today, seeking treatment  Additionally, Salome Leong voices ongoing commitment to psychotherapy, suggesting a will and desire to live  Mina Master lists his wife, daughter, and spiritual beliefs as protective factors  At this juncture, inpatient hospitalization is not currently warranted   To mitigate future risk, patient should adhere to treatment recommendations, avoid alcohol/illicit substance use, utilize community-based resources and familiar support, and prioritize "mental health treatment  Assessment/Plan:     Lai Shoemaker is a 48 y o  male with a past psychiatric history significant for MDD, GORDON, and insomnia who presents to the [address] [place] [address] [address] outpatient clinic for intake assessment  He first entered [facility] treatment in 2003 and shares that he has struggled with anxiety \"throughout his life\"  Ramone Carpenter was managed by providers within the [facility] since 2021, following an inpatient psychiatric admission at [facility]  In addition to full psychiatric assessment today, I reviewed documentation from that visit as well as past visits with providers  Ramone Carpenter endorses problematic depression and anxiety at that time in the context of marital discord, increased occupational demands, and COVID-19  He subsequently overdosed on Trazodone and was psychiatrically stabilized on the inpatient unit  Following discharge, Ramone Carpenter was linked with PHP and completed the program, which he states was beneficial  Ramone Carpenter was formally diagnosed with MDD and GORDON in the past  He has been trialled on numerous psychotropic agents and was involved in intense psychotherapy  He continues to engage with his therapist, Shanita Mcneil, on a monthly basis  Ramone Carpenter has also prioritized mindfulness, yoga, and spirituality since discharge from the hospital  His interest in Congregation has peaked, which has been difficult for his marriage, as his wife is not accepting of this  Acutely, Maria Del Carmenedmar Du currently denies most neurovegetative symptomatology suggestive of an acute episode of major depressive disorder or dysthymia  There is a documented history of prior suicidal attempts (overdose -1x)  Maria Del Carmenedmar Du currently endorses occasional and appropriate anxiety that is not pathologic in nature  Maria Del Carmen Loosen denies recent periods of excessive nervousness, irrational worry, or overt anxiousness  Maria Del Carmen Loosen vehemently denies any acute or chronic history suggestive of an underlying affective (bipolar) organization   Maria Del Carmen Ana Laura denies historical " "symptomatology suggestive of an underlying psychotic process  Devon Su denies historical symptomatology suggestive of PTSD, OCD, or disordered eating  Ramy Garvey denies ETOH or illicit substance abuse  He has no access to firearms/weapons  Today's Plan/Medical Decision Making:    Psychopharmacologically, I spoke at length with Raym Garvey regarding current psychiatric stability and the bio-psycho-social-spiritual approach to treatment  He was pleasantly receptive  We discussed his \"emotional house\" and the need to fortify the foundation and pillars of support  Acutely, Ramy Garvey reports appropriate management of insomnia, depression, and anxiety with current regimen  He denies adverse medication side effects or any acute need for medication change/intervention  Lastly, in an attempt to facilitate greater communication with wife, we discussed benefits of \"spouse journal\" to which he was receptive       DSM-V Diagnoses:     1 ) Generalized Anxiety Disorder  2 ) Major Depressive Disorder, in full remission   3 ) Insomnia      Treatment Recommendations/Precautions:    1 ) Generalized Anxiety Disorder  - Continue Zoloft 75mg Daily  - Continue Gabapentin 300mg BID     2 ) Major Depressive Disorder, in full remission   - Continue Zoloft 75mg Daily  - Continue engagement in individual psychotherapy with mj   - Psychoeducation provided regarding the importance of exercise and healthy dietary choices and their impact on mood, energy, and motivation  - Counseled to avoid ETOH, illict substances, and nicotine secondary to the detrimental effects of these substances on mental and physical health  - Encouraged to engage in non-verbal forms of therapy such as art therapy, music therapy, and mindfulness  - Discussed the bio-psycho-social model to treatment and therapeutic exercises/interventions were attempted to cognitively restructure thoughts    3 ) Insomnia  - Volitionally discontinued Seroquel 25mg QHS - denies current need for " medication intervention       Medication management every 4 weeks  Continue psychotherapy with own therapist  Aware of need to follow up with family physician for medical issues  Aware of 24 hour and weekend coverage for urgent situations accessed by calling [facility] main practice number    Medications Risks/Benefits:      Risks, Benefits And Possible Side Effects Of Medications:    Risks, benefits, and possible side effects of medications explained to Avelino including risk of suicidality and serotonin syndrome related to treatment with antidepressants  He verbalizes understanding and agreement for treatment  Controlled Medication Discussion:     Not applicable    Treatment Plan:    Completed and signed during the session: Yes - with Avelino      Visit Time    Visit Start Time: 10:00 AM  Visit Stop Time: 11:13 AM  Total Visit Duration: 73 minutes     The total visit duration detailed above includes: patient engagement, medication management, psychotherapy/counseling, documentation, discussion regarding treatment goals, and coordination of care  Note Share Disclaimer:      This note was not shared with the patient due to reasonable likelihood of causing patient harm      Selena Johnson MD  Board Certified Diplomate of the [address] [place] [address] Rd , [address] of Psychiatry and Neurology  05/02/23

## 2023-05-02 NOTE — BH TREATMENT PLAN
TREATMENT PLAN (Medication Management Only)        [facility]    Name and Date of Birth:  Annabelle Colón 48 y o  1970  Date of Treatment Plan: May 2, 2023  Diagnosis/Diagnoses:    1  GORDON (generalized anxiety disorder)    2  Recurrent major depressive disorder, in full remission ([facility] [address] )    3  Generalized anxiety disorder    4  Current moderate episode of major depressive disorder without prior episode [facility])      Strengths/Personal Resources for Self-Care: supportive family, supportive friends, taking medications as prescribed, ability to adapt to life changes, ability to communicate needs, ability to communicate well, ability to listen, ability to reason, good understanding of illness, independence, motivation for treatment, ability to negotiate basic needs, [employer] orth[employer] affiliation, being resoureceful, self-reliance, sense of humor  Area/Areas of need (in own words): anxiety symptoms, depressive symptoms  1  Long Term Goal: maintain control of anxiety  Target Date:6 months - 11/2/2023  Person/Persons responsible for completion of goal: Zarina Carter  2  Short Term Objective (s) - How will we reach this goal?:   A  Provider new recommended medication/dosage changes and/or continue medication(s): continue current medications as prescribed  B  Attend medication management appointments regularly  C  Take psychiatric medications responsibly  D  Continue individual psychotherapy  E  Communicate more effectively with wife - start spouse journal   Target Date:6 months - 11/2/2023  Person/Persons Responsible for Completion of Goal: Zarina Carter  Progress Towards Goals: continuing treatment  Treatment Modality: medication management every 3 months  Review due 180 days from date of this plan: 6 months - 11/2/2023  Expected length of service: ongoing treatment  My Physician/PA/NP and I have developed this plan together and I agree to work on the goals and objectives   I understand the treatment goals that were developed for my treatment  Treatment Plan completed with assistance and input from patient and verbal consent provided  Treatment plan was not signed at time of office visit secondary to COVID-19 social distancing guidelines

## 2023-05-15 ENCOUNTER — OFFICE VISIT (OUTPATIENT)
Dept: URGENT CARE | Facility: CLINIC | Age: 53
End: 2023-05-15

## 2023-05-15 VITALS
BODY MASS INDEX: 24.34 KG/M2 | OXYGEN SATURATION: 97 % | HEART RATE: 94 BPM | TEMPERATURE: 97.8 F | HEIGHT: 70 IN | RESPIRATION RATE: 16 BRPM | WEIGHT: 170 LBS

## 2023-05-15 DIAGNOSIS — J02.9 ACUTE PHARYNGITIS, UNSPECIFIED ETIOLOGY: Primary | ICD-10-CM

## 2023-05-15 LAB — S PYO AG THROAT QL: NEGATIVE

## 2023-05-15 RX ORDER — DEXAMETHASONE SODIUM PHOSPHATE 10 MG/ML
10 INJECTION, SOLUTION INTRAMUSCULAR; INTRAVENOUS ONCE
Status: COMPLETED | OUTPATIENT
Start: 2023-05-15 | End: 2023-05-15

## 2023-05-15 RX ADMIN — DEXAMETHASONE SODIUM PHOSPHATE 10 MG: 10 INJECTION, SOLUTION INTRAMUSCULAR; INTRAVENOUS at 17:14

## 2023-05-15 NOTE — PROGRESS NOTES
[facility]        NAME: Sara Bergeron is a 48 y o  male  : 1970    MRN: [id number]  DATE: May 15, 2023  TIME: 8:56 PM    Assessment and Plan   Acute pharyngitis, unspecified etiology [J02 9]  1  Acute pharyngitis, unspecified etiology  POCT rapid strepA    Throat culture    al mag oxide-diphenhydramine-lidocaine viscous (MAGIC MOUTHWASH) 1:1:1 suspension    dexamethasone (PF) (DECADRON) injection 10 mg      Pt presents with pharyngitis concerning for possible strep, rapid strep in clinic is negative  Will send for culture and notify patient of any positive result  Pt provided with oral dexamethasone in clinic and rx for magic mouthwash to treat pain  Patient Instructions     Patient Instructions   Rapid strep in the office is negative  Result will be sent for culture as the rapid test is not always accurate  If the result comes back positive we will call you to start antibiotic treatment  If you see the result is positive in your MyChart and do not receive a call within 1-2 hours call the office to request antibiotics  Over the counter antihistamine and/or Flonase as needed  Salt water gargles  Over the counter throat lozenges such as Cepocal or Chloroseptic  If symptoms are not improved in 3-5 days, follow-up with PCP  If symptoms worsen or new symptoms such as fever, drooling, voice changes, anterior neck pain, or difficulty swallowing develop report to the emergency room immediately  Follow up with PCP in 3-5 days  Proceed to  ER if symptoms worsen  Chief Complaint     Chief Complaint   Patient presents with   • Sore Throat     Sore throat- started Saturday morning, runny nose  OTC [facility]          History of Present Illness       61-year-old male presents with complaints of sore throat and congestion for 2 days  Pt denies fever, chills, shortness of breath and chest pain  Has tried OTC medications with no benefit  Symptoms worsening over time         Review of Systems   Review of Systems   Constitutional: Negative for chills, fatigue and fever  HENT: Positive for congestion, postnasal drip, rhinorrhea and sore throat  Negative for trouble swallowing and voice change  Respiratory: Negative for cough and shortness of breath  Cardiovascular: Negative for chest pain  Gastrointestinal: Negative for diarrhea, nausea and vomiting  Musculoskeletal: Negative for myalgias  Neurological: Negative for headaches  Current Medications       Current Outpatient Medications:   •  al mag oxide-diphenhydramine-lidocaine viscous (MAGIC MOUTHWASH) 1:1:1 suspension, Swish and spit 10 mL every 6 (six) hours as needed for mouth pain or discomfort, Disp: 90 mL, Rfl: 0  •  gabapentin (NEURONTIN) 300 mg capsule, Take 1 capsule (300 mg total) by mouth 2 (two) times a day, Disp: 180 capsule, Rfl: 1  •  rosuvastatin (CRESTOR) 20 MG tablet, Take 1 tablet (20 mg total) by mouth daily, Disp: 90 tablet, Rfl: 0  •  sertraline (ZOLOFT) 25 mg tablet, Take 1 tablet (25 mg total) by mouth daily, Disp: 90 tablet, Rfl: 1  •  sertraline (ZOLOFT) 50 mg tablet, Take 1 tablet (50 mg total) by mouth daily At 9am, Disp: 90 tablet, Rfl: 1  No current facility-administered medications for this visit      Current Allergies     Allergies as of 05/15/2023 - Reviewed 05/15/2023   Allergen Reaction Noted   • Betadine antibiotic-moisturize [bacitracin-polymyxin b] Rash 05/07/2018            The following portions of the patient's history were reviewed and updated as appropriate: allergies, current medications, past family history, past medical history, past social history, past surgical history and problem list      Past Medical History:   Diagnosis Date   • Anxiety    • Depression    • Hyperlipidemia     controlled with diet and exercise   • Kidney stone    • Obstructive sleep apnea    • Panic attack    • Renal disorder     kidney stones   • Suicidal ideation 1/17/2021   • Suicide attempt ([facility] [place] [address] ) "      Past Surgical History:   Procedure Laterality Date   • APPENDECTOMY     • OK CYSTO/URETERO W/LITHOTRIPSY &INDWELL STENT INSRT Left 5/9/2018    Procedure: CYSTOSCOPY URETEROSCOPY,LEFT  RETROGRADE PYELOGRAM AND INSERTION STENT URETERAL;  Surgeon: Jorge Luis Alonzo MD;  Location: [facility];  Service: Urology   • OK CYSTO/URETERO W/LITHOTRIPSY &INDWELL STENT INSRT Left 6/8/2018    Procedure: CYSTOSCOPY URETEROSCOPY WITH LITHOTRIPSY HOLMIUM LASER, RETROGRADE PYELOGRAM AND INSERTION STENT URETERAL;  Surgeon: Jorge Luis Alonzo MD;  Location: [facility];  Service: Urology   • TONSILLECTOMY     • WISDOM TOOTH EXTRACTION     • WISDOM TOOTH EXTRACTION         Family History   Problem Relation Age of Onset   • Depression Mother    • Heart disease Mother    • Lung cancer Mother    • Hypertension Mother    • Anxiety disorder Mother    • Stroke Father    • Hyperlipidemia Father    • Completed Suicide  Maternal Aunt    • Kidney failure Maternal Grandfather    • Heart attack Paternal Grandfather    • Testicular cancer Neg Hx    • Prostate cancer Neg Hx    • Colon cancer Neg Hx          Medications have been verified  Objective   Pulse 94   Temp 97 8 °F (36 6 °C)   Resp 16   Ht 5' 10\" (1 778 m)   Wt 77 1 kg (170 lb)   SpO2 97%   BMI 24 39 kg/m²   No LMP for male patient  Physical Exam     Physical Exam  Vitals and nursing note reviewed  Constitutional:       General: He is not in acute distress  Appearance: Normal appearance  He is not ill-appearing or toxic-appearing  HENT:      Head: Normocephalic and atraumatic  Jaw: No trismus  Right Ear: Hearing, tympanic membrane, ear canal and external ear normal  There is no impacted cerumen  No foreign body  Left Ear: Hearing, tympanic membrane, ear canal and external ear normal  There is no impacted cerumen  No foreign body  Nose: Mucosal edema, congestion and rhinorrhea present  No nasal deformity  Rhinorrhea is clear        " "Right Nostril: No foreign body, epistaxis or occlusion  Left Nostril: No foreign body, epistaxis or occlusion  Right Turbinates: Not enlarged, swollen or pale  Left Turbinates: Not enlarged, swollen or pale  Mouth/Throat:      Lips: Pink  No lesions  Mouth: Mucous membranes are moist  No injury, oral lesions or angioedema  Dentition: Normal dentition  Tongue: No lesions  Tongue does not deviate from midline  Palate: No mass and lesions  Pharynx: Uvula midline  Pharyngeal swelling and uvula swelling present  No oropharyngeal exudate or posterior oropharyngeal erythema  Tonsils: No tonsillar exudate or tonsillar abscesses  Comments: Mild to moderate swelling of the posterior oropharynx and uvula  Eyes:      General: Lids are normal  Gaze aligned appropriately  No allergic shiner  Extraocular Movements: Extraocular movements intact  Cardiovascular:      Rate and Rhythm: Normal rate  Pulmonary:      Effort: Pulmonary effort is normal       Comments: Patient speaking in full sentences with no increased respiratory effort  No audible wheezing or stridor  Lymphadenopathy:      Cervical: No cervical adenopathy  Skin:     General: Skin is warm and dry  Neurological:      Mental Status: He is alert and oriented to person, place, and time  Coordination: Coordination is intact  Gait: Gait is intact  Psychiatric:         Attention and Perception: Attention and perception normal          Mood and Affect: Mood and affect normal          Speech: Speech normal          Behavior: Behavior is cooperative  Note: Portions of this record may have been created with voice recognition software  Occasional wrong word or \"sound a like\" substitutions may have occurred due to the inherent limitations of voice recognition software  Please read the chart carefully and recognize, using context, where substitutions have occurred  *      "

## 2023-05-17 ENCOUNTER — TELEPHONE (OUTPATIENT)
Dept: FAMILY MEDICINE CLINIC | Facility: CLINIC | Age: 53
End: 2023-05-17

## 2023-05-17 DIAGNOSIS — J02.9 SORE THROAT: Primary | ICD-10-CM

## 2023-05-17 DIAGNOSIS — J02.0 STREP THROAT: ICD-10-CM

## 2023-05-17 LAB — BACTERIA THROAT CULT: ABNORMAL

## 2023-05-17 RX ORDER — AMOXICILLIN 500 MG/1
500 CAPSULE ORAL EVERY 12 HOURS SCHEDULED
Qty: 20 CAPSULE | Refills: 0 | Status: SHIPPED | OUTPATIENT
Start: 2023-05-17 | End: 2023-05-27

## 2023-05-17 NOTE — TELEPHONE ENCOUNTER
Patient has positive culture that came to my inbox  No call to patient yet, therefore I have sent the amoxicillin 500mg BID for 10 days       Marilu Casey DO  [facility]  5/17/2023 12:25 PM

## 2023-05-30 ENCOUNTER — OFFICE VISIT (OUTPATIENT)
Dept: PSYCHIATRY | Facility: CLINIC | Age: 53
End: 2023-05-30

## 2023-05-30 DIAGNOSIS — F41.1 GAD (GENERALIZED ANXIETY DISORDER): Primary | ICD-10-CM

## 2023-05-30 DIAGNOSIS — F32.5 MAJOR DEPRESSIVE DISORDER WITH SINGLE EPISODE, IN FULL REMISSION (HCC): ICD-10-CM

## 2023-05-30 NOTE — PSYCH
"MEDICATION MANAGEMENT NOTE        [facility]      Name and Date of Birth:  Alena Keller 48 y o  1970 MRN: [id number]    Date of Visit: May 30, 2023    Reason for Visit: Follow-up visit for medication management       SUBJECTIVE:    Alena Keller is a 48 y o  male with past psychiatric history significant for GORDON, MDD, and insomnia who was personally seen and evaluated today at the [address] [place] [address] outpatient clinic for follow-up and medication management  Bereketino Felty presents as mildly anxious yet pleasant and cooperative  His thoughts are linear and organized  He completes assessment without difficulty  Sabino Felty endorses compliance with psychotropic medication regimen consisting of Gabapentin and Zoloft  Zuhair Echevarria denies adverse medication side effects  Zuhair Echevarria reports psychiatric stability since last visit  He spoke at length about a new employment opportunity in [employer] and his resistance to leaving his comfort zone  Extensive supportive therapy and CBT utilized  We discussed the importance of creating a pros/cons list and the need to develop inner strength through adversity  We also discussed a recent book he has been reading, \"A Man's search for meaning\", and ways to find positivity even in challenging situations  Zuhair Echevarria was receptive  We processed ways \"perspective shapes reality\" and the importance of looking out more than one window to know where he stands  Acutely, Zuhair Echevarria reports adequate and restorative sleep  His appetite is stable  No current SI/HI  His energy and motivation are appropriate  Zuhair Echevarria denies crying spells or pathologic anhedonia  He voices excitement regarding plans to spend time touring historic sites this summer with family  He also reports excitement regarding the birth of his granddaughter (today) via his step-son  Arnold's concentration and focus is appropriate  He is performing well at work   He denies " "overwhelming or pathologic anxiety  He is mildly anxious today but does not appear tense or on-edge  No recent panic attacks  During today's examination, Pat Christianson does not exhibit objective evidence of jay/hypomania or ayde psychosis  Pat Christianson is not currently irritable, grandiose, labile, or pathologically euphoric  Pat Christianson is without perceptual disturbances, such as A/V hallucinations, paranoia, ideas of reference, or delusional beliefs  Pat Christianson denies recent ETOH or illicit substance abuse  Last visit, we discussed potential benefits of \"spouse journal\" to which he and his wife did not start  He denies current need given improvement in marriage  He offers no further concerns  Current Rating Scores:     Current PHQ-9   PHQ-2/9 Depression Screening    Little interest or pleasure in doing things: 0 - not at all  Feeling down, depressed, or hopeless: 0 - not at all  Trouble falling or staying asleep, or sleeping too much: 1 - several days  Feeling tired or having little energy: 0 - not at all  Poor appetite or overeatin - not at all  Feeling bad about yourself - or that you are a failure or have let yourself or your family down: 0 - not at all  Trouble concentrating on things, such as reading the newspaper or watching television: 0 - not at all  Moving or speaking so slowly that other people could have noticed  Or the opposite - being so fidgety or restless that you have been moving around a lot more than usual: 0 - not at all  Thoughts that you would be better off dead, or of hurting yourself in some way: 0 - not at all  PHQ-9 Score: 1   PHQ-9 Interpretation: No or Minimal depression        Current GORDON-7 is   GORDON-7 Flowsheet Screening    Flowsheet Row Most Recent Value   Over the last 2 weeks, how often have you been bothered by any of the following problems?     Feeling nervous, anxious, or on edge 1   Not being able to stop or control worrying 0   Worrying too much about different things 0   Trouble " relaxing 1   Being so restless that it is hard to sit still 0   Becoming easily annoyed or irritable 0   Feeling afraid as if something awful might happen 1   GORDON-7 Total Score 3        Review Of Systems:      Constitutional negative   ENT negative   Cardiovascular negative   Respiratory negative   Gastrointestinal negative   Genitourinary negative   Musculoskeletal negative   Integumentary negative   Neurological negative   Endocrine negative   Other Symptoms none, all other systems are negative       Past Psychiatric History: (unchanged information from previous note copied and italicized) - Information that is bolded has been updated  Inpatient psychiatric admissions: 1x - [facility] in January 2021 secondary to depression, anxiety, and overdose on Trazodone - He also completed PHP in March 2021   Prior outpatient psychiatric linkage: Previously linked with EDWAR Alves and Dr Elidia Graham via [facility]  Past/current psychotherapy: Currently linked with Ronda Bermudez (private therapist)  History of suicidal attempts/gestures: 1x- overdose on Trazodone in 2021  History of violence/aggressive behaviors: Denies  Psychotropic medication trials: Lexapro, Zoloft, Xanax, Klonopin, Hydroxyzine, Seroquel, Gabapentin  Substance abuse inpatient/outpatient rehabilitation: Denies     Substance Abuse History: (unchanged information from previous note copied and italicized) - Information that is bolded has been updated       No history of ETOH, illict substance, or tobacco abuse  No past legal actions or arrests secondary to substance intoxication  The patient denies prior DWIs/DUIs  No history of outpatient/inpatient rehabilitation programs   Frankie Ha does not exhibit objective evidence of substance withdrawal during today's examination nor does Frankie Ha appear under the influence of any psychoactive substance           Social History: (unchanged information from previous note copied and italicized) - "Information that is bolded has been updated       Developmental: Denies a history of milestone/developmental delay  Denies a history of in-utero exposure to toxins/illicit substances  There is no documented history of IEP or need for special education  Jim Chandra shares that his mother struggled immensely during childhood with MH concerns  His father was supportive  Jim Chandra describes the emotionality of the house as \"tense\"  He has 2 siblings, both of which he speaks to  Education: post college graduate work or degree - Master's degree in [address] [place] [address] from [address] [place] [address]   Marital history:   Living arrangement, social support: wife, 12year old daughter , also has 29year old step son    Occupational History: Employed full time as  for [employer]   Access to firearms: Denies direct access to weapons/firearms  Amanuel Aguero has no history of arrests or violence with a deadly weapon       Traumatic History: (unchanged information from previous note copied and italicized) - Information that is bolded has been updated       Abuse:none is reported  Other Traumatic Events:  Mother's death in 2018 was challenging      Past Medical History:    Past Medical History:   Diagnosis Date   • Anxiety    • Depression    • Hyperlipidemia     controlled with diet and exercise   • Kidney stone    • Obstructive sleep apnea    • Panic attack    • Renal disorder     kidney stones   • Suicidal ideation 1/17/2021   • Suicide attempt [facility])         Past Surgical History:   Procedure Laterality Date   • APPENDECTOMY     • MN CYSTO/URETERO W/LITHOTRIPSY &INDWELL STENT INSRT Left 5/9/2018    Procedure: CYSTOSCOPY URETEROSCOPY,LEFT  RETROGRADE PYELOGRAM AND INSERTION STENT URETERAL;  Surgeon: Priscila Good MD;  Location: [facility];  Service: Urology   • MN CYSTO/URETERO W/LITHOTRIPSY &INDWELL STENT INSRT Left 6/8/2018    Procedure: CYSTOSCOPY URETEROSCOPY WITH LITHOTRIPSY HOLMIUM LASER, RETROGRADE PYELOGRAM AND INSERTION STENT " URETERAL;  Surgeon: Amie Sparks MD;  Location: [facility]  MAIN [facility];  Service: Urology   • TONSILLECTOMY     • WISDOM TOOTH EXTRACTION     • WISDOM TOOTH EXTRACTION       Allergies   Allergen Reactions   • Betadine Antibiotic-Moisturize [Bacitracin-Polymyxin B] Rash       Substance Abuse History:    Social History     Substance and Sexual Activity   Alcohol Use Not Currently    Comment: interfering with medication, abstaining at this point     Social History     Substance and Sexual Activity   Drug Use No    Comment: Reports last use of any substance >20years ago       Social History:    Social History     Socioeconomic History   • Marital status: /Civil Union     Spouse name: Not on file   • Number of children: Not on file   • Years of education: Not on file   • Highest education level: Master's degree (e g , MA, MS, Jesus, MEd, MSW, GLEN)   Occupational History   • Not on file   Tobacco Use   • Smoking status: Former     Packs/day: 0 50     Types: Cigarettes     Quit date: 1998     Years since quittin 0   • Smokeless tobacco: Never   Vaping Use   • Vaping Use: Never used   Substance and Sexual Activity   • Alcohol use: Not Currently     Comment: interfering with medication, abstaining at this point   • Drug use: No     Comment: Reports last use of any substance >20years ago   • Sexual activity: Yes     Partners: Female     Birth control/protection: Condom Male   Other Topics Concern   • Not on file   Social History Narrative   • Not on file     Social Determinants of Health     Financial Resource Strain: Low Risk  (2021)    Overall Financial Resource Strain (CARDIA)    • Difficulty of Paying Living Expenses: Not hard at all   Food Insecurity: Not on file   Transportation Needs: Not on file   Physical Activity: Unknown (2021)    Exercise Vital Sign    • Days of Exercise per Week: 0 days    • Minutes of Exercise per Session: Not on file   Stress: Stress Concern Present (2021) Yvette Richard 52 Stress Questionnaire    • Feeling of Stress : Rather much   Social Connections: Unknown (1/22/2021)    Social Connection and Isolation Panel [NHANES]    • Frequency of Communication with Friends and Family: Once a week    • Frequency of Social Gatherings with Friends and Family: Not on file    • Attends Yazdanism Services: Not on file    • Active Member of Clubs or Organizations: Not on file    • Attends Club or Organization Meetings: Not on file    • Marital Status:    Intimate Partner Violence: Not At Risk (1/22/2021)    Humiliation, Afraid, Rape, and Kick questionnaire    • Fear of Current or Ex-Partner: No    • Emotionally Abused: No    • Physically Abused: No    • Sexually Abused: No   Housing Stability: Not on file       Family Psychiatric History:     Family History   Problem Relation Age of Onset   • Depression Mother    • Heart disease Mother    • Lung cancer Mother    • Hypertension Mother    • Anxiety disorder Mother    • Stroke Father    • Hyperlipidemia Father    • Completed Suicide  Maternal Aunt    • Kidney failure Maternal Grandfather    • Heart attack Paternal Grandfather    • Testicular cancer Neg Hx    • Prostate cancer Neg Hx    • Colon cancer Neg Hx        History Review: The following portions of the patient's history were reviewed and updated as appropriate: allergies, current medications, past family history, past medical history, past social history, past surgical history and problem list          OBJECTIVE:     Vital signs in last 24 hours: There were no vitals filed for this visit      Mental Status Evaluation:    Appearance age appropriate, casually dressed, dressed appropriately, looks stated age   Behavior pleasant, cooperative, mildly anxious, good eye contact   Speech normal rate, normal volume, normal pitch   Mood euthymic   Affect normal range and intensity, appropriate   Thought Processes organized, logical, goal directed   Associations intact associations   Thought Content no overt delusions   Perceptual Disturbances: no auditory hallucinations, no visual hallucinations   Abnormal Thoughts  Risk Potential Suicidal ideation - None at present  Homicidal ideation - None at present  Potential for aggression - No   Orientation oriented to person, place, time/date and situation   Memory recent and remote memory grossly intact   Consciousness alert and awake   Attention Span Concentration Span attention span and concentration are age appropriate   Intellect appears to be of average intelligence   Insight intact and good   Judgement intact and good   Muscle Strength and  Gait normal gait and normal balance   Motor activity no abnormal movements   Language no difficulty naming common objects   Fund of Knowledge adequate knowledge of current events   Pain none   Pain Scale Did not ask patient to formally rate       Laboratory Results: I have personally reviewed all pertinent laboratory/tests results    Recent Labs (last 2 months):   Office Visit on 05/15/2023   Component Date Value   •  RAPID STREP A 05/15/2023 Negative    • Throat Culture 05/15/2023 2+ Growth of Beta Hemolytic Streptococcus Group A (A)        Suicide/Homicide Risk Assessment:    The following interventions are recommended: contracts for safety at present - agrees to go to ED if feeling unsafe      Lethality Statement:    Based on today's assessment and clinical criteria, Cherylene Borrow contracts for safety and is not an imminent risk of harm to self or others  Outpatient level of care is deemed appropriate at this current time  Cheyanne Claire understands that if they can no longer contract for safety, they need to call the office or report to their nearest Emergency Room for immediate evaluation        Assessment/Plan:     Cherylene Borrow is a 48 y o  male with past psychiatric history significant for GORDON, MDD, and insomnia who was personally seen and evaluated today at the "[address] [facility] [address] outpatient clinic for follow-up and medication management  He first entered [facility] treatment in 2003 and shares that he has struggled with anxiety \"throughout his life\"  Zuhair Echevarria was managed by providers within the [facility] since 2021, following an inpatient psychiatric admission at [facility]  In addition to full psychiatric assessment today, I reviewed documentation from that visit as well as past visits with providers  Zuhair Echevarria endorses problematic depression and anxiety at that time in the context of marital discord, increased occupational demands, and COVID-19  He subsequently overdosed on Trazodone and was psychiatrically stabilized on the inpatient unit  Following discharge, Zuhair Echevarria was linked with PHP and completed the program, which he states was beneficial  Zuhair Echevarria was formally diagnosed with MDD and GORDON in the past  He has been trialled on numerous psychotropic agents and was involved in intense psychotherapy  He continues to engage with his therapist, Dorinda Kamara, on a monthly basis  Zuhair Echevarria has also prioritized mindfulness, yoga, and spirituality since discharge from the hospital  His interest in Sikh has peaked, which has been difficult for his marriage, as his wife is not accepting of this  Acutely, Sabino Felty currently denies most neurovegetative symptomatology suggestive of an acute episode of major depressive disorder or dysthymia  There is a documented history of prior suicidal attempts (overdose -1x)  Sabino Felty currently endorses occasional and appropriate anxiety that is not pathologic in nature  Bereket Camarilloy denies recent periods of excessive nervousness, irrational worry, or overt anxiousness  Sabino Felty vehemently denies any acute or chronic history suggestive of an underlying affective (bipolar) organization  Bereket Felty denies historical symptomatology suggestive of an underlying psychotic process  Sabino Felty denies historical symptomatology suggestive of PTSD, OCD, or disordered eating   Zuhair Echevarria " denies ETOH or illicit substance abuse  He has no access to firearms/weapons          Today's Plan/Medical Decision Making:     Psychopharmacologically, Verner Kugel endorses tolerability and benefit from current medication regimen  He denies need for medication change or intervention  No acute lethality concern  DSM-V Diagnoses:      1 ) Generalized Anxiety Disorder  2 ) Major Depressive Disorder, in full remission   3 ) Insomnia        Treatment Recommendations/Precautions:     1 ) Generalized Anxiety Disorder  - Continue Gabapentin 300mg BID - off label use, patient aware and consents      2 ) Major Depressive Disorder, in full remission   - Continue Zoloft 75mg Daily  - Continue engagement in individual psychotherapy with mj   - Psychoeducation provided regarding the importance of exercise and healthy dietary choices and their impact on mood, energy, and motivation  - Counseled to avoid ETOH, illict substances, and nicotine secondary to the detrimental effects of these substances on mental and physical health  - Encouraged to engage in non-verbal forms of therapy such as art therapy, music therapy, and mindfulness     3 ) Insomnia  - No active Tx    Medication management every 3 months  Continue psychotherapy with own therapist  Aware of need to follow up with family physician for medical issues  Aware of 24 hour and weekend coverage for urgent situations accessed by calling [facility] main practice number    Medications Risks/Benefits      Risks, Benefits And Possible Side Effects Of Medications:    Risks, benefits, and possible side effects of medications explained to Avelino including risk of suicidality and serotonin syndrome related to treatment with antidepressants  He verbalizes understanding and agreement for treatment      Controlled Medication Discussion:     Not applicable    Psychotherapy Provided:     Individual psychotherapy provided: Yes  Counseling was provided during the session today for 20 minutes  Medication education provided to Avelino  Recent stressors discussed with Avelino including family conflict, family issues, marital problems, job stress, problems at work, everyday stressors and occasional anxiety  Coping strategies reviewed with Avelino  Educated on importance of medication and treatment compliance  Importance of follow up with family physician for medical issues reviewed with Avelino  Supportive therapy provided  Cognitive therapy was utilized during the session  Treatment Plan:    Completed and signed during the session: Not applicable - Treatment Plan not due at this session      Visit Time    Visit Start Time: 9:00 AM  Visit Stop Time: 9:32 AM  Total Visit Duration: 32 minutes     The total visit duration detailed above includes: patient engagement, medication management, psychotherapy/counseling, discussion regarding treatment goals, and coordination of care  Note Share Disclaimer:      This note was not shared with the patient due to reasonable likelihood of causing patient harm      Brittnee Denis MD  Board Certified Diplomate of the [address] [place] [address] Kwadwo Ferrara [address] of Psychiatry and Neurology  05/30/23

## 2023-06-19 ENCOUNTER — RA CDI HCC (OUTPATIENT)
Dept: OTHER | Facility: HOSPITAL | Age: 53
End: 2023-06-19

## 2023-06-19 NOTE — PROGRESS NOTES
[facility]  coding opportunities       Chart reviewed, no opportunity found: CHART REVIEWED, NO OPPORTUNITY FOUND        Patients Insurance        Commercial Insurance: [employer]

## 2023-06-23 ENCOUNTER — OFFICE VISIT (OUTPATIENT)
Dept: FAMILY MEDICINE CLINIC | Facility: CLINIC | Age: 53
End: 2023-06-23
Payer: COMMERCIAL

## 2023-06-23 VITALS
DIASTOLIC BLOOD PRESSURE: 70 MMHG | WEIGHT: 178 LBS | BODY MASS INDEX: 25.48 KG/M2 | SYSTOLIC BLOOD PRESSURE: 110 MMHG | HEIGHT: 70 IN | OXYGEN SATURATION: 97 % | HEART RATE: 76 BPM

## 2023-06-23 DIAGNOSIS — H91.93 BILATERAL CHANGE IN HEARING: ICD-10-CM

## 2023-06-23 DIAGNOSIS — F41.1 GAD (GENERALIZED ANXIETY DISORDER): ICD-10-CM

## 2023-06-23 DIAGNOSIS — F32.1 CURRENT MODERATE EPISODE OF MAJOR DEPRESSIVE DISORDER WITHOUT PRIOR EPISODE (HCC): ICD-10-CM

## 2023-06-23 DIAGNOSIS — Z00.01 ENCOUNTER FOR WELL ADULT EXAM WITH ABNORMAL FINDINGS: ICD-10-CM

## 2023-06-23 DIAGNOSIS — E78.2 MIXED HYPERLIPIDEMIA: Primary | ICD-10-CM

## 2023-06-23 DIAGNOSIS — E78.00 HYPERCHOLESTEROLEMIA: ICD-10-CM

## 2023-06-23 DIAGNOSIS — H91.93 HEARING DIFFICULTY OF BOTH EARS: ICD-10-CM

## 2023-06-23 PROCEDURE — 99396 PREV VISIT EST AGE 40-64: CPT | Performed by: FAMILY MEDICINE

## 2023-06-23 NOTE — PROGRESS NOTES
ADULT ANNUAL [address]  [facility]    NAME: Karol Rai  AGE: 48 y o  SEX: male  : 1970     DATE: 2023     Assessment and Plan:     Problem List Items Addressed This Visit        Other    GORDON (generalized anxiety disorder)    Hypercholesterolemia    Relevant Orders    Lipid panel   Other Visit Diagnoses     Mixed hyperlipidemia    -  Primary    Relevant Orders    Lipid panel    Encounter for well adult exam with abnormal findings        Current moderate episode of major depressive disorder without prior episode ([address] )        Hearing difficulty of both ears        Bilateral change in hearing            Overall patient is doing well  He has maintain his BMI and blood pressure remains normal   He is interested in following up for his hyperlipidemia and continued use of Crestor 20 mg  He has not had any follow-up labs for his cholesterol, therefore order placed  The patient is concerned about the cost for other screening test such as fasting sugar, kidney function, liver function, cell lines  He continues to follow with mental health provider  He is seeing a new one, but remains on Zoloft 75 mg daily and gabapentin 300 mg twice daily  We reviewed prostate screening as well as colon cancer screening  He went through [facility] last year which was negative  We opted not to screen for PSA at this time, patient is not having any symptoms nor does he have significant family history  Patient is an overall decreased to his baseline hearing  Audiology did evaluate him over the last year and noted some sensorineural hearing loss  He is looking into possible hearing aids either over-the-counter or through insurance  His insurance currently is not excellent, therefore he will need to determine if it is viable out-of-pocket  Immunizations and preventive care screenings were discussed with patient today   Appropriate education was printed on patient's after visit summary  Discussed risks and benefits of prostate cancer screening  We discussed the controversial history of PSA screening for prostate cancer in the [place] as well as the risk of over detection and over treatment of prostate cancer by way of PSA screening  The patient understands that PSA blood testing is an imperfect way to screen for prostate cancer and that elevated PSA levels in the blood may also be caused by infection, inflammation, prostatic trauma or manipulation, urological procedures, or by benign prostatic enlargement  The role of the digital rectal examination in prostate cancer screening was also discussed and I discussed with him that there is large interobserver variability in the findings of digital rectal examination  Counseling:  Alcohol/drug use: discussed moderation in alcohol intake, the recommendations for healthy alcohol use, and avoidance of illicit drug use  Dental Health: discussed importance of regular tooth brushing, flossing, and dental visits  Injury prevention: discussed safety/seat belts, safety helmets, smoke detectors, carbon dioxide detectors, and smoking near bedding or upholstery  Sexual health: discussed sexually transmitted diseases, partner selection, use of condoms, avoidance of unintended pregnancy, and contraceptive alternatives  · Exercise: the importance of regular exercise/physical activity was discussed  Recommend exercise 3-5 times per week for at least 30 minutes  BMI Counseling: Body mass index is 25 54 kg/m²  The BMI is above normal  Nutrition recommendations include consuming healthier snacks, moderation in carbohydrate intake and increasing intake of lean protein  Exercise recommendations include moderate physical activity 150 minutes/week  Rationale for BMI follow-up plan is due to patient being overweight or obese  No follow-ups on file       Chief Complaint:     Chief Complaint   Patient presents with   • Annual Exam      History of Present Illness:     Adult Annual Physical   Patient here for a comprehensive physical exam  The patient reports no problems  Diet and Physical Activity  · Diet/Nutrition: well balanced diet and consuming 3-5 servings of fruits/vegetables daily  · Exercise: moderate cardiovascular exercise and cycling, 3-4 times per week depending on schedule  Depression Screening  PHQ-2/9 Depression Screening    Little interest or pleasure in doing things: 0 - not at all  Feeling down, depressed, or hopeless: 0 - not at all  Trouble falling or staying asleep, or sleeping too much: 0 - not at all  Feeling tired or having little energy: 0 - not at all  Poor appetite or overeatin - not at all  Feeling bad about yourself - or that you are a failure or have let yourself or your family down: 0 - not at all  Trouble concentrating on things, such as reading the newspaper or watching television: 0 - not at all  Moving or speaking so slowly that other people could have noticed  Or the opposite - being so fidgety or restless that you have been moving around a lot more than usual: 0 - not at all  Thoughts that you would be better off dead, or of hurting yourself in some way: 0 - not at all       General Health  · Sleep: sleeps well and gets 7-8 hours of sleep on average  · Hearing: decreased - bilateral   · Vision: wears glasses  · Dental: regular dental visits, brushes teeth twice daily and typically, since COVID has been decreased   Health  · Symptoms include: none     Review of Systems:     Review of Systems   Constitutional: Negative for chills, fever and unexpected weight change  HENT: Positive for hearing loss  Negative for congestion, ear discharge, ear pain, postnasal drip, rhinorrhea, sinus pressure, sinus pain and sore throat  Eyes: Positive for visual disturbance (wears glasses)  Respiratory: Negative for cough, chest tightness and shortness of breath      Cardiovascular: Negative for chest pain and palpitations  Gastrointestinal: Negative for abdominal pain, constipation, diarrhea, nausea and vomiting  Genitourinary: Negative for dysuria and frequency  Skin: Negative for rash and wound  Neurological: Negative for dizziness, light-headedness and headaches  Psychiatric/Behavioral: Negative for self-injury and suicidal ideas        Past Medical History:     Past Medical History:   Diagnosis Date   • Anxiety    • Depression    • Hyperlipidemia     controlled with diet and exercise   • Kidney stone    • Obstructive sleep apnea    • Panic attack    • Renal disorder     kidney stones   • Suicidal ideation 01/17/2021   • Suicide attempt [facility])    • Urinary tract infection May 9, 2018      Past Surgical History:     Past Surgical History:   Procedure Laterality Date   • APPENDECTOMY     • TX CYSTO/URETERO W/LITHOTRIPSY &INDWELL STENT INSRT Left 5/9/2018    Procedure: CYSTOSCOPY URETEROSCOPY,LEFT  RETROGRADE PYELOGRAM AND INSERTION STENT URETERAL;  Surgeon: Arsen Lugo MD;  Location[facility];  Service: Urology   • TX CYSTO/URETERO W/LITHOTRIPSY &INDWELL STENT INSRT Left 6/8/2018    Procedure: CYSTOSCOPY URETEROSCOPY WITH LITHOTRIPSY HOLMIUM LASER, RETROGRADE PYELOGRAM AND INSERTION STENT URETERAL;  Surgeon: Arsen Lugo MD;  [facility];  Service: Urology   • TONSILLECTOMY     • WISDOM TOOTH EXTRACTION     • WISDOM TOOTH EXTRACTION        Family History:     Family History   Problem Relation Age of Onset   • Depression Mother    • Heart disease Mother    • Lung cancer Mother    • Hypertension Mother    • Anxiety disorder Mother    • Cancer Mother         lung cancer, chronic smoker   • Stroke Father    • Hyperlipidemia Father    • Heart disease Father    • Completed Suicide  Maternal Aunt    • Kidney failure Maternal Grandfather    • Heart attack Paternal Grandfather    • Hearing loss Maternal Grandmother         Constantin   • Heart disease Paternal Grandmother         He  of a heart attack   • Testicular cancer Neg Hx    • Prostate cancer Neg Hx    • Colon cancer Neg Hx       Social History:     Social History     Socioeconomic History   • Marital status: /Civil Union     Spouse name: None   • Number of children: None   • Years of education: None   • Highest education level: Master's degree (e julito , MA, MS, Jesus, MEd, MSW, GLEN)   Occupational History   • None   Tobacco Use   • Smoking status: Former     Packs/day: 0 50     Years: 2 00     Total pack years: 1 00     Types: Cigarettes     Quit date: 1998     Years since quittin 1   • Smokeless tobacco: Never   Vaping Use   • Vaping Use: Never used   Substance and Sexual Activity   • Alcohol use: Not Currently     Comment: interfering with medication, abstaining at this point   • Drug use: No     Comment: Reports last use of any substance >20years ago   • Sexual activity: Yes     Partners: Female     Birth control/protection: Condom Male   Other Topics Concern   • None   Social History Narrative   • None     Social Determinants of Health     Financial Resource Strain: Low Risk  (2021)    Overall Financial Resource Strain (CARDIA)    • Difficulty of Paying Living Expenses: Not hard at all   Food Insecurity: Not on file   Transportation Needs: Not on file   Physical Activity: Unknown (2021)    Exercise Vital Sign    • Days of Exercise per Week: 0 days    • Minutes of Exercise per Session: Not on file   Stress: Stress Concern Present (2021)    [id number] Yusuf Ferrara    • Feeling of Stress : Rather much   Social Connections: Unknown (2021)    Social Connection and Isolation Panel [NHANES]    • Frequency of Communication with Friends and Family: Once a week    • Frequency of Social Gatherings with Friends and Family: Not on file    • Attends Baptism Services: Not on file    • Active Member of Clubs or Organizations: Not on "file    • Attends Club or Organization Meetings: Not on file    • Marital Status:    Intimate Partner Violence: Not At Risk (1/22/2021)    Humiliation, Afraid, Rape, and Kick questionnaire    • Fear of Current or Ex-Partner: No    • Emotionally Abused: No    • Physically Abused: No    • Sexually Abused: No   Housing Stability: Not on file      Current Medications:     Current Outpatient Medications   Medication Sig Dispense Refill   • gabapentin (NEURONTIN) 300 mg capsule Take 1 capsule (300 mg total) by mouth 2 (two) times a day 180 capsule 1   • rosuvastatin (CRESTOR) 20 MG tablet Take 1 tablet (20 mg total) by mouth daily 90 tablet 0   • sertraline (ZOLOFT) 25 mg tablet Take 1 tablet (25 mg total) by mouth daily 90 tablet 1   • sertraline (ZOLOFT) 50 mg tablet Take 1 tablet (50 mg total) by mouth daily At 9am 90 tablet 1     No current facility-administered medications for this visit  Allergies: Allergies   Allergen Reactions   • Betadine Antibiotic-Moisturize [Bacitracin-Polymyxin B] Rash      Physical Exam:     /70   Pulse 76   Ht 5' 10\" (1 778 m)   Wt 80 7 kg (178 lb)   SpO2 97%   BMI 25 54 kg/m²     Physical Exam  Constitutional:       General: He is not in acute distress  Appearance: Normal appearance  He is normal weight  He is not ill-appearing, toxic-appearing or diaphoretic  HENT:      Head: Normocephalic and atraumatic  Right Ear: Tympanic membrane, ear canal and external ear normal  There is no impacted cerumen  Left Ear: Tympanic membrane, ear canal and external ear normal  There is no impacted cerumen  Nose: Nose normal  No congestion or rhinorrhea  Mouth/Throat:      Mouth: Mucous membranes are moist       Pharynx: Oropharynx is clear  No oropharyngeal exudate or posterior oropharyngeal erythema  Eyes:      General:         Right eye: No discharge  Left eye: No discharge  Extraocular Movements: Extraocular movements intact        " Pupils: Pupils are equal, round, and reactive to light  Cardiovascular:      Rate and Rhythm: Normal rate and regular rhythm  Heart sounds: Normal heart sounds  No murmur heard  No friction rub  No gallop  Pulmonary:      Effort: Pulmonary effort is normal  No respiratory distress  Breath sounds: Normal breath sounds  No stridor  No wheezing, rhonchi or rales  Abdominal:      General: Bowel sounds are normal  There is no distension  Palpations: Abdomen is soft  Tenderness: There is no abdominal tenderness  Musculoskeletal:      Cervical back: Neck supple  No tenderness  Lymphadenopathy:      Cervical: No cervical adenopathy  Skin:     General: Skin is warm  Capillary Refill: Capillary refill takes less than 2 seconds  Neurological:      Mental Status: He is alert  Sensory: No sensory deficit ( Light touch present in all 4 extremities)  Motor: No weakness ( 5/5 in all 4 extremities)  Deep Tendon Reflexes: Reflexes normal ( 2/4, intact, C5, C6, L4, S1)            Kiah Veliz DO  [address]

## 2023-07-07 LAB
CHOLEST SERPL-MCNC: 159 MG/DL
CHOLEST/HDLC SERPL: 3.9 (CALC)
HDLC SERPL-MCNC: 41 MG/DL
LDLC SERPL CALC-MCNC: 97 MG/DL (CALC)
NONHDLC SERPL-MCNC: 118 MG/DL (CALC)
TRIGL SERPL-MCNC: 111 MG/DL

## 2023-07-16 DIAGNOSIS — E78.2 MIXED HYPERLIPIDEMIA: ICD-10-CM

## 2023-07-17 RX ORDER — ROSUVASTATIN CALCIUM 20 MG/1
TABLET, COATED ORAL
Qty: 90 TABLET | Refills: 0 | Status: SHIPPED | OUTPATIENT
Start: 2023-07-17

## 2023-10-02 ENCOUNTER — OFFICE VISIT (OUTPATIENT)
Dept: PSYCHIATRY | Facility: CLINIC | Age: 53
End: 2023-10-02
Payer: COMMERCIAL

## 2023-10-02 DIAGNOSIS — F32.5 MAJOR DEPRESSIVE DISORDER WITH SINGLE EPISODE, IN FULL REMISSION (HCC): Primary | ICD-10-CM

## 2023-10-02 DIAGNOSIS — F41.1 GAD (GENERALIZED ANXIETY DISORDER): ICD-10-CM

## 2023-10-02 DIAGNOSIS — F41.1 GENERALIZED ANXIETY DISORDER: ICD-10-CM

## 2023-10-02 PROCEDURE — 90833 PSYTX W PT W E/M 30 MIN: CPT | Performed by: STUDENT IN AN ORGANIZED HEALTH CARE EDUCATION/TRAINING PROGRAM

## 2023-10-02 PROCEDURE — 99214 OFFICE O/P EST MOD 30 MIN: CPT | Performed by: STUDENT IN AN ORGANIZED HEALTH CARE EDUCATION/TRAINING PROGRAM

## 2023-10-02 RX ORDER — SERTRALINE HYDROCHLORIDE 25 MG/1
25 TABLET, FILM COATED ORAL DAILY
Qty: 90 TABLET | Refills: 2 | Status: SHIPPED | OUTPATIENT
Start: 2023-10-02 | End: 2024-03-30

## 2023-10-02 RX ORDER — GABAPENTIN 300 MG/1
300 CAPSULE ORAL 2 TIMES DAILY
Qty: 180 CAPSULE | Refills: 2 | Status: SHIPPED | OUTPATIENT
Start: 2023-10-02 | End: 2024-03-30

## 2023-10-02 NOTE — BH TREATMENT PLAN
TREATMENT PLAN (Medication Management Only)        [address] [facility]    Name and Date of Birth:  Dimitri Milian 48 y.o. 1970  Date of Treatment Plan: October 2, 2023  Diagnosis/Diagnoses:    1. Major depressive disorder with single episode, in full remission ([address])    2. GORDON (generalized anxiety disorder)    3. Generalized anxiety disorder      Strengths/Personal Resources for Self-Care: supportive family, supportive friends, ability to adapt to life changes, ability to communicate needs, ability to communicate well, good understanding of illness, independence, motivation for treatment, ability to negotiate basic needs, being resoureceful, self-reliance. Area/Areas of need (in own words): anxiety symptoms  1. Long Term Goal: maintain control of anxiety. Target Date:6 months - 4/2/2024  Person/Persons responsible for completion of goal: Joel Delgadillo  2. Short Term Objective (s) - How will we reach this goal?:   A. Provider new recommended medication/dosage changes and/or continue medication(s): continue current medications as prescribed. B. Attend medication management appointments regularly. C. Take psychiatric medications responsibly. D> Set firmer boundaries at work  Target Date:6 months - 4/2/2024  Person/Persons Responsible for Completion of Goal: Joel Delgadillo  Progress Towards Goals: continuing treatment  Treatment Modality: medication management every 3 months  Review due 180 days from date of this plan: 6 months - 4/2/2024  Expected length of service: ongoing treatment  My Physician/PA/NP and I have developed this plan together and I agree to work on the goals and objectives. I understand the treatment goals that were developed for my treatment. Treatment Plan was completed with Dat's assistance and input throughout today's session. Joel Delgadillo consented to the information provided and documented above.  Unfortunately, treatment plan was not signed at the time of this office visit secondary to issues related to signature keypad.

## 2023-10-02 NOTE — PSYCH
MEDICATION MANAGEMENT NOTE        [facility]      Name and Date of Birth:  Mateus Crowell 48 y.o. 1970 MRN: [id number]    Date of Visit: October 2, 2023    Reason for Visit: Follow-up visit for medication management       SUBJECTIVE:    Mateus Crowell is a 48 y.o. male with past psychiatric history significant for GORDON, MDD, and insomnia who was personally seen and evaluated today at the [address] [place] [address] outpatient clinic for follow-up and medication management. Patric Feliz presents as pleasant and cooperative. His thoughts are linear and organized. He completes assessment without difficulty. Patric Feliz endorses compliance with psychotropic medication regimen consisting of Gabapentin and Zoloft. He denies adverse medication side effects. Slime Leigh reports psychiatric stability at the moment. He rates his overall state of MH as a "8/10" (10 being the best he's ever felt, 0 being the worst). He shares positive news that his granddaughter born. He and his wife have been enjoying their roles as grandparents. Slime Leigh speaks at length today regarding occupational stressors. He feels less validated and is seeking employment elsewhere. He discusses not ever receiving the pay raise he was told and joint problem solving utilized. We discussed ways he fuses the concept of "advocating for himself vs creating conflict". We discussed the need for setting limits/boundaries in the workplace. He was receptive. He speaks to ways he finds purpose and meaning in creating change through his reporting. He does endorse stress related to work and episodic anxiety. Supportive therapy provided. Acutely, Slime Leigh is not tense or pathologically on-edge. He denies panic symptomatology. His sleep and appetite are stable. He denies SI/HI when asked today. His energy and motivation are at baseline. No recent changes in focus or cognition.  He continues to read which is intellectually stimulating. He is without crying spells or anhedonia. No current feelings of hopelessness or despair. He is no longer seeing his therapist secondary to psychiatric stability. We discussed his "emoitonal house" today and constant need to fortify the pillars of this house. During today's examination, Thony Rider does not exhibit objective evidence of jay/hypomania or psychosis. Thony Rider denies recent ETOH or illicit substance abuse. He offers no further concerns. Current Rating Scores:     None completed today. Review Of Systems:      Constitutional negative   ENT negative   Cardiovascular negative   Respiratory negative   Gastrointestinal negative   Genitourinary negative   Musculoskeletal negative   Integumentary negative   Neurological negative   Endocrine negative   Other Symptoms none, all other systems are negative       Past Psychiatric History: (unchanged information from previous note copied and italicized) - Information that is bolded has been updated.      Inpatient psychiatric admissions: 1x - [facility] in January 2021 secondary to depression, anxiety, and overdose on Trazodone - He also completed PHP in March 2021   Prior outpatient psychiatric linkage: Previously linked with EDWAR Dillard and Dr. Tamika Mathur via [facility]  Past/current psychotherapy: Currently linked with Cj Cleary (private therapist)  History of suicidal attempts/gestures: 1x- overdose on Trazodone in 2021  History of violence/aggressive behaviors: Denies  Psychotropic medication trials: Lexapro, Zoloft, Xanax, Klonopin, Hydroxyzine, Seroquel, Gabapentin  Substance abuse inpatient/outpatient rehabilitation: Denies     Substance Abuse History: (unchanged information from previous note copied and italicized) - Information that is bolded has been updated.      No history of ETOH, illict substance, or tobacco abuse. No past legal actions or arrests secondary to substance intoxication.  The patient denies prior DWIs/DUIs. No history of outpatient/inpatient rehabilitation programs. Dat does not exhibit objective evidence of substance withdrawal during today's examination nor does Dat appear under the influence of any psychoactive substance.          Social History: (unchanged information from previous note copied and italicized) - Information that is bolded has been updated.      Developmental: Denies a history of milestone/developmental delay. Denies a history of in-utero exposure to toxins/illicit substances. There is no documented history of IEP or need for special education. Arnold shares that his mother struggled immensely during childhood with MH concerns. His father was supportive. Anali Foss describes the emotionality of the house as "tense".  He has 2 siblings, both of which he speaks to.   Education: post college graduate work or degree - Master's degree in [employer] from [address] [place] [address]  Marital history:   Living arrangement, social support: wife, 12year old daughter , also has 29year old step son    Occupational History: Employed full time as  for [employer]   Access to firearms: Denies direct access to weapons/firearms. Dat Harp has no history of arrests or violence with a deadly weapon.      Traumatic History: (unchanged information from previous note copied and italicized) - Information that is bolded has been updated.      Abuse:none is reported  Other Traumatic Events: Mother's death in 2018 was challenging      Past Medical History:    Past Medical History:   Diagnosis Date   • Anxiety    • Depression    • Hyperlipidemia     controlled with diet and exercise   • Kidney stone    • Obstructive sleep apnea    • Panic attack    • Renal disorder     kidney stones   • Suicidal ideation 01/17/2021   • Suicide attempt [facility])    • Urinary tract infection May 9, 2018        Past Surgical History:   Procedure Laterality Date   • APPENDECTOMY     • CO CYSTO/URETERO W/LITHOTRIPSY &INDWELL STENT INSRT Left 2018    Procedure: CYSTOSCOPY URETEROSCOPY,LEFT  RETROGRADE PYELOGRAM AND INSERTION STENT URETERAL;  Surgeon: Laxmi Orosco MD;  Location: [facility];  Service: Urology   • SD CYSTO/URETERO W/LITHOTRIPSY &INDWELL STENT INSRT Left 2018    Procedure: CYSTOSCOPY URETEROSCOPY WITH LITHOTRIPSY HOLMIUM LASER, RETROGRADE PYELOGRAM AND INSERTION STENT URETERAL;  Surgeon: Laxmi Orosco MD;  Location: [facility];  Service: Urology   • TONSILLECTOMY     • WISDOM TOOTH EXTRACTION     • WISDOM TOOTH EXTRACTION       Allergies   Allergen Reactions   • Betadine Antibiotic-Moisturize [Bacitracin-Polymyxin B] Rash       Substance Abuse History:    Social History     Substance and Sexual Activity   Alcohol Use Not Currently    Comment: interfering with medication, abstaining at this point     Social History     Substance and Sexual Activity   Drug Use No    Comment: Reports last use of any substance >20years ago       Social History:    Social History     Socioeconomic History   • Marital status: /Civil Union     Spouse name: Not on file   • Number of children: Not on file   • Years of education: Not on file   • Highest education level: Master's degree (e.g., MA, MS, Jesus, MEd, MSW, GLEN)   Occupational History   • Not on file   Tobacco Use   • Smoking status: Former     Packs/day: 0.50     Years: 2.00     Total pack years: 1.00     Types: Cigarettes     Quit date: 1998     Years since quittin.3   • Smokeless tobacco: Never   Vaping Use   • Vaping Use: Never used   Substance and Sexual Activity   • Alcohol use: Not Currently     Comment: interfering with medication, abstaining at this point   • Drug use: No     Comment: Reports last use of any substance >20years ago   • Sexual activity: Yes     Partners: Female     Birth control/protection: Condom Male   Other Topics Concern   • Not on file   Social History Narrative   • Not on file     Social Determinants of Health Financial Resource Strain: Low Risk  (2021)    Overall Financial Resource Strain (CARDIA)    • Difficulty of Paying Living Expenses: Not hard at all   Food Insecurity: Not on file   Transportation Needs: Not on file   Physical Activity: Unknown (2021)    Exercise Vital Sign    • Days of Exercise per Week: 0 days    • Minutes of Exercise per Session: Not on file   Stress: Stress Concern Present (2021)    [address]    • Feeling of Stress : Rather much   Social Connections: Unknown (2021)    Social Connection and Isolation Panel [NHANES]    • Frequency of Communication with Friends and Family: Once a week    • Frequency of Social Gatherings with Friends and Family: Not on file    • Attends Advent Services: Not on file    • Active Member of Clubs or Organizations: Not on file    • Attends Club or Organization Meetings: Not on file    • Marital Status:    Intimate Partner Violence: Not At Risk (2021)    Humiliation, Afraid, Rape, and Kick questionnaire    • Fear of Current or Ex-Partner: No    • Emotionally Abused: No    • Physically Abused: No    • Sexually Abused: No   Housing Stability: Not on file       Family Psychiatric History:     Family History   Problem Relation Age of Onset   • Depression Mother    • Heart disease Mother    • Lung cancer Mother    • Hypertension Mother    • Anxiety disorder Mother    • Cancer Mother         lung cancer, chronic smoker   • Stroke Father    • Hyperlipidemia Father    • Heart disease Father    • Completed Suicide  Maternal Aunt    • Kidney failure Maternal Grandfather    • Heart attack Paternal Grandfather    • Hearing loss Maternal Grandmother         Constantin   • Heart disease Paternal Grandmother         He  of a heart attack   • Testicular cancer Neg Hx    • Prostate cancer Neg Hx    • Colon cancer Neg Hx        History Review:  The following portions of the patient's history were reviewed and updated as appropriate: allergies, current medications, past family history, past medical history, past social history, past surgical history and problem list.         OBJECTIVE:     Vital signs in last 24 hours: There were no vitals filed for this visit. Mental Status Evaluation:    Appearance age appropriate, casually dressed, dressed appropriately, looks stated age   Behavior pleasant, cooperative, calm   Speech normal rate, normal volume, normal pitch   Mood euthymic   Affect normal range and intensity, appropriate   Thought Processes organized, goal directed, normal rate of thoughts, normal abstract reasoning   Associations intact associations   Thought Content no overt delusions   Perceptual Disturbances: no auditory hallucinations, no visual hallucinations   Abnormal Thoughts  Risk Potential Suicidal ideation - None at present  Homicidal ideation - None at present  Potential for aggression - No   Orientation oriented to person, place, time/date and situation   Memory recent and remote memory grossly intact   Consciousness alert and awake   Attention Span Concentration Span attention span and concentration are age appropriate   Intellect appears to be of average intelligence   Insight intact and good   Judgement intact and good   Muscle Strength and  Gait normal gait and normal balance   Motor activity no abnormal movements   Language no difficulty naming common objects   Fund of Knowledge adequate knowledge of current events   Pain none   Pain Scale Did not ask patient to formally rate       Laboratory Results: I have personally reviewed all pertinent laboratory/tests results    Recent Labs (last 2 months):   No visits with results within 2 Month(s) from this visit.    Latest known visit with results is:   Orders Only on 07/07/2023   Component Date Value   • Total Cholesterol 07/07/2023 159    • HDL 07/07/2023 41    • Triglycerides 07/07/2023 111    • LDL Calculated 07/07/2023 97    • Chol HDLC Ratio 07/07/2023 3.9    • Non-HDL Cholesterol 07/07/2023 118        Suicide/Homicide Risk Assessment:    The following interventions are recommended: contracts for safety at present - agrees to go to ED if feeling unsafe      Lethality Statement:    Based on today's assessment and clinical criteria, Dimitri Milian contracts for safety and is not an imminent risk of harm to self or others. Outpatient level of care is deemed appropriate at this current time. Joel Delgadillo understands that if they can no longer contract for safety, they need to call the office or report to their nearest Emergency Room for immediate evaluation. Assessment/Plan:     Dimitri Milian is a 48 y.o. male with past psychiatric history significant for GORDON, MDD, and insomnia who was personally seen and evaluated today at the [address] [place] [address] outpatient clinic for follow-up and medication management. He first entered [address] [place] [address] in 2003 and shares that he has struggled with anxiety "throughout his life". Kirsten Kay was managed by providers within the [facility][note 3] since 2021, following an inpatient psychiatric admission at [facility]. In addition to full psychiatric assessment today, I reviewed documentation from that visit as well as past visits with providers. Kirsten Kay endorses problematic depression and anxiety at that time in the context of marital discord, increased occupational demands, and COVID-19. He subsequently overdosed on Trazodone and was psychiatrically stabilized on the inpatient unit. Following discharge, Kirsten Kay was linked with [facility] and completed the program, which he states was beneficial. Kirsten Kay was formally diagnosed with MDD and GORDON in the past. He has been trialled on numerous psychotropic agents and was involved in intense psychotherapy. He continues to engage with his therapist, Mercedes Aviles, on a monthly basis.  Kirsten Kay has also prioritized mindfulness, yoga, and spirituality since discharge from the hospital. His interest in [employer] has peaked, which has been difficult for his marriage, as his wife is not accepting of this. Acutely, Dat currently denies most neurovegetative symptomatology suggestive of an acute episode of major depressive disorder or dysthymia. There is a documented history of prior suicidal attempts (overdose -1x).  Dat currently endorses occasional and appropriate anxiety that is not pathologic in nature. Dat denies recent periods of excessive nervousness, irrational worry, or overt anxiousness. Dat vehemently denies any acute or chronic history suggestive of an underlying affective (bipolar) organization. Dat denies historical symptomatology suggestive of an underlying psychotic process. Dat denies historical symptomatology suggestive of PTSD, OCD, or disordered eating. Jeannette Mcginnis denies ETOH or illicit substance abuse. He has no access to firearms/weapons.         Today's Plan/Medical Decision Making:     Psychopharmacologically, Jeannette Mcginnis endorses psychiatric stability. He denies current need for medication change or intervention.        DSM-V Diagnoses:      1.) Generalized Anxiety Disorder  2.) Major Depressive Disorder, in full remission   3.) Insomnia        Treatment Recommendations/Precautions:     1.) Generalized Anxiety Disorder  - Continue Gabapentin 300mg BID - off label use, patient aware and consents      2.) Major Depressive Disorder, in full remission   - Continue Zoloft 75mg Daily  - Continue engagement in individual psychotherapy with mj   - Psychoeducation provided regarding the importance of exercise and healthy dietary choices and their impact on mood, energy, and motivation  - Counseled to avoid ETOH, illict substances, and nicotine secondary to the detrimental effects of these substances on mental and physical health  - Encouraged to engage in non-verbal forms of therapy such as art therapy, music therapy, and mindfulness     3. ) Insomnia  - No active Tx    Medication management every 4 months  Does not want to see a therapist despite recommendation  Aware of need to follow up with family physician for medical issues  Aware of 24 hour and weekend coverage for urgent situations accessed by calling [facility] main practice number    Medications Risks/Benefits      Risks, Benefits And Possible Side Effects Of Medications:    Risks, benefits, and possible side effects of medications explained to Vanessa including risk of suicidality and serotonin syndrome related to treatment with antidepressants. He verbalizes understanding and agreement for treatment. Controlled Medication Discussion:     Not applicable    Psychotherapy Provided:     Individual psychotherapy provided: Yes  Counseling was provided during the session today for 16 minutes. Medications, treatment progress and treatment plan reviewed with Vanessa. Medication education provided to Vanessa. Recent stressors discussed with Vanessa including family issues, marital problems, job stress, problems at work, everyday stressors and occasional anxiety. Coping strategies reviewed with Vanessa. Educated on importance of medication and treatment compliance. Importance of follow up with family physician for medical issues reviewed with Vanessa. Supportive therapy provided. Cognitive therapy was utilized during the session. Treatment Plan:    Completed and signed during the session: Yes - with Vanessa      Visit Time    Visit Start Time: 5:35 PM   Visit Stop Time: 6:08 PM   Total Visit Duration: 33 minutes     The total visit duration detailed above includes: patient engagement, medication management, psychotherapy/counseling, discussion regarding treatment goals, documentation, review of past medical records, and coordination of care. Note Share Disclaimer: This note was not shared with the patient due to reasonable likelihood of causing patient harm      Shelbie Starkey. Mamadou White MD  Board Certified Diplomate of the [address] [place] [address] of Psychiatry and Neurology  10/02/23

## 2023-10-09 NOTE — TELEPHONE ENCOUNTER
Returned call from patient, he is calling to report that last night he developed sharp lower right sided back pain and had blood in his urine  Pain level 2 -3  He took Tylenol,  No fever or chills, no nausea or vomiting  Patient states he spoke to on call provider last night and was told to update us to see if further imaging is needed  Patient has indwelling left ureteral stent and is scheduled for L URS on 6/8/18  This morning urine is clear, pain level remains 2 to 3,  He has not need further pain medication  No fever, chills, nausea or vomiting  Advised will send to advanced practitioner to review  He stopped Flomax yesterday  stable  Lab Results   Component Value Date    HGBA1C 11.8 (H) 07/10/2023

## 2023-10-14 DIAGNOSIS — E78.2 MIXED HYPERLIPIDEMIA: ICD-10-CM

## 2023-10-16 RX ORDER — ROSUVASTATIN CALCIUM 20 MG/1
TABLET, COATED ORAL
Qty: 90 TABLET | Refills: 0 | Status: SHIPPED | OUTPATIENT
Start: 2023-10-16

## 2024-01-14 DIAGNOSIS — E78.2 MIXED HYPERLIPIDEMIA: ICD-10-CM

## 2024-01-15 RX ORDER — ROSUVASTATIN CALCIUM 20 MG/1
TABLET, COATED ORAL
Qty: 90 TABLET | Refills: 0 | Status: SHIPPED | OUTPATIENT
Start: 2024-01-15

## 2024-03-01 NOTE — PSYCH
MEDICATION MANAGEMENT NOTE        [facility]      Name and Date of Birth:  Dat Harp 53 y.o. 1970 MRN: [id number]    Date of Visit: March 4, 2024    Reason for Visit: Follow-up visit for medication management     Virtual Visit Disclaimer:       TeleMed provider: Ronald Braxton MD.   Location: [place]     Verification of patient location:     Patient is currently located in the state [facility]  Patient is currently located in a state in which I am licensed     After connecting through [employer], the patient was identified by name and date of birth.  Dat Harp was informed that this is a telemedicine visit that is being conducted through [employer], and the patient was informed that this is a secure, HIPAA-compliant platform. My office door was closed. No one else was in the room. Dat Harp acknowledged consent and understanding of privacy and security of the video platform. Dat understands that the online visit is based solely on information provided by the patient, and that, in the absence of a face-to-face physical evaluation by the physician, the diagnosis Dat  receives is both limited and provisional in terms of accuracy and completeness. Dat Harp understands that they can discontinue the visit at any time. I informed Dat that I have reviewed their record in EPIC and presented the opportunity for them to ask any questions regarding the visit today. Dat Harp voiced understanding and consented to these terms. Dat is aware this is a billable service. Dat is present at home.      SUBJECTIVE:    Dat Harp is a 53 y.o. male with past psychiatric history significant for GORDON, MDD, and insomnia who was personally seen and evaluated today at the [facility] outpatient clinic for follow-up and medication management. Dat presents as calm, pleasant, and cooperative. His thoughts are linear and  "organized. He completes assessment without difficulty.     Dat endorses compliance with psychotropic medication regimen consisting of gabapentin and Zoloft. He denies adverse medication side effects. Arnold reports psychiatric stability since last visit. He describes his overall state of MH as \"pretty tranquil\". He rates his MH state as a \"8.5/10\" today (10 being best he ever felt, 0 being worst he ever felt). His sleep and appetite are adequate. He denies thoughts of suicide or self-harm when asked. No HI today. Arnold reports adequate energy and motivation. He reports improvement regarding relationship he has with his boss. We discussed the importance of boundary setting today and his willingness to say \"no\" regarding occupational demands as occupational distress will inevitably permeate other aspects of his life. Arnold was receptive to this. We discussed ways his perception of reality often influences his emotions, thoughts, and behaviors and much of this is not fact-based. We processed the campsite example and analogies were used (ie psychiatric A1c vs finger stick) to normalize periods of regression and human emotion. Arnold is not experience anhedonia, daily crying spells, or profound feelings of hopelessness. He continues to enjoy time with his wife and granddaughter. He is future-oriented, detailing plans to celebrate his wife's birthday this weekend. We also discussed potential travel plans over the summer, including [place], [place] and the [place]. Arnold reports some periods of worry, tension, and feeling on-edge but denies that these are pathologic at the moment. He does not experience daily panic attacks. We once again discussed his emotional house and ways to fortify his house for the inevitable \"storm\". Given MH stability, we did discuss Arnold transitioning to his PCP for medication management. He will speak to him in June 2024 regarding this. During today's examination, Dat does not exhibit " objective evidence of jay/hypomania or psychosis. Dat is not currently irritable, grandiose, labile, or pathologically euphoric. Dat is without perceptual disturbances, such as A/V hallucinations, paranoia, ideas of reference, or delusional beliefs. Dat denies recent ETOH or illicit substance abuse. Dat offers no further concerns.       Current Rating Scores:     None completed today.    Review Of Systems:      Constitutional negative   ENT negative   Cardiovascular negative   Respiratory negative   Gastrointestinal negative   Genitourinary negative   Musculoskeletal negative   Integumentary negative   Neurological negative   Endocrine negative   Other Symptoms none, all other systems are negative       Past Psychiatric History: (unchanged information from previous note copied and italicized) - Information that is bolded has been updated.      Inpatient psychiatric admissions: 1x - [facility] in January 2021 secondary to depression, anxiety, and overdose on Trazodone - He also completed PHP in March 2021   Prior outpatient psychiatric linkage: Previously linked with EDWAR Coulter and Dr. Gaspar via [facility]  Past/current psychotherapy: previously linked with Emily Garcia (private therapist)  History of suicidal attempts/gestures: 1x- overdose on Trazodone in 2021  History of violence/aggressive behaviors: Denies  Psychotropic medication trials: Lexapro, Zoloft, Xanax, Klonopin, Hydroxyzine, Seroquel, Gabapentin  Substance abuse inpatient/outpatient rehabilitation: Denies     Substance Abuse History: (unchanged information from previous note copied and italicized) - Information that is bolded has been updated.      No history of ETOH, illict substance, or tobacco abuse. No past legal actions or arrests secondary to substance intoxication. The patient denies prior DWIs/DUIs. No history of outpatient/inpatient rehabilitation programs. Dat does not exhibit objective evidence of  "substance withdrawal during today's examination nor does Dat appear under the influence of any psychoactive substance.          Social History: (unchanged information from previous note copied and italicized) - Information that is bolded has been updated.      Developmental: Denies a history of milestone/developmental delay. Denies a history of in-utero exposure to toxins/illicit substances. There is no documented history of IEP or need for special education. Arnold shares that his mother struggled immensely during childhood with MH concerns. His father was supportive. Arnold describes the emotionality of the house as \"tense\". He has 2 siblings, both of which he speaks to.   Education: post college graduate work or degree - Master's degree in English from [employer]   Marital history:   Living arrangement, social support: wife, 18 year old daughter , also has 30 year old step son now new granddaughter   Occupational History: Employed full time as  for [employer]   Access to firearms: Denies direct access to weapons/firearms. Dat Harp has no history of arrests or violence with a deadly weapon.      Traumatic History: (unchanged information from previous note copied and italicized) - Information that is bolded has been updated.      Abuse:none is reported  Other Traumatic Events: Mother's death in 2018 was challenging    Past Medical History:    Past Medical History:   Diagnosis Date    Anxiety     Depression     Hyperlipidemia     controlled with diet and exercise    Kidney stone     Obstructive sleep apnea     Panic attack     Renal disorder     kidney stones    Suicidal ideation 01/17/2021    Suicide attempt (HCC)     Urinary tract infection May 9, 2018        Past Surgical History:   Procedure Laterality Date    APPENDECTOMY      AL CYSTO/URETERO W/LITHOTRIPSY &INDWELL STENT INSRT Left 5/9/2018    Procedure: CYSTOSCOPY URETEROSCOPY,LEFT  RETROGRADE PYELOGRAM AND INSERTION STENT " URETERAL;  Surgeon: Dakota Driver MD;  Location: [facility];  Service: Urology    MI CYSTO/URETERO W/LITHOTRIPSY &INDWELL STENT INSRT Left 2018    Procedure: CYSTOSCOPY URETEROSCOPY WITH LITHOTRIPSY HOLMIUM LASER, RETROGRADE PYELOGRAM AND INSERTION STENT URETERAL;  Surgeon: Dakota Driver MD;  Location: [facility];  Service: Urology    TONSILLECTOMY      WISDOM TOOTH EXTRACTION      WISDOM TOOTH EXTRACTION       Allergies   Allergen Reactions    Betadine Antibiotic-Moisturize [Bacitracin-Polymyxin B] Rash       Substance Abuse History:    Social History     Substance and Sexual Activity   Alcohol Use Not Currently    Comment: interfering with medication, abstaining at this point     Social History     Substance and Sexual Activity   Drug Use No    Comment: Reports last use of any substance >20years ago       Social History:    Social History     Socioeconomic History    Marital status: /Civil Union     Spouse name: Not on file    Number of children: Not on file    Years of education: Not on file    Highest education level: Master's degree (e.g., MA, MS, Jesus, MEd, MSW, GLEN)   Occupational History    Not on file   Tobacco Use    Smoking status: Former     Current packs/day: 0.00     Average packs/day: 0.5 packs/day for 2.0 years (1.0 ttl pk-yrs)     Types: Cigarettes     Start date: 1996     Quit date: 1998     Years since quittin.8    Smokeless tobacco: Never   Vaping Use    Vaping status: Never Used   Substance and Sexual Activity    Alcohol use: Not Currently     Comment: interfering with medication, abstaining at this point    Drug use: No     Comment: Reports last use of any substance >20years ago    Sexual activity: Yes     Partners: Female     Birth control/protection: Condom Male   Other Topics Concern    Not on file   Social History Narrative    Not on file     Social Determinants of Health     Financial Resource Strain: Low Risk  (2021)    Overall Financial  Resource Strain (CARDIA)     Difficulty of Paying Living Expenses: Not hard at all   Food Insecurity: Not on file   Transportation Needs: Not on file   Physical Activity: Unknown (2021)    Exercise Vital Sign     Days of Exercise per Week: 0 days     Minutes of Exercise per Session: Not on file   Stress: Stress Concern Present (2021)    [employer] - Occupational Stress Questionnaire     Feeling of Stress : Rather much   Social Connections: Unknown (2021)    Social Connection and Isolation Panel [NHANES]     Frequency of Communication with Friends and Family: Once a week     Frequency of Social Gatherings with Friends and Family: Not on file     Attends Denominational Services: Not on file     Active Member of Clubs or Organizations: Not on file     Attends Club or Organization Meetings: Not on file     Marital Status:    Intimate Partner Violence: Not At Risk (2021)    Humiliation, Afraid, Rape, and Kick questionnaire     Fear of Current or Ex-Partner: No     Emotionally Abused: No     Physically Abused: No     Sexually Abused: No   Housing Stability: Not on file       Family Psychiatric History:     Family History   Problem Relation Age of Onset    Depression Mother     Heart disease Mother     Lung cancer Mother     Hypertension Mother     Anxiety disorder Mother     Cancer Mother         lung cancer, chronic smoker    Stroke Father     Hyperlipidemia Father     Heart disease Father     Completed Suicide  Maternal Aunt     Kidney failure Maternal Grandfather     Heart attack Paternal Grandfather     Hearing loss Maternal Grandmother         Constantin    Heart disease Paternal Grandmother         He  of a heart attack    Testicular cancer Neg Hx     Prostate cancer Neg Hx     Colon cancer Neg Hx        History Review: The following portions of the patient's history were reviewed and updated as appropriate: allergies, current medications, past family history,  past medical history, past social history, past surgical history, and problem list.         OBJECTIVE:     Vital signs in last 24 hours:    There were no vitals filed for this visit.    Mental Status Evaluation:    Appearance age appropriate, casually dressed, dressed appropriately   Behavior pleasant, cooperative, calm, good eye contact   Speech normal rate, normal volume, normal pitch   Mood euthymic   Affect normal range and intensity, appropriate   Thought Processes organized, coherent, goal directed, normal rate of thoughts, normal abstract reasoning   Associations intact associations   Thought Content no overt delusions   Perceptual Disturbances: no auditory hallucinations, no visual hallucinations   Abnormal Thoughts  Risk Potential Suicidal ideation - None at present  Homicidal ideation - None at present  Potential for aggression - No   Orientation oriented to person, place, time/date, and situation   Memory recent and remote memory grossly intact   Consciousness alert and awake   Attention Span Concentration Span attention span and concentration are age appropriate   Intellect appears to be of average intelligence   Insight intact and good   Judgement intact and good   Muscle Strength and  Gait unable to assess today due to virtual visit   Motor activity no abnormal movements   Language no difficulty naming common objects   Fund of Knowledge adequate knowledge of current events   Pain none   Pain Scale Did not ask patient to formally rate       Laboratory Results: I have personally reviewed all pertinent laboratory/tests results    Recent Labs (last 2 months):   No visits with results within 2 Month(s) from this visit.   Latest known visit with results is:   Orders Only on 07/07/2023   Component Date Value    Total Cholesterol 07/07/2023 159     HDL 07/07/2023 41     Triglycerides 07/07/2023 111     LDL Calculated 07/07/2023 97     Chol HDLC Ratio 07/07/2023 3.9     Non-HDL Cholesterol 07/07/2023 118   "      Suicide/Homicide Risk Assessment:    The following interventions are recommended: no intervention changes needed      Lethality Statement:    Based on today's assessment and clinical criteria, Dat Harp contracts for safety and is not an imminent risk of harm to self or others. Outpatient level of care is deemed appropriate at this current time. Dat understands that if they can no longer contract for safety, they need to call the office or report to their nearest Emergency Room for immediate evaluation.      Assessment/Plan:     Dat Harp is a 53 y.o. male with past psychiatric history significant for GORDON, MDD, and insomnia who was personally seen and evaluated today at the [facility] outpatient clinic for follow-up and medication management. He first entered  treatment in 2003 and shares that he has struggled with anxiety \"throughout his life\". Arnold was managed by providers within the [facility] since 2021, following an inpatient psychiatric admission at [facility]. Arnold endorsed problematic depression and anxiety at that time in the context of marital discord, increased occupational demands, and COVID-19. He subsequently overdosed on Trazodone and was psychiatrically stabilized on the inpatient unit. Following discharge, Arnold was linked with PHP and completed the program, which he states was beneficial. Arnold was formally diagnosed with MDD and GORDON in the past. He has been trialled on numerous psychotropic agents and was involved in intense psychotherapy. Arnold has also prioritized mindfulness, yoga, and spirituality since discharge from the hospital. His interest in [employer] has peaked, which has been difficult for his marriage, as his wife is not accepting of this. Acutely, Dat currently denies most neurovegetative symptomatology suggestive of an acute episode of major depressive disorder or dysthymia. There is a documented history of prior suicidal attempts (overdose " -1x). Dat currently endorses occasional and appropriate anxiety that is not pathologic in nature. Dat denies recent periods of excessive nervousness, irrational worry, or overt anxiousness. Dat vehemently denies any acute or chronic history suggestive of an underlying affective (bipolar) organization. Dat denies historical symptomatology suggestive of an underlying psychotic process. Dat denies historical symptomatology suggestive of PTSD, OCD, or disordered eating. Arnold denies ETOH or illicit substance abuse. He has no access to firearms/weapons.         Today's Plan/Medical Decision Making:     Psychopharmacologically, Arnold reports benefit and tolerability with current regimen. He denies need for medication change or intervention.        DSM-V Diagnoses:      1.) Generalized Anxiety Disorder  2.) Major Depressive Disorder, in full remission   3.) Insomnia        Treatment Recommendations/Precautions:     1.) Generalized Anxiety Disorder  - Continue Gabapentin 300mg BID - off label use, patient aware and consents      2.) Major Depressive Disorder, in full remission   - Continue Zoloft 75mg Daily    3.) Insomnia  - No active Tx    Medication management every 6 months  Aware of need to follow up with family physician for medical issues  Aware of 24 hour and weekend coverage for urgent situations accessed by calling [facility] main practice number    Medications Risks/Benefits      Risks, Benefits And Possible Side Effects Of Medications:    Risks, benefits, and possible side effects of medications explained to Dat including risk of suicidality and serotonin syndrome related to treatment with antidepressants. He verbalizes understanding and agreement for treatment.    Controlled Medication Discussion:     Not applicable    Psychotherapy Provided:     Individual psychotherapy provided: Yes  Counseling was provided during the session today for 16 minutes.  Medications,  treatment progress and treatment plan reviewed with [place].  Medication education provided to [place].  Recent stressors discussed with Dat including job stress, problems at work, everyday stressors, and occasional anxiety.  Educated on importance of medication and treatment compliance.  Supportive therapy provided.   Cognitive therapy was utilized during the session.     Treatment Plan:    Completed and signed during the session: Yes - Treatment Plan done but not signed at time of office visit due to:  Plan reviewed by video and verbal consent given due to virtual visit.      Visit Time    Visit Start Time: 2:30 PM  Visit Stop Time: 2:55 PM  Total Visit Duration:  25 minutes     The total visit duration detailed above includes: patient engagement, medication management, psychotherapy/counseling, discussion regarding treatment goals, documentation, review of past medical records, and coordination of care.      Ronald Braxton MD  Board Certified Diplomate of the American Board of Psychiatry and Neurology  03/04/24

## 2024-03-04 ENCOUNTER — TELEMEDICINE (OUTPATIENT)
Dept: PSYCHIATRY | Facility: CLINIC | Age: 54
End: 2024-03-04
Payer: COMMERCIAL

## 2024-03-04 DIAGNOSIS — F32.5 MAJOR DEPRESSIVE DISORDER WITH SINGLE EPISODE, IN FULL REMISSION (HCC): Primary | ICD-10-CM

## 2024-03-04 DIAGNOSIS — F41.1 GENERALIZED ANXIETY DISORDER: ICD-10-CM

## 2024-03-04 PROCEDURE — 99213 OFFICE O/P EST LOW 20 MIN: CPT | Performed by: STUDENT IN AN ORGANIZED HEALTH CARE EDUCATION/TRAINING PROGRAM

## 2024-03-04 PROCEDURE — 90833 PSYTX W PT W E/M 30 MIN: CPT | Performed by: STUDENT IN AN ORGANIZED HEALTH CARE EDUCATION/TRAINING PROGRAM

## 2024-03-04 RX ORDER — SERTRALINE HYDROCHLORIDE 25 MG/1
25 TABLET, FILM COATED ORAL DAILY
Qty: 90 TABLET | Refills: 2 | Status: SHIPPED | OUTPATIENT
Start: 2024-03-04 | End: 2024-08-31

## 2024-03-04 RX ORDER — GABAPENTIN 300 MG/1
300 CAPSULE ORAL 2 TIMES DAILY
Qty: 180 CAPSULE | Refills: 2 | Status: SHIPPED | OUTPATIENT
Start: 2024-03-04 | End: 2024-08-31

## 2024-03-04 NOTE — BH TREATMENT PLAN
TREATMENT PLAN (Medication Management Only)        [facility]      Name and Date of Birth:  Dat Harp 53 y.o. 1970 MRN: [id number]    Date of Visit: March 4, 2024    Diagnosis/Diagnoses:    1. Major depressive disorder with single episode, in full remission (HCC)        2. GORDON (generalized anxiety disorder)            Strengths/Personal Resources for Self-Care: supportive family, supportive friends, taking medications as prescribed, ability to adapt to life changes, ability to communicate needs, ability to communicate well, ability to listen, ability to reason, good understanding of illness, independence, motivation for treatment, ability to negotiate basic needs, being resoureceful, self-reliance, sense of humor, special hobby/interest, stable employment, well educated, willingness to work on problems.    Area/Areas of need (in own words): anxiety symptoms  1. Long Term Goal: maintain control of anxiety.  Target Date:6 months - 9/4/2024  Person/Persons responsible for completion of goal: Dat  2.  Short Term Objective (s) - How will we reach this goal?:   A. Provider new recommended medication/dosage changes and/or continue medication(s): continue current medications as prescribed.  B. Keep regularly scheduled psychiatric appointments  C. Maintain adherence to psychotropic medication regimen   D. Exercise more frequently, such as walking  E. Maintain appropriate dietary intake  F. Practice adequate sleep hygiene    G. Continue to spend time with granddaughter    Target Date:6 months - 9/4/2024  Person/Persons Responsible for Completion of Goal: Dat    Progress Towards Goals: continuing treatment    Treatment Modality: medication management every 3 months    Review due 180 days from date of this plan: 6 months - 9/4/2024  Expected length of service: ongoing treatment    My Physician/PA/NP and I have developed this plan together and I agree to work on  the goals and objectives. I understand the treatment goals that were developed for my treatment. Treatment Plan was completed with Dat's assistance and input throughout today's session. Dat consented to the information provided and documented above. Unfortunately, treatment plan was not signed at the time of this office visit secondary to issues related to signature keypad.

## 2024-03-06 ENCOUNTER — TELEPHONE (OUTPATIENT)
Dept: PSYCHIATRY | Facility: CLINIC | Age: 54
End: 2024-03-06

## 2024-03-06 NOTE — TELEPHONE ENCOUNTER
Called and left message for patient to return a call to [phone] and schedule 6 month follow up with provider (9/4/2024). Please schedule upon return call. Thank you.

## 2024-03-13 ENCOUNTER — OFFICE VISIT (OUTPATIENT)
Dept: FAMILY MEDICINE CLINIC | Facility: CLINIC | Age: 54
End: 2024-03-13
Payer: COMMERCIAL

## 2024-03-13 VITALS
HEART RATE: 86 BPM | SYSTOLIC BLOOD PRESSURE: 124 MMHG | RESPIRATION RATE: 16 BRPM | WEIGHT: 184 LBS | TEMPERATURE: 98.1 F | BODY MASS INDEX: 26.34 KG/M2 | OXYGEN SATURATION: 98 % | HEIGHT: 70 IN | DIASTOLIC BLOOD PRESSURE: 78 MMHG

## 2024-03-13 DIAGNOSIS — J02.0 STREP PHARYNGITIS: Primary | ICD-10-CM

## 2024-03-13 DIAGNOSIS — R09.81 NASAL CONGESTION: ICD-10-CM

## 2024-03-13 DIAGNOSIS — J02.9 SORE THROAT: ICD-10-CM

## 2024-03-13 PROCEDURE — 99213 OFFICE O/P EST LOW 20 MIN: CPT | Performed by: FAMILY MEDICINE

## 2024-03-13 RX ORDER — AMOXICILLIN 500 MG/1
500 CAPSULE ORAL EVERY 12 HOURS SCHEDULED
Qty: 20 CAPSULE | Refills: 0 | Status: SHIPPED | OUTPATIENT
Start: 2024-03-13 | End: 2024-03-23

## 2024-04-14 DIAGNOSIS — E78.2 MIXED HYPERLIPIDEMIA: ICD-10-CM

## 2024-04-14 RX ORDER — ROSUVASTATIN CALCIUM 20 MG/1
TABLET, COATED ORAL
Qty: 90 TABLET | Refills: 0 | Status: SHIPPED | OUTPATIENT
Start: 2024-04-14

## 2024-06-24 ENCOUNTER — OFFICE VISIT (OUTPATIENT)
Dept: FAMILY MEDICINE CLINIC | Facility: CLINIC | Age: 54
End: 2024-06-24
Payer: COMMERCIAL

## 2024-06-24 VITALS
DIASTOLIC BLOOD PRESSURE: 72 MMHG | BODY MASS INDEX: 25.91 KG/M2 | OXYGEN SATURATION: 97 % | SYSTOLIC BLOOD PRESSURE: 110 MMHG | HEIGHT: 70 IN | WEIGHT: 181 LBS | HEART RATE: 71 BPM

## 2024-06-24 DIAGNOSIS — H91.93 DECREASED HEARING OF BOTH EARS: ICD-10-CM

## 2024-06-24 DIAGNOSIS — E78.00 HYPERCHOLESTEROLEMIA: ICD-10-CM

## 2024-06-24 DIAGNOSIS — Z13.0 SCREENING FOR DEFICIENCY ANEMIA: ICD-10-CM

## 2024-06-24 DIAGNOSIS — F32.5 MAJOR DEPRESSIVE DISORDER WITH SINGLE EPISODE, IN FULL REMISSION (HCC): ICD-10-CM

## 2024-06-24 DIAGNOSIS — Z13.1 SCREENING FOR DIABETES MELLITUS: ICD-10-CM

## 2024-06-24 DIAGNOSIS — E78.2 MIXED HYPERLIPIDEMIA: ICD-10-CM

## 2024-06-24 DIAGNOSIS — Z00.00 ENCOUNTER FOR SCREENING AND PREVENTATIVE CARE: Primary | ICD-10-CM

## 2024-06-24 DIAGNOSIS — F41.1 GAD (GENERALIZED ANXIETY DISORDER): ICD-10-CM

## 2024-06-24 DIAGNOSIS — Z13.220 SCREENING CHOLESTEROL LEVEL: ICD-10-CM

## 2024-06-24 PROCEDURE — 99396 PREV VISIT EST AGE 40-64: CPT | Performed by: FAMILY MEDICINE

## 2024-06-24 NOTE — PROGRESS NOTES
Adult Annual Physical  Name: Dat Harp      : 1970      MRN: [id number]  Encounter Provider: Derik Morgan DO  Encounter Date: 2024   Encounter department: [facility]    Assessment & Plan   Encounter for screening and preventative care  Screening cholesterol level  Screening for diabetes mellitus  Screening for deficiency anemia  Patient presents for annual physical.  Will place orders for cholesterol, cell lines, liver function, kidney function, electrolytes, fasting sugar.  We previously have avoided any other labs outside of cholesterol secondary to the patient's insurance.  He will look into whether or not he can obtain all or just follow-up with lipid panel.  If possible CMP would also be very helpful to review for any underlying LFT concerns that the statins can cause.  He has no significant symptoms, this would just be to confirm there is no other issues  -     Lipid panel; Future; Expected date: 2024  -     Comprehensive metabolic panel; Future; Expected date: 2024  -     CBC and Platelet; Future; Expected date: 2024    5. Decreased hearing of both ears  Patient has noted a decrease in hearing over the course of the last several years.  He was previously evaluated by audiology in .  Patient believes that it would be beneficial for follow-up and consideration for amplification or hearing aid.  -     Ambulatory Referral to Audiology; Future    GORDON (generalized anxiety disorder)  Major depressive disorder with single episode, in full remission (HCC)  Stable.  Currently on Zoloft 75 mg daily and gabapentin 300 mg twice daily.  This has kept his anxiety and depression at a steady state.  We discussed briefly care taking over the medication management of his psychiatric meds.  I informed him that I take care of these meds regularly and be happy to take them over, but would like to consider him seeing psychiatry once a year to allow for  reevaluation if there is any exacerbation of mental health    Hypercholesterolemia  Mixed hyperlipidemia  Patient has history of high cholesterol and mixed hyperlipidemia.  With Crestor 20 mg daily this has significantly improved and is now within normal limits, last evaluation was July 2023.  Will continue to monitor cholesterol  -     Lipid panel; Future; Expected date: 06/24/2024  -     Comprehensive metabolic panel; Future; Expected date: 06/24/2024    Immunizations and preventive care screenings were discussed with patient today. Appropriate education was printed on patient's after visit summary.    Discussed risks and benefits of prostate cancer screening. We discussed the controversial history of PSA screening for prostate cancer in the [place] as well as the risk of over detection and over treatment of prostate cancer by way of PSA screening.  The patient understands that PSA blood testing is an imperfect way to screen for prostate cancer and that elevated PSA levels in the blood may also be caused by infection, inflammation, prostatic trauma or manipulation, urological procedures, or by benign prostatic enlargement.    The role of the digital rectal examination in prostate cancer screening was also discussed and I discussed with him that there is large interobserver variability in the findings of digital rectal examination.    Counseling:  Alcohol/drug use: discussed moderation in alcohol intake, the recommendations for healthy alcohol use, and avoidance of illicit drug use.  Dental Health: discussed importance of regular tooth brushing, flossing, and dental visits.  Sexual health: discussed sexually transmitted diseases, partner selection, use of condoms, avoidance of unintended pregnancy, and contraceptive alternatives.  Exercise: the importance of regular exercise/physical activity was discussed. Recommend exercise 3-5 times per week for at least 30 minutes.          History of Present Illness      Adult Annual Physical:  Patient presents for annual physical. Patient presents for annual physical.  Overall he has been doing well on current medication management and is hopeful that we may be able to transfer his psychiatric care/medication to family medicine since it can be difficult review with current psychiatrist.  Additionally the patient feels that he has had a well-balanced diet continues to exercise the best he can and although with the recent heat it has been more difficult.  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  He recently obtained labs that demonstrate normal cholesterol and is tolerating the Crestor 20 mg daily well.    We briefly talked about sleep and possible sleep apnea/snoring.  He previously had a sleep study in 2021 that resulted in CPAP, he was unable to tolerate the CPAP and it kept his significant other up more than the snoring.  We reviewed the importance of sleep apnea and possible complications such as A-fib or pulmonary hypertension..     Diet and Physical Activity:  - Diet/Nutrition: well balanced diet.  - Exercise: 30-60 minutes on average. 2-3x per week cycling    Depression Screening:    - PHQ-9 Score: 0    General Health:  - Sleep: sleeps well and 7-8 hours of sleep on average.  - Hearing: decreased hearing bilateral ears.  - Vision: goes for regular eye exams and wears glasses.  - Dental: regular dental visits.     Health:  - History of STDs: no.   - Urinary symptoms: none.     Advanced Care Planning:  - Has an advanced directive?: no    - Has a durable medical POA?: no    - ACP document given to patient?: yes      Review of Systems  Medical History Reviewed by provider this encounter:  Tobacco  Allergies  Meds  Problems  Med Hx  Surg Hx  Fam Hx  Soc   Hx      Current Outpatient Medications on File Prior to Visit   Medication Sig Dispense Refill    gabapentin (NEURONTIN) 300 mg capsule Take 1  "capsule (300 mg total) by mouth 2 (two) times a day 180 capsule 2    rosuvastatin (CRESTOR) 20 MG tablet TAKE 1 TABLET BY MOUTH EVERY DAY 90 tablet 0    sertraline (ZOLOFT) 25 mg tablet Take 1 tablet (25 mg total) by mouth daily 90 tablet 2    sertraline (ZOLOFT) 50 mg tablet Take 1 tablet (50 mg total) by mouth daily At 9am 90 tablet 2     No current facility-administered medications on file prior to visit.      Social History     Tobacco Use    Smoking status: Former     Types: Cigarettes    Smokeless tobacco: Never    Tobacco comments:     Quit 26 years ago, smoking for 2 year- 0.25ppd   Vaping Use    Vaping status: Never Used   Substance and Sexual Activity    Alcohol use: Not Currently     Comment: 3-4 drinks, 1-2x per month    Drug use: No     Comment: Reports last use of any substance >20years ago    Sexual activity: Yes     Partners: Female     Birth control/protection: Condom Male       Objective     /72   Pulse 71   Ht 5' 10\" (1.778 m)   Wt 82.1 kg (181 lb)   SpO2 97%   BMI 25.97 kg/m²     Physical Exam  Constitutional:       General: He is not in acute distress.     Appearance: Normal appearance. He is normal weight. He is not ill-appearing, toxic-appearing or diaphoretic.   HENT:      Head: Normocephalic and atraumatic.      Right Ear: Tympanic membrane, ear canal and external ear normal. There is no impacted cerumen.      Left Ear: Tympanic membrane, ear canal and external ear normal. There is no impacted cerumen.      Nose: Nose normal. No congestion or rhinorrhea.      Mouth/Throat:      Mouth: Mucous membranes are moist.      Pharynx: Oropharynx is clear. No oropharyngeal exudate or posterior oropharyngeal erythema.   Eyes:      General:         Right eye: No discharge.         Left eye: No discharge.      Extraocular Movements: Extraocular movements intact.      Pupils: Pupils are equal, round, and reactive to light.   Cardiovascular:      Rate and Rhythm: Normal rate and regular rhythm. "      Heart sounds: Normal heart sounds. No murmur heard.     No friction rub. No gallop.   Pulmonary:      Effort: Pulmonary effort is normal. No respiratory distress.      Breath sounds: Normal breath sounds. No stridor. No wheezing, rhonchi or rales.   Abdominal:      General: Bowel sounds are normal. There is no distension.      Palpations: Abdomen is soft.      Tenderness: There is no abdominal tenderness.   Musculoskeletal:      Cervical back: Neck supple. No tenderness.   Lymphadenopathy:      Cervical: No cervical adenopathy.   Skin:     Capillary Refill: Capillary refill takes less than 2 seconds.   Neurological:      Mental Status: He is alert.      Sensory: No sensory deficit (Light touch present in all 4 extremities).      Motor: No weakness (5/5 in all 4 extremities).      Deep Tendon Reflexes: Reflexes normal (2/4, intact, C5, C6, L4, S1).

## 2024-07-11 ENCOUNTER — TELEPHONE (OUTPATIENT)
Dept: PSYCHIATRY | Facility: CLINIC | Age: 54
End: 2024-07-11

## 2024-07-11 NOTE — TELEPHONE ENCOUNTER
Dat Harp and/or patient requested a call back to discuss patient consulted with his General Practitioner in regard to taking over his medication. His GP would like patient to continue seeing provider at this office once a year for med consultations. Patient is asking provider if this is something he is willing to do.    They can be reached at P# [phone].       Thank you.

## 2024-07-12 NOTE — TELEPHONE ENCOUNTER
Sure, Arnold can remain within the clinic. My only request is that it's every 6 months (not yearly) given that we have to update treatment plans (as per regulations) [id number]. Britta - please schedule Arnold accordingly. It can be anytime in the fall (October, November, December) unless there are more pertinent concerns.

## 2024-07-12 NOTE — TELEPHONE ENCOUNTER
Called and spoke with patient and scheduled appointment for 9/19 2PM virtual. Patient is in agreement to be seen every 6 months and understands in office visits are required every so often as well. Patient thanked staff for the call.

## 2024-07-14 DIAGNOSIS — E78.2 MIXED HYPERLIPIDEMIA: ICD-10-CM

## 2024-07-14 RX ORDER — ROSUVASTATIN CALCIUM 20 MG/1
TABLET, COATED ORAL
Qty: 30 TABLET | Refills: 0 | Status: SHIPPED | OUTPATIENT
Start: 2024-07-14

## 2024-07-16 LAB
ALBUMIN SERPL-MCNC: 4.5 G/DL (ref 3.6–5.1)
ALBUMIN/GLOB SERPL: 2 (CALC) (ref 1–2.5)
ALP SERPL-CCNC: 58 U/L (ref 35–144)
ALT SERPL-CCNC: 22 U/L (ref 9–46)
AST SERPL-CCNC: 19 U/L (ref 10–35)
BASOPHILS # BLD AUTO: 20 CELLS/UL (ref 0–200)
BASOPHILS NFR BLD AUTO: 0.4 %
BILIRUB SERPL-MCNC: 0.8 MG/DL (ref 0.2–1.2)
BUN SERPL-MCNC: 12 MG/DL (ref 7–25)
BUN/CREAT SERPL: NORMAL (CALC) (ref 6–22)
CALCIUM SERPL-MCNC: 9.2 MG/DL (ref 8.6–10.3)
CHLORIDE SERPL-SCNC: 105 MMOL/L (ref 98–110)
CHOLEST SERPL-MCNC: 141 MG/DL
CHOLEST/HDLC SERPL: 3.9 (CALC)
CO2 SERPL-SCNC: 31 MMOL/L (ref 20–32)
CREAT SERPL-MCNC: 0.8 MG/DL (ref 0.7–1.3)
EOSINOPHIL # BLD AUTO: 39 CELLS/UL (ref 15–500)
EOSINOPHIL NFR BLD AUTO: 0.8 %
ERYTHROCYTE [DISTWIDTH] IN BLOOD BY AUTOMATED COUNT: 12.8 % (ref 11–15)
GFR/BSA.PRED SERPLBLD CYS-BASED-ARV: 106 ML/MIN/1.73M2
GLOBULIN SER CALC-MCNC: 2.2 G/DL (CALC) (ref 1.9–3.7)
GLUCOSE SERPL-MCNC: 91 MG/DL (ref 65–99)
HCT VFR BLD AUTO: 41.7 % (ref 38.5–50)
HDLC SERPL-MCNC: 36 MG/DL
HGB BLD-MCNC: 14.3 G/DL (ref 13.2–17.1)
LDLC SERPL CALC-MCNC: 83 MG/DL (CALC)
LYMPHOCYTES # BLD AUTO: 1666 CELLS/UL (ref 850–3900)
LYMPHOCYTES NFR BLD AUTO: 34 %
MCH RBC QN AUTO: 31.4 PG (ref 27–33)
MCHC RBC AUTO-ENTMCNC: 34.3 G/DL (ref 32–36)
MCV RBC AUTO: 91.4 FL (ref 80–100)
MONOCYTES # BLD AUTO: 382 CELLS/UL (ref 200–950)
MONOCYTES NFR BLD AUTO: 7.8 %
NEUTROPHILS # BLD AUTO: 2793 CELLS/UL (ref 1500–7800)
NEUTROPHILS NFR BLD AUTO: 57 %
NONHDLC SERPL-MCNC: 105 MG/DL (CALC)
PLATELET # BLD AUTO: 144 THOUSAND/UL (ref 140–400)
PMV BLD REES-ECKER: 9.9 FL (ref 7.5–12.5)
POTASSIUM SERPL-SCNC: 4 MMOL/L (ref 3.5–5.3)
PROT SERPL-MCNC: 6.7 G/DL (ref 6.1–8.1)
RBC # BLD AUTO: 4.56 MILLION/UL (ref 4.2–5.8)
SODIUM SERPL-SCNC: 141 MMOL/L (ref 135–146)
TRIGL SERPL-MCNC: 128 MG/DL
WBC # BLD AUTO: 4.9 THOUSAND/UL (ref 3.8–10.8)

## 2024-07-22 ENCOUNTER — TELEPHONE (OUTPATIENT)
Age: 54
End: 2024-07-22

## 2024-07-22 DIAGNOSIS — F41.1 GENERALIZED ANXIETY DISORDER: ICD-10-CM

## 2024-07-22 RX ORDER — GABAPENTIN 300 MG/1
300 CAPSULE ORAL DAILY
Qty: 90 CAPSULE | Refills: 1 | Status: SHIPPED | OUTPATIENT
Start: 2024-07-22 | End: 2025-01-18

## 2024-07-22 RX ORDER — SERTRALINE HYDROCHLORIDE 25 MG/1
25 TABLET, FILM COATED ORAL DAILY
Qty: 90 TABLET | Refills: 1 | Status: SHIPPED | OUTPATIENT
Start: 2024-07-22 | End: 2025-01-18

## 2024-07-22 NOTE — TELEPHONE ENCOUNTER
Spoke with patient, patient would like to speak with the provider in regards to the gabapentin and Zoloft. Please advise.

## 2024-07-22 NOTE — TELEPHONE ENCOUNTER
Patient to review concern about medication and refills.  I informed him that I was comfortable with prescribing the medication, but we would be at a loss if he required any major changes or additional medication since it would likely take more time to follow-up with the psychiatrist.  Patient is comfortable with continuing medication with me at this time.  New prescription was sent over.  He has been cutting down on his gabapentin, will change prescription from 300 twice daily to daily.  He prefers to remain on the 75 mg of Zoloft.  Patient is to call with any new changes or concerns with medication management.

## 2024-08-11 DIAGNOSIS — E78.2 MIXED HYPERLIPIDEMIA: ICD-10-CM

## 2024-08-11 RX ORDER — ROSUVASTATIN CALCIUM 20 MG/1
20 TABLET, COATED ORAL DAILY
Qty: 90 TABLET | Refills: 1 | Status: SHIPPED | OUTPATIENT
Start: 2024-08-11

## 2024-09-01 DIAGNOSIS — F41.1 GENERALIZED ANXIETY DISORDER: ICD-10-CM

## 2024-09-02 RX ORDER — SERTRALINE HYDROCHLORIDE 25 MG/1
25 TABLET, FILM COATED ORAL DAILY
Qty: 90 TABLET | Refills: 0 | Status: SHIPPED | OUTPATIENT
Start: 2024-09-02 | End: 2025-03-01

## 2024-10-16 DIAGNOSIS — Z00.6 ENCOUNTER FOR EXAMINATION FOR NORMAL COMPARISON OR CONTROL IN CLINICAL RESEARCH PROGRAM: ICD-10-CM

## 2025-02-04 DIAGNOSIS — E78.2 MIXED HYPERLIPIDEMIA: ICD-10-CM

## 2025-02-04 RX ORDER — ROSUVASTATIN CALCIUM 20 MG/1
20 TABLET, COATED ORAL DAILY
Qty: 90 TABLET | Refills: 1 | Status: SHIPPED | OUTPATIENT
Start: 2025-02-04 | End: 2025-02-10 | Stop reason: SDUPTHER

## 2025-02-10 DIAGNOSIS — E78.2 MIXED HYPERLIPIDEMIA: ICD-10-CM

## 2025-02-10 DIAGNOSIS — F41.1 GENERALIZED ANXIETY DISORDER: ICD-10-CM

## 2025-02-10 RX ORDER — SERTRALINE HYDROCHLORIDE 25 MG/1
25 TABLET, FILM COATED ORAL DAILY
Qty: 30 TABLET | Refills: 5 | Status: SHIPPED | OUTPATIENT
Start: 2025-02-10 | End: 2025-08-09

## 2025-02-10 RX ORDER — GABAPENTIN 300 MG/1
300 CAPSULE ORAL DAILY
Qty: 30 CAPSULE | Refills: 5 | Status: SHIPPED | OUTPATIENT
Start: 2025-02-10 | End: 2025-08-09

## 2025-02-10 RX ORDER — ROSUVASTATIN CALCIUM 20 MG/1
20 TABLET, COATED ORAL DAILY
Qty: 30 TABLET | Refills: 5 | Status: SHIPPED | OUTPATIENT
Start: 2025-02-10

## 2025-02-10 NOTE — TELEPHONE ENCOUNTER
Message sent via [email or web address].   have a new prescription plan. The old plan required I receive drugs in increments of 90 days. The new plan requires I receive them in increments of 30 days. I need you to please switch over my prescription refills from 90 days to 30 days. I need refills of gabapentin, sertraline 50 mg and sertraline 25 mg. I just refilled my Rosuvastatin so I'm good with that for now. Thank you!!

## 2025-03-30 DIAGNOSIS — F41.1 GENERALIZED ANXIETY DISORDER: ICD-10-CM

## 2025-04-06 DIAGNOSIS — F41.1 GENERALIZED ANXIETY DISORDER: ICD-10-CM

## 2025-04-08 RX ORDER — SERTRALINE HYDROCHLORIDE 25 MG/1
25 TABLET, FILM COATED ORAL DAILY
Qty: 30 TABLET | Refills: 0 | OUTPATIENT
Start: 2025-04-08 | End: 2025-10-05

## 2025-04-13 ENCOUNTER — PATIENT MESSAGE (OUTPATIENT)
Dept: FAMILY MEDICINE CLINIC | Facility: CLINIC | Age: 55
End: 2025-04-13

## 2025-04-14 NOTE — PATIENT COMMUNICATION
Patient called to find out why his sertraline 25mg was denied. Patient made aware that when it was sent to the pharmacy 02/10/25, he was given 5 refills. Patient will contact the pharmacy for the refill.

## 2025-05-27 DIAGNOSIS — F41.1 GENERALIZED ANXIETY DISORDER: ICD-10-CM

## 2025-06-19 ENCOUNTER — RA CDI HCC (OUTPATIENT)
Dept: OTHER | Facility: HOSPITAL | Age: 55
End: 2025-06-19

## 2025-06-25 ENCOUNTER — OFFICE VISIT (OUTPATIENT)
Dept: FAMILY MEDICINE CLINIC | Facility: CLINIC | Age: 55
End: 2025-06-25
Payer: COMMERCIAL

## 2025-06-25 VITALS
BODY MASS INDEX: 26.48 KG/M2 | OXYGEN SATURATION: 98 % | SYSTOLIC BLOOD PRESSURE: 124 MMHG | HEIGHT: 70 IN | HEART RATE: 84 BPM | WEIGHT: 185 LBS | DIASTOLIC BLOOD PRESSURE: 78 MMHG

## 2025-06-25 DIAGNOSIS — Z13.0 SCREENING FOR DEFICIENCY ANEMIA: ICD-10-CM

## 2025-06-25 DIAGNOSIS — F41.1 GENERALIZED ANXIETY DISORDER: ICD-10-CM

## 2025-06-25 DIAGNOSIS — Z13.220 SCREENING CHOLESTEROL LEVEL: ICD-10-CM

## 2025-06-25 DIAGNOSIS — R09.A2 GLOBUS SENSATION: ICD-10-CM

## 2025-06-25 DIAGNOSIS — Z12.11 SCREENING FOR COLON CANCER: ICD-10-CM

## 2025-06-25 DIAGNOSIS — Z00.00 ENCOUNTER FOR SCREENING AND PREVENTATIVE CARE: ICD-10-CM

## 2025-06-25 DIAGNOSIS — E78.00 HYPERCHOLESTEROLEMIA: Primary | ICD-10-CM

## 2025-06-25 DIAGNOSIS — Z13.1 SCREENING FOR DIABETES MELLITUS: ICD-10-CM

## 2025-06-25 DIAGNOSIS — Z12.5 SCREENING FOR MALIGNANT NEOPLASM OF PROSTATE: ICD-10-CM

## 2025-06-25 DIAGNOSIS — E78.2 MIXED HYPERLIPIDEMIA: ICD-10-CM

## 2025-06-25 DIAGNOSIS — F32.5 MAJOR DEPRESSIVE DISORDER WITH SINGLE EPISODE, IN FULL REMISSION (HCC): ICD-10-CM

## 2025-06-25 PROCEDURE — 99396 PREV VISIT EST AGE 40-64: CPT | Performed by: FAMILY MEDICINE

## 2025-06-25 RX ORDER — ROSUVASTATIN CALCIUM 20 MG/1
20 TABLET, COATED ORAL DAILY
Qty: 30 TABLET | Refills: 11 | Status: SHIPPED | OUTPATIENT
Start: 2025-06-25

## 2025-06-25 RX ORDER — GABAPENTIN 300 MG/1
300 CAPSULE ORAL DAILY
Qty: 30 CAPSULE | Refills: 11 | Status: SHIPPED | OUTPATIENT
Start: 2025-06-25 | End: 2026-06-20

## 2025-06-25 RX ORDER — SERTRALINE HYDROCHLORIDE 25 MG/1
25 TABLET, FILM COATED ORAL DAILY
Qty: 30 TABLET | Refills: 11 | Status: SHIPPED | OUTPATIENT
Start: 2025-06-25 | End: 2026-06-20

## 2025-06-25 NOTE — PATIENT INSTRUCTIONS
"Patient Education     Routine physical for adults   The Basics   Written by the doctors and editors at [facility]   What is a physical? -- A physical is a routine visit, or \"check-up,\" with your doctor. You might also hear it called a \"wellness visit\" or \"preventive visit.\"  During each visit, the doctor will:   Ask about your physical and mental health   Ask about your habits, behaviors, and lifestyle   Do an exam   Give you vaccines if needed   Talk to you about any medicines you take   Give advice about your health   Answer your questions  Getting regular check-ups is an important part of taking care of your health. It can help your doctor find and treat any problems you have. But it's also important for preventing health problems.  A routine physical is different from a \"sick visit.\" A sick visit is when you see a doctor because of a health concern or problem. Since physicals are scheduled ahead of time, you can think about what you want to ask the doctor.  How often should I get a physical? -- It depends on your age and health. In general, for people age 21 years and older:   If you are younger than 50 years, you might be able to get a physical every 3 years.   If you are 50 years or older, your doctor might recommend a physical every year.  If you have an ongoing health condition, like diabetes or high blood pressure, your doctor will probably want to see you more often.  What happens during a physical? -- In general, each visit will include:   Physical exam - The doctor or nurse will check your height, weight, heart rate, and blood pressure. They will also look at your eyes and ears. They will ask about how you are feeling and whether you have any symptoms that bother you.   Medicines - It's a good idea to bring a list of all the medicines you take to each doctor visit. Your doctor will talk to you about your medicines and answer any questions. Tell them if you are having any side effects that bother you. You " "should also tell them if you are having trouble paying for any of your medicines.   Habits and behaviors - This includes:   Your diet   Your exercise habits   Whether you smoke, drink alcohol, or use drugs   Whether you are sexually active   Whether you feel safe at home  Your doctor will talk to you about things you can do to improve your health and lower your risk of health problems. They will also offer help and support. For example, if you want to quit smoking, they can give you advice and might prescribe medicines. If you want to improve your diet or get more physical activity, they can help you with this, too.   Lab tests, if needed - The tests you get will depend on your age and situation. For example, your doctor might want to check your:   Cholesterol   Blood sugar   Iron level   Vaccines - The recommended vaccines will depend on your age, health, and what vaccines you already had. Vaccines are very important because they can prevent certain serious or deadly infections.   Discussion of screening - \"Screening\" means checking for diseases or other health problems before they cause symptoms. Your doctor can recommend screening based on your age, risk, and preferences. This might include tests to check for:   Cancer, such as breast, prostate, cervical, ovarian, colorectal, prostate, lung, or skin cancer   Sexually transmitted infections, such as chlamydia and gonorrhea   Mental health conditions like depression and anxiety  Your doctor will talk to you about the different types of screening tests. They can help you decide which screenings to have. They can also explain what the results might mean.   Answering questions - The physical is a good time to ask the doctor or nurse questions about your health. If needed, they can refer you to other doctors or specialists, too.  Adults older than 65 years often need other care, too. As you get older, your doctor will talk to you about:   How to prevent falling at " home   Hearing or vision tests   Memory testing   How to take your medicines safely   Making sure that you have the help and support you need at home  All topics are updated as new evidence becomes available and our peer review process is complete.  This topic retrieved from [employer] on: May 02, 2024.  Topic 486805 Version 1.0  Release: 32.4.3 - C32.122  © 2024 UpToDate, Inc. and/or its affiliates. All rights reserved.  Consumer Information Use and Disclaimer   Disclaimer: This generalized information is a limited summary of diagnosis, treatment, and/or medication information. It is not meant to be comprehensive and should be used as a tool to help the user understand and/or assess potential diagnostic and treatment options. It does NOT include all information about conditions, treatments, medications, side effects, or risks that may apply to a specific patient. It is not intended to be medical advice or a substitute for the medical advice, diagnosis, or treatment of a health care provider based on the health care provider's examination and assessment of a patient's specific and unique circumstances. Patients must speak with a health care provider for complete information about their health, medical questions, and treatment options, including any risks or benefits regarding use of medications. This information does not endorse any treatments or medications as safe, effective, or approved for treating a specific patient. UpToDate, Inc. and its affiliates disclaim any warranty or liability relating to this information or the use thereof.The use of this information is governed by the Terms of Use, available at https://www.[employer] Financialuwer.com/en/know/clinical-effectiveness-terms. 2024© UpToDate, Inc. and its affiliates and/or licensors. All rights reserved.  Copyright   © 2024 UpToDate, Inc. and/or its affiliates. All rights reserved.

## 2025-06-25 NOTE — ASSESSMENT & PLAN NOTE
Patient's cholesterol is within normal limits in 2024.  Stable.  Will continue Crestor 20 mg daily.  Orders:    rosuvastatin (CRESTOR) 20 MG tablet; Take 1 tablet (20 mg total) by mouth daily

## 2025-06-25 NOTE — PROGRESS NOTES
Adult Annual Physical  Name: Dat Harp      : 1970      MRN: [id number]  Encounter Provider: Derik Morgan DO  Encounter Date: 2025   Encounter department: [facility]    Assessment & Plan  Major depressive disorder with single episode, in full remission (HCC)  Generalized anxiety disorder  Stable.  Patient to continue with Neurontin 300 mg nightly and Zoloft 75 mg daily.  He feels very stable on current dosing of medication.  Will continue meds for the year at this time.  Medication sent for full year.  Orders:    gabapentin (NEURONTIN) 300 mg capsule; Take 1 capsule (300 mg total) by mouth daily    sertraline (ZOLOFT) 50 mg tablet; Take 1 tablet (50 mg total) by mouth daily At 9am    sertraline (ZOLOFT) 25 mg tablet; Take 1 tablet (25 mg total) by mouth daily    Encounter for screening and preventative care  Screening cholesterol level  Screening for diabetes mellitus  Screening for deficiency anemia  Screening for malignant neoplasm of prostate  Patient presents today for annual physical.  Will obtain labs to review for cholesterol, kidney function, liver function, electrolytes, fasting sugar, PSA, and blood counts.  Patient to obtain PSA for further review of possible prostate cancer.  Orders:    CBC and differential; Future    Comprehensive metabolic panel; Future    Lipid panel; Future    Cologuard; Future    PSA, Total Screen; Future    Hypercholesterolemia  Mixed hyperlipidemia  Patient's cholesterol is within normal limits in .  Stable.  Will continue Crestor 20 mg daily.  Orders:    rosuvastatin (CRESTOR) 20 MG tablet; Take 1 tablet (20 mg total) by mouth daily    Screening for colon cancer  Recommend colon cancer screening.  Went through pros and cons of both Cologuard and colonoscopy.  Patient is interested in continuing Cologuard screening.  Orders:    Cologuard; Future    Globus sensation  Patient has globus like sensation and chronic cough.  Therefore  I recommended that he start Claritin/antihistamine on a regular basis.  This is to decrease the postnasal drip and see if this is the cause of his chronic cough/globus sensation.  If there is any improvement, he should remain on the medication, if not may be LPR other causes.  Will need to continue to monitor and see what interventions would be most helpful.  If LPR is considered, Pepcid/PPI may be considered next.  Differential diagnosis includes lung pathology, infection, allergies, LPR, medication.  Unlikely medication due to lack of ACEi on medication list.           Immunizations:  - Immunizations due: Prevnar 20 and Zoster (Shingrix)         History of Present Illness     Adult Annual Physical:  Patient presents for annual physical. Patient presents today for annual physical.  He feels that he is well-controlled on current medications including gabapentin, Zoloft, and Crestor.  He has no questions or concerns on presentation today and overall feels well.     Diet and Physical Activity:  - Diet/Nutrition: no special diet, well balanced diet and consuming 3-5 servings of fruits/vegetables daily.  - Exercise: walking, 3-4 times a week on average and 30-60 minutes on average.    Depression Screening:    - PHQ-9 Score: 0    General Health:  - Sleep: sleeps well and snores loudly. attempted CPAP, was diagnosed positive, could not tolerate.  - Hearing: decreased hearing bilateral ears. I could benefit from hearing aids but they are cost prohibitive now.  - Vision: wears glasses and most recent eye exam > 1 year ago. sees doctor every two years  - Dental: regular dental visits and brushes teeth twice daily.    /GYN Health:  - Follows with GYN: no.   - History of STDs: no     Health:  - History of STDs: no.   - Urinary symptoms: none.     Advanced Care Planning:  - Has an advanced directive?: no    - Has a durable medical POA?: no      Review of Systems   Constitutional:  Negative for chills, fever and unexpected  "weight change.   HENT:  Positive for hearing loss. Negative for congestion, ear discharge, ear pain, postnasal drip, rhinorrhea, sinus pressure and sinus pain.    Eyes:  Positive for visual disturbance.   Respiratory:  Negative for cough, chest tightness and shortness of breath.    Cardiovascular:  Negative for chest pain and palpitations.   Gastrointestinal:  Negative for abdominal pain, blood in stool, constipation, diarrhea, nausea and vomiting.   Genitourinary:  Negative for dysuria and frequency.   Neurological:  Negative for dizziness, light-headedness and headaches.     Medical History Reviewed by provider this encounter:  Tobacco  Allergies  Meds  Problems  Surg Hx  Fam Hx  Soc Hx    .  Medications Ordered Prior to Encounter[1]   Social History[2]    Objective   /78   Pulse 84   Ht 5' 10\" (1.778 m)   Wt 83.9 kg (185 lb)   SpO2 98%   BMI 26.54 kg/m²     Physical Exam  Constitutional:       General: He is not in acute distress.     Appearance: Normal appearance. He is normal weight. He is not ill-appearing, toxic-appearing or diaphoretic.   HENT:      Head: Normocephalic and atraumatic.      Right Ear: Tympanic membrane, ear canal and external ear normal. There is no impacted cerumen.      Left Ear: Tympanic membrane, ear canal and external ear normal. There is no impacted cerumen.      Nose: Nose normal. No congestion or rhinorrhea.      Mouth/Throat:      Mouth: Mucous membranes are moist.      Pharynx: Oropharynx is clear. No oropharyngeal exudate or posterior oropharyngeal erythema.     Eyes:      General:         Right eye: No discharge.         Left eye: No discharge.      Pupils: Pupils are equal, round, and reactive to light.       Cardiovascular:      Rate and Rhythm: Normal rate and regular rhythm.      Heart sounds: Normal heart sounds. No murmur heard.     No friction rub. No gallop.   Pulmonary:      Effort: Pulmonary effort is normal. No respiratory distress.      Breath sounds: " Normal breath sounds. No stridor. No wheezing, rhonchi or rales.   Abdominal:      General: There is no distension.      Tenderness: There is no abdominal tenderness.     Musculoskeletal:      Cervical back: Neck supple. No tenderness.   Lymphadenopathy:      Cervical: No cervical adenopathy.     Skin:     General: Skin is warm.      Capillary Refill: Capillary refill takes less than 2 seconds.     Neurological:      Mental Status: He is alert.      Sensory: No sensory deficit (Light touch present all 4 extremities).      Motor: No weakness (5/5 in all 4 extremities).      Deep Tendon Reflexes: Reflexes normal (2/4, intact, C5, C6, L4, S1).              [1]   Current Outpatient Medications on File Prior to Visit   Medication Sig Dispense Refill    gabapentin (NEURONTIN) 300 mg capsule Take 1 capsule (300 mg total) by mouth daily 30 capsule 5    rosuvastatin (CRESTOR) 20 MG tablet Take 1 tablet (20 mg total) by mouth daily 30 tablet 5    sertraline (ZOLOFT) 25 mg tablet Take 1 tablet (25 mg total) by mouth daily 30 tablet 5    sertraline (ZOLOFT) 50 mg tablet TAKE 1 TABLET (50 MG TOTAL) BY MOUTH DAILY AT 9AM 30 tablet 1     No current facility-administered medications on file prior to visit.   [2]   Social History  Tobacco Use    Smoking status: Former     Types: Cigarettes    Smokeless tobacco: Never    Tobacco comments:     Quit 26 years ago, smoking for 2 year- 0.25ppd   Vaping Use    Vaping status: Never Used   Substance and Sexual Activity    Alcohol use: Not Currently     Comment: 1-2x month, 1-2 drinks    Drug use: No     Comment: Reports last use of any substance >20years ago    Sexual activity: Yes     Partners: Female     Birth control/protection: Condom Male

## 2025-06-25 NOTE — ASSESSMENT & PLAN NOTE
Stable.  Patient to continue with Neurontin 300 mg nightly and Zoloft 75 mg daily.  He feels very stable on current dosing of medication.  Will continue meds for the year at this time.  Medication sent for full year.  Orders:    gabapentin (NEURONTIN) 300 mg capsule; Take 1 capsule (300 mg total) by mouth daily    sertraline (ZOLOFT) 50 mg tablet; Take 1 tablet (50 mg total) by mouth daily At 9am    sertraline (ZOLOFT) 25 mg tablet; Take 1 tablet (25 mg total) by mouth daily

## 2025-08-05 LAB
ALBUMIN SERPL-MCNC: 4.4 G/DL (ref 3.6–5.1)
ALBUMIN/GLOB SERPL: 2.2 (CALC) (ref 1–2.5)
ALP SERPL-CCNC: 56 U/L (ref 35–144)
ALT SERPL-CCNC: 25 U/L (ref 9–46)
AST SERPL-CCNC: 20 U/L (ref 10–35)
BASOPHILS # BLD AUTO: 19 CELLS/UL (ref 0–200)
BASOPHILS NFR BLD AUTO: 0.4 %
BILIRUB SERPL-MCNC: 0.7 MG/DL (ref 0.2–1.2)
BUN SERPL-MCNC: 14 MG/DL (ref 7–25)
BUN/CREAT SERPL: NORMAL (CALC) (ref 6–22)
CALCIUM SERPL-MCNC: 8.9 MG/DL (ref 8.6–10.3)
CHLORIDE SERPL-SCNC: 106 MMOL/L (ref 98–110)
CHOLEST SERPL-MCNC: 139 MG/DL
CHOLEST/HDLC SERPL: 3.9 (CALC)
CO2 SERPL-SCNC: 28 MMOL/L (ref 20–32)
CREAT SERPL-MCNC: 0.73 MG/DL (ref 0.7–1.3)
EOSINOPHIL # BLD AUTO: 29 CELLS/UL (ref 15–500)
EOSINOPHIL NFR BLD AUTO: 0.6 %
ERYTHROCYTE [DISTWIDTH] IN BLOOD BY AUTOMATED COUNT: 13.1 % (ref 11–15)
GFR/BSA.PRED SERPLBLD CYS-BASED-ARV: 109 ML/MIN/1.73M2
GLOBULIN SER CALC-MCNC: 2 G/DL (CALC) (ref 1.9–3.7)
GLUCOSE SERPL-MCNC: 91 MG/DL (ref 65–99)
HCT VFR BLD AUTO: 42.6 % (ref 38.5–50)
HDLC SERPL-MCNC: 36 MG/DL
HGB BLD-MCNC: 13.9 G/DL (ref 13.2–17.1)
LDLC SERPL CALC-MCNC: 84 MG/DL (CALC)
LYMPHOCYTES # BLD AUTO: 1363 CELLS/UL (ref 850–3900)
LYMPHOCYTES NFR BLD AUTO: 28.4 %
MCH RBC QN AUTO: 31.5 PG (ref 27–33)
MCHC RBC AUTO-ENTMCNC: 32.6 G/DL (ref 32–36)
MCV RBC AUTO: 96.6 FL (ref 80–100)
MONOCYTES # BLD AUTO: 374 CELLS/UL (ref 200–950)
MONOCYTES NFR BLD AUTO: 7.8 %
NEUTROPHILS # BLD AUTO: 3014 CELLS/UL (ref 1500–7800)
NEUTROPHILS NFR BLD AUTO: 62.8 %
NONHDLC SERPL-MCNC: 103 MG/DL (CALC)
PLATELET # BLD AUTO: 139 THOUSAND/UL (ref 140–400)
PMV BLD REES-ECKER: 9.6 FL (ref 7.5–12.5)
POTASSIUM SERPL-SCNC: 4 MMOL/L (ref 3.5–5.3)
PROT SERPL-MCNC: 6.4 G/DL (ref 6.1–8.1)
PSA SERPL-MCNC: 0.65 NG/ML
RBC # BLD AUTO: 4.41 MILLION/UL (ref 4.2–5.8)
SODIUM SERPL-SCNC: 141 MMOL/L (ref 135–146)
TRIGL SERPL-MCNC: 95 MG/DL
WBC # BLD AUTO: 4.8 THOUSAND/UL (ref 3.8–10.8)

## (undated) DEVICE — CHLORHEXIDINE 4PCT 4 OZ

## (undated) DEVICE — STERILE SURGICAL LUBRICANT,  TUBE: Brand: SURGILUBE

## (undated) DEVICE — PACK TUR

## (undated) DEVICE — CATH URETERAL 5FR X 70 CM FLEX TIP POLYUR BARD

## (undated) DEVICE — UROCATCH BAG

## (undated) DEVICE — INVIEW CLEAR LEGGINGS: Brand: CONVERTORS

## (undated) DEVICE — GUIDEWIRE STRGHT TIP 0.035 IN  SOLO PLUS

## (undated) DEVICE — 200 MICRON SINGLE-USE HOLMIUM FIBER ASSEMBLY WITH FLAT TIP: Brand: OPTILITE

## (undated) DEVICE — BASKET STONE RTRVL ZERO TIP 2.4FR

## (undated) DEVICE — SHEATH URETERAL ACCESS 12/14FR 35CM PROXIS

## (undated) DEVICE — GLOVE SRG BIOGEL 7

## (undated) DEVICE — PREMIUM DRY TRAY LF: Brand: MEDLINE INDUSTRIES, INC.